# Patient Record
Sex: MALE | Race: WHITE | NOT HISPANIC OR LATINO | Employment: OTHER | URBAN - METROPOLITAN AREA
[De-identification: names, ages, dates, MRNs, and addresses within clinical notes are randomized per-mention and may not be internally consistent; named-entity substitution may affect disease eponyms.]

---

## 2021-06-04 ENCOUNTER — APPOINTMENT (OUTPATIENT)
Dept: RADIOLOGY | Facility: MEDICAL CENTER | Age: 66
End: 2021-06-04
Attending: EMERGENCY MEDICINE
Payer: MEDICARE

## 2021-06-04 ENCOUNTER — HOSPITAL ENCOUNTER (INPATIENT)
Facility: MEDICAL CENTER | Age: 66
LOS: 132 days | DRG: 884 | End: 2021-10-18
Attending: EMERGENCY MEDICINE | Admitting: STUDENT IN AN ORGANIZED HEALTH CARE EDUCATION/TRAINING PROGRAM
Payer: MEDICARE

## 2021-06-04 ENCOUNTER — HOSPITAL ENCOUNTER (OUTPATIENT)
Facility: MEDICAL CENTER | Age: 66
End: 2021-06-04
Attending: EMERGENCY MEDICINE | Admitting: INTERNAL MEDICINE
Payer: MEDICARE

## 2021-06-04 ENCOUNTER — APPOINTMENT (OUTPATIENT)
Dept: RADIOLOGY | Facility: MEDICAL CENTER | Age: 66
DRG: 884 | End: 2021-06-04
Attending: EMERGENCY MEDICINE
Payer: MEDICARE

## 2021-06-04 ENCOUNTER — APPOINTMENT (OUTPATIENT)
Dept: RADIOLOGY | Facility: MEDICAL CENTER | Age: 66
End: 2021-06-04
Attending: INTERNAL MEDICINE
Payer: MEDICARE

## 2021-06-04 VITALS
RESPIRATION RATE: 20 BRPM | HEIGHT: 72 IN | OXYGEN SATURATION: 93 % | HEART RATE: 98 BPM | SYSTOLIC BLOOD PRESSURE: 121 MMHG | WEIGHT: 209.66 LBS | TEMPERATURE: 97.7 F | DIASTOLIC BLOOD PRESSURE: 69 MMHG | BODY MASS INDEX: 28.4 KG/M2

## 2021-06-04 DIAGNOSIS — B35.1 ONYCHOMYCOSIS: ICD-10-CM

## 2021-06-04 DIAGNOSIS — R41.89 COGNITIVE IMPAIRMENT: ICD-10-CM

## 2021-06-04 DIAGNOSIS — E87.20 LACTIC ACIDOSIS: ICD-10-CM

## 2021-06-04 DIAGNOSIS — E86.0 DEHYDRATION: ICD-10-CM

## 2021-06-04 DIAGNOSIS — R41.82 ALTERED MENTAL STATUS, UNSPECIFIED ALTERED MENTAL STATUS TYPE: ICD-10-CM

## 2021-06-04 PROBLEM — R55 SYNCOPE: Status: ACTIVE | Noted: 2021-06-04

## 2021-06-04 PROBLEM — R79.89 ELEVATED LACTIC ACID LEVEL: Status: ACTIVE | Noted: 2021-06-04

## 2021-06-04 PROBLEM — N17.9 AKI (ACUTE KIDNEY INJURY) (HCC): Status: ACTIVE | Noted: 2021-06-04

## 2021-06-04 PROBLEM — E87.29 METABOLIC ACIDOSIS, INCREASED ANION GAP (IAG): Status: ACTIVE | Noted: 2021-06-04

## 2021-06-04 PROBLEM — E83.39 HYPOPHOSPHATEMIA: Status: ACTIVE | Noted: 2021-06-04

## 2021-06-04 PROBLEM — R00.0 TACHYCARDIA: Status: ACTIVE | Noted: 2021-06-04

## 2021-06-04 PROBLEM — E87.6 HYPOKALEMIA: Status: ACTIVE | Noted: 2021-06-04

## 2021-06-04 LAB
ALBUMIN SERPL BCP-MCNC: 4 G/DL (ref 3.2–4.9)
ALBUMIN SERPL BCP-MCNC: 4.3 G/DL (ref 3.2–4.9)
ALBUMIN/GLOB SERPL: 1.7 G/DL
ALBUMIN/GLOB SERPL: 1.7 G/DL
ALP SERPL-CCNC: 62 U/L (ref 30–99)
ALP SERPL-CCNC: 69 U/L (ref 30–99)
ALT SERPL-CCNC: 25 U/L (ref 2–50)
ALT SERPL-CCNC: 30 U/L (ref 2–50)
ANION GAP SERPL CALC-SCNC: 23 MMOL/L (ref 7–16)
ANION GAP SERPL CALC-SCNC: 25 MMOL/L (ref 7–16)
APPEARANCE UR: CLEAR
AST SERPL-CCNC: 29 U/L (ref 12–45)
AST SERPL-CCNC: 30 U/L (ref 12–45)
BACTERIA #/AREA URNS HPF: NEGATIVE /HPF
BASOPHILS # BLD AUTO: 0.4 % (ref 0–1.8)
BASOPHILS # BLD AUTO: 0.5 % (ref 0–1.8)
BASOPHILS # BLD: 0.03 K/UL (ref 0–0.12)
BASOPHILS # BLD: 0.04 K/UL (ref 0–0.12)
BILIRUB SERPL-MCNC: 0.8 MG/DL (ref 0.1–1.5)
BILIRUB SERPL-MCNC: 0.9 MG/DL (ref 0.1–1.5)
BILIRUB UR QL STRIP.AUTO: NEGATIVE
BUN SERPL-MCNC: 12 MG/DL (ref 8–22)
BUN SERPL-MCNC: 15 MG/DL (ref 8–22)
CALCIUM SERPL-MCNC: 9 MG/DL (ref 8.5–10.5)
CALCIUM SERPL-MCNC: 9.9 MG/DL (ref 8.5–10.5)
CHLORIDE SERPL-SCNC: 101 MMOL/L (ref 96–112)
CHLORIDE SERPL-SCNC: 111 MMOL/L (ref 96–112)
CK SERPL-CCNC: 336 U/L (ref 0–154)
CO2 SERPL-SCNC: 11 MMOL/L (ref 20–33)
CO2 SERPL-SCNC: 14 MMOL/L (ref 20–33)
COLOR UR: YELLOW
CREAT SERPL-MCNC: 1.31 MG/DL (ref 0.5–1.4)
CREAT SERPL-MCNC: 1.48 MG/DL (ref 0.5–1.4)
CRP SERPL HS-MCNC: <0.3 MG/DL (ref 0–0.75)
D DIMER PPP IA.FEU-MCNC: 1.34 UG/ML (FEU) (ref 0–0.5)
EKG IMPRESSION: NORMAL
EOSINOPHIL # BLD AUTO: 0.03 K/UL (ref 0–0.51)
EOSINOPHIL # BLD AUTO: 0.1 K/UL (ref 0–0.51)
EOSINOPHIL NFR BLD: 0.4 % (ref 0–6.9)
EOSINOPHIL NFR BLD: 1.5 % (ref 0–6.9)
EPI CELLS #/AREA URNS HPF: NEGATIVE /HPF
ERYTHROCYTE [DISTWIDTH] IN BLOOD BY AUTOMATED COUNT: 50.6 FL (ref 35.9–50)
ERYTHROCYTE [DISTWIDTH] IN BLOOD BY AUTOMATED COUNT: 51.2 FL (ref 35.9–50)
ERYTHROCYTE [SEDIMENTATION RATE] IN BLOOD BY WESTERGREN METHOD: 10 MM/HOUR (ref 0–20)
ERYTHROCYTE [SEDIMENTATION RATE] IN BLOOD BY WESTERGREN METHOD: 8 MM/HOUR (ref 0–20)
EST. AVERAGE GLUCOSE BLD GHB EST-MCNC: 94 MG/DL
ETHANOL BLD-MCNC: <10.1 MG/DL (ref 0–10)
FLUAV RNA SPEC QL NAA+PROBE: NEGATIVE
FLUBV RNA SPEC QL NAA+PROBE: NEGATIVE
GLOBULIN SER CALC-MCNC: 2.3 G/DL (ref 1.9–3.5)
GLOBULIN SER CALC-MCNC: 2.5 G/DL (ref 1.9–3.5)
GLUCOSE BLD-MCNC: 87 MG/DL (ref 65–99)
GLUCOSE SERPL-MCNC: 67 MG/DL (ref 65–99)
GLUCOSE SERPL-MCNC: 97 MG/DL (ref 65–99)
GLUCOSE UR STRIP.AUTO-MCNC: NEGATIVE MG/DL
HBA1C MFR BLD: 4.9 % (ref 4–5.6)
HCT VFR BLD AUTO: 38.5 % (ref 42–52)
HCT VFR BLD AUTO: 40.6 % (ref 42–52)
HGB BLD-MCNC: 12.9 G/DL (ref 14–18)
HGB BLD-MCNC: 13.5 G/DL (ref 14–18)
HYALINE CASTS #/AREA URNS LPF: ABNORMAL /LPF
IMM GRANULOCYTES # BLD AUTO: 0.02 K/UL (ref 0–0.11)
IMM GRANULOCYTES # BLD AUTO: 0.03 K/UL (ref 0–0.11)
IMM GRANULOCYTES NFR BLD AUTO: 0.3 % (ref 0–0.9)
IMM GRANULOCYTES NFR BLD AUTO: 0.4 % (ref 0–0.9)
INR PPP: 1.29 (ref 0.87–1.13)
KETONES UR STRIP.AUTO-MCNC: >=160 MG/DL
LACTATE BLD-SCNC: 1.7 MMOL/L (ref 0.5–2)
LACTATE BLD-SCNC: 2.6 MMOL/L (ref 0.5–2)
LACTATE BLD-SCNC: 2.7 MMOL/L (ref 0.5–2)
LACTATE BLD-SCNC: 3 MMOL/L (ref 0.5–2)
LEUKOCYTE ESTERASE UR QL STRIP.AUTO: NEGATIVE
LYMPHOCYTES # BLD AUTO: 0.81 K/UL (ref 1–4.8)
LYMPHOCYTES # BLD AUTO: 1 K/UL (ref 1–4.8)
LYMPHOCYTES NFR BLD: 14.6 % (ref 22–41)
LYMPHOCYTES NFR BLD: 9.7 % (ref 22–41)
MAGNESIUM SERPL-MCNC: 1.9 MG/DL (ref 1.5–2.5)
MCH RBC QN AUTO: 32.5 PG (ref 27–33)
MCH RBC QN AUTO: 32.7 PG (ref 27–33)
MCHC RBC AUTO-ENTMCNC: 33.3 G/DL (ref 33.7–35.3)
MCHC RBC AUTO-ENTMCNC: 33.5 G/DL (ref 33.7–35.3)
MCV RBC AUTO: 97.5 FL (ref 81.4–97.8)
MCV RBC AUTO: 97.8 FL (ref 81.4–97.8)
MICRO URNS: ABNORMAL
MONOCYTES # BLD AUTO: 0.62 K/UL (ref 0–0.85)
MONOCYTES # BLD AUTO: 0.69 K/UL (ref 0–0.85)
MONOCYTES NFR BLD AUTO: 8.3 % (ref 0–13.4)
MONOCYTES NFR BLD AUTO: 9 % (ref 0–13.4)
NEUTROPHILS # BLD AUTO: 5.1 K/UL (ref 1.82–7.42)
NEUTROPHILS # BLD AUTO: 6.71 K/UL (ref 1.82–7.42)
NEUTROPHILS NFR BLD: 74.2 % (ref 44–72)
NEUTROPHILS NFR BLD: 80.7 % (ref 44–72)
NITRITE UR QL STRIP.AUTO: NEGATIVE
NRBC # BLD AUTO: 0 K/UL
NRBC # BLD AUTO: 0 K/UL
NRBC BLD-RTO: 0 /100 WBC
NRBC BLD-RTO: 0 /100 WBC
NT-PROBNP SERPL IA-MCNC: 98 PG/ML (ref 0–125)
PH UR STRIP.AUTO: 5 [PH] (ref 5–8)
PHOSPHATE SERPL-MCNC: 2.4 MG/DL (ref 2.5–4.5)
PLATELET # BLD AUTO: 211 K/UL (ref 164–446)
PLATELET # BLD AUTO: 215 K/UL (ref 164–446)
PMV BLD AUTO: 9.7 FL (ref 9–12.9)
PMV BLD AUTO: 9.8 FL (ref 9–12.9)
POTASSIUM SERPL-SCNC: 3.4 MMOL/L (ref 3.6–5.5)
POTASSIUM SERPL-SCNC: 3.6 MMOL/L (ref 3.6–5.5)
PROCALCITONIN SERPL-MCNC: <0.05 NG/ML
PROT SERPL-MCNC: 6.3 G/DL (ref 6–8.2)
PROT SERPL-MCNC: 6.8 G/DL (ref 6–8.2)
PROT UR QL STRIP: NEGATIVE MG/DL
PROTHROMBIN TIME: 16.5 SEC (ref 12–14.6)
RBC # BLD AUTO: 3.95 M/UL (ref 4.7–6.1)
RBC # BLD AUTO: 4.15 M/UL (ref 4.7–6.1)
RBC # URNS HPF: ABNORMAL /HPF
RBC UR QL AUTO: ABNORMAL
RSV RNA SPEC QL NAA+PROBE: NEGATIVE
SARS-COV-2 RNA RESP QL NAA+PROBE: NOTDETECTED
SODIUM SERPL-SCNC: 140 MMOL/L (ref 135–145)
SODIUM SERPL-SCNC: 145 MMOL/L (ref 135–145)
SP GR UR STRIP.AUTO: 1.01
SPECIMEN SOURCE: NORMAL
TROPONIN T SERPL-MCNC: 15 NG/L (ref 6–19)
TROPONIN T SERPL-MCNC: 20 NG/L (ref 6–19)
TSH SERPL DL<=0.005 MIU/L-ACNC: 2.42 UIU/ML (ref 0.38–5.33)
UROBILINOGEN UR STRIP.AUTO-MCNC: 0.2 MG/DL
WBC # BLD AUTO: 6.9 K/UL (ref 4.8–10.8)
WBC # BLD AUTO: 8.3 K/UL (ref 4.8–10.8)
WBC #/AREA URNS HPF: ABNORMAL /HPF

## 2021-06-04 PROCEDURE — 85610 PROTHROMBIN TIME: CPT

## 2021-06-04 PROCEDURE — 83735 ASSAY OF MAGNESIUM: CPT

## 2021-06-04 PROCEDURE — 93005 ELECTROCARDIOGRAM TRACING: CPT | Performed by: EMERGENCY MEDICINE

## 2021-06-04 PROCEDURE — 86140 C-REACTIVE PROTEIN: CPT

## 2021-06-04 PROCEDURE — 80053 COMPREHEN METABOLIC PANEL: CPT

## 2021-06-04 PROCEDURE — 82077 ASSAY SPEC XCP UR&BREATH IA: CPT

## 2021-06-04 PROCEDURE — A9270 NON-COVERED ITEM OR SERVICE: HCPCS | Performed by: STUDENT IN AN ORGANIZED HEALTH CARE EDUCATION/TRAINING PROGRAM

## 2021-06-04 PROCEDURE — 83605 ASSAY OF LACTIC ACID: CPT | Mod: 91

## 2021-06-04 PROCEDURE — 85379 FIBRIN DEGRADATION QUANT: CPT

## 2021-06-04 PROCEDURE — 99219 PR INITIAL OBSERVATION CARE,LEVL II: CPT | Performed by: STUDENT IN AN ORGANIZED HEALTH CARE EDUCATION/TRAINING PROGRAM

## 2021-06-04 PROCEDURE — 81001 URINALYSIS AUTO W/SCOPE: CPT

## 2021-06-04 PROCEDURE — 85652 RBC SED RATE AUTOMATED: CPT | Mod: 91

## 2021-06-04 PROCEDURE — 83520 IMMUNOASSAY QUANT NOS NONAB: CPT

## 2021-06-04 PROCEDURE — 700102 HCHG RX REV CODE 250 W/ 637 OVERRIDE(OP): Performed by: STUDENT IN AN ORGANIZED HEALTH CARE EDUCATION/TRAINING PROGRAM

## 2021-06-04 PROCEDURE — A9270 NON-COVERED ITEM OR SERVICE: HCPCS | Performed by: INTERNAL MEDICINE

## 2021-06-04 PROCEDURE — G0378 HOSPITAL OBSERVATION PER HR: HCPCS

## 2021-06-04 PROCEDURE — 70450 CT HEAD/BRAIN W/O DYE: CPT

## 2021-06-04 PROCEDURE — 84443 ASSAY THYROID STIM HORMONE: CPT

## 2021-06-04 PROCEDURE — 84484 ASSAY OF TROPONIN QUANT: CPT | Mod: 91

## 2021-06-04 PROCEDURE — 84145 PROCALCITONIN (PCT): CPT

## 2021-06-04 PROCEDURE — 82550 ASSAY OF CK (CPK): CPT

## 2021-06-04 PROCEDURE — 93005 ELECTROCARDIOGRAM TRACING: CPT

## 2021-06-04 PROCEDURE — 0241U HCHG SARS-COV-2 COVID-19 NFCT DS RESP RNA 4 TRGT MIC: CPT

## 2021-06-04 PROCEDURE — 84484 ASSAY OF TROPONIN QUANT: CPT

## 2021-06-04 PROCEDURE — 700105 HCHG RX REV CODE 258: Performed by: EMERGENCY MEDICINE

## 2021-06-04 PROCEDURE — 81003 URINALYSIS AUTO W/O SCOPE: CPT | Mod: XU

## 2021-06-04 PROCEDURE — 83036 HEMOGLOBIN GLYCOSYLATED A1C: CPT

## 2021-06-04 PROCEDURE — 85025 COMPLETE CBC W/AUTO DIFF WBC: CPT

## 2021-06-04 PROCEDURE — 83880 ASSAY OF NATRIURETIC PEPTIDE: CPT

## 2021-06-04 PROCEDURE — 700102 HCHG RX REV CODE 250 W/ 637 OVERRIDE(OP): Performed by: INTERNAL MEDICINE

## 2021-06-04 PROCEDURE — 71045 X-RAY EXAM CHEST 1 VIEW: CPT

## 2021-06-04 PROCEDURE — 85025 COMPLETE CBC W/AUTO DIFF WBC: CPT | Mod: 91

## 2021-06-04 PROCEDURE — 87086 URINE CULTURE/COLONY COUNT: CPT

## 2021-06-04 PROCEDURE — 83605 ASSAY OF LACTIC ACID: CPT

## 2021-06-04 PROCEDURE — 700105 HCHG RX REV CODE 258: Performed by: STUDENT IN AN ORGANIZED HEALTH CARE EDUCATION/TRAINING PROGRAM

## 2021-06-04 PROCEDURE — 80053 COMPREHEN METABOLIC PANEL: CPT | Mod: 91

## 2021-06-04 PROCEDURE — 36415 COLL VENOUS BLD VENIPUNCTURE: CPT

## 2021-06-04 PROCEDURE — 87040 BLOOD CULTURE FOR BACTERIA: CPT

## 2021-06-04 PROCEDURE — 99220 PR INITIAL OBSERVATION CARE,LEVL III: CPT | Performed by: INTERNAL MEDICINE

## 2021-06-04 PROCEDURE — 700111 HCHG RX REV CODE 636 W/ 250 OVERRIDE (IP): Performed by: STUDENT IN AN ORGANIZED HEALTH CARE EDUCATION/TRAINING PROGRAM

## 2021-06-04 PROCEDURE — 306565 RIGID MIT RESTRAINT(PAIR): Performed by: STUDENT IN AN ORGANIZED HEALTH CARE EDUCATION/TRAINING PROGRAM

## 2021-06-04 PROCEDURE — 99285 EMERGENCY DEPT VISIT HI MDM: CPT

## 2021-06-04 PROCEDURE — 82962 GLUCOSE BLOOD TEST: CPT

## 2021-06-04 PROCEDURE — 71046 X-RAY EXAM CHEST 2 VIEWS: CPT

## 2021-06-04 PROCEDURE — 84100 ASSAY OF PHOSPHORUS: CPT

## 2021-06-04 PROCEDURE — C9803 HOPD COVID-19 SPEC COLLECT: HCPCS | Performed by: INTERNAL MEDICINE

## 2021-06-04 RX ORDER — ONDANSETRON 4 MG/1
4 TABLET, ORALLY DISINTEGRATING ORAL EVERY 4 HOURS PRN
Status: DISCONTINUED | OUTPATIENT
Start: 2021-06-04 | End: 2021-10-07

## 2021-06-04 RX ORDER — ASPIRIN 81 MG/1
324 TABLET, CHEWABLE ORAL DAILY
Status: DISCONTINUED | OUTPATIENT
Start: 2021-06-05 | End: 2021-07-24

## 2021-06-04 RX ORDER — BISACODYL 10 MG
10 SUPPOSITORY, RECTAL RECTAL
Status: DISCONTINUED | OUTPATIENT
Start: 2021-06-04 | End: 2021-06-04

## 2021-06-04 RX ORDER — BISACODYL 10 MG
10 SUPPOSITORY, RECTAL RECTAL
Status: DISCONTINUED | OUTPATIENT
Start: 2021-06-04 | End: 2021-06-04 | Stop reason: HOSPADM

## 2021-06-04 RX ORDER — LABETALOL HYDROCHLORIDE 5 MG/ML
10 INJECTION, SOLUTION INTRAVENOUS EVERY 4 HOURS PRN
Status: DISCONTINUED | OUTPATIENT
Start: 2021-06-04 | End: 2021-06-04 | Stop reason: HOSPADM

## 2021-06-04 RX ORDER — POLYETHYLENE GLYCOL 3350 17 G/17G
1 POWDER, FOR SOLUTION ORAL
Status: DISCONTINUED | OUTPATIENT
Start: 2021-06-04 | End: 2021-06-04

## 2021-06-04 RX ORDER — AMOXICILLIN 250 MG
2 CAPSULE ORAL 2 TIMES DAILY
Status: DISCONTINUED | OUTPATIENT
Start: 2021-06-04 | End: 2021-06-04 | Stop reason: HOSPADM

## 2021-06-04 RX ORDER — ASPIRIN 300 MG/1
300 SUPPOSITORY RECTAL DAILY
Status: DISCONTINUED | OUTPATIENT
Start: 2021-06-05 | End: 2021-07-24

## 2021-06-04 RX ORDER — SODIUM CHLORIDE 9 MG/ML
1000 INJECTION, SOLUTION INTRAVENOUS ONCE
Status: COMPLETED | OUTPATIENT
Start: 2021-06-04 | End: 2021-06-04

## 2021-06-04 RX ORDER — ONDANSETRON 2 MG/ML
4 INJECTION INTRAMUSCULAR; INTRAVENOUS EVERY 4 HOURS PRN
Status: DISCONTINUED | OUTPATIENT
Start: 2021-06-04 | End: 2021-06-04 | Stop reason: HOSPADM

## 2021-06-04 RX ORDER — ACETAMINOPHEN 325 MG/1
650 TABLET ORAL EVERY 6 HOURS PRN
Status: DISCONTINUED | OUTPATIENT
Start: 2021-06-04 | End: 2021-06-04 | Stop reason: HOSPADM

## 2021-06-04 RX ORDER — REGADENOSON 0.08 MG/ML
0.4 INJECTION, SOLUTION INTRAVENOUS
Status: DISCONTINUED | OUTPATIENT
Start: 2021-06-04 | End: 2021-07-08

## 2021-06-04 RX ORDER — ONDANSETRON 2 MG/ML
4 INJECTION INTRAMUSCULAR; INTRAVENOUS EVERY 4 HOURS PRN
Status: DISCONTINUED | OUTPATIENT
Start: 2021-06-04 | End: 2021-07-31

## 2021-06-04 RX ORDER — AMOXICILLIN 250 MG
2 CAPSULE ORAL 2 TIMES DAILY
Status: DISCONTINUED | OUTPATIENT
Start: 2021-06-04 | End: 2021-06-04

## 2021-06-04 RX ORDER — POLYETHYLENE GLYCOL 3350 17 G/17G
1 POWDER, FOR SOLUTION ORAL
Status: DISCONTINUED | OUTPATIENT
Start: 2021-06-04 | End: 2021-06-04 | Stop reason: HOSPADM

## 2021-06-04 RX ORDER — LABETALOL HYDROCHLORIDE 5 MG/ML
10 INJECTION, SOLUTION INTRAVENOUS EVERY 4 HOURS PRN
Status: DISCONTINUED | OUTPATIENT
Start: 2021-06-04 | End: 2021-07-08

## 2021-06-04 RX ORDER — POLYETHYLENE GLYCOL 3350 17 G/17G
1 POWDER, FOR SOLUTION ORAL
Status: DISCONTINUED | OUTPATIENT
Start: 2021-06-04 | End: 2021-08-30

## 2021-06-04 RX ORDER — AMOXICILLIN 250 MG
2 CAPSULE ORAL 2 TIMES DAILY
Status: DISCONTINUED | OUTPATIENT
Start: 2021-06-04 | End: 2021-08-30

## 2021-06-04 RX ORDER — HALOPERIDOL 5 MG/ML
2-5 INJECTION INTRAMUSCULAR EVERY 4 HOURS PRN
Status: DISCONTINUED | OUTPATIENT
Start: 2021-06-04 | End: 2021-07-08

## 2021-06-04 RX ORDER — ACETAMINOPHEN 325 MG/1
650 TABLET ORAL EVERY 6 HOURS PRN
Status: DISCONTINUED | OUTPATIENT
Start: 2021-06-04 | End: 2021-06-04

## 2021-06-04 RX ORDER — ONDANSETRON 4 MG/1
4 TABLET, ORALLY DISINTEGRATING ORAL EVERY 4 HOURS PRN
Status: DISCONTINUED | OUTPATIENT
Start: 2021-06-04 | End: 2021-06-04 | Stop reason: HOSPADM

## 2021-06-04 RX ORDER — BISACODYL 10 MG
10 SUPPOSITORY, RECTAL RECTAL
Status: DISCONTINUED | OUTPATIENT
Start: 2021-06-04 | End: 2021-08-30

## 2021-06-04 RX ORDER — SODIUM CHLORIDE, SODIUM LACTATE, POTASSIUM CHLORIDE, CALCIUM CHLORIDE 600; 310; 30; 20 MG/100ML; MG/100ML; MG/100ML; MG/100ML
INJECTION, SOLUTION INTRAVENOUS CONTINUOUS
Status: DISCONTINUED | OUTPATIENT
Start: 2021-06-04 | End: 2021-06-04

## 2021-06-04 RX ORDER — AMINOPHYLLINE 25 MG/ML
100 INJECTION, SOLUTION INTRAVENOUS
Status: DISCONTINUED | OUTPATIENT
Start: 2021-06-04 | End: 2021-07-08

## 2021-06-04 RX ORDER — ENALAPRILAT 1.25 MG/ML
1.25 INJECTION INTRAVENOUS EVERY 6 HOURS PRN
Status: DISCONTINUED | OUTPATIENT
Start: 2021-06-04 | End: 2021-06-04 | Stop reason: HOSPADM

## 2021-06-04 RX ORDER — ENALAPRILAT 1.25 MG/ML
1.25 INJECTION INTRAVENOUS EVERY 6 HOURS PRN
Status: DISCONTINUED | OUTPATIENT
Start: 2021-06-04 | End: 2021-07-08

## 2021-06-04 RX ORDER — SODIUM CHLORIDE, SODIUM LACTATE, POTASSIUM CHLORIDE, CALCIUM CHLORIDE 600; 310; 30; 20 MG/100ML; MG/100ML; MG/100ML; MG/100ML
INJECTION, SOLUTION INTRAVENOUS CONTINUOUS
Status: DISCONTINUED | OUTPATIENT
Start: 2021-06-04 | End: 2021-06-05

## 2021-06-04 RX ORDER — ASPIRIN 325 MG
325 TABLET ORAL DAILY
Status: DISCONTINUED | OUTPATIENT
Start: 2021-06-05 | End: 2021-07-24

## 2021-06-04 RX ORDER — ACETAMINOPHEN 325 MG/1
650 TABLET ORAL EVERY 6 HOURS PRN
Status: DISCONTINUED | OUTPATIENT
Start: 2021-06-04 | End: 2021-10-18 | Stop reason: HOSPADM

## 2021-06-04 RX ADMIN — SODIUM CHLORIDE 1000 ML: 9 INJECTION, SOLUTION INTRAVENOUS at 18:27

## 2021-06-04 RX ADMIN — HALOPERIDOL LACTATE 5 MG: 5 INJECTION, SOLUTION INTRAMUSCULAR at 22:50

## 2021-06-04 RX ADMIN — DIBASIC SODIUM PHOSPHATE, MONOBASIC POTASSIUM PHOSPHATE AND MONOBASIC SODIUM PHOSPHATE 250 MG: 852; 155; 130 TABLET ORAL at 12:40

## 2021-06-04 RX ADMIN — DOCUSATE SODIUM 50 MG AND SENNOSIDES 8.6 MG 2 TABLET: 8.6; 5 TABLET, FILM COATED ORAL at 21:24

## 2021-06-04 RX ADMIN — SODIUM CHLORIDE, POTASSIUM CHLORIDE, SODIUM LACTATE AND CALCIUM CHLORIDE: 600; 310; 30; 20 INJECTION, SOLUTION INTRAVENOUS at 21:26

## 2021-06-04 RX ADMIN — SODIUM CHLORIDE 1000 ML: 9 INJECTION, SOLUTION INTRAVENOUS at 09:45

## 2021-06-04 RX ADMIN — SODIUM CHLORIDE 1000 ML: 9 INJECTION, SOLUTION INTRAVENOUS at 18:26

## 2021-06-04 RX ADMIN — SODIUM CHLORIDE 1000 ML: 9 INJECTION, SOLUTION INTRAVENOUS at 11:11

## 2021-06-04 RX ADMIN — DIBASIC SODIUM PHOSPHATE, MONOBASIC POTASSIUM PHOSPHATE AND MONOBASIC SODIUM PHOSPHATE 250 MG: 852; 155; 130 TABLET ORAL at 21:25

## 2021-06-04 ASSESSMENT — COGNITIVE AND FUNCTIONAL STATUS - GENERAL
PERSONAL GROOMING: A LITTLE
STANDING UP FROM CHAIR USING ARMS: A LITTLE
EATING MEALS: A LITTLE
SUGGESTED CMS G CODE MODIFIER MOBILITY: CK
MOVING TO AND FROM BED TO CHAIR: A LITTLE
DRESSING REGULAR LOWER BODY CLOTHING: A LITTLE
MOBILITY SCORE: 17
HELP NEEDED FOR BATHING: A LITTLE
TURNING FROM BACK TO SIDE WHILE IN FLAT BAD: A LITTLE
MOVING FROM LYING ON BACK TO SITTING ON SIDE OF FLAT BED: A LITTLE
WALKING IN HOSPITAL ROOM: A LITTLE
CLIMB 3 TO 5 STEPS WITH RAILING: A LOT
SUGGESTED CMS G CODE MODIFIER DAILY ACTIVITY: CK
DAILY ACTIVITIY SCORE: 18
TOILETING: A LITTLE
DRESSING REGULAR UPPER BODY CLOTHING: A LITTLE

## 2021-06-04 ASSESSMENT — LIFESTYLE VARIABLES
TOTAL SCORE: 0
HAVE YOU EVER FELT YOU SHOULD CUT DOWN ON YOUR DRINKING: NO
TOTAL SCORE: 0
TOTAL SCORE: 0
ALCOHOL_USE: NO
DO YOU DRINK ALCOHOL: NO
HOW MANY TIMES IN THE PAST YEAR HAVE YOU HAD 5 OR MORE DRINKS IN A DAY: 0
HAVE PEOPLE ANNOYED YOU BY CRITICIZING YOUR DRINKING: NO
AVERAGE NUMBER OF DAYS PER WEEK YOU HAVE A DRINK CONTAINING ALCOHOL: 0
ON A TYPICAL DAY WHEN YOU DRINK ALCOHOL HOW MANY DRINKS DO YOU HAVE: 0
EVER HAD A DRINK FIRST THING IN THE MORNING TO STEADY YOUR NERVES TO GET RID OF A HANGOVER: NO
EVER FELT BAD OR GUILTY ABOUT YOUR DRINKING: NO
CONSUMPTION TOTAL: NEGATIVE

## 2021-06-04 ASSESSMENT — PAIN DESCRIPTION - PAIN TYPE
TYPE: ACUTE PAIN
TYPE: ACUTE PAIN

## 2021-06-04 ASSESSMENT — ENCOUNTER SYMPTOMS
PALPITATIONS: 1
FALLS: 1
WEAKNESS: 1
DIZZINESS: 1
LOSS OF CONSCIOUSNESS: 1

## 2021-06-04 ASSESSMENT — PATIENT HEALTH QUESTIONNAIRE - PHQ9
2. FEELING DOWN, DEPRESSED, IRRITABLE, OR HOPELESS: NOT AT ALL
1. LITTLE INTEREST OR PLEASURE IN DOING THINGS: NOT AT ALL
SUM OF ALL RESPONSES TO PHQ9 QUESTIONS 1 AND 2: 0

## 2021-06-04 ASSESSMENT — FIBROSIS 4 INDEX
FIB4 SCORE: 1.67
FIB4 SCORE: 1.93
FIB4 SCORE: 1.67

## 2021-06-04 NOTE — ED TRIAGE NOTES
BIBA with report of ALOC.  Pt discharged AMA from CDU earlier today.  Pt found lying on sidewalk with multiple witnessed falls and confusion.  Pt oriented to name only at this time.  Pt moving all extremities, no unilateral deficits noted.  Pt talking nonsensical conversation.  EMS reports initially tachycardic in 140's and dressed in winter clothing.  Given 500ml IVF, cardiac monitor now showing ST at 109.

## 2021-06-04 NOTE — DISCHARGE SUMMARY
"Discharge Summary    CHIEF COMPLAINT ON ADMISSION  Chief Complaint   Patient presents with   • Dizziness   • Syncope     Near       Reason for Admission  ems     Admission Date  6/4/2021    CODE STATUS  Full Code    HPI & HOSPITAL COURSE  This is a 66 y.o. male with no known past medical history, who presented 6/4/2021 with multiple random episodes of lightheadedness and dizziness over the last few weeks, which he says most often occurs when he bends down to  something, with sensation of passing out, and few episodes of loss of consciousness finding himself on the floor. He had no other complaints such as chest pain, shortness of breath, palpitations, nausea, vomiting, fevers or chills, abdominal pain, or bowel movement changes.  He had no associated neurologic symptoms such as focal weakness or numbness, speech changes, or visual field defects.  He does say that he thinks he eats and drinks adequately, and denies using alcohol, tobacco, or recreational drug use.  He does report severe anxiety when he tries to \"step on a curb\", which is better when he goes around the curb instead.  Notably, he is a poor historian, with very odd affect, and is very tangential and circumstantial.  He states he lives in a hotel. Today, he was walking towards a construction site, when a vacuum canister that he was holding fell to the ground, and as he bent down to pick it up, he suddenly felt lightheaded, weak, and subsequently lost consciousness.  EMS found him with a blood sugar of 69, and given D10, thiamine, which improved his blood sugar to 188.  He was then brought to the ED.     The patient was initially evaluated.  Vital signs were stable, albeit blood pressure elevated.  Tachycardic.  Orthostatic vital signs were positive. Initial blood work-up showed no leukocytosis, with stable hemoglobin, potassium of 3.4, CO2 14, anion gap 25, creatinine of 1.48, and blood glucose of 97.  Magnesium was 1.9.  Lactate was 3.0.  " Phosphate level was 2.4.  Troponin was negative.  BNP was low.  Chest x-ray did not show any infiltrates or consolidation.  Head CT showed nothing acute.  EKG showed sinus tachycardia, with T wave inversions in the inferior lateral leads.  He was not complaining of any chest pains.   He was felt to be dehydrated, and patient was given 2 liters of IV normal saline, and admitted for further evaluation.  However, patient then decided to leave AGAINST MEDICAL ADVICE.  Patient was counseled regarding risks that he is taking by leaving AGAINST MEDICAL ADVICE, including increased morbidity from undiagnosed and untreated condition, and even death.  Although patient has odd behavior and thought process, patient understood, and accepted these risks.  He has full capacity to make informed decisions, and could not be held against his will. Despite our best effort to convince him to stay, he decided to leave.  He was counseled to follow-up with his primary physician, or to return to ED for worsening condition.    Therefore, he is discharged in fair and stable condition against medcial advice.      Discharge Date  6/4/2021      FOLLOW UP ITEMS POST DISCHARGE  --- counseled to seek immediate medical attention, or return to the ED for recurrent or worsening symptoms.  He is counseled to keep himself well-hydrated.      DISCHARGE DIAGNOSES  Principal Problem:    Syncope POA: Yes  Active Problems:    Hypokalemia POA: Yes    Hypophosphatemia POA: Yes    Metabolic acidosis, increased anion gap (IAG) POA: Yes    LANDY (acute kidney injury) (HCC) POA: Yes  Resolved Problems:    * No resolved hospital problems. *      FOLLOW UP  No future appointments.  No follow-up provider specified.    MEDICATIONS ON DISCHARGE     Medication List      You have not been prescribed any medications.         Allergies  No Known Allergies    DIET  Orders Placed This Encounter   Procedures   • Diet Order Diet: Regular; Miscellaneous modifications: (optional):  No Decaf, No Caffeine(for test)     Standing Status:   Standing     Number of Occurrences:   1     Order Specific Question:   Diet:     Answer:   Regular [1]     Order Specific Question:   Miscellaneous modifications: (optional)     Answer:   No Decaf, No Caffeine(for test) [11]       ACTIVITY  As tolerated.  Weight bearing as tolerated    CONSULTATIONS  none    PROCEDURES  none    LABORATORY  Lab Results   Component Value Date    SODIUM 140 06/04/2021    POTASSIUM 3.4 (L) 06/04/2021    CHLORIDE 101 06/04/2021    CO2 14 (L) 06/04/2021    GLUCOSE 97 06/04/2021    BUN 15 06/04/2021    CREATININE 1.48 (H) 06/04/2021        Lab Results   Component Value Date    WBC 6.9 06/04/2021    HEMOGLOBIN 13.5 (L) 06/04/2021    HEMATOCRIT 40.6 (L) 06/04/2021    PLATELETCT 215 06/04/2021

## 2021-06-04 NOTE — PROGRESS NOTES
Assessment completed. Pt A&Ox 3 not to place. Respirations are even and unlabored on room air. Pt denies/reports pain at this time. Monitors applied, VS stable, call light and belongings within reach. POC updated (Stress test). Pt educated on room and call light, pt verbalized understanding. Communication board updated. Needs met.

## 2021-06-04 NOTE — ED NOTES
Med Rec completed: per pt at bedside.     No ORAL antibiotics in last 14 days    Preferred Pharmacy: [facility]    Pt confirmed following allergies:  No Known Allergies     Pt's home medications:   No current facility-administered medications on file prior to encounter.     No current outpatient medications on file prior to encounter.

## 2021-06-04 NOTE — ASSESSMENT & PLAN NOTE
-Likely this is due to volume depletion given his metabolic acidosis, LANDY, tachycardia, along with hypokalemia and hypophosphatemia, and possible orthostasis.  He is homeless, and I presume that he is not drinking or eating enough given relative low blood sugar level and tachycardia when he was found by the EMS.  -I will hydrate him with IV LR at 125 cc/h, and replace his potassium and phosphorus deficiencies.  Check orthostatic vital signs.  Check HbA1c, and monitor BG's.  -For further work-up, I will send for echocardiogram, and carotid ultrasound. Will trend troponins.  As he had abnormal EKGs, I will obtain a nuclear cardiac stress test.  Check d-dimer, and if positive proceed to rule out PE.  Monitor on telemetry to rule out any arrhythmias.  Given recurrent syncopal episodes, I will check a tryptase level.  Check urine drug screen.  -If all work-up negative, may need an event monitor or Zio patch for extended cardiac monitoring.

## 2021-06-04 NOTE — ASSESSMENT & PLAN NOTE
-Likely due to lactic acidosis, suspect this is due to dehydration.  -Hydrate with IV LR at 125 cc/h.  Trend lactic acid levels.  Repeat BMP in the morning.

## 2021-06-04 NOTE — H&P
"Hospital Medicine History & Physical Note    Date of Service  6/4/2021    Primary Care Physician  No primary care provider on file.    Consultants  none    Code Status  Full Code    Chief Complaint  Chief Complaint   Patient presents with   • Dizziness   • Syncope     Near       History of Presenting Illness  66 y.o. male with no known past medical history, who presented 6/4/2021 with multiple random episodes of lightheadedness and dizziness over the last few weeks, which he says most often occurs when he bends down to  something, with sensation of passing out, and few episodes of loss of consciousness finding himself on the floor.  He states that prior to losing consciousness, he feels a wave of unsteadiness and weakness on both legs and arms.  He had no other complaints such as chest pain, shortness of breath, palpitations, nausea, vomiting, fevers or chills, abdominal pain, or bowel movement changes.  He had no associated neurologic symptoms such as focal weakness or numbness, speech changes, or visual field defects.  He does say that he thinks he eats and drinks adequately, and denies using alcohol, tobacco, or recreational drug use.  He does report severe anxiety when he tries to \"step on a curb\", which is better when he goes around the curb instead.  Notably, he is a poor historian, with very odd affect, and is very tangential and circumstantial.  He states he lives in a hotel. Today, he was walking towards a construction site, when a vacuum canister that he was holding fell to the ground, and as he bent down to pick it up, he suddenly felt lightheaded, weak, and subsequently lost consciousness.  EMS found him with a blood sugar of 69, and given D10, thiamine, which improved his blood sugar to 188.  He was then brought to the ED.    ED course:  The patient was initially evaluated.  Vital signs were stable, albeit blood pressure elevated.  Tachycardic.  Initial blood work-up showed no leukocytosis, with " stable hemoglobin, potassium of 3.4, CO2 14, anion gap 25, creatinine of 1.48, and blood glucose of 97.  Magnesium was 1.9.  Lactate was 3.0.  Phosphate level was 2.4.  Troponin was negative.  BNP was low.  Chest x-ray (personally reviewed) did not show any infiltrates or consolidation.  Head CT showed nothing acute.  EKG (my read) showed sinus tachycardia, with T wave inversions in the inferior lateral leads.  Patient was given IV fluids, and was subsequent admitted to the hospitalist service.      Review of Systems  ROS     Pertinent positives/negatives as mentioned above.     A complete review of systems was personally done by me. All other systems were negative.       Past Medical History  Reviewed.  No past medical history.    Surgical History  Reviewed.  No past surgical history.    Family History  Reviewed and not pertinent.       Social History   reports that he has never smoked. He has never used smokeless tobacco. He reports previous alcohol use. He reports previous drug use.    Allergies  No Known Allergies    Medications  None       Physical Exam  Temp:  [37 °C (98.6 °F)] 37 °C (98.6 °F)  Pulse:  [110] 110  Resp:  [20] 20  BP: (161)/(82) 161/82  SpO2:  [98 %] 98 %    Physical Exam  Vitals reviewed.   Constitutional:       General: He is not in acute distress.     Appearance: Normal appearance. He is not toxic-appearing or diaphoretic.   HENT:      Head: Normocephalic and atraumatic.      Right Ear: External ear normal.      Left Ear: External ear normal.      Nose: Nose normal.      Mouth/Throat:      Mouth: Mucous membranes are dry.      Pharynx: No oropharyngeal exudate.   Eyes:      General: No scleral icterus.     Extraocular Movements: Extraocular movements intact.      Conjunctiva/sclera: Conjunctivae normal.      Pupils: Pupils are equal, round, and reactive to light.   Cardiovascular:      Rate and Rhythm: Regular rhythm. Tachycardia present.      Heart sounds: Normal heart sounds. No murmur  heard.   No gallop.    Pulmonary:      Effort: Pulmonary effort is normal. No respiratory distress.      Breath sounds: Normal breath sounds. No stridor. No wheezing, rhonchi or rales.   Chest:      Chest wall: No tenderness.   Abdominal:      General: Bowel sounds are normal. There is no distension.      Palpations: Abdomen is soft. There is no mass.      Tenderness: There is no abdominal tenderness. There is no guarding or rebound.   Musculoskeletal:         General: No swelling. Normal range of motion.      Cervical back: Normal range of motion and neck supple.      Right lower leg: No edema.      Left lower leg: No edema.   Lymphadenopathy:      Cervical: No cervical adenopathy.   Skin:     General: Skin is warm and dry.      Coloration: Skin is not jaundiced.      Findings: No rash.   Neurological:      General: No focal deficit present.      Mental Status: He is alert and oriented to person, place, and time.      Cranial Nerves: No cranial nerve deficit.   Psychiatric:         Mood and Affect: Mood normal.         Behavior: Behavior normal.      Comments: Very odd affect  Tangential and circumstantial thought         Laboratory:  Recent Labs     06/04/21  0905   WBC 6.9   RBC 4.15*   HEMOGLOBIN 13.5*   HEMATOCRIT 40.6*   MCV 97.8   MCH 32.5   MCHC 33.3*   RDW 50.6*   PLATELETCT 215   MPV 9.7     Recent Labs     06/04/21  0905   SODIUM 140   POTASSIUM 3.4*   CHLORIDE 101   CO2 14*   GLUCOSE 97   BUN 15   CREATININE 1.48*   CALCIUM 9.9     Recent Labs     06/04/21  0905   ALTSGPT 30   ASTSGOT 29   ALKPHOSPHAT 69   TBILIRUBIN 0.9   GLUCOSE 97         Recent Labs     06/04/21  0905   NTPROBNP 98         Recent Labs     06/04/21  0905   TROPONINT 15       Imaging:  [id number]   Final Result      1.  No acute cardiopulmonary process is identified.   2.  Atherosclerotic plaque.      CT-HEAD W/O   Final Result      1.  No acute intracranial abnormality is identified.   2.  Atrophy   3.  There are periventricular  and subcortical white matter changes present.  This finding is nonspecific and could be from previous small vessel ischemia, demyelination, or gliosis.      EC-ECHOCARDIOGRAM COMPLETE W/O CONT    (Results Pending)   US-CAROTID DOPPLER BILAT    (Results Pending)   NM-CARDIAC STRESS TEST    (Results Pending)         Assessment/Plan:  I anticipate this patient is appropriate for observation status at this time.    * Syncope- (present on admission)  Assessment & Plan  -Likely this is due to volume depletion given his metabolic acidosis, LANDY, tachycardia, along with hypokalemia and hypophosphatemia, and possible orthostasis.  He is homeless, and I presume that he is not drinking or eating enough given relative low blood sugar level and tachycardia when he was found by the EMS.  -I will hydrate him with IV LR at 125 cc/h, and replace his potassium and phosphorus deficiencies.  Check orthostatic vital signs.  Check HbA1c, and monitor BG's.  -For further work-up, I will send for echocardiogram, and carotid ultrasound. Will trend troponins.  As he had abnormal EKGs, I will obtain a nuclear cardiac stress test.  Check d-dimer, and if positive proceed to rule out PE.  Monitor on telemetry to rule out any arrhythmias.  Given recurrent syncopal episodes, I will check a tryptase level.  Check urine drug screen.  -If all work-up negative, may need an event monitor or Zio patch for extended cardiac monitoring.      LANDY (acute kidney injury) (HCC)- (present on admission)  Assessment & Plan  -Suspect prerenal, although we do not have a baseline/prior renal function in the system.  -Hydrate with IV LR at 125 cc/h.  Monitor for improvement in renal function.  - avoid nephrotoxins, and continue to renally dose all medications.      Metabolic acidosis, increased anion gap (IAG)- (present on admission)  Assessment & Plan  -Likely due to lactic acidosis, suspect this is due to dehydration.  -Hydrate with IV LR at 125 cc/h.  Trend lactic  acid levels.  Repeat BMP in the morning.    Hypophosphatemia- (present on admission)  Assessment & Plan  -We will give him 4 doses of oral K-Phos.  Repeat phosphorus level in the morning.    Hypokalemia- (present on admission)  Assessment & Plan  -Replace with 20 mEq of KCl with his IV fluids.  Magnesium level is normal.  Repeat BMP in the morning.      DVT prophylaxis: SCD

## 2021-06-04 NOTE — PROGRESS NOTES
Tele sitter at bedside and lap belt applied. Patient is able to remove lap belt without direction from nursing staff. All safety measures in place.

## 2021-06-04 NOTE — ED TRIAGE NOTES
".  Chief Complaint   Patient presents with   • Dizziness   • Syncope     Near   Pt BIB EMS from local construction site.  Pt walking through construction area, reported feeling dizzy and was assisted to the ground.  Chest pain?  No headache.  Bilateral knee and right elbow abrasions noted, no active bleeding.  Per EMS upon arrival Pt was pale and diaphoretic with initial GCS of 14.  FSBS was 69.  Pt received D10, 175 ml and Thiamine 100mg.  Pt's blood sugar increased to 188.  Pt alert and oriented X 3(date?).  Pt reports no history of diabetes,  \" never been treated for diabetes. \"   "

## 2021-06-04 NOTE — ED NOTES
Pt transported from [facility] on [facility] back to [facility].

## 2021-06-04 NOTE — ED NOTES
"Pt questioned about vacuum cannister that was brought in by EMS.  Pt states \" I was moving the vacuum across the street, saw something and that's when I fell.\"   "

## 2021-06-04 NOTE — ED PROVIDER NOTES
"ED Provider Note    CHIEF COMPLAINT  Chief Complaint   Patient presents with   • Dizziness   • Syncope     Near       [facility]  Lane Beard is a 66 y.o. male who presents with complaint of dizziness and multiple falls.  Patient reports that over the last few weeks he has had multiple episodes of feeling dizzy, reports his dizziness occurs seemingly out of nowhere, he reports it feels like he is going to pass out and therefore he generally goes down and lies on the floor.  She does report having a few episodes of loss of consciousness where he wakes up on the ground.  Today he was walking across the street, started to feel dizzy went down slowly to the ground and then reports he passed out.  Patient denies any associated chest pain shortness of breath or palpitations prior to his episodes.  He denies any history of heart disease or other major medical problems however he rarely ever seeks medical care.  Patient denies any use of anticoagulants.  Denies any neck or back pain.  He denies any pain elsewhere.  Patient denies any bleeding, he denies any black or bloody stool, nosebleeds or other issues.  He denies any nausea or vomiting.    REVIEW OF SYSTEMS  ROS    See [facility] for further details. All other systems are negative.     PAST MEDICAL HISTORY       SOCIAL HISTORY  Social History     Tobacco Use   • Smoking status: Never Smoker   • Smokeless tobacco: Never Used   Substance and Sexual Activity   • Alcohol use: Not Currently     Comment: Pt reports last drink \" in the eighties sometime \"    • Drug use: Not Currently   • Sexual activity: Not on file       SURGICAL HISTORY  patient denies any surgical history    CURRENT MEDICATIONS  Home Medications    **Home medications have not yet been reviewed for this encounter**         ALLERGIES  Not on File    PHYSICAL EXAM  Vitals:    06/04/21 0900   BP: (!) 161/82   Pulse: (!) 110   Resp: 20   Temp: 37 °C (98.6 °F)   SpO2: 98%       Physical Exam  Constitutional:       " Appearance: He is well-developed.   HENT:      Head: Normocephalic and atraumatic.   Eyes:      Conjunctiva/sclera: Conjunctivae normal.      Pupils: Pupils are equal, round, and reactive to light.   Cardiovascular:      Rate and Rhythm: Regular rhythm.      Heart sounds: No murmur heard.   No friction rub. No gallop.       Comments: tachycardic  Pulmonary:      Effort: Pulmonary effort is normal. No respiratory distress.      Breath sounds: Normal breath sounds. No wheezing.   Abdominal:      General: Bowel sounds are normal. There is no distension.      Palpations: Abdomen is soft.      Tenderness: There is no abdominal tenderness. There is no rebound.   Musculoskeletal:      Cervical back: Normal range of motion and neck supple.   Skin:     General: Skin is warm and dry.   Neurological:      General: No focal deficit present.      Mental Status: He is alert and oriented to person, place, and time.      Comments: Distal and proximal strength 5/5 in upper and lower extremities. Normal gait. No dysmetria. No sensation deficits. No visual field deficits. Cranial nerves intact.      Psychiatric:         Behavior: Behavior normal.           DIAGNOSTIC STUDIES / PROCEDURES    EKG  See below    LABS  Results for orders placed or performed during the hospital encounter of 06/04/21   CBC WITH DIFFERENTIAL   Result Value Ref Range    WBC 6.9 4.8 - 10.8 K/uL    RBC 4.15 (L) 4.70 - 6.10 M/uL    Hemoglobin 13.5 (L) 14.0 - 18.0 g/dL    Hematocrit 40.6 (L) 42.0 - 52.0 %    MCV 97.8 81.4 - 97.8 fL    MCH 32.5 27.0 - 33.0 pg    MCHC 33.3 (L) 33.7 - 35.3 g/dL    RDW 50.6 (H) 35.9 - 50.0 fL    Platelet Count 215 164 - 446 K/uL    MPV 9.7 9.0 - 12.9 fL    Neutrophils-Polys 74.20 (H) 44.00 - 72.00 %    Lymphocytes 14.60 (L) 22.00 - 41.00 %    Monocytes 9.00 0.00 - 13.40 %    Eosinophils 1.50 0.00 - 6.90 %    Basophils 0.40 0.00 - 1.80 %    Immature Granulocytes 0.30 0.00 - 0.90 %    Nucleated RBC 0.00 /100 WBC    Neutrophils  (Absolute) 5.10 1.82 - 7.42 K/uL    Lymphs (Absolute) 1.00 1.00 - 4.80 K/uL    Monos (Absolute) 0.62 0.00 - 0.85 K/uL    Eos (Absolute) 0.10 0.00 - 0.51 K/uL    Baso (Absolute) 0.03 0.00 - 0.12 K/uL    Immature Granulocytes (abs) 0.02 0.00 - 0.11 K/uL    NRBC (Absolute) 0.00 K/uL   CMP   Result Value Ref Range    Sodium 140 135 - 145 mmol/L    Potassium 3.4 (L) 3.6 - 5.5 mmol/L    Chloride 101 96 - 112 mmol/L    Co2 14 (L) 20 - 33 mmol/L    Anion Gap 25.0 (H) 7.0 - 16.0    Glucose 97 65 - 99 mg/dL    Bun 15 8 - 22 mg/dL    Creatinine 1.48 (H) 0.50 - 1.40 mg/dL    Calcium 9.9 8.5 - 10.5 mg/dL    AST(SGOT) 29 12 - 45 U/L    ALT(SGPT) 30 2 - 50 U/L    Alkaline Phosphatase 69 30 - 99 U/L    Total Bilirubin 0.9 0.1 - 1.5 mg/dL    Albumin 4.3 3.2 - 4.9 g/dL    Total Protein 6.8 6.0 - 8.2 g/dL    Globulin 2.5 1.9 - 3.5 g/dL    A-G Ratio 1.7 g/dL   TROPONIN   Result Value Ref Range    Troponin T 15 6 - 19 ng/L   LACTIC ACID   Result Value Ref Range    Lactic Acid 3.0 (H) 0.5 - 2.0 mmol/L   PHOSPHORUS   Result Value Ref Range    Phosphorus 2.4 (L) 2.5 - 4.5 mg/dL   MAGNESIUM   Result Value Ref Range    Magnesium 1.9 1.5 - 2.5 mg/dL   proBrain Natriuretic Peptide, NT   Result Value Ref Range    NT-proBNP 98 0 - 125 pg/mL   ESTIMATED GFR   Result Value Ref Range    GFR If  57 (A) >60 mL/min/1.73 m 2    GFR If Non  47 (A) >60 mL/min/1.73 m 2   EKG (NOW)   Result Value Ref Range    Report       [facility] Emergency Dept.    Test Date:  2021  Pt Name:    YENNY BARRERA             Department: ER  MRN:        [id number]                      Room:       [facility]  Gender:     Male                         Technician: [id number]  :        [id number]                   Requested By:ER TRIAGE PROTOCOL  Order #:    [id number]                    [place] MD: Arden Garces MD    Measurements  Intervals                                Axis  Rate:       107                          P:           65  GA:         128                          QRS:        76  QRSD:       88                           T:          -83  QT:         348  QTc:        465    Interpretive Statements  EKG is sinus tachycardia, T wave inversions present in the inferior leads and  some ST depression in the lateral leads no ST elevation.  No ectopy.  Normal  axis.  Normal intervals.  Electronically Signed On 6-4-2021 9:13:56 PDT by Arden Garces MD     POCT glucose device results   Result Value Ref Range    Glucose - Accu-Ck 87 65 - 99 mg/dL         RADIOLOGY  DX-CHEST-2 VIEWS   Final Result      1.  No acute cardiopulmonary process is identified.   2.  Atherosclerotic plaque.      CT-HEAD W/O   Final Result      1.  No acute intracranial abnormality is identified.   2.  Atrophy   3.  There are periventricular and subcortical white matter changes present.  This finding is nonspecific and could be from previous small vessel ischemia, demyelination, or gliosis.      EC-ECHOCARDIOGRAM COMPLETE W/O CONT    (Results Pending)   US-CAROTID DOPPLER BILAT    (Results Pending)   NM-CARDIAC STRESS TEST    (Results Pending)           COURSE & MEDICAL DECISION MAKING  Pertinent Labs & Imaging studies reviewed. (See chart for details)    Patient is very poor historian but appears to have multiple episodes of syncope.  Syncope occurs randomly and does not appear to be vasovagal though this is possible.  Patient does not appear to have any seizures, he has had this happen in front of his roommate at his group home and no convulsions were described.  Patient has struck his head in the setting of falling and given his multiple falls and age will check CT for possible subdural.  Patient CT head is unremarkable for evidence of acute trauma.  Patient's EKG reveals some T wave inversions but no evidence of ectopy.  Patient basic labs reveal a mild elevated lactic acidosis, possible dehydration.  Giving IV fluids.  Patient without any associated complaints of  abdominal pain and he has no tenderness on exam.  Patien does not have any fever or evidence of infection on exam.  Given patient's abnormal EKG, elevated lactic acidosis, and multiple episodes of unprovoked syncope will admit patient for further work-up.  I have discussed the case with hospitalist and patient will be admitted.      FINAL IMPRESSION  1.  Unprovoked syncope, abnormal EKG, lactic acidosis, dehydration         Electronically signed by: Dez Her M.D., 6/4/2021 9:12 AM

## 2021-06-04 NOTE — ED NOTES
Pt standing next to gurHerminie after using urinal.  Pt removing EKG leads - assisted back on gurney by RN.

## 2021-06-04 NOTE — ASSESSMENT & PLAN NOTE
-Replace with 20 mEq of KCl with his IV fluids.  Magnesium level is normal.  Repeat BMP in the morning.

## 2021-06-04 NOTE — ED NOTES
Pt transported to [facility] on [facility] by [place].

## 2021-06-04 NOTE — ED NOTES
Pt transported from [facility] on [facility] back to [facility].

## 2021-06-04 NOTE — PROGRESS NOTES
Patient decided to leave AGAINST MEDICAL ADVICE.  Patient was counseled regarding risks that he is taking by leaving AGAINST MEDICAL ADVICE, including increased morbidity from undiagnosed and untreated condition, and even death.  Although patient has odd behavior and thought process, patient understood, and accepted these risks.  He has full capacity to make informed decisions, and could not be held against his will. Despite our best effort to convince him to stay, he decided to leave.  He was counseled to follow-up with his primary physician, or to return to ED for worsening condition.

## 2021-06-04 NOTE — ASSESSMENT & PLAN NOTE
-Suspect prerenal, although we do not have a baseline/prior renal function in the system.  -Hydrate with IV LR at 125 cc/h.  Monitor for improvement in renal function.  - avoid nephrotoxins, and continue to renally dose all medications.

## 2021-06-05 ENCOUNTER — APPOINTMENT (OUTPATIENT)
Dept: RADIOLOGY | Facility: MEDICAL CENTER | Age: 66
DRG: 884 | End: 2021-06-05
Attending: STUDENT IN AN ORGANIZED HEALTH CARE EDUCATION/TRAINING PROGRAM
Payer: MEDICARE

## 2021-06-05 LAB
AMPHET UR QL SCN: NEGATIVE
ANION GAP SERPL CALC-SCNC: 17 MMOL/L (ref 7–16)
APPEARANCE UR: CLEAR
BARBITURATES UR QL SCN: NEGATIVE
BASOPHILS # BLD AUTO: 0.6 % (ref 0–1.8)
BASOPHILS # BLD: 0.04 K/UL (ref 0–0.12)
BENZODIAZ UR QL SCN: NEGATIVE
BILIRUB UR QL STRIP.AUTO: NEGATIVE
BUN SERPL-MCNC: 9 MG/DL (ref 8–22)
BZE UR QL SCN: NEGATIVE
CALCIUM SERPL-MCNC: 8.5 MG/DL (ref 8.5–10.5)
CANNABINOIDS UR QL SCN: POSITIVE
CHLORIDE SERPL-SCNC: 113 MMOL/L (ref 96–112)
CHOLEST SERPL-MCNC: 128 MG/DL (ref 100–199)
CO2 SERPL-SCNC: 15 MMOL/L (ref 20–33)
COLOR UR: YELLOW
CREAT SERPL-MCNC: 1.08 MG/DL (ref 0.5–1.4)
EOSINOPHIL # BLD AUTO: 0.03 K/UL (ref 0–0.51)
EOSINOPHIL NFR BLD: 0.4 % (ref 0–6.9)
ERYTHROCYTE [DISTWIDTH] IN BLOOD BY AUTOMATED COUNT: 52.3 FL (ref 35.9–50)
GLUCOSE BLD-MCNC: 69 MG/DL (ref 65–99)
GLUCOSE BLD-MCNC: 81 MG/DL (ref 65–99)
GLUCOSE BLD-MCNC: 82 MG/DL (ref 65–99)
GLUCOSE BLD-MCNC: 90 MG/DL (ref 65–99)
GLUCOSE SERPL-MCNC: 65 MG/DL (ref 65–99)
GLUCOSE UR STRIP.AUTO-MCNC: NEGATIVE MG/DL
HCT VFR BLD AUTO: 34.8 % (ref 42–52)
HDLC SERPL-MCNC: 41 MG/DL
HGB BLD-MCNC: 11.7 G/DL (ref 14–18)
IMM GRANULOCYTES # BLD AUTO: 0.04 K/UL (ref 0–0.11)
IMM GRANULOCYTES NFR BLD AUTO: 0.6 % (ref 0–0.9)
KETONES UR STRIP.AUTO-MCNC: >=160 MG/DL
LACTATE BLD-SCNC: 1.2 MMOL/L (ref 0.5–2)
LACTATE BLD-SCNC: 1.2 MMOL/L (ref 0.5–2)
LACTATE BLD-SCNC: 1.4 MMOL/L (ref 0.5–2)
LDLC SERPL CALC-MCNC: 72 MG/DL
LEUKOCYTE ESTERASE UR QL STRIP.AUTO: NEGATIVE
LYMPHOCYTES # BLD AUTO: 1.15 K/UL (ref 1–4.8)
LYMPHOCYTES NFR BLD: 15.8 % (ref 22–41)
MCH RBC QN AUTO: 33.1 PG (ref 27–33)
MCHC RBC AUTO-ENTMCNC: 33.6 G/DL (ref 33.7–35.3)
MCV RBC AUTO: 98.3 FL (ref 81.4–97.8)
METHADONE UR QL SCN: NEGATIVE
MICRO URNS: NORMAL
MONOCYTES # BLD AUTO: 0.77 K/UL (ref 0–0.85)
MONOCYTES NFR BLD AUTO: 10.6 % (ref 0–13.4)
NEUTROPHILS # BLD AUTO: 5.24 K/UL (ref 1.82–7.42)
NEUTROPHILS NFR BLD: 72 % (ref 44–72)
NITRITE UR QL STRIP.AUTO: NEGATIVE
NRBC # BLD AUTO: 0 K/UL
NRBC BLD-RTO: 0 /100 WBC
OPIATES UR QL SCN: NEGATIVE
OXYCODONE UR QL SCN: NEGATIVE
PCP UR QL SCN: NEGATIVE
PH UR STRIP.AUTO: 5 [PH] (ref 5–8)
PHOSPHATE SERPL-MCNC: 2.8 MG/DL (ref 2.5–4.5)
PLATELET # BLD AUTO: 170 K/UL (ref 164–446)
PMV BLD AUTO: 9.1 FL (ref 9–12.9)
POTASSIUM SERPL-SCNC: 3.8 MMOL/L (ref 3.6–5.5)
PROPOXYPH UR QL SCN: NEGATIVE
PROT UR QL STRIP: NEGATIVE MG/DL
RBC # BLD AUTO: 3.54 M/UL (ref 4.7–6.1)
RBC UR QL AUTO: NEGATIVE
SODIUM SERPL-SCNC: 145 MMOL/L (ref 135–145)
SP GR UR STRIP.AUTO: 1.02
TRIGL SERPL-MCNC: 77 MG/DL (ref 0–149)
TROPONIN T SERPL-MCNC: 18 NG/L (ref 6–19)
UROBILINOGEN UR STRIP.AUTO-MCNC: 0.2 MG/DL
WBC # BLD AUTO: 7.3 K/UL (ref 4.8–10.8)

## 2021-06-05 PROCEDURE — 80048 BASIC METABOLIC PNL TOTAL CA: CPT

## 2021-06-05 PROCEDURE — 84100 ASSAY OF PHOSPHORUS: CPT

## 2021-06-05 PROCEDURE — 302092 REMEDY SKIN REPAIR CREAM: Performed by: HOSPITALIST

## 2021-06-05 PROCEDURE — 306565 RIGID MIT RESTRAINT(PAIR): Performed by: STUDENT IN AN ORGANIZED HEALTH CARE EDUCATION/TRAINING PROGRAM

## 2021-06-05 PROCEDURE — 700101 HCHG RX REV CODE 250: Performed by: HOSPITALIST

## 2021-06-05 PROCEDURE — 36415 COLL VENOUS BLD VENIPUNCTURE: CPT

## 2021-06-05 PROCEDURE — 700102 HCHG RX REV CODE 250 W/ 637 OVERRIDE(OP): Performed by: STUDENT IN AN ORGANIZED HEALTH CARE EDUCATION/TRAINING PROGRAM

## 2021-06-05 PROCEDURE — 85025 COMPLETE CBC W/AUTO DIFF WBC: CPT

## 2021-06-05 PROCEDURE — A9270 NON-COVERED ITEM OR SERVICE: HCPCS | Performed by: STUDENT IN AN ORGANIZED HEALTH CARE EDUCATION/TRAINING PROGRAM

## 2021-06-05 PROCEDURE — 80307 DRUG TEST PRSMV CHEM ANLYZR: CPT

## 2021-06-05 PROCEDURE — 99226 PR SUBSEQUENT OBSERVATION CARE,LEVEL III: CPT | Performed by: HOSPITALIST

## 2021-06-05 PROCEDURE — A9270 NON-COVERED ITEM OR SERVICE: HCPCS | Performed by: INTERNAL MEDICINE

## 2021-06-05 PROCEDURE — 80061 LIPID PANEL: CPT

## 2021-06-05 PROCEDURE — 700111 HCHG RX REV CODE 636 W/ 250 OVERRIDE (IP): Performed by: STUDENT IN AN ORGANIZED HEALTH CARE EDUCATION/TRAINING PROGRAM

## 2021-06-05 PROCEDURE — 82962 GLUCOSE BLOOD TEST: CPT | Mod: 91

## 2021-06-05 PROCEDURE — G0378 HOSPITAL OBSERVATION PER HR: HCPCS

## 2021-06-05 PROCEDURE — 700102 HCHG RX REV CODE 250 W/ 637 OVERRIDE(OP): Performed by: INTERNAL MEDICINE

## 2021-06-05 PROCEDURE — 83605 ASSAY OF LACTIC ACID: CPT

## 2021-06-05 RX ORDER — DEXTROSE MONOHYDRATE, SODIUM CHLORIDE, AND POTASSIUM CHLORIDE 50; 1.49; 4.5 G/1000ML; G/1000ML; G/1000ML
INJECTION, SOLUTION INTRAVENOUS CONTINUOUS
Status: DISCONTINUED | OUTPATIENT
Start: 2021-06-05 | End: 2021-06-14

## 2021-06-05 RX ADMIN — ASPIRIN 325 MG ORAL TABLET 325 MG: 325 PILL ORAL at 05:12

## 2021-06-05 RX ADMIN — DOCUSATE SODIUM 50 MG AND SENNOSIDES 8.6 MG 2 TABLET: 8.6; 5 TABLET, FILM COATED ORAL at 05:12

## 2021-06-05 RX ADMIN — POTASSIUM CHLORIDE, DEXTROSE MONOHYDRATE AND SODIUM CHLORIDE: 150; 5; 450 INJECTION, SOLUTION INTRAVENOUS at 19:43

## 2021-06-05 RX ADMIN — DIBASIC SODIUM PHOSPHATE, MONOBASIC POTASSIUM PHOSPHATE AND MONOBASIC SODIUM PHOSPHATE 250 MG: 852; 155; 130 TABLET ORAL at 00:19

## 2021-06-05 RX ADMIN — HALOPERIDOL LACTATE 5 MG: 5 INJECTION, SOLUTION INTRAMUSCULAR at 15:54

## 2021-06-05 RX ADMIN — ENOXAPARIN SODIUM 40 MG: 40 INJECTION SUBCUTANEOUS at 05:11

## 2021-06-05 RX ADMIN — POTASSIUM CHLORIDE, DEXTROSE MONOHYDRATE AND SODIUM CHLORIDE: 150; 5; 450 INJECTION, SOLUTION INTRAVENOUS at 11:10

## 2021-06-05 RX ADMIN — DIBASIC SODIUM PHOSPHATE, MONOBASIC POTASSIUM PHOSPHATE AND MONOBASIC SODIUM PHOSPHATE 250 MG: 852; 155; 130 TABLET ORAL at 05:12

## 2021-06-05 ASSESSMENT — PAIN DESCRIPTION - PAIN TYPE
TYPE: ACUTE PAIN
TYPE: ACUTE PAIN

## 2021-06-05 NOTE — H&P
Hospital Medicine History & Physical Note    Date of Service  6/4/2021    Primary Care Physician  No primary care provider on file.    Consultants      Code Status  Full Code    Chief Complaint  Chief Complaint   Patient presents with   • ALOC       History of Presenting Illness  66 y.o. male no known past medical history, who presented 6/4/2021 AM with multiple random episodes of lightheadedness and dizziness over the last few weeks, which he says most often occurs when he bends down to  something, with sensation of passing out, and few episodes of loss of consciousness finding himself on the floor. Patient is a poor historian, with very odd affect, and is very tangential and circumstantial.  He stated on earlier admission this morning that he lives in a hotel, and when seen in ED currently states that he lives here in the hospital in [place]. This morning, at previous admission patient states that he was walking towards a construction site, when a vacuum canister that he was holding fell to the ground, and as he bent down to pick it up, he suddenly felt lightheaded, weak, and subsequently lost consciousness.  EMS found him with a blood sugar of 69, and given D10, thiamine, which improved his blood sugar to 188.  He was then brought to the ED.       Chest x-ray did not show any infiltrates or consolidation.  Head CT showed nothing acute.  EKG showed sinus tachycardia, with T wave inversions in the inferior lateral leads. He was felt to be dehydrated, and patient was given 2 liters of IV normal saline, and admitted for further evaluation.  However, patient then decided to leave AGAINST MEDICAL ADVICE.      Following this morning's events patient was found by several bystanders laying down on the sidewalk following multiple witnessed falls. He was confused at the time with altered level of consciousness. EMS was called and he was brought into the ED for evaluation. According to EMS and nursing staff, he was  seen in the ED this morning for syncope while crossing the street, dizziness, and confusion by Dr. Her (Emergency Medicine) and plan for care was admission to CDU, however left AMA as discussed above.        Notable findings include: Heart rate 114, GFR 45, AG 23, LA 2.6    CT head showing no acute intracranial abnormalities  CT chest x-ray showing no acute cardiopulmonary abnormality      Patient is readmitted with the hospitalist service for medical management with AMS.        Review of Systems  Review of Systems   Unable to perform ROS: Mental status change   Cardiovascular: Positive for palpitations.   Musculoskeletal: Positive for falls.   Neurological: Positive for dizziness, loss of consciousness and weakness.        Confusion and AMS   All other systems reviewed and are negative.      Past Medical History  None known, reassess when mental status improves    Surgical History  None known, reassess     Family History  None reassess when mental status improves    Social History   reports that he has never smoked. He has never used smokeless tobacco. He reports previous alcohol use. He reports previous drug use. reassess when mental status improves    Allergies  No Known Allergies    Medications  None       Physical Exam  Temp:  [37.1 °C (98.7 °F)-37.4 °C (99.3 °F)] 37.4 °C (99.3 °F)  Pulse:  [] 97  Resp:  [14-20] 20  BP: (111-154)/(58-80) 134/80  SpO2:  [95 %-99 %] 99 %    Physical Exam       Constitutional: Resting comfortably in NAD   HENT: Normocephalic, no obvious evidence of acute trauma.  Eyes: No scleral icterus. Normal conjunctiva   Neck: Comfortable movement without any obvious restriction in the range of motion.  Cardiovascular: tachycardia  Thorax & Lungs: No respiratory distress. No wheezing, rales or rhonchi heard on ausculation.  there is no obvious chest wall tenderness. I appreciate normal air movement throughout.   Abdomen: The abdomen is not visibly distended. Upon palpation, I find  it to be without tenderness.  No mass appreciated.  Skin: The exposed portions of skin reveal no obvious rash or other abnormalities.  Extremities/Musculoskeletal: no lower extremity edema with no asymmetry.  Neurologic: patient AAAX1 to self only, states that he is currently in his home in [place], does not known date, month or year  Psychiatric: AMS, tangential, confused    Laboratory:  Recent Labs     06/04/21  0905 06/04/21  1700   WBC 6.9 8.3   RBC 4.15* 3.95*   HEMOGLOBIN 13.5* 12.9*   HEMATOCRIT 40.6* 38.5*   MCV 97.8 97.5   MCH 32.5 32.7   MCHC 33.3* 33.5*   RDW 50.6* 51.2*   PLATELETCT 215 211   MPV 9.7 9.8     Recent Labs     06/04/21  0905 06/04/21  1700   SODIUM 140 145   POTASSIUM 3.4* 3.6   CHLORIDE 101 111   CO2 14* 11*   GLUCOSE 97 67   BUN 15 12   CREATININE 1.48* 1.31   CALCIUM 9.9 9.0     Recent Labs     06/04/21  0905 06/04/21  1700   ALTSGPT 30 25   ASTSGOT 29 30   ALKPHOSPHAT 69 62   TBILIRUBIN 0.9 0.8   GLUCOSE 97 67     Recent Labs     06/04/21  2040   INR 1.29*     Recent Labs     06/04/21  0905   NTPROBNP 98         Recent Labs     06/04/21  0905 06/04/21  2040   TROPONINT 15 20*       Imaging:  CT-HEAD W/O   Final Result      1.  Cerebral atrophy and chronic microvascular ischemic type changes.   2.  No acute intracranial abnormality.      DX-CHEST-PORTABLE (1 VIEW)   Final Result      No acute cardiopulmonary abnormality.      NM-CARDIAC STRESS TEST    (Results Pending)   EC-ECHOCARDIOGRAM COMPLETE W/O CONT    (Results Pending)   EC-ECHOCARDIOGRAM COMPLETE W/O CONT    (Results Pending)   NM-CARDIAC STRESS TEST    (Results Pending)         Assessment/Plan:  I anticipate this patient is appropriate for observation status at this time.    * Syncope- (present on admission)  Assessment & Plan  CT head showing no acute intracranial abnormalities  PT OT  Fall precautions in place  Echo ordered to assess  Ordered CoVID testing     AMS (altered mental status)- (present on admission)  Assessment &  Plan  AAAX1 to self only  Pulling at lines and soft restraints are in place due to possible self injury/aggression, interference with medical treatment  Continue to monitor    Dehydration- (present on admission)  Assessment & Plan  IV fluids in place  Confusion and altered mental status on readmission today  Multiple falls and syncope  Fall Precautions in place continue to monitor    Tachycardia- (present on admission)  Assessment & Plan  Rate 114 on admission  Admitted with telemetry  IV fluids in place  Echo ordered due to syncopal episodes  Continue to monitor    Elevated lactic acid level- (present on admission)  Assessment & Plan  Lactic acid 2.6  IV fluids in place  WBC WNL  ESR/CRP ordered  Awaiting blood and urine cultures

## 2021-06-05 NOTE — CARE PLAN
The patient is Stable - Low risk of patient condition declining or worsening       Problem: Safety - Medical Restraint  Goal: Remains free of injury from restraints (Restraint for Interference with Medical Device)  Outcome: Progressing  Flowsheets (Taken 6/4/2021 2144)  Addressed this shift: Remains free of injury from restraints (restraint for interference with medical device):   Determine that other, less restrictive measures have been tried or would not be effective before applying the restraint   Evaluate the patient's condition at the time of restraint application   Inform patient/family regarding the reason for restraint   Every 2 hours: Monitor safety, psychosocial status, comfort, nutrition and hydration  Note: Pt in bilateral soft wrist restraints for impulsivity, noncompliance and interfering with medical treatment. A&O to self, confused, impulsive and hallucinating/delusional. No evidence of injury from restraints. Hourly rounding in place to assess needs. ROM performed. Fluids/food/elimination offered. WCTM.     Problem: Skin Integrity  Goal: Skin integrity is maintained or improved  Outcome: Progressing     Problem: Fall Risk  Goal: Patient will remain free from falls  Outcome: Progressing

## 2021-06-05 NOTE — PROGRESS NOTES
Dr Denise notified pt's BG 69. Pt had one OJ and recheck was 90. New order for D5 1/2 NS with 20K.

## 2021-06-05 NOTE — ED NOTES
"1915 back from CT via gurney.  Per CT tech PIV \"fell out\".  Dressing applied.    PIV to R hand also dislodged , dressing applied.  EOS, verbal report to Jose Martin COLLADO  "

## 2021-06-05 NOTE — PROGRESS NOTES
4 Eyes Skin Assessment Completed by HEAVEN Mercer and HEAVEN Burch.    Head WDL  Ears WDL  Nose WDL  Mouth WDL  Neck redness back of neck  Breast/Chest WDL  Shoulder Blades Redness  Spine Redness  (R) Arm/Elbow/Hand Abrasion and Scab  (L) Arm/Elbow/Hand Redness and Bruising  Abdomen WDL  Groin WDL  Scrotum/Coccyx/Buttocks WDL  (R) Leg Redness and Abrasion on knee  (L) Leg Redness and Abrasion on knee  (R) Heel/Foot/Toe WDL 3rd toe bleeding from long nails, dryness flaky. Cleaned off, no further bleeding  (L) Heel/Foot/Toe WDL dryness, flaky          Devices In Places Tele Box      Interventions In Place Pillows, Q2 Turns and Barrier Cream. Hourly rounding, moisturizer at bedside, daily skin checks    Possible Skin Injury No    Pictures Uploaded Into Epic N/A  Wound Consult Placed N/A  RN Wound Prevention Protocol Ordered Yes

## 2021-06-05 NOTE — PROGRESS NOTES
[facility] Medicine Daily Progress Note    Date of Service  6/5/2021    Chief Complaint  66 y.o. male admitted 6/4/2021 with altered mental status    Hospital Course  No notes on file    Interval Problem Update  No acute overnight events, patient continues to be disoriented, we are currently trialing him off restraints at this juncture.    Consultants/Specialty  None indicated at this juncture.    Code Status  Full Code    Disposition  Pending at this time    Review of Systems  Unable to be assessed    Physical Exam  Temp:  [36.7 °C (98.1 °F)-37.4 °C (99.3 °F)] 36.7 °C (98.1 °F)  Pulse:  [] 90  Resp:  [14-20] 18  BP: (111-154)/(58-80) 123/63  SpO2:  [93 %-99 %] 93 %    General: No acute distress ANO x1  HEENT atraumatic, normocephalic, pupils equal round reactive to light  Neck: No JVD  Chest: Respirations are unlabored  Cardiac: Physiologic S1 and S2  Abdomen: Soft, nontender, nondistended  Extremities: Without clubbing, cyanosis or edema  Neuro: Cranial nerves II through XII are grossly intact.  Psych: Disoriented, judgment does not appear intact at this time.      Current Facility-Administered Medications:   •  dextrose 5 % and 0.45 % NaCl with KCl 20 mEq, , Intravenous, Continuous, Kelvin Denise M.D., Last Rate: 125 mL/hr at 06/05/21 1110, New Bag at 06/05/21 1110  •  aspirin (ASA) tablet 325 mg, 325 mg, Oral, DAILY, 325 mg at 06/05/21 0512 **OR** aspirin (ASA) chewable tab 324 mg, 324 mg, Oral, DAILY **OR** aspirin (ASA) suppository 300 mg, 300 mg, Rectal, DAILY, Nabeel Pitts M.D.  •  regadenoson (LEXISCAN) injection SOLN 0.4 mg, 0.4 mg, Intravenous, Once PRN, Nabeel Pitts M.D.  •  aminophylline injection 100 mg, 100 mg, Intravenous, Q5 MIN PRN, Nabeel Pitts M.D.  •  acetaminophen (Tylenol) tablet 650 mg, 650 mg, Oral, Q6HRS PRN, Jojo Castillo M.D.  •  enalaprilat (VASOTEC) injection 1.25 mg, 1.25 mg, Intravenous, Q6HRS PRN, Jojo Castillo M.D.  •  labetalol (NORMODYNE/TRANDATE)  injection 10 mg, 10 mg, Intravenous, Q4HRS PRN, Jojo Castillo M.D.  •  ondansetron (ZOFRAN ODT) dispertab 4 mg, 4 mg, Oral, Q4HRS PRN, Jojo Castillo M.D.  •  ondansetron (ZOFRAN) syringe/vial injection 4 mg, 4 mg, Intravenous, Q4HRS PRN, Jojo Castillo M.D.  •  Respiratory Therapy Consult, , Nebulization, Continuous RT, Jojo Castillo M.D.  •  senna-docusate (PERICOLACE or SENOKOT S) 8.6-50 MG per tablet 2 tablet, 2 tablet, Oral, BID, 2 tablet at 06/05/21 0512 **AND** polyethylene glycol/lytes (MIRALAX) PACKET 1 Packet, 1 Packet, Oral, QDAY PRN **AND** magnesium hydroxide (MILK OF MAGNESIA) suspension 30 mL, 30 mL, Oral, QDAY PRN **AND** bisacodyl (DULCOLAX) suppository 10 mg, 10 mg, Rectal, QDAY PRN, Jojo Castillo M.D.  •  enoxaparin (LOVENOX) inj 40 mg, 40 mg, Subcutaneous, DAILY, Jojo Castillo M.D., 40 mg at 06/05/21 0511  •  haloperidol lactate (HALDOL) injection 2-5 mg, 2-5 mg, Intravenous, Q4HRS PRN, Jojo Castillo M.D., 5 mg at 06/04/21 2250      Fluids  No intake or output data in the 24 hours ending 06/05/21 1218    Laboratory  Recent Labs     06/04/21  0905 06/04/21  1700 06/05/21  0102   WBC 6.9 8.3 7.3   RBC 4.15* 3.95* 3.54*   HEMOGLOBIN 13.5* 12.9* 11.7*   HEMATOCRIT 40.6* 38.5* 34.8*   MCV 97.8 97.5 98.3*   MCH 32.5 32.7 33.1*   MCHC 33.3* 33.5* 33.6*   RDW 50.6* 51.2* 52.3*   PLATELETCT 215 211 170   MPV 9.7 9.8 9.1     Recent Labs     06/04/21  0905 06/04/21  1700 06/05/21  0102   SODIUM 140 145 145   POTASSIUM 3.4* 3.6 3.8   CHLORIDE 101 111 113*   CO2 14* 11* 15*   GLUCOSE 97 67 65   BUN 15 12 9   CREATININE 1.48* 1.31 1.08   CALCIUM 9.9 9.0 8.5     Recent Labs     06/04/21  2040   INR 1.29*         Recent Labs     06/05/21  0102   TRIGLYCERIDE 77   HDL 41   LDL 72       Imaging  CT-HEAD W/O   Final Result      1.  Cerebral atrophy and chronic microvascular ischemic type changes.   2.  No acute intracranial abnormality.      DX-CHEST-PORTABLE (1 VIEW)   Final Result       No acute cardiopulmonary abnormality.      NM-CARDIAC STRESS TEST    (Results Pending)   EC-ECHOCARDIOGRAM COMPLETE W/O CONT    (Results Pending)   EC-ECHOCARDIOGRAM COMPLETE W/O CONT    (Results Pending)   NM-CARDIAC STRESS TEST    (Results Pending)   MR-BRAIN-W/O    (Results Pending)        Assessment/Plan  * Syncope- (present on admission)  Assessment & Plan  CT head showing no acute intracranial abnormalities  PT OT  Fall precautions in place  Echo ordered to assess  Ordered CoVID testing     AMS (altered mental status)- (present on admission)  Assessment & Plan  AAAX1 to self only  Pulling at lines and soft restraints are in place due to possible self injury/aggression, interference with medical treatment  Continue to monitor  MRI Brain ordered HD#1.      Dehydration- (present on admission)  Assessment & Plan  IV fluids in place  Confusion and altered mental status on readmission today  Multiple falls and syncope  Fall Precautions in place continue to monitor    Tachycardia- (present on admission)  Assessment & Plan  Rate 114 on admission  Admitted with telemetry  IV fluids in place  Echo ordered due to syncopal episodes  Continue to monitor    Elevated lactic acid level- (present on admission)  Assessment & Plan  Lactic acid 2.6  IV fluids in place  WBC WNL  ESR/CRP ordered  Awaiting blood and urine cultures         VTE prophylaxis: Low molecular weight heparin's as ordered.

## 2021-06-05 NOTE — ED PROVIDER NOTES
ED Provider Note    Scribed for Dr. Lane May M.D. by Willie Pond. 6/4/2021  5:38 PM    Primary care provider: None Noted  Means of arrival: Ambulance   History obtained from: Patient and EMS  History limited by: Altered Level of Consciousness    CHIEF COMPLAINT  Chief Complaint   Patient presents with    ALOC       HPI  Lane Beard is a 66 y.o. male who presents to the Emergency Department for evaluation of acute altered level of consciousness onset today. The patient was found by several bystanders laying down on the sidewalk following multiple witnessed falls. He was confused at the time with altered level of consciousness. EMS was called and he was brought into the ED for evaluation. According to EMS and nursing staff, he was seen in the ED this morning for syncope while crossing the street, dizziness, and confusion by Dr. Her (Emergency Medicine) and plan for care was admission to CDU. Before he could be taken up to the CDU, the patient left AMA. He states that he is not very confused at the moment, but notes that he occasionally gets confused and has had more episodes like this recently. In the last few weeks, he has had several episodes of dizziness where he has to lay down on the ground where ever he is and often time will lose consciousness. The patient reports associated dizziness, syncope, and intermittent confusion. Negative for fever, chills, nausea, vomiting, vision changes, numbness, tingling, or head injury. His dizziness is exacerbated when standing up or ambulating. The patient denies any chronic medical history.    Further history of present illness cannot be obtained due to the patient's altered level of consciousness     REVIEW OF SYSTEMS  Pertinent positives include altered level of consciousness, dizziness, syncope, and intermittent confusion. Pertinent negatives include no fever, chills, nausea, vomiting, vision changes, numbness, tingling, or head injury. As above, all  "other systems reviewed and are negative.   See HPI for further details.   Further review of systems cannot be obtained due to the patient's altered level of consciousness    PAST MEDICAL HISTORY   None chronic noted    SURGICAL HISTORY  patient denies any surgical history    SOCIAL HISTORY  Social History     Tobacco Use    Smoking status: Never Smoker    Smokeless tobacco: Never Used   Vaping Use    Vaping Use: Never used   Substance Use Topics    Alcohol use: Not Currently     Comment: Pt reports last drink \" in the eighties sometime \"     Drug use: Not Currently      Social History     Substance and Sexual Activity   Drug Use Not Currently       FAMILY HISTORY  None pertinent noted.     CURRENT MEDICATIONS  Home Medications       Reviewed by Malik Craig (Pharmacy Tech) on 06/04/21 at 1947  Med List Status: Complete     Medication Last Dose Status        Patient Clement Taking any Medications                           ALLERGIES  No Known Allergies    PHYSICAL EXAM  VITAL SIGNS: /74   Pulse (!) 112   Temp 37.1 °C (98.7 °F) (Temporal)   Resp 14   Ht 1.829 m (6')   Wt 95.1 kg (209 lb 10.5 oz)   SpO2 95%   BMI 28.43 kg/m²     Constitutional: Well developed, Well nourished, No distress, cooperative    HENT: Normocephalic, Atraumatic, Bilateral external ears normal, Oropharynx extremely dry, No oral exudates.   Eyes: PERRLA, EOMI, Conjunctiva normal, No discharge.   Neck: No tenderness, Supple, No stridor.   Lymphatic: No lymphadenopathy noted.   Cardiovascular: Tachycardic heart rate, Normal rhythm.   Thorax & Lungs: Clear to auscultation bilaterally, No respiratory distress, No wheezing, No crackles.   Abdomen: Soft, No tenderness, No masses, No pulsatile masses.   Skin: Warm, Dry, No erythema, No rash.   Extremities:, No edema No cyanosis.   Musculoskeletal: No tenderness to palpation or major deformities noted.  Intact distal pulses  Neurologic: Awake, alert. He is oriented to place, but not time. " He answers questions appropriately otherwise.   Psychiatric: Is confused rambling historian   LABS  Results for orders placed or performed during the hospital encounter of 06/04/21   Lactic acid (lactate)   Result Value Ref Range    Lactic Acid 2.6 (H) 0.5 - 2.0 mmol/L   CBC WITH DIFFERENTIAL   Result Value Ref Range    WBC 8.3 4.8 - 10.8 K/uL    RBC 3.95 (L) 4.70 - 6.10 M/uL    Hemoglobin 12.9 (L) 14.0 - 18.0 g/dL    Hematocrit 38.5 (L) 42.0 - 52.0 %    MCV 97.5 81.4 - 97.8 fL    MCH 32.7 27.0 - 33.0 pg    MCHC 33.5 (L) 33.7 - 35.3 g/dL    RDW 51.2 (H) 35.9 - 50.0 fL    Platelet Count 211 164 - 446 K/uL    MPV 9.8 9.0 - 12.9 fL    Neutrophils-Polys 80.70 (H) 44.00 - 72.00 %    Lymphocytes 9.70 (L) 22.00 - 41.00 %    Monocytes 8.30 0.00 - 13.40 %    Eosinophils 0.40 0.00 - 6.90 %    Basophils 0.50 0.00 - 1.80 %    Immature Granulocytes 0.40 0.00 - 0.90 %    Nucleated RBC 0.00 /100 WBC    Neutrophils (Absolute) 6.71 1.82 - 7.42 K/uL    Lymphs (Absolute) 0.81 (L) 1.00 - 4.80 K/uL    Monos (Absolute) 0.69 0.00 - 0.85 K/uL    Eos (Absolute) 0.03 0.00 - 0.51 K/uL    Baso (Absolute) 0.04 0.00 - 0.12 K/uL    Immature Granulocytes (abs) 0.03 0.00 - 0.11 K/uL    NRBC (Absolute) 0.00 K/uL   COMP METABOLIC PANEL   Result Value Ref Range    Sodium 145 135 - 145 mmol/L    Potassium 3.6 3.6 - 5.5 mmol/L    Chloride 111 96 - 112 mmol/L    Co2 11 (L) 20 - 33 mmol/L    Anion Gap 23.0 (H) 7.0 - 16.0    Glucose 67 65 - 99 mg/dL    Bun 12 8 - 22 mg/dL    Creatinine 1.31 0.50 - 1.40 mg/dL    Calcium 9.0 8.5 - 10.5 mg/dL    AST(SGOT) 30 12 - 45 U/L    ALT(SGPT) 25 2 - 50 U/L    Alkaline Phosphatase 62 30 - 99 U/L    Total Bilirubin 0.8 0.1 - 1.5 mg/dL    Albumin 4.0 3.2 - 4.9 g/dL    Total Protein 6.3 6.0 - 8.2 g/dL    Globulin 2.3 1.9 - 3.5 g/dL    A-G Ratio 1.7 g/dL   URINALYSIS    Specimen: Urine   Result Value Ref Range    Color Yellow     Character Clear     Specific Gravity 1.011 <1.035    Ph 5.0 5.0 - 8.0    Glucose Negative  Negative mg/dL    Ketones >=160 Negative mg/dL    Protein Negative Negative mg/dL    Bilirubin Negative Negative    Urobilinogen, Urine 0.2 Negative    Nitrite Negative Negative    Leukocyte Esterase Negative Negative    Occult Blood Trace (A) Negative    Micro Urine Req Microscopic    URINE MICROSCOPIC (W/UA)   Result Value Ref Range    WBC 0-2 (A) /hpf    RBC 0-2 (A) /hpf    Bacteria Negative None /hpf    Epithelial Cells Negative /hpf    Hyaline Cast 0-2 /lpf   ESTIMATED GFR   Result Value Ref Range    GFR If African American >60 >60 mL/min/1.73 m 2    GFR If Non African American 54 (A) >60 mL/min/1.73 m 2     All labs reviewed by me.      RADIOLOGY  CT-HEAD W/O   Final Result      1.  Cerebral atrophy and chronic microvascular ischemic type changes.   2.  No acute intracranial abnormality.      DX-CHEST-PORTABLE (1 VIEW)   Final Result      No acute cardiopulmonary abnormality.      NM-CARDIAC STRESS TEST    (Results Pending)   EC-ECHOCARDIOGRAM COMPLETE W/O CONT    (Results Pending)   EC-ECHOCARDIOGRAM COMPLETE W/O CONT    (Results Pending)   NM-CARDIAC STRESS TEST    (Results Pending)     The radiologist's interpretation of all radiological studies have been reviewed by me.    COURSE & MEDICAL DECISION MAKING  Pertinent Labs & Imaging studies reviewed. (See chart for details)    5:15 PM - Patient seen and examined at bedside. Ordered DX Chest, Blood Culture, Urine Culture, Urinalysis, CMP, CBC with Differential, and Lactic Acid to evaluate his symptoms. The differential diagnoses include but are not limited to: dehydration, unspecified altered mental status, or lactic acidosis. I will await lab and radiology results before determining whether interventions are necessary. The patient is agreeable and understanding of my plan of care.     6:04 PM - Patient will be treated with NS Infusion 1,000 mL. Ordered for CT Head for further evaluation.    7:30 PM - Paged hospitalist.     7:38 PM - I spoke with Dr. Castillo  (Hospitalist) and update her on the condition of the patient. She agrees to admit the patient for further evaluation and treatment.    HYDRATION: Based on the patient's presentation of Tachycardia the patient was given IV fluids. IV Hydration was used because oral hydration was not adequate alone. Upon recheck following hydration, the patient was improved.       Decision Making:  This is a patient presenting with altered mental status he had been seen here earlier in the day with similar presentation apparently signed out AGAINST MEDICAL ADVICE at that time. He appears to be significantly dehydrated is a significant lactic acidosis and metabolic acidosis, which is likely I think secondary to dehydration. No source of infection was found to suggest sepsis. The patient is agreeable to admission and I think is too confused to determine at this point. He did have a fall although apparently trauma we did repeat his CT scan which is negative. Fluid replacement has been commenced here in the emergency room patient will be admitted the hospitalist as noted    DISPOSITION:  Patient will be hospitalized by Dr. Castillo (Hospitalist) in guarded condition.    FINAL IMPRESSION  1. Altered mental status, unspecified altered mental status type    2. Dehydration    3. Lactic acidosis          Willie DAILEY (Abhijeet), am scribing for, and in the presence of, Lane May M.D..    Electronically signed by: Willie Pond (Abhijeet), 6/4/2021    Lane DAILEY M.D. personally performed the services described in this documentation, as scribed by Willie Pond in my presence, and it is both accurate and complete.    C.     The note accurately reflects work and decisions made by me.  Lane May M.D.  6/4/2021  9:15 PM

## 2021-06-05 NOTE — PROGRESS NOTES
Pt getting increasingly agitated and he has pulled off his tele monitor x3 and condom catheter x2. Pt is twisting in bed and trying to swing legs over to get out. Safety and fall education provided again, pt confused and shows no evidence of learning. Medicated with Haldol as per MAR and awaiting updated restraint orders. WCTM.

## 2021-06-05 NOTE — PROGRESS NOTES
Pt up from ED on zoll with HEAVEN Burch. telemonitor on, monitor room notified. Pt in bilateral soft wrist restraints for interference with medical treatment. A&O to self, on 2L nasal canula, confused. VSS, denies pain. Pt oriented to room. Needs assessed and met. WCTM. Hourly rounding in place.

## 2021-06-05 NOTE — CARE PLAN
The patient is Watcher - Medium risk of patient condition declining or worsening         Progress made toward(s) clinical / shift goals:  Pt remains confused and in restraints. Not redirectable. Multiple times pt has pulled off condom cath and tried to get out of bed.     Patient is not progressing towards the following goals:      Problem: Safety - Medical Restraint  Goal: Remains free of injury from restraints (Restraint for Interference with Medical Device)  Outcome: Not Progressing  Goal: Free from restraint(s) (Restraint for Interference with Medical Device)  Outcome: Not Progressing

## 2021-06-05 NOTE — ASSESSMENT & PLAN NOTE
RESOLVED. Lactic acid 2.6 on admission, possibly due to poor oral intake. Infectious workup negative

## 2021-06-05 NOTE — ED NOTES
Med rec complete per historical med rec, today 6/4/2021 prior to Pt AMA, at which time med rec had been completed per Pt at bedside. Pt had denied taking any prescription medications. No known drug allergies. No oral antibiotics in last 14 days. Preferred pharmacy: [facility].

## 2021-06-05 NOTE — PROGRESS NOTES
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   "

## 2021-06-05 NOTE — ASSESSMENT & PLAN NOTE
Dementia likely contributing. SLP cognitive evaluation results on 6/11 shows severe-profound deficits across almost all cognitive domains tested, consistent with dementia. He was evaluated by psychiatry on 6/15 who also agree with likely dementia dx, and since he is without acute psychiatric symptoms or behaviors, psychiatry consult cancelled. Evaluated by neurology. CT head and MRI brain negative. TSH, HIV, RPR, B12 normal.  - Started on thiamine  - Friends are attempting to pursue guardianship

## 2021-06-06 ENCOUNTER — APPOINTMENT (OUTPATIENT)
Dept: CARDIOLOGY | Facility: MEDICAL CENTER | Age: 66
DRG: 884 | End: 2021-06-06
Attending: INTERNAL MEDICINE
Payer: MEDICARE

## 2021-06-06 LAB
BACTERIA UR CULT: NORMAL
GLUCOSE BLD-MCNC: 101 MG/DL (ref 65–99)
GLUCOSE BLD-MCNC: 116 MG/DL (ref 65–99)
GLUCOSE BLD-MCNC: 123 MG/DL (ref 65–99)
GLUCOSE BLD-MCNC: 136 MG/DL (ref 65–99)
LV EJECT FRACT  99904: 60
SIGNIFICANT IND 70042: NORMAL
SITE SITE: NORMAL
SOURCE SOURCE: NORMAL

## 2021-06-06 PROCEDURE — 99226 PR SUBSEQUENT OBSERVATION CARE,LEVEL III: CPT | Performed by: HOSPITALIST

## 2021-06-06 PROCEDURE — 700111 HCHG RX REV CODE 636 W/ 250 OVERRIDE (IP): Performed by: HOSPITALIST

## 2021-06-06 PROCEDURE — 700111 HCHG RX REV CODE 636 W/ 250 OVERRIDE (IP): Performed by: STUDENT IN AN ORGANIZED HEALTH CARE EDUCATION/TRAINING PROGRAM

## 2021-06-06 PROCEDURE — 93308 TTE F-UP OR LMTD: CPT | Mod: 26 | Performed by: INTERNAL MEDICINE

## 2021-06-06 PROCEDURE — 93325 DOPPLER ECHO COLOR FLOW MAPG: CPT

## 2021-06-06 PROCEDURE — G0378 HOSPITAL OBSERVATION PER HR: HCPCS

## 2021-06-06 PROCEDURE — 306565 RIGID MIT RESTRAINT(PAIR): Performed by: HOSPITALIST

## 2021-06-06 PROCEDURE — 93325 DOPPLER ECHO COLOR FLOW MAPG: CPT | Mod: 26 | Performed by: INTERNAL MEDICINE

## 2021-06-06 PROCEDURE — A9270 NON-COVERED ITEM OR SERVICE: HCPCS | Performed by: INTERNAL MEDICINE

## 2021-06-06 PROCEDURE — 700117 HCHG RX CONTRAST REV CODE 255: Performed by: INTERNAL MEDICINE

## 2021-06-06 PROCEDURE — 82962 GLUCOSE BLOOD TEST: CPT | Mod: 91

## 2021-06-06 PROCEDURE — 700102 HCHG RX REV CODE 250 W/ 637 OVERRIDE(OP): Performed by: INTERNAL MEDICINE

## 2021-06-06 PROCEDURE — 700101 HCHG RX REV CODE 250: Performed by: HOSPITALIST

## 2021-06-06 PROCEDURE — 93321 DOPPLER ECHO F-UP/LMTD STD: CPT | Mod: 26 | Performed by: INTERNAL MEDICINE

## 2021-06-06 RX ORDER — LORAZEPAM 2 MG/ML
1 INJECTION INTRAMUSCULAR EVERY 4 HOURS PRN
Status: DISCONTINUED | OUTPATIENT
Start: 2021-06-06 | End: 2021-06-08

## 2021-06-06 RX ORDER — MORPHINE SULFATE 4 MG/ML
2 INJECTION, SOLUTION INTRAMUSCULAR; INTRAVENOUS ONCE
Status: COMPLETED | OUTPATIENT
Start: 2021-06-06 | End: 2021-06-06

## 2021-06-06 RX ADMIN — MORPHINE SULFATE 2 MG: 4 INJECTION INTRAVENOUS at 17:07

## 2021-06-06 RX ADMIN — LORAZEPAM 1 MG: 2 INJECTION INTRAMUSCULAR; INTRAVENOUS at 17:07

## 2021-06-06 RX ADMIN — HALOPERIDOL LACTATE 5 MG: 5 INJECTION, SOLUTION INTRAMUSCULAR at 09:18

## 2021-06-06 RX ADMIN — ASPIRIN 325 MG ORAL TABLET 325 MG: 325 PILL ORAL at 06:03

## 2021-06-06 RX ADMIN — HUMAN ALBUMIN MICROSPHERES AND PERFLUTREN 3 ML: 10; .22 INJECTION, SOLUTION INTRAVENOUS at 11:20

## 2021-06-06 RX ADMIN — POTASSIUM CHLORIDE, DEXTROSE MONOHYDRATE AND SODIUM CHLORIDE: 150; 5; 450 INJECTION, SOLUTION INTRAVENOUS at 22:12

## 2021-06-06 RX ADMIN — POTASSIUM CHLORIDE, DEXTROSE MONOHYDRATE AND SODIUM CHLORIDE: 150; 5; 450 INJECTION, SOLUTION INTRAVENOUS at 12:47

## 2021-06-06 RX ADMIN — POTASSIUM CHLORIDE, DEXTROSE MONOHYDRATE AND SODIUM CHLORIDE: 150; 5; 450 INJECTION, SOLUTION INTRAVENOUS at 04:38

## 2021-06-06 RX ADMIN — HALOPERIDOL LACTATE 5 MG: 5 INJECTION, SOLUTION INTRAMUSCULAR at 13:46

## 2021-06-06 RX ADMIN — ENOXAPARIN SODIUM 40 MG: 40 INJECTION SUBCUTANEOUS at 06:03

## 2021-06-06 ASSESSMENT — PAIN DESCRIPTION - PAIN TYPE: TYPE: ACUTE PAIN

## 2021-06-06 NOTE — PROGRESS NOTES
[facility] Medicine Daily Progress Note    Date of Service  6/6/2021    Chief Complaint  66 y.o. male admitted 6/4/2021 with altered mental status    Hospital Course  No notes on file    Interval Problem Update  No acute overnight events, patient continues to be disoriented, we are currently trialing him off restraints at this juncture.    Consultants/Specialty  None indicated at this juncture.    Code Status  Full Code    Disposition  Pending at this time    Review of Systems  Unable to be assessed    Physical Exam  Temp:  [36.4 °C (97.5 °F)-36.9 °C (98.5 °F)] 36.7 °C (98 °F)  Pulse:  [86-96] 88  Resp:  [16-18] 16  BP: (140-155)/(65-86) 140/65  SpO2:  [92 %-97 %] 92 %    General: No acute distress ANO x1  HEENT atraumatic, normocephalic, pupils equal round reactive to light  Neck: No JVD  Chest: Respirations are unlabored  Cardiac: Physiologic S1 and S2  Abdomen: Soft, nontender, nondistended  Extremities: Without clubbing, cyanosis or edema  Neuro: Cranial nerves II through XII are grossly intact.  Psych: Disoriented, judgment does not appear intact at this time.      Current Facility-Administered Medications:   •  dextrose 5 % and 0.45 % NaCl with KCl 20 mEq, , Intravenous, Continuous, Kelvin Denise M.D., Last Rate: 125 mL/hr at 06/06/21 1247, New Bag at 06/06/21 1247  •  aspirin (ASA) tablet 325 mg, 325 mg, Oral, DAILY, 325 mg at 06/06/21 0603 **OR** aspirin (ASA) chewable tab 324 mg, 324 mg, Oral, DAILY **OR** aspirin (ASA) suppository 300 mg, 300 mg, Rectal, DAILY, Nabeel Pitts M.D.  •  regadenoson (LEXISCAN) injection SOLN 0.4 mg, 0.4 mg, Intravenous, Once PRN, Nabeel Pitts M.D.  •  aminophylline injection 100 mg, 100 mg, Intravenous, Q5 MIN PRN, Nabeel Pitts M.D.  •  acetaminophen (Tylenol) tablet 650 mg, 650 mg, Oral, Q6HRS PRN, Jojo Castillo M.D.  •  enalaprilat (VASOTEC) injection 1.25 mg, 1.25 mg, Intravenous, Q6HRS PRN, Jojo Castillo M.D.  •  labetalol (NORMODYNE/TRANDATE)  injection 10 mg, 10 mg, Intravenous, Q4HRS PRN, Jojo Castillo M.D.  •  ondansetron (ZOFRAN ODT) dispertab 4 mg, 4 mg, Oral, Q4HRS PRN, Jojo Castillo M.D.  •  ondansetron (ZOFRAN) syringe/vial injection 4 mg, 4 mg, Intravenous, Q4HRS PRN, Jojo Castillo M.D.  •  Respiratory Therapy Consult, , Nebulization, Continuous RT, Jojo Castillo M.D.  •  senna-docusate (PERICOLACE or SENOKOT S) 8.6-50 MG per tablet 2 tablet, 2 tablet, Oral, BID, 2 tablet at 06/05/21 0512 **AND** polyethylene glycol/lytes (MIRALAX) PACKET 1 Packet, 1 Packet, Oral, QDAY PRN **AND** magnesium hydroxide (MILK OF MAGNESIA) suspension 30 mL, 30 mL, Oral, QDAY PRN **AND** bisacodyl (DULCOLAX) suppository 10 mg, 10 mg, Rectal, QDAY PRN, Jojo Castillo M.D.  •  enoxaparin (LOVENOX) inj 40 mg, 40 mg, Subcutaneous, DAILY, Jojo Castillo M.D., 40 mg at 06/06/21 0603  •  haloperidol lactate (HALDOL) injection 2-5 mg, 2-5 mg, Intravenous, Q4HRS PRN, Jojo Castillo M.D., 5 mg at 06/06/21 1346      Fluids    Intake/Output Summary (Last 24 hours) at 6/6/2021 1519  Last data filed at 6/6/2021 0600  Gross per 24 hour   Intake --   Output 1900 ml   Net -1900 ml       Laboratory  Recent Labs     06/04/21  0905 06/04/21  1700 06/05/21  0102   WBC 6.9 8.3 7.3   RBC 4.15* 3.95* 3.54*   HEMOGLOBIN 13.5* 12.9* 11.7*   HEMATOCRIT 40.6* 38.5* 34.8*   MCV 97.8 97.5 98.3*   MCH 32.5 32.7 33.1*   MCHC 33.3* 33.5* 33.6*   RDW 50.6* 51.2* 52.3*   PLATELETCT 215 211 170   MPV 9.7 9.8 9.1     Recent Labs     06/04/21  0905 06/04/21  1700 06/05/21  0102   SODIUM 140 145 145   POTASSIUM 3.4* 3.6 3.8   CHLORIDE 101 111 113*   CO2 14* 11* 15*   GLUCOSE 97 67 65   BUN 15 12 9   CREATININE 1.48* 1.31 1.08   CALCIUM 9.9 9.0 8.5     Recent Labs     06/04/21  2040   INR 1.29*         Recent Labs     06/05/21  0102   TRIGLYCERIDE 77   HDL 41   LDL 72       Imaging  EC-ECHOCARDIOGRAM LTD W/ CONT   Final Result      CT-HEAD W/O   Final Result      1.  Cerebral  atrophy and chronic microvascular ischemic type changes.   2.  No acute intracranial abnormality.      DX-CHEST-PORTABLE (1 VIEW)   Final Result      No acute cardiopulmonary abnormality.      NM-CARDIAC STRESS TEST    (Results Pending)   MR-BRAIN-W/O    (Results Pending)        Assessment/Plan  * Syncope- (present on admission)  Assessment & Plan  CT head showing no acute intracranial abnormalities  PT OT  Fall precautions in place  Echo ordered to assess  Ordered CoVID testing     AMS (altered mental status)- (present on admission)  Assessment & Plan  AAAX1 to self only  Pulling at lines and soft restraints are in place due to possible self injury/aggression, interference with medical treatment  Continue to monitor  MRI Brain ordered HD#1.      Dehydration- (present on admission)  Assessment & Plan  IV fluids in place  Confusion and altered mental status on readmission today  Multiple falls and syncope  Fall Precautions in place continue to monitor    Tachycardia- (present on admission)  Assessment & Plan  Rate 114 on admission  Admitted with telemetry  IV fluids in place  Echo ordered due to syncopal episodes  Continue to monitor    Elevated lactic acid level- (present on admission)  Assessment & Plan  Lactic acid 2.6  IV fluids in place  WBC WNL  ESR/CRP ordered  Awaiting blood and urine cultures       VTE prophylaxis: Low molecular weight heparin's as ordered.

## 2021-06-06 NOTE — CARE PLAN
The patient is Watcher - Medium risk of patient condition declining or worsening         Progress made toward(s) clinical / shift goals:  Pt cont to be very confused and remains in restraints. Echo completed.     Patient is not progressing towards the following goals:      Problem: Safety - Medical Restraint  Goal: Remains free of injury from restraints (Restraint for Interference with Medical Device)  Outcome: Not Progressing  Goal: Free from restraint(s) (Restraint for Interference with Medical Device)  Outcome: Not Progressing     Problem: Hemodynamics  Goal: Patient's hemodynamics, fluid balance and neurologic status will be stable or improve  Outcome: Not Progressing     Problem: Fluid Volume  Goal: Fluid volume balance will be maintained  Outcome: Not Progressing     Problem: Urinary - Renal Perfusion  Goal: Ability to achieve and maintain adequate renal perfusion and functioning will improve  Outcome: Not Progressing     Problem: Respiratory  Goal: Patient will achieve/maintain optimum respiratory ventilation and gas exchange  Outcome: Not Progressing     Problem: Mechanical Ventilation  Goal: Safe management of artificial airway and ventilation  Outcome: Not Progressing  Goal: Successful weaning off mechanical ventilator, spontaneously maintains adequate gas exchange  Outcome: Not Progressing  Goal: Patient will be able to express needs and understand communication  Outcome: Not Progressing     Problem: Physical Regulation  Goal: Diagnostic test results will improve  Outcome: Not Progressing  Goal: Signs and symptoms of infection will decrease  Outcome: Not Progressing     Problem: Skin Integrity  Goal: Skin integrity is maintained or improved  Outcome: Not Progressing     Problem: Fall Risk  Goal: Patient will remain free from falls  Outcome: Not Progressing     Problem: Knowledge Deficit - Standard  Goal: Patient and family/care givers will demonstrate understanding of plan of care, disease  process/condition, diagnostic tests and medications  Outcome: Not Progressing

## 2021-06-06 NOTE — THERAPY
Physical Therapy Contact Note    PT consult received and acknowledged. Discussed with RN, patient is currently restrained and not appropriate to participate with PT. Will re attempt as able and appropriate.    Ana Laura Heaton, PT, DPT  [phone]

## 2021-06-06 NOTE — CARE PLAN
The patient is Stable - Low risk of patient condition declining or worsening         Progress made toward(s) clinical / shift goals:  Fall precautions in place. Pt turned q2h. Skin red but blanching under restraints.       Problem: Safety - Medical Restraint  Goal: Remains free of injury from restraints (Restraint for Interference with Medical Device)  Outcome: Progressing     Problem: Skin Integrity  Goal: Skin integrity is maintained or improved  Outcome: Progressing     Problem: Fall Risk  Goal: Patient will remain free from falls  Outcome: Progressing

## 2021-06-06 NOTE — PROGRESS NOTES
Pt continues to try and get out of bed non stop. He pulls condom cath off, telemetry and mitts despite being in restraints. Gave 5mg of PRN Haldol with no effect noted. Pt refusing food and continues to ramble nonsensically.

## 2021-06-06 NOTE — PROGRESS NOTES
Pt continues to be restless and agitated. Continually trying to crawl out of bed and pulling at tubes and lines. Briefly tried removing restraints while performing care but pt immediately tried to get out of bed and was not redirectable. Placed at least 10 condom caths on this shift. A and o x 1. Constant nonsensical rambling from patient.  Refusing food. Redness under restraints. Some of it looks like healing bruises likely related to previous falls outside of hospital. Some may be from him constantly pulling against restraints. Gave two doses of PRN Haldol with little to no effect noted.

## 2021-06-06 NOTE — PROGRESS NOTES
MRI asking for abd X ray to clear patient for MRI.  Will relay to MD. Also informed them pt is currently in restraints and will not follow commands. Will notify them when patient able to follow directions.

## 2021-06-07 ENCOUNTER — APPOINTMENT (OUTPATIENT)
Dept: RADIOLOGY | Facility: MEDICAL CENTER | Age: 66
DRG: 884 | End: 2021-06-07
Attending: HOSPITALIST
Payer: MEDICARE

## 2021-06-07 LAB
AMMONIA PLAS-SCNC: 16 UMOL/L (ref 11–45)
HIV 1+2 AB+HIV1 P24 AG SERPL QL IA: NORMAL
MAGNESIUM SERPL-MCNC: 1.6 MG/DL (ref 1.5–2.5)
T4 FREE SERPL-MCNC: 1.37 NG/DL (ref 0.93–1.7)
TREPONEMA PALLIDUM IGG+IGM AB [PRESENCE] IN SERUM OR PLASMA BY IMMUNOASSAY: NORMAL
TSH SERPL DL<=0.005 MIU/L-ACNC: 2.77 UIU/ML (ref 0.38–5.33)
VIT B12 SERPL-MCNC: 955 PG/ML (ref 211–911)

## 2021-06-07 PROCEDURE — 97166 OT EVAL MOD COMPLEX 45 MIN: CPT

## 2021-06-07 PROCEDURE — 82607 VITAMIN B-12: CPT

## 2021-06-07 PROCEDURE — 83735 ASSAY OF MAGNESIUM: CPT

## 2021-06-07 PROCEDURE — A9270 NON-COVERED ITEM OR SERVICE: HCPCS | Performed by: INTERNAL MEDICINE

## 2021-06-07 PROCEDURE — 700101 HCHG RX REV CODE 250: Performed by: HOSPITALIST

## 2021-06-07 PROCEDURE — 84443 ASSAY THYROID STIM HORMONE: CPT

## 2021-06-07 PROCEDURE — 700102 HCHG RX REV CODE 250 W/ 637 OVERRIDE(OP): Performed by: INTERNAL MEDICINE

## 2021-06-07 PROCEDURE — 700111 HCHG RX REV CODE 636 W/ 250 OVERRIDE (IP): Performed by: STUDENT IN AN ORGANIZED HEALTH CARE EDUCATION/TRAINING PROGRAM

## 2021-06-07 PROCEDURE — 84425 ASSAY OF VITAMIN B-1: CPT

## 2021-06-07 PROCEDURE — G0378 HOSPITAL OBSERVATION PER HR: HCPCS

## 2021-06-07 PROCEDURE — 82140 ASSAY OF AMMONIA: CPT

## 2021-06-07 PROCEDURE — 74018 RADEX ABDOMEN 1 VIEW: CPT

## 2021-06-07 PROCEDURE — 86780 TREPONEMA PALLIDUM: CPT

## 2021-06-07 PROCEDURE — 99205 OFFICE O/P NEW HI 60 MIN: CPT | Performed by: PSYCHIATRY & NEUROLOGY

## 2021-06-07 PROCEDURE — 99225 PR SUBSEQUENT OBSERVATION CARE,LEVEL II: CPT | Performed by: HOSPITALIST

## 2021-06-07 PROCEDURE — 84439 ASSAY OF FREE THYROXINE: CPT

## 2021-06-07 PROCEDURE — 87389 HIV-1 AG W/HIV-1&-2 AB AG IA: CPT

## 2021-06-07 PROCEDURE — 700102 HCHG RX REV CODE 250 W/ 637 OVERRIDE(OP): Performed by: STUDENT IN AN ORGANIZED HEALTH CARE EDUCATION/TRAINING PROGRAM

## 2021-06-07 PROCEDURE — A9270 NON-COVERED ITEM OR SERVICE: HCPCS | Performed by: STUDENT IN AN ORGANIZED HEALTH CARE EDUCATION/TRAINING PROGRAM

## 2021-06-07 PROCEDURE — 36415 COLL VENOUS BLD VENIPUNCTURE: CPT

## 2021-06-07 RX ADMIN — DOCUSATE SODIUM 50 MG AND SENNOSIDES 8.6 MG 2 TABLET: 8.6; 5 TABLET, FILM COATED ORAL at 05:21

## 2021-06-07 RX ADMIN — POTASSIUM CHLORIDE, DEXTROSE MONOHYDRATE AND SODIUM CHLORIDE: 150; 5; 450 INJECTION, SOLUTION INTRAVENOUS at 15:30

## 2021-06-07 RX ADMIN — ASPIRIN 324 MG: 81 TABLET, CHEWABLE ORAL at 05:21

## 2021-06-07 RX ADMIN — POTASSIUM CHLORIDE, DEXTROSE MONOHYDRATE AND SODIUM CHLORIDE: 150; 5; 450 INJECTION, SOLUTION INTRAVENOUS at 05:22

## 2021-06-07 RX ADMIN — ENOXAPARIN SODIUM 40 MG: 40 INJECTION SUBCUTANEOUS at 05:21

## 2021-06-07 ASSESSMENT — COGNITIVE AND FUNCTIONAL STATUS - GENERAL
DAILY ACTIVITIY SCORE: 14
EATING MEALS: A LITTLE
PERSONAL GROOMING: A LITTLE
HELP NEEDED FOR BATHING: A LOT
DRESSING REGULAR UPPER BODY CLOTHING: A LITTLE
TOILETING: TOTAL
DRESSING REGULAR LOWER BODY CLOTHING: A LOT
SUGGESTED CMS G CODE MODIFIER DAILY ACTIVITY: CK

## 2021-06-07 ASSESSMENT — ACTIVITIES OF DAILY LIVING (ADL): TOILETING: INDEPENDENT

## 2021-06-07 ASSESSMENT — PAIN DESCRIPTION - PAIN TYPE
TYPE: ACUTE PAIN

## 2021-06-07 ASSESSMENT — FIBROSIS 4 INDEX: FIB4 SCORE: 2.4

## 2021-06-07 NOTE — PROGRESS NOTES
Report received at bedside from NOC RN. PT care assumed. Tele box on and verified with monitor room. PT A&O to self only. Restraints in place with MD order. No signs of distress noted at this time. Patient resting comfortably in bed. POC discussed with PT and verbalizes no questions. PT c/o of no pain. PT denies any additional needs at this time. Bed in lowest and locked position. PT educated on fall risk and verbalized understanding. Bed alarm plugged in and on. All questions answered appropriately. Will continue plan of care.

## 2021-06-07 NOTE — CARE PLAN
Problem: Safety - Medical Restraint  Goal: Remains free of injury from restraints (Restraint for Interference with Medical Device)  Outcome: Not Met  Flowsheets (Taken 6/7/2021 0004)  Addressed this shift: Remains free of injury from restraints (restraint for interference with medical device):   Determine that other, less restrictive measures have been tried or would not be effective before applying the restraint   Evaluate the patient's condition at the time of restraint application   Inform patient/family regarding the reason for restraint   Every 2 hours: Monitor safety, psychosocial status, comfort, nutrition and hydration  Note: Patient has bruising to L wrist.  MD aware.  Will continue to monitor.  Goal: Free from restraint(s) (Restraint for Interference with Medical Device)  Outcome: Not Met  Flowsheets (Taken 6/7/2021 0004)  Addressed this shift: Free from restraint(s) (restraint for interference with medical device):   ONCE/SHIFT or MINIMUM Every 12 hours: Assess and document the continuing need for restraints   Identify and implement measures to help patient regain control   The patient is Watcher - Medium risk of patient condition declining or worsening    Shift Goals  Clinical Goals: Reduce agitation. Sleep comfortable  Patient Goals: Unable to Assess  Family Goals: N/A    Progress made toward(s) clinical / shift goals:   Patient has been sleeping comfortably     Patient is not progressing towards the following goals: Patient remains in restraints due to continuing agitation, confusion, trying to get out of bed, and taking off clothing, tele leads, and pulling at peripheral IV.    Problem: Fall Risk  Goal: Patient will remain free from falls  Outcome: Progressing  Note: Patient has remained free from falls over the course of his hospital stay.

## 2021-06-07 NOTE — PROGRESS NOTES
Received Bedside report. Assumed care at 1900. This pt is AOx1, voiding via condom catheter, 0/10 pain. Patient and RN discussed plan of care: questions answered. Labs noted, assessment complete, patient tolerating regular diet. Tele box in place. Pt is on room air. Call light in place, fall precautions in place, patient educated on importance of calling for assistance. No additional needs at this time. VSS

## 2021-06-07 NOTE — CONSULTS
Neurology Initial Consult H&P  Neurohospitalist Service, [facility]    Referring Physician: Kelvin Denise M.D.    Chief Complaint   Patient presents with   • ALOC       HPI: Lane Beard is a 66 y.o. man presenting for whom neurology has been consulted for altered mental status. Per schultz review, pt initially presented on 6/4/21 with dizziness and lightheadedness with reported sensations of passing out and loss of consciousness. Pt then AMA and was brought back by EMS after he was witnessed falling on the street and reportedly loosing consciousness again. Initial workup noted for blood sugar of 69 and LA of 2.7. This was treated with 2L iv fluids and D10. His chest XR reviewed and unremarkable. Head CT reviewed and shows moderate cortical atrophy and microvascular changes. He has not gone down for MRI do to AMS and agitation. UA neg. UDS pos for cannabinoids. TSH 2.42. ECHO EF 60% limited study. Blood and urine cultures neg to date, no leukocytosis, and normal procal. A1C 4.9% and LDL 72. ETOH was negative on admission. He does have metabolic acidosis bicarb 15. ESR and CRP negative. Pt currently denies pain. He has required restraints for agitation and Haldol for sedation.     Review of systems: In addition to what is detailed in the HPI above, all other systems reviewed and are negative.    Past Medical History:   Pt denies pmx    has no past medical history on file.    FHx:  Pt not able to recall any pertinent family hx   family history is not on file.    SHx:  He denied drinking alcohol, smoking, and illicit drug use     Allergies:  No Known Allergies    Medications:    Current Facility-Administered Medications:   •  LORazepam (ATIVAN) injection 1 mg, 1 mg, Intravenous, Q4HRS PRN, Kelvin Denise M.D., 1 mg at 06/06/21 1707  •  dextrose 5 % and 0.45 % NaCl with KCl 20 mEq, , Intravenous, Continuous, Kelvin Denise M.D., Last Rate: 125 mL/hr at 06/07/21 0522, New Bag at 06/07/21  0522  •  aspirin (ASA) tablet 325 mg, 325 mg, Oral, DAILY, 325 mg at 06/06/21 0603 **OR** aspirin (ASA) chewable tab 324 mg, 324 mg, Oral, DAILY, 324 mg at 06/07/21 0521 **OR** aspirin (ASA) suppository 300 mg, 300 mg, Rectal, DAILY, Nabeel Pitts M.D.  •  regadenoson (LEXISCAN) injection SOLN 0.4 mg, 0.4 mg, Intravenous, Once PRN, Nabeel Pitts M.D.  •  aminophylline injection 100 mg, 100 mg, Intravenous, Q5 MIN PRN, Nabeel Pitts M.D.  •  acetaminophen (Tylenol) tablet 650 mg, 650 mg, Oral, Q6HRS PRN, Jojo Castillo M.D.  •  enalaprilat (VASOTEC) injection 1.25 mg, 1.25 mg, Intravenous, Q6HRS PRN, Jojo Castillo M.D.  •  labetalol (NORMODYNE/TRANDATE) injection 10 mg, 10 mg, Intravenous, Q4HRS PRN, Jojo Castillo M.D.  •  ondansetron (ZOFRAN ODT) dispertab 4 mg, 4 mg, Oral, Q4HRS PRN, Jojo Castillo M.D.  •  ondansetron (ZOFRAN) syringe/vial injection 4 mg, 4 mg, Intravenous, Q4HRS PRN, Jojo Castillo M.D.  •  Respiratory Therapy Consult, , Nebulization, Continuous RT, Jojo Castillo M.D.  •  senna-docusate (PERICOLACE or SENOKOT S) 8.6-50 MG per tablet 2 tablet, 2 tablet, Oral, BID, 2 tablet at 06/07/21 0521 **AND** polyethylene glycol/lytes (MIRALAX) PACKET 1 Packet, 1 Packet, Oral, QDAY PRN **AND** magnesium hydroxide (MILK OF MAGNESIA) suspension 30 mL, 30 mL, Oral, QDAY PRN **AND** bisacodyl (DULCOLAX) suppository 10 mg, 10 mg, Rectal, QDAY PRN, Jojo Castillo M.D.  •  enoxaparin (LOVENOX) inj 40 mg, 40 mg, Subcutaneous, DAILY, Jojo Castillo M.D., 40 mg at 06/07/21 0521  •  haloperidol lactate (HALDOL) injection 2-5 mg, 2-5 mg, Intravenous, Q4HRS PRN, Jojo Castillo M.D., 5 mg at 06/06/21 1346    Physical Examination:     Vitals:    06/06/21 2100 06/07/21 0030 06/07/21 0400 06/07/21 0808   BP: 154/90 138/73 156/93 132/83   Pulse: 96 81 (!) 106 85   Resp: 18 18 18 19   Temp: 36.2 °C (97.2 °F) 35.9 °C (96.6 °F) 36.4 °C (97.5 °F) 36.1 °C (97 °F)   TempSrc: Temporal  Temporal Temporal Temporal   SpO2: 97% 94% 97% 96%   Weight:       Height:           General: Patient is awake and in no acute distress  Eyes: examination of optic disks not indicated at this time given acuity of consult  CV: RRR    NEUROLOGICAL EXAM:     Mental status: Awake, alert to name and year, able to point to the ceiling and then to the ground  Unable to follow two step command of touching left ear with right hand (used left hand), able to tell apple and orange are fruit, states watch and ruler measure time, knows he's in [place] but unaware that he is in the hospital. Not able to reason what killing two birds with one stone means. When asked what he would do if there was a fire in his room, pt stated if it was small, he would take care of it   Speech and language: speech is not dysarthric. The patient is able to name and repeat.  Cranial nerve exam: Pupils are equal, round and reactive to light bilaterally. Visual fields are full. Extraocular muscles are intact. Sensation in the face is intact to light touch. Face is symmetric. Shoulder shrug is full. Tongue is midline.   Motor exam: Strength is 5/5 in all extremities both distally and proximally. Tone is normal. No abnormal movements were seen on exam  Sensory exam: No sensory deficits identified   Deep tendon reflexes: Toes down-going bilaterally.  Gait: deferred given pt status     Objective Data:    Labs:  Lab Results   Component Value Date/Time    PROTHROMBTM 16.5 (H) 06/04/2021 08:40 PM    INR 1.29 (H) 06/04/2021 08:40 PM      Lab Results   Component Value Date/Time    WBC 7.3 06/05/2021 01:02 AM    RBC 3.54 (L) 06/05/2021 01:02 AM    HEMOGLOBIN 11.7 (L) 06/05/2021 01:02 AM    HEMATOCRIT 34.8 (L) 06/05/2021 01:02 AM    MCV 98.3 (H) 06/05/2021 01:02 AM    MCH 33.1 (H) 06/05/2021 01:02 AM    MCHC 33.6 (L) 06/05/2021 01:02 AM    MPV 9.1 06/05/2021 01:02 AM    NEUTSPOLYS 72.00 06/05/2021 01:02 AM    LYMPHOCYTES 15.80 (L) 06/05/2021 01:02 AM    MONOCYTES  10.60 06/05/2021 01:02 AM    EOSINOPHILS 0.40 06/05/2021 01:02 AM    BASOPHILS 0.60 06/05/2021 01:02 AM      Lab Results   Component Value Date/Time    SODIUM 145 06/05/2021 01:02 AM    POTASSIUM 3.8 06/05/2021 01:02 AM    CHLORIDE 113 (H) 06/05/2021 01:02 AM    CO2 15 (L) 06/05/2021 01:02 AM    GLUCOSE 65 06/05/2021 01:02 AM    BUN 9 06/05/2021 01:02 AM    CREATININE 1.08 06/05/2021 01:02 AM      Lab Results   Component Value Date/Time    CHOLSTRLTOT 128 06/05/2021 01:02 AM    LDL 72 06/05/2021 01:02 AM    HDL 41 06/05/2021 01:02 AM    TRIGLYCERIDE 77 06/05/2021 01:02 AM       Lab Results   Component Value Date/Time    ALKPHOSPHAT 62 06/04/2021 05:00 PM    ASTSGOT 30 06/04/2021 05:00 PM    ALTSGPT 25 06/04/2021 05:00 PM    TBILIRUBIN 0.8 06/04/2021 05:00 PM        Imaging/Testing:    I interpreted and/or reviewed the patient's neuroimaging    EC-ECHOCARDIOGRAM LTD W/ CONT   Final Result      CT-HEAD W/O   Final Result      1.  Cerebral atrophy and chronic microvascular ischemic type changes.   2.  No acute intracranial abnormality.      DX-CHEST-PORTABLE (1 VIEW)   Final Result      No acute cardiopulmonary abnormality.      NM-CARDIAC STRESS TEST    (Results Pending)   MR-BRAIN-WITH & W/O    (Results Pending)       Assessment and Plan:    Lane Beard is a 66 y.o. man presenting for whom neurology has been consulted for altered mental status  #Metabolic encephalopathy vs acute delirium, less likely acute stroke given normal neuro exam   Suspect that this is likely metabolic. CT head reviewed and does show some chronic changes of cortical atrophy and microvascular changes. Has not been able to go for an MRI. Neuro exam motor and sensory unremarkable. He denied hx of seizure or stroke. A1C and lipid panel in a good range.     Plan:  Will obtain further lab workup (HIV, syphilis, free T4, ammonia, Magnesium, B12)  Will send patient down for an MRI brain  w and w/o contrast when he is appropriate   Try to orient  patient frequently, keep the windows open during the day, limit restraint and sedation use   Avoid ativan ideally as it can worsen delirium   Please notify neurology if there are new findings on MRI  Correct metabolic acidosis per primary team   Will continue to follow, please contact us with questions    The evaluation of the patient, and recommended management, was discussed with Dr. Sabrina Phan MD  Internal Medicine Resident

## 2021-06-07 NOTE — THERAPY
"Occupational Therapy   Initial Evaluation     Patient Name: Lane Beard  Age:  66 y.o., Sex:  male  Medical Record #: [id number]  Today's Date: 6/7/2021       Precautions: Fall Risk  Comments: Pt with multiple abraisions on his LE from repeated falls    Assessment  Patient is 66 y.o. male with a diagnosis of AMS.  Pt was in the ER previously but left AMA & was later found down on the street.  Pt currently being worked up for cause of AMS.  Pt was pleasant & co-operative but only oriented to self.  Pt was incontinent of BM in bed.  Pt unable to state his need for additional BM until he was incontinent again standing at the EOB.  Nsg notified that pt's HR increased into the 150's while standing EOB.  Pt able to amb to bathroom with FWW & Min A.  Pt was Min A for toilet transfer using grab bar.  Pt required Max A for personal hygiene after toileting.  Pt did stand at sink to groom briefly with Min A.  Pt returned back to supine in bed after tx.  Pt appeared fatigued after tx.  Pt unable to demonstrate correct use of call light despite multiple attempts to teach.  Pt is not safe to D/C home at this time.  Will need Post Acute OT services or the very least 24/7 supervision.    Plan    Recommend Occupational Therapy 3 times per week until therapy goals are met for the following treatments:  Adaptive Equipment, Cognitive Skill Development, Self Care/Activities of Daily Living, Therapeutic Activities and Therapeutic Exercises.    DC Equipment Recommendations: Unable to determine at this time  Discharge Recommendations: Recommend post-acute placement for additional occupational therapy services prior to discharge home     Subjective    \"We better decide quick\"     Objective       06/07/21 1053   Prior Living Situation   Prior Services None   Housing / Facility Unable To Determine At This Time   Comments Pt is A&O 1.  Pt unable to provide any meaningful hx or PLOF   Prior Level of IADL Function   Medication Management " Unable To Determine At This Time   Cognition    Cognition / Consciousness X   Speech/ Communication Delayed Responses   Orientation Level Not Oriented to Age;Not Oriented to Address;Not Oriented to Year;Not Oriented to Month;Not Oriented to Day;Not Oriented to Time;Not Oriented to Place;Not Oriented to Reason   Level of Consciousness Alert   Ability To Follow Commands 1 Step   Safety Awareness Impaired   New Learning Impaired   Attention Impaired   Sequencing Impaired   Initiation Impaired   Comments Pt with very poor STM, is only oriented to self.   Balance Assessment   Sitting Balance (Static) Fair +   Sitting Balance (Dynamic) Fair +   Standing Balance (Static) Fair   Standing Balance (Dynamic) Fair -   Weight Shift Sitting Fair   Weight Shift Standing Fair   Bed Mobility    Supine to Sit Minimal Assist   Sit to Supine Minimal Assist   Scooting Minimal Assist   Rolling Supervised   ADL Assessment   Eating Minimal Assist   Grooming Minimal Assist;Standing   Bathing Moderate Assist   Upper Body Dressing Minimal Assist   Lower Body Dressing Moderate Assist   Toileting Maximal Assist   Comments Pt incontinent of loose stool, unable to express his need for BM on toilet.   Functional Mobility   Sit to Stand Minimal Assist   Bed, Chair, Wheelchair Transfer Minimal Assist   Toilet Transfers Minimal Assist   Mobility pt amb with FWW to bathroom with Min A   Patient / Family Goals   Patient / Family Goal #1 pt unable to state   Short Term Goals   Short Term Goal # 1 Pt will be supervised with ADL transfers   Short Term Goal # 2 Pt will dress LB with Min A   Short Term Goal # 3 Pt will sequence grooming tasks standing with less than 2 V/Q's

## 2021-06-07 NOTE — PROGRESS NOTES
Hospital Medicine Daily Progress Note    Date of Service  6/7/2021    Chief Complaint  66 y.o. male admitted 6/4/2021 with altered mental status    Hospital Course  No notes on file    Interval Problem Update  No acute overnight events, patient continues to be disoriented, I have requested that we trial mittens only as restraints this morning adds he is beginning to develop wrist abrasions.  Neurology consulted 6/7/2021 given his persistent altered status.    Consultants/Specialty  Neuro 6/7    Code Status  Full Code    Disposition  Pending at this time    Review of Systems  Unable to be assessed    Physical Exam  Temp:  [35.9 °C (96.6 °F)-36.5 °C (97.7 °F)] 36.2 °C (97.1 °F)  Pulse:  [] 89  Resp:  [17-19] 17  BP: (132-156)/(55-93) 140/82  SpO2:  [94 %-97 %] 95 %    General: No acute distress ANO x1  HEENT atraumatic, normocephalic, pupils equal round reactive to light  Neck: No JVD  Chest: Respirations are unlabored  Cardiac: Physiologic S1 and S2  Abdomen: Soft, nontender, nondistended  Extremities: Without clubbing, cyanosis or edema, abrasions developing around wrist restraints.    Neuro: Cranial nerves II through XII are grossly intact.  Psych: Disoriented, judgment does not appear intact at this time.      Current Facility-Administered Medications:   •  LORazepam (ATIVAN) injection 1 mg, 1 mg, Intravenous, Q4HRS PRN, Kelvin Denise M.D., 1 mg at 06/06/21 1707  •  dextrose 5 % and 0.45 % NaCl with KCl 20 mEq, , Intravenous, Continuous, Kelvin Denise M.D., Last Rate: 125 mL/hr at 06/07/21 0522, New Bag at 06/07/21 0522  •  aspirin (ASA) tablet 325 mg, 325 mg, Oral, DAILY, 325 mg at 06/06/21 0603 **OR** aspirin (ASA) chewable tab 324 mg, 324 mg, Oral, DAILY, 324 mg at 06/07/21 0521 **OR** aspirin (ASA) suppository 300 mg, 300 mg, Rectal, DAILY, Nabeel Pitts M.D.  •  regadenoson (LEXISCAN) injection SOLN 0.4 mg, 0.4 mg, Intravenous, Once PRN, Nabeel Pitts M.D.  •  aminophylline injection 100 mg,  100 mg, Intravenous, Q5 MIN PRN, Nabeel Pitts M.D.  •  acetaminophen (Tylenol) tablet 650 mg, 650 mg, Oral, Q6HRS PRN, Jojo Castillo M.D.  •  enalaprilat (VASOTEC) injection 1.25 mg, 1.25 mg, Intravenous, Q6HRS PRN, Jojo Castillo M.D.  •  labetalol (NORMODYNE/TRANDATE) injection 10 mg, 10 mg, Intravenous, Q4HRS PRN, Jojo Castillo M.D.  •  ondansetron (ZOFRAN ODT) dispertab 4 mg, 4 mg, Oral, Q4HRS PRN, Jojo Castillo M.D.  •  ondansetron (ZOFRAN) syringe/vial injection 4 mg, 4 mg, Intravenous, Q4HRS PRN, Jojo Castillo M.D.  •  Respiratory Therapy Consult, , Nebulization, Continuous RT, Jojo Castillo M.D.  •  senna-docusate (PERICOLACE or SENOKOT S) 8.6-50 MG per tablet 2 tablet, 2 tablet, Oral, BID, 2 tablet at 06/07/21 0521 **AND** polyethylene glycol/lytes (MIRALAX) PACKET 1 Packet, 1 Packet, Oral, QDAY PRN **AND** magnesium hydroxide (MILK OF MAGNESIA) suspension 30 mL, 30 mL, Oral, QDAY PRN **AND** bisacodyl (DULCOLAX) suppository 10 mg, 10 mg, Rectal, QDAY PRN, Jojo Castillo M.D.  •  enoxaparin (LOVENOX) inj 40 mg, 40 mg, Subcutaneous, DAILY, Jojo Castillo M.D., 40 mg at 06/07/21 0521  •  haloperidol lactate (HALDOL) injection 2-5 mg, 2-5 mg, Intravenous, Q4HRS PRN, Jojo Castillo M.D., 5 mg at 06/06/21 1346      Fluids    Intake/Output Summary (Last 24 hours) at 6/7/2021 1220  Last data filed at 6/7/2021 0900  Gross per 24 hour   Intake 100 ml   Output 750 ml   Net -650 ml       Laboratory  Recent Labs     06/04/21  1700 06/05/21  0102   WBC 8.3 7.3   RBC 3.95* 3.54*   HEMOGLOBIN 12.9* 11.7*   HEMATOCRIT 38.5* 34.8*   MCV 97.5 98.3*   MCH 32.7 33.1*   MCHC 33.5* 33.6*   RDW 51.2* 52.3*   PLATELETCT 211 170   MPV 9.8 9.1     Recent Labs     06/04/21  1700 06/05/21  0102   SODIUM 145 145   POTASSIUM 3.6 3.8   CHLORIDE 111 113*   CO2 11* 15*   GLUCOSE 67 65   BUN 12 9   CREATININE 1.31 1.08   CALCIUM 9.0 8.5     Recent Labs     06/04/21  2040   INR 1.29*         Recent Labs      06/05/21  0102   TRIGLYCERIDE 77   HDL 41   LDL 72       Imaging  EC-ECHOCARDIOGRAM LTD W/ CONT   Final Result      CT-HEAD W/O   Final Result      1.  Cerebral atrophy and chronic microvascular ischemic type changes.   2.  No acute intracranial abnormality.      DX-CHEST-PORTABLE (1 VIEW)   Final Result      No acute cardiopulmonary abnormality.      NM-CARDIAC STRESS TEST    (Results Pending)   MR-BRAIN-WITH & W/O    (Results Pending)        Assessment/Plan  * Syncope- (present on admission)  Assessment & Plan  CT head showing no acute intracranial abnormalities  PT OT  Fall precautions in place  Echo ordered to assess, grossly unremarkable      AMS (altered mental status)- (present on admission)  Assessment & Plan  AAAX1 to self only  Pulling at lines and soft restraints are in place due to possible self injury/aggression, interference with medical treatment  Continue to monitor  MRI Brain ordered HD#1.    Neuro consults on HD#3 given lack of interval improvement with supportive measures.  Assistance appreciated.      Dehydration- (present on admission)  Assessment & Plan  IV fluids in place, resolved.     Tachycardia- (present on admission)  Assessment & Plan  Rate 114 on admission  Admitted with telemetry  IV fluids in place  Echo ordered due to syncopal episodes  Continue to monitor    Elevated lactic acid level- (present on admission)  Assessment & Plan  Lactic acid 2.6  IV fluids in place  WBC WNL  Cx Neg       VTE prophylaxis: Low molecular weight heparin's as ordered.

## 2021-06-07 NOTE — CARE PLAN
The patient is Watcher - Medium risk of patient condition declining or worsening    Progress made toward(s) clinical / shift goals: Q2 turns in place. Managing and monitoring wrist bruising. Wrist restraints discontinued and patient cooperating while being out of wrist restraints. All fall precautions in place. Non-skid socks in place. Bed in lowest and locked position. Bed alarm in place and on.    Patient is not progressing towards the following goals: PT is still confused and A&O to self only. Reorienting PT hourly and allowing him to voice his feelings and opinions.       Problem: Skin Integrity  Goal: Skin integrity is maintained or improved  Outcome: Progressing     Problem: Fall Risk  Goal: Patient will remain free from falls  Outcome: Progressing

## 2021-06-08 ENCOUNTER — APPOINTMENT (OUTPATIENT)
Dept: RADIOLOGY | Facility: MEDICAL CENTER | Age: 66
DRG: 884 | End: 2021-06-08
Attending: INTERNAL MEDICINE
Payer: MEDICARE

## 2021-06-08 LAB
ANION GAP SERPL CALC-SCNC: 12 MMOL/L (ref 7–16)
BUN SERPL-MCNC: 6 MG/DL (ref 8–22)
CALCIUM SERPL-MCNC: 9.1 MG/DL (ref 8.5–10.5)
CHLORIDE SERPL-SCNC: 110 MMOL/L (ref 96–112)
CO2 SERPL-SCNC: 21 MMOL/L (ref 20–33)
CREAT SERPL-MCNC: 0.95 MG/DL (ref 0.5–1.4)
GLUCOSE SERPL-MCNC: 119 MG/DL (ref 65–99)
POTASSIUM SERPL-SCNC: 3.4 MMOL/L (ref 3.6–5.5)
SODIUM SERPL-SCNC: 143 MMOL/L (ref 135–145)
TRYPTASE SERPL-MCNC: 4.1 UG/L

## 2021-06-08 PROCEDURE — 770020 HCHG ROOM/CARE - TELE (206)

## 2021-06-08 PROCEDURE — 36415 COLL VENOUS BLD VENIPUNCTURE: CPT

## 2021-06-08 PROCEDURE — 99233 SBSQ HOSP IP/OBS HIGH 50: CPT | Performed by: STUDENT IN AN ORGANIZED HEALTH CARE EDUCATION/TRAINING PROGRAM

## 2021-06-08 PROCEDURE — A9270 NON-COVERED ITEM OR SERVICE: HCPCS | Performed by: STUDENT IN AN ORGANIZED HEALTH CARE EDUCATION/TRAINING PROGRAM

## 2021-06-08 PROCEDURE — 700102 HCHG RX REV CODE 250 W/ 637 OVERRIDE(OP): Performed by: STUDENT IN AN ORGANIZED HEALTH CARE EDUCATION/TRAINING PROGRAM

## 2021-06-08 PROCEDURE — A9270 NON-COVERED ITEM OR SERVICE: HCPCS | Performed by: INTERNAL MEDICINE

## 2021-06-08 PROCEDURE — 700102 HCHG RX REV CODE 250 W/ 637 OVERRIDE(OP): Performed by: INTERNAL MEDICINE

## 2021-06-08 PROCEDURE — 99232 SBSQ HOSP IP/OBS MODERATE 35: CPT | Performed by: PSYCHIATRY & NEUROLOGY

## 2021-06-08 PROCEDURE — 700111 HCHG RX REV CODE 636 W/ 250 OVERRIDE (IP): Performed by: STUDENT IN AN ORGANIZED HEALTH CARE EDUCATION/TRAINING PROGRAM

## 2021-06-08 PROCEDURE — 97162 PT EVAL MOD COMPLEX 30 MIN: CPT

## 2021-06-08 PROCEDURE — 80048 BASIC METABOLIC PNL TOTAL CA: CPT

## 2021-06-08 RX ORDER — POTASSIUM CHLORIDE 20 MEQ/1
40 TABLET, EXTENDED RELEASE ORAL ONCE
Status: COMPLETED | OUTPATIENT
Start: 2021-06-08 | End: 2021-06-08

## 2021-06-08 RX ORDER — LORAZEPAM 2 MG/ML
1 INJECTION INTRAMUSCULAR
Status: DISCONTINUED | OUTPATIENT
Start: 2021-06-08 | End: 2021-07-08

## 2021-06-08 RX ADMIN — POTASSIUM CHLORIDE 40 MEQ: 1500 TABLET, EXTENDED RELEASE ORAL at 17:50

## 2021-06-08 RX ADMIN — ENOXAPARIN SODIUM 40 MG: 40 INJECTION SUBCUTANEOUS at 05:54

## 2021-06-08 RX ADMIN — ASPIRIN 325 MG ORAL TABLET 325 MG: 325 PILL ORAL at 05:53

## 2021-06-08 ASSESSMENT — COGNITIVE AND FUNCTIONAL STATUS - GENERAL
MOVING FROM LYING ON BACK TO SITTING ON SIDE OF FLAT BED: A LOT
MOBILITY SCORE: 12
STANDING UP FROM CHAIR USING ARMS: A LITTLE
TURNING FROM BACK TO SIDE WHILE IN FLAT BAD: A LITTLE
WALKING IN HOSPITAL ROOM: A LOT
SUGGESTED CMS G CODE MODIFIER MOBILITY: CL
CLIMB 3 TO 5 STEPS WITH RAILING: TOTAL
MOVING TO AND FROM BED TO CHAIR: UNABLE

## 2021-06-08 ASSESSMENT — GAIT ASSESSMENTS
GAIT LEVEL OF ASSIST: MODERATE ASSIST
DEVIATION: DECREASED BASE OF SUPPORT;OTHER (COMMENT)
DISTANCE (FEET): 20
ASSISTIVE DEVICE: HAND HELD ASSIST

## 2021-06-08 ASSESSMENT — ENCOUNTER SYMPTOMS
TREMORS: 0
NERVOUS/ANXIOUS: 0
FOCAL WEAKNESS: 0
HEADACHES: 0
DEPRESSION: 0
DIZZINESS: 1
LOSS OF CONSCIOUSNESS: 1
MYALGIAS: 0
FALLS: 1
NAUSEA: 0
ABDOMINAL PAIN: 0
CHILLS: 0
SHORTNESS OF BREATH: 0
BLURRED VISION: 0
VOMITING: 0
FEVER: 0
SEIZURES: 0
SPEECH CHANGE: 0
ROS GI COMMENTS: DECREASED APPETITE

## 2021-06-08 ASSESSMENT — LIFESTYLE VARIABLES: SUBSTANCE_ABUSE: 0

## 2021-06-08 ASSESSMENT — FIBROSIS 4 INDEX: FIB4 SCORE: 2.4

## 2021-06-08 NOTE — PROGRESS NOTES
Neurology Progress Note  Neurohospitalist Service, [facility]    Referring Physician: Adia Lara M.D.    Chief Complaint   Patient presents with   • ALOC       HPI: Refer to initial documented Neurology H&P, as detailed in the patient's chart.    Interval History: No acute events overnight.  Denies headache.  No novel complaints.  Confused and disoriented.    Review of systems: In addition to what is detailed in the HPI and/or updated in the interval history, all other systems reviewed and are negative.    Past Medical History:   Unchanged from prior    FHx:  Unchanged from prior    SHx:   reports that he has never smoked. He has never used smokeless tobacco. He reports previous alcohol use. He reports previous drug use.    Medications:    Current Facility-Administered Medications:   •  LORazepam (ATIVAN) injection 1 mg, 1 mg, Intravenous, Q4HRS PRN, Kelvin Denise M.D., 1 mg at 06/06/21 1707  •  dextrose 5 % and 0.45 % NaCl with KCl 20 mEq, , Intravenous, Continuous, Kelvin Denise M.D., Last Rate: 125 mL/hr at 06/07/21 1530, New Bag at 06/07/21 1530  •  aspirin (ASA) tablet 325 mg, 325 mg, Oral, DAILY, 325 mg at 06/08/21 0553 **OR** aspirin (ASA) chewable tab 324 mg, 324 mg, Oral, DAILY, 324 mg at 06/07/21 0521 **OR** aspirin (ASA) suppository 300 mg, 300 mg, Rectal, DAILY, Nabeel Pitts M.D.  •  regadenoson (LEXISCAN) injection SOLN 0.4 mg, 0.4 mg, Intravenous, Once PRN, Nabeel Pitts M.D.  •  aminophylline injection 100 mg, 100 mg, Intravenous, Q5 MIN PRN, Nabeel Pitts M.D.  •  acetaminophen (Tylenol) tablet 650 mg, 650 mg, Oral, Q6HRS PRN, Jojo Castillo M.D.  •  enalaprilat (VASOTEC) injection 1.25 mg, 1.25 mg, Intravenous, Q6HRS PRN, Jojo Castillo M.D.  •  labetalol (NORMODYNE/TRANDATE) injection 10 mg, 10 mg, Intravenous, Q4HRS PRN, Jojo Castillo M.D.  •  ondansetron (ZOFRAN ODT) dispertab 4 mg, 4 mg, Oral, Q4HRS PRN, Jojo Castillo M.D.  •   ondansetron (ZOFRAN) syringe/vial injection 4 mg, 4 mg, Intravenous, Q4HRS PRN, Jojo Castillo M.D.  •  Respiratory Therapy Consult, , Nebulization, Continuous RT, Jojo Castillo M.D.  •  senna-docusate (PERICOLACE or SENOKOT S) 8.6-50 MG per tablet 2 tablet, 2 tablet, Oral, BID, 2 tablet at 06/07/21 0521 **AND** polyethylene glycol/lytes (MIRALAX) PACKET 1 Packet, 1 Packet, Oral, QDAY PRN **AND** magnesium hydroxide (MILK OF MAGNESIA) suspension 30 mL, 30 mL, Oral, QDAY PRN **AND** bisacodyl (DULCOLAX) suppository 10 mg, 10 mg, Rectal, QDAY PRN, Jojo Castillo M.D.  •  enoxaparin (LOVENOX) inj 40 mg, 40 mg, Subcutaneous, DAILY, Jojo Castillo M.D., 40 mg at 06/08/21 0554  •  haloperidol lactate (HALDOL) injection 2-5 mg, 2-5 mg, Intravenous, Q4HRS PRN, Jojo Castillo M.D., 5 mg at 06/06/21 1346    Physical Examination:     Vitals:    06/07/21 2308 06/08/21 0347 06/08/21 0701 06/08/21 1108   BP: 153/86 149/88 140/85 144/93   Pulse: 89 92 89 (!) 113   Resp: 18 18 18 18   Temp: 37.3 °C (99.2 °F) 36 °C (96.8 °F) 36.3 °C (97.3 °F) 36.5 °C (97.7 °F)   TempSrc: Temporal Temporal Temporal Temporal   SpO2: 99% 98% 97% 98%   Weight:       Height:           General: Patient is awake and in no acute distress  Eyes: examination of optic disks not indicated at this time  CV: RRR    NEUROLOGICAL EXAM:     Mental status: Awake, alert and disoriented, thinks he is in a hotel, follows simple but not complex commands, abnormal abstraction, unable to spell WORLD backwards, abnormal insight  Speech and language: speech is not dysarthric. The patient is able to name and repeat.  Cranial nerve exam: Pupils are equal, round and reactive to light bilaterally. Visual fields are full on blink to threat b/l. Extraocular muscles are intact. Sensation in the face is intact to light touch. Face is symmetric. Hearing to finger rub equal. Palate elevates symmetrically. Shoulder shrug is full. Tongue is midline.  Motor exam: at least  antigravity in all four extremities and moving symmetrically spontaneously. Tone is normal. No abnormal movements were seen on exam.  Sensory exam: No sensory deficits identified   Deep tendon reflexes: 1+ and symmetric. Toes down-going bilaterally.  Coordination: no ataxia   Gait: deferred given patient preference    Objective Data:    Labs:  Lab Results   Component Value Date/Time    PROTHROMBTM 16.5 (H) 06/04/2021 08:40 PM    INR 1.29 (H) 06/04/2021 08:40 PM      Lab Results   Component Value Date/Time    WBC 7.3 06/05/2021 01:02 AM    RBC 3.54 (L) 06/05/2021 01:02 AM    HEMOGLOBIN 11.7 (L) 06/05/2021 01:02 AM    HEMATOCRIT 34.8 (L) 06/05/2021 01:02 AM    MCV 98.3 (H) 06/05/2021 01:02 AM    MCH 33.1 (H) 06/05/2021 01:02 AM    MCHC 33.6 (L) 06/05/2021 01:02 AM    MPV 9.1 06/05/2021 01:02 AM    NEUTSPOLYS 72.00 06/05/2021 01:02 AM    LYMPHOCYTES 15.80 (L) 06/05/2021 01:02 AM    MONOCYTES 10.60 06/05/2021 01:02 AM    EOSINOPHILS 0.40 06/05/2021 01:02 AM    BASOPHILS 0.60 06/05/2021 01:02 AM      Lab Results   Component Value Date/Time    SODIUM 145 06/05/2021 01:02 AM    POTASSIUM 3.8 06/05/2021 01:02 AM    CHLORIDE 113 (H) 06/05/2021 01:02 AM    CO2 15 (L) 06/05/2021 01:02 AM    GLUCOSE 65 06/05/2021 01:02 AM    BUN 9 06/05/2021 01:02 AM    CREATININE 1.08 06/05/2021 01:02 AM      Lab Results   Component Value Date/Time    CHOLSTRLTOT 128 06/05/2021 01:02 AM    LDL 72 06/05/2021 01:02 AM    HDL 41 06/05/2021 01:02 AM    TRIGLYCERIDE 77 06/05/2021 01:02 AM       Lab Results   Component Value Date/Time    ALKPHOSPHAT 62 06/04/2021 05:00 PM    ASTSGOT 30 06/04/2021 05:00 PM    ALTSGPT 25 06/04/2021 05:00 PM    TBILIRUBIN 0.8 06/04/2021 05:00 PM        Imaging/Testing:    I interpreted and/or reviewed the patient's neuroimaging    [id number] VIEW   Final Result      1.  No metallic foreign bodies detected.   2.  Right nephrolithiasis.      EC-ECHOCARDIOGRAM LTD W/ CONT   Final Result      CT-HEAD W/O   Final Result       1.  Cerebral atrophy and chronic microvascular ischemic type changes.   2.  No acute intracranial abnormality.      DX-CHEST-PORTABLE (1 VIEW)   Final Result      No acute cardiopulmonary abnormality.      MR-BRAIN-WITH & W/O    (Results Pending)       Assessment and Plan:    Lane Beard is a 66 y.o. man presenting for whom neurology has been consulted for altered mental status.  Differential includes toxic metabolic encephalopathy (notable metabolic acidosis) vs. Delirium.  Recommend completion of workup for altered mental status contributors and/or confounders: TSH, B12, thiamine, RPR, HIV, ammonia, UA, utox.  Attempt to minimize risk of delirium such as avoiding day time napping and promote night time sleep, monitor for constipation, remove lines/tubing that is not needed, avoid early lab draws and vital checks, limit polypharmacy as able, and keep close to the window.  Pending dedicated neuroimaging with MRI brain w/ and w/o CST with further recommendations to follow.    The evaluation of the patient, and recommended management, was discussed with the resident staff. I have performed a physical exam and reviewed and updated ROS and Plan today (6/8/2021). In review of yesterday's note (6/7/2021), there are no changes except as documented above.    Marcin Bennett MD  Neurohospitalist, Acute Care Services   of Neurology

## 2021-06-08 NOTE — CARE PLAN
The patient is Watcher - Medium risk of patient condition declining or worsening    Progress made toward(s) clinical / shift goals: Educated PT on fall risks. All fall risks in place at this time. PT is now able to answer questions appropriately and PT can tell us what brought him into the hospital. He is progressing today.    Patient is not progressing towards the following goals: PT has no appetite, so RN will educate PT the importance of intake.    Problem: Knowledge Deficit - Standard  Goal: Patient and family/care givers will demonstrate understanding of plan of care, disease process/condition, diagnostic tests and medications  Outcome: Progressing     Problem: Fall Risk  Goal: Patient will remain free from falls  Outcome: Progressing

## 2021-06-08 NOTE — THERAPY
Physical Therapy   Initial Evaluation     Patient Name: Lane Beard  Age:  66 y.o., Sex:  male  Medical Record #: [id number]  Today's Date: 6/8/2021     Precautions: Fall Risk    Assessment  Patient is 66 y.o. male admitted with AMS. Pt was previously in ER but left AMA and was later found down in the street. Work up is ongoing for confusion, pending MRI. Pt very pleasant but only oriented to self. He cooperated with initial evaluation but required max cues throughout for safety and sequencing. Pt required mod assist with bed mobility, mostly due to initiation deficits. Pt completed transfers with HHA and min a/ and ambulated around room with HHA x2 and mod assist. 2 large LOB noted requiring max assist to prevent fall. PT will cont while in acute care setting to address deficits.     Pt is not safe to dc without 24/7      Plan    Recommend Physical Therapy 3 times per week until therapy goals are met for the following treatments:  Bed Mobility, Gait Training, Neuro Re-Education / Balance, Self Care/Home Evaluation, Therapeutic Activities and Therapeutic Exercises    DC Equipment Recommendations: Unable to determine at this time  Discharge Recommendations: Recommend post-acute placement for additional physical therapy services prior to discharge home          06/08/21 1145   Prior Living Situation   Prior Services Unable To Determine At This Time   Comments Pt is only oriented to himself and unable to provide any information   Prior Level of Functional Mobility   Bed Mobility Unable To Determine At This Time   History of Falls   History of Falls Yes   Cognition    Cognition / Consciousness X   Speech/ Communication Delayed Responses   Orientation Level Not Oriented to Age;Not Oriented to Address;Not Oriented to Year;Not Oriented to Month;Not Oriented to Day;Not Oriented to Time;Not Oriented to Place;Not Oriented to Reason   Level of Consciousness Alert   Ability To Follow Commands 1 Step   Safety Awareness  Impaired;Impulsive   New Learning Impaired   Attention Impaired   Sequencing Impaired   Initiation Impaired   Comments very pleasantly confused   Coordination Lower Body    Coordination Lower Body  Not Tested   Comments unable to assess   Gait Analysis   Gait Level Of Assist Moderate Assist   Assistive Device Hand Held Assist   Distance (Feet) 20   # of Times Distance was Traveled 1   Deviation Decreased Base Of Support;Other (Comment)  (scissoring)   # of Stairs Climbed 0   Comments 2 large LOB requiring max assist to prevent falls   Bed Mobility    Supine to Sit Moderate Assist   Sit to Supine   (in recliner)   Scooting Supervised   Comments behavioral?   Functional Mobility   Sit to Stand Minimal Assist   Bed, Chair, Wheelchair Transfer Minimal Assist   Transfer Method Stand Step   Mobility in room with HHA x2   Short Term Goals    Short Term Goal # 1 pt will be able to complete bed mobility from a flat bed with SPV in 6tx in order to return home   Short Term Goal # 2 pt will be able to complete functional transfers with SPV and no AD in 6tx in order to progress with therapy   Short Term Goal # 3 pt will be able to ambulate 10ft with no AD and SPV in 6tx in order to reduce fall risk   Anticipated Discharge Equipment and Recommendations   DC Equipment Recommendations Unable to determine at this time   Discharge Recommendations Recommend post-acute placement for additional physical therapy services prior to discharge home

## 2021-06-08 NOTE — HOSPITAL COURSE
66-year-old male with no known PMHx who presented to the hospital on 6/4/2021 after he was found down on the sidewalk after multiple witnessed falls.  Patient was confused at the time of admission.  He had been seen earlier in the day for syncope while crossing the street however at that time left AGAINST MEDICAL ADVICE.  On admission he was tachycardic with a heart rate of 114, anion gap of 23 and a lactic acid of 2.6.  He had a CT scan of the head, which revealed no acute intracranial abnormalities.  Chest x-ray completed, which showed no acute cardiopulmonary process.  Patient was admitted for further evaluation of altered mental status and syncope. Patient throughout this stay has been confused an lack of capacity was established.   He underwent MRI brain with and without contrast on Gavi 10, 2021, no acute abnormalities noted.

## 2021-06-08 NOTE — PROGRESS NOTES
Report received at bedside from NOC RN. Pt care assumed. Tele box on and verified with monitor room. PT A&Ox3, disoriented to situation. No signs of distress noted at this time. Patient resting comfortably in bed. POC discussed with PT and verbalizes no questions. PT c/o of no pain. PT denies any additional needs at this time. Bed in lowest and locked position. PT educated on fall risk and verbalized understanding. Call light within reach. Bed alarm plugged in and on. Will continue plan of care.

## 2021-06-08 NOTE — CARE PLAN
Problem: Safety - Medical Restraint  Goal: Remains free of injury from restraints (Restraint for Interference with Medical Device)  Outcome: Progressing  Goal: Free from restraint(s) (Restraint for Interference with Medical Device)  Outcome: Progressing     Problem: Urinary - Renal Perfusion  Goal: Ability to achieve and maintain adequate renal perfusion and functioning will improve  Outcome: Progressing     Problem: Respiratory  Goal: Patient will achieve/maintain optimum respiratory ventilation and gas exchange  Outcome: Progressing

## 2021-06-08 NOTE — PROGRESS NOTES
PT back onto floor from X-ray Abdomen. Monitor room notified. Tele box remains in place. Bilateral mitt restraints in place with MD order as well as 4 side railings up. Will follow plan of care.

## 2021-06-08 NOTE — PROGRESS NOTES
[facility] Medicine Daily Progress Note    Date of Service  6/8/2021    Chief Complaint  66 y.o. male admitted 6/4/2021 with altered mental status    Hospital Course  Lane is a 66-year-old male with no known past medical history who presented to the hospital on 6/4/2021 after he was found down on the sidewalk after multiple witnessed falls.  Patient was confused at the time of admission.  He had been seen earlier in the day for syncope while crossing the street however at that time left AGAINST MEDICAL ADVICE.  On admission he was tachycardic with a heart rate of 114, anion gap of 23 and a lactic acid of 2.6.  He had a CT which showed no acute intracranial abnormalities.  He also underwent his chest x-ray which showed no acute cardiopulmonary process patient was admitted for further evaluation of altered mental status and syncope.       Interval Problem Update  No acute overnight events, patient is awake and alert and oriented to self and place.  He is calm and pleasant he denies any acute complaints.  He does have decreased appetite.    He states that he was a  and is now retired he states he [place] but is unable to tell me , the name of it.  He is unable to tell me how old he is however he is able to tell me his birthdate.    Out of restraints and pleasant  His vitals are stable, intermittently tachycardic  BMP pending  MRI brain pending    Consultants/Specialty  Neuro 6/7    Code Status  Full Code    Disposition  Anticipate patient may need long-term placement    Review of Systems  Review of Systems   Constitutional: Negative for chills and fever.   HENT: Negative for congestion.    Eyes: Negative for blurred vision.   Respiratory: Negative for shortness of breath.    Cardiovascular: Negative for chest pain.   Gastrointestinal: Negative for abdominal pain, nausea and vomiting.        Decreased appetite   Genitourinary: Negative for dysuria.   Musculoskeletal: Positive for  falls. Negative for joint pain and myalgias.   Skin: Negative for rash.   Neurological: Positive for dizziness and loss of consciousness. Negative for tremors, speech change, focal weakness, seizures and headaches.   Psychiatric/Behavioral: Negative for depression and substance abuse. The patient is not nervous/anxious.        Physical Exam  Temp:  [36 °C (96.8 °F)-37.3 °C (99.2 °F)] 36.5 °C (97.7 °F)  Pulse:  [] 113  Resp:  [17-18] 18  BP: (122-153)/(79-93) 144/93  SpO2:  [95 %-99 %] 98 %    Physical Exam  Constitutional:       General: He is not in acute distress.     Appearance: Normal appearance. He is normal weight. He is not ill-appearing.   HENT:      Head: Normocephalic and atraumatic.      Mouth/Throat:      Mouth: Mucous membranes are dry.      Pharynx: Oropharynx is clear.   Eyes:      Extraocular Movements: Extraocular movements intact.      Conjunctiva/sclera: Conjunctivae normal.      Pupils: Pupils are equal, round, and reactive to light.   Cardiovascular:      Rate and Rhythm: Normal rate and regular rhythm.      Pulses: Normal pulses.   Pulmonary:      Effort: Pulmonary effort is normal. No respiratory distress.      Breath sounds: Normal breath sounds. No wheezing.   Abdominal:      General: Bowel sounds are normal. There is no distension.      Palpations: Abdomen is soft.      Tenderness: There is no abdominal tenderness. There is no guarding.   Musculoskeletal:         General: Signs of injury present. Normal range of motion.      Cervical back: Normal range of motion.      Right lower leg: No edema.      Left lower leg: No edema.   Skin:     General: Skin is warm.      Capillary Refill: Capillary refill takes less than 2 seconds.      Findings: Bruising present.   Neurological:      General: No focal deficit present.      Mental Status: He is alert. He is disoriented.      Cranial Nerves: No cranial nerve deficit.      Sensory: No sensory deficit.      Coordination: Coordination normal.    Psychiatric:         Mood and Affect: Mood normal.         Cognition and Memory: Cognition is impaired. Memory is impaired. He exhibits impaired recent memory and impaired remote memory.         Judgment: Judgment is not inappropriate.      Comments: Slowed mentation, A&Ox 2 (fluctates)            Current Facility-Administered Medications:   •  dextrose 5 % and 0.45 % NaCl with KCl 20 mEq, , Intravenous, Continuous, Kelvin Denise M.D., Last Rate: 125 mL/hr at 06/07/21 1530, New Bag at 06/07/21 1530  •  aspirin (ASA) tablet 325 mg, 325 mg, Oral, DAILY, 325 mg at 06/08/21 0553 **OR** aspirin (ASA) chewable tab 324 mg, 324 mg, Oral, DAILY, 324 mg at 06/07/21 0521 **OR** aspirin (ASA) suppository 300 mg, 300 mg, Rectal, DAILY, Nabeel Pitts M.D.  •  regadenoson (LEXISCAN) injection SOLN 0.4 mg, 0.4 mg, Intravenous, Once PRN, Nabeel Pitts M.D.  •  aminophylline injection 100 mg, 100 mg, Intravenous, Q5 MIN PRN, Nabeel Pitts M.D.  •  acetaminophen (Tylenol) tablet 650 mg, 650 mg, Oral, Q6HRS PRN, Jojo Castillo M.D.  •  enalaprilat (VASOTEC) injection 1.25 mg, 1.25 mg, Intravenous, Q6HRS PRN, Jojo Castillo M.D.  •  labetalol (NORMODYNE/TRANDATE) injection 10 mg, 10 mg, Intravenous, Q4HRS PRN, Jojo Castillo M.D.  •  ondansetron (ZOFRAN ODT) dispertab 4 mg, 4 mg, Oral, Q4HRS PRN, Jojo Castillo M.D.  •  ondansetron (ZOFRAN) syringe/vial injection 4 mg, 4 mg, Intravenous, Q4HRS PRN, Jojo Castillo M.D.  •  Respiratory Therapy Consult, , Nebulization, Continuous RT, oJjo Castillo M.D.  •  senna-docusate (PERICOLACE or SENOKOT S) 8.6-50 MG per tablet 2 tablet, 2 tablet, Oral, BID, 2 tablet at 06/07/21 0521 **AND** polyethylene glycol/lytes (MIRALAX) PACKET 1 Packet, 1 Packet, Oral, QDAY PRN **AND** magnesium hydroxide (MILK OF MAGNESIA) suspension 30 mL, 30 mL, Oral, QDAY PRN **AND** bisacodyl (DULCOLAX) suppository 10 mg, 10 mg, Rectal, QDAY PRN, Jojo Castillo M.D.  •  enoxaparin  (LOVENOX) inj 40 mg, 40 mg, Subcutaneous, DAILY, Jojo Castillo M.D., 40 mg at 06/08/21 0581  •  haloperidol lactate (HALDOL) injection 2-5 mg, 2-5 mg, Intravenous, Q4HRS PRN, Jojo Castillo M.D., 5 mg at 06/06/21 1346      Fluids    Intake/Output Summary (Last 24 hours) at 6/8/2021 1157  Last data filed at 6/7/2021 1216  Gross per 24 hour   Intake 150 ml   Output --   Net 150 ml       Laboratory                        Imaging  [id number] VIEW   Final Result      1.  No metallic foreign bodies detected.   2.  Right nephrolithiasis.      EC-ECHOCARDIOGRAM LTD W/ CONT   Final Result      CT-HEAD W/O   Final Result      1.  Cerebral atrophy and chronic microvascular ischemic type changes.   2.  No acute intracranial abnormality.      DX-CHEST-PORTABLE (1 VIEW)   Final Result      No acute cardiopulmonary abnormality.      MR-BRAIN-WITH & W/O    (Results Pending)        Assessment/Plan  * Syncope- (present on admission)  Assessment & Plan  Apparent episodes of syncope  CT head showing no acute intracranial abnormalities  Echo ordered to assess, grossly unremarkable  Will check orthostatic vitals   Nothing significant on telemetry found   Fall precautions in place  PTOT    AMS (altered mental status)- (present on admission)  Assessment & Plan  Patient continues to be encephalopathic, unclear etiology at this time, suspect he has a component of delirium as well as he mentation does seem to wax and wane, suspect possible underlying dementia/alzheimers given his lack of overall improvement and impairment in memory   CT on admission neg for acute process, no focal symptoms to suggest large CVA as etiology   TSH, HIV, RPR, B12 all normal   MRI brain is pending   Neuro is following, appreciate their recommendations      Dehydration- (present on admission)  Assessment & Plan  Continue IVF  Encourage oral intake, pt has poor appetite     Tachycardia- (present on admission)  Assessment & Plan  Rate 114 on admission, now  improved, intermittently elevates with activity  Cont. IVF until oral intake is adequate   No significant arrhythmias on telemetry  ctm     Elevated lactic acid level- (present on admission)  Assessment & Plan  Lactic acid 2.6 on admission, suspect this is due to poor oral intake   Continue IVF pending repeat BMP  WBC WNL  Cx Neg       VTE prophylaxis: Heparin

## 2021-06-09 LAB
ANION GAP SERPL CALC-SCNC: 9 MMOL/L (ref 7–16)
BACTERIA BLD CULT: NORMAL
BASOPHILS # BLD AUTO: 0.5 % (ref 0–1.8)
BASOPHILS # BLD: 0.03 K/UL (ref 0–0.12)
BUN SERPL-MCNC: 6 MG/DL (ref 8–22)
CALCIUM SERPL-MCNC: 9.1 MG/DL (ref 8.5–10.5)
CHLORIDE SERPL-SCNC: 110 MMOL/L (ref 96–112)
CO2 SERPL-SCNC: 25 MMOL/L (ref 20–33)
CREAT SERPL-MCNC: 0.83 MG/DL (ref 0.5–1.4)
EOSINOPHIL # BLD AUTO: 0.12 K/UL (ref 0–0.51)
EOSINOPHIL NFR BLD: 1.9 % (ref 0–6.9)
ERYTHROCYTE [DISTWIDTH] IN BLOOD BY AUTOMATED COUNT: 50.8 FL (ref 35.9–50)
GLUCOSE SERPL-MCNC: 111 MG/DL (ref 65–99)
HCT VFR BLD AUTO: 35.5 % (ref 42–52)
HGB BLD-MCNC: 11.8 G/DL (ref 14–18)
IMM GRANULOCYTES # BLD AUTO: 0.03 K/UL (ref 0–0.11)
IMM GRANULOCYTES NFR BLD AUTO: 0.5 % (ref 0–0.9)
LYMPHOCYTES # BLD AUTO: 1.43 K/UL (ref 1–4.8)
LYMPHOCYTES NFR BLD: 22.5 % (ref 22–41)
MCH RBC QN AUTO: 32.2 PG (ref 27–33)
MCHC RBC AUTO-ENTMCNC: 33.2 G/DL (ref 33.7–35.3)
MCV RBC AUTO: 96.7 FL (ref 81.4–97.8)
MONOCYTES # BLD AUTO: 0.63 K/UL (ref 0–0.85)
MONOCYTES NFR BLD AUTO: 9.9 % (ref 0–13.4)
NEUTROPHILS # BLD AUTO: 4.12 K/UL (ref 1.82–7.42)
NEUTROPHILS NFR BLD: 64.7 % (ref 44–72)
NRBC # BLD AUTO: 0 K/UL
NRBC BLD-RTO: 0 /100 WBC
PLATELET # BLD AUTO: 202 K/UL (ref 164–446)
PMV BLD AUTO: 9.6 FL (ref 9–12.9)
POTASSIUM SERPL-SCNC: 3.3 MMOL/L (ref 3.6–5.5)
RBC # BLD AUTO: 3.67 M/UL (ref 4.7–6.1)
SIGNIFICANT IND 70042: NORMAL
SITE SITE: NORMAL
SODIUM SERPL-SCNC: 144 MMOL/L (ref 135–145)
SOURCE SOURCE: NORMAL
WBC # BLD AUTO: 6.4 K/UL (ref 4.8–10.8)

## 2021-06-09 PROCEDURE — 700102 HCHG RX REV CODE 250 W/ 637 OVERRIDE(OP): Performed by: STUDENT IN AN ORGANIZED HEALTH CARE EDUCATION/TRAINING PROGRAM

## 2021-06-09 PROCEDURE — A9270 NON-COVERED ITEM OR SERVICE: HCPCS | Performed by: STUDENT IN AN ORGANIZED HEALTH CARE EDUCATION/TRAINING PROGRAM

## 2021-06-09 PROCEDURE — 80048 BASIC METABOLIC PNL TOTAL CA: CPT

## 2021-06-09 PROCEDURE — 99233 SBSQ HOSP IP/OBS HIGH 50: CPT | Performed by: STUDENT IN AN ORGANIZED HEALTH CARE EDUCATION/TRAINING PROGRAM

## 2021-06-09 PROCEDURE — 700102 HCHG RX REV CODE 250 W/ 637 OVERRIDE(OP): Performed by: INTERNAL MEDICINE

## 2021-06-09 PROCEDURE — 99232 SBSQ HOSP IP/OBS MODERATE 35: CPT | Mod: GC | Performed by: PSYCHIATRY & NEUROLOGY

## 2021-06-09 PROCEDURE — A9270 NON-COVERED ITEM OR SERVICE: HCPCS | Performed by: INTERNAL MEDICINE

## 2021-06-09 PROCEDURE — 770001 HCHG ROOM/CARE - MED/SURG/GYN PRIV*

## 2021-06-09 PROCEDURE — 700111 HCHG RX REV CODE 636 W/ 250 OVERRIDE (IP): Performed by: STUDENT IN AN ORGANIZED HEALTH CARE EDUCATION/TRAINING PROGRAM

## 2021-06-09 PROCEDURE — 36415 COLL VENOUS BLD VENIPUNCTURE: CPT

## 2021-06-09 PROCEDURE — 85025 COMPLETE CBC W/AUTO DIFF WBC: CPT

## 2021-06-09 RX ORDER — GAUZE BANDAGE 2" X 2"
100 BANDAGE TOPICAL DAILY
Status: DISCONTINUED | OUTPATIENT
Start: 2021-06-09 | End: 2021-10-18 | Stop reason: HOSPADM

## 2021-06-09 RX ORDER — MAGNESIUM SULFATE HEPTAHYDRATE 40 MG/ML
2 INJECTION, SOLUTION INTRAVENOUS ONCE
Status: COMPLETED | OUTPATIENT
Start: 2021-06-09 | End: 2021-06-09

## 2021-06-09 RX ORDER — POTASSIUM CHLORIDE 20 MEQ/1
40 TABLET, EXTENDED RELEASE ORAL DAILY
Status: DISCONTINUED | OUTPATIENT
Start: 2021-06-09 | End: 2021-06-11

## 2021-06-09 RX ORDER — LOSARTAN POTASSIUM 25 MG/1
25 TABLET ORAL
Status: DISCONTINUED | OUTPATIENT
Start: 2021-06-09 | End: 2021-07-31

## 2021-06-09 RX ADMIN — MAGNESIUM SULFATE 2 G: 2 INJECTION INTRAVENOUS at 11:22

## 2021-06-09 RX ADMIN — Medication 100 MG: at 11:22

## 2021-06-09 RX ADMIN — ASPIRIN 325 MG ORAL TABLET 325 MG: 325 PILL ORAL at 05:22

## 2021-06-09 RX ADMIN — THERA TABS 1 TABLET: TAB at 11:22

## 2021-06-09 RX ADMIN — ENOXAPARIN SODIUM 40 MG: 40 INJECTION SUBCUTANEOUS at 05:23

## 2021-06-09 RX ADMIN — POTASSIUM CHLORIDE 40 MEQ: 1500 TABLET, EXTENDED RELEASE ORAL at 07:52

## 2021-06-09 RX ADMIN — LOSARTAN POTASSIUM 25 MG: 25 TABLET, FILM COATED ORAL at 17:42

## 2021-06-09 ASSESSMENT — LIFESTYLE VARIABLES: SUBSTANCE_ABUSE: 0

## 2021-06-09 ASSESSMENT — ENCOUNTER SYMPTOMS
SHORTNESS OF BREATH: 0
TREMORS: 0
MYALGIAS: 0
FEVER: 0
SPEECH CHANGE: 0
CHILLS: 0
DEPRESSION: 0
ROS GI COMMENTS: DECREASED APPETITE
ABDOMINAL PAIN: 0
LOSS OF CONSCIOUSNESS: 1
FOCAL WEAKNESS: 0
NAUSEA: 0
DIZZINESS: 1
FALLS: 1
HEADACHES: 0
BLURRED VISION: 0
SEIZURES: 0
NERVOUS/ANXIOUS: 0
VOMITING: 0

## 2021-06-09 ASSESSMENT — FIBROSIS 4 INDEX: FIB4 SCORE: 2.02

## 2021-06-09 NOTE — PROGRESS NOTES
"Pt continually pulling off leads, and getting out of bed without calling. Tele sitter in room. Report given to tele sitter. Pt states \"I will pull everything off all night. I will pull this box off all night,\" while pointing to tele box. Called monitor room to inform them pt is refusing tele box for now. Will try again to put box on pt later tonight.   "

## 2021-06-09 NOTE — PROGRESS NOTES
Assumed care of PT A&O 1. Pt resting in bed with no signs of labored breathing. On RA. Tele monitor in place, cardiac rhythm being monitored. Call light within reach, bed in lowest position, upper bed rails up. Pt was updated on plan of care for the night.

## 2021-06-09 NOTE — DISCHARGE PLANNING
Anticipated Discharge Disposition: tbd     Action: attempted to do next of kin search on pt to coordinate discharge planning. No results came up for pt.     Barriers to Discharge: discharge plan, orientation status, medical clearance, no information on pt     Plan: LSW to assist as needed and monitor for barriers to discharge.

## 2021-06-09 NOTE — CARE PLAN
The patient is Watcher - Medium risk of patient condition declining or worsening    Progress made toward(s) clinical / shift goals: Educate PT on fall risks. Administer medication as prescribed. Ensure bed alarm is on at all times.     Patient is not progressing towards the following goals: PT currently still A&O to self only and remains confused.     Problem: Hemodynamics  Goal: Patient's hemodynamics, fluid balance and neurologic status will be stable or improve  Outcome: Not Met     Problem: Safety - Medical Restraint  Goal: Free from restraint(s) (Restraint for Interference with Medical Device)  Outcome: Progressing

## 2021-06-09 NOTE — PROGRESS NOTES
"Report received at bedside from NOC RN. PT care assumed. PT refusing tele box at this time. PT A&O to self only and stated \"there is fire surrounding me all the time.\" PT agitated and restless. PT c/o of no pain. PT denies any additional needs at this time. Bed in lowest and locked position. PT educated on fall risk and no evidence of learning. Call light within reach. 3 bed rails up. Non-skid socks in place. Bed alarm plugged in and on. Will continue plan of care.          "

## 2021-06-09 NOTE — CARE PLAN
Problem: Skin Integrity  Goal: Skin integrity is maintained or improved  Outcome: Progressing     Problem: Fall Risk  Goal: Patient will remain free from falls  Outcome: Not Progressing     Problem: Knowledge Deficit - Standard  Goal: Patient and family/care givers will demonstrate understanding of plan of care, disease process/condition, diagnostic tests and medications  Outcome: Not Progressing

## 2021-06-09 NOTE — PROGRESS NOTES
Neurology Progress Note  Neurohospitalist Service, [facility]    Referring Physician: Adia Lara M.D.    Chief Complaint   Patient presents with   • ALOC       HPI: Refer to initial documented Neurology H&P, as detailed in the patient's chart.    Interval History: Pt had episodes of agitation and restlessness overnight. Was calm sitting in a geriatric chair this morning. Denies headache.  No novel complaints.  Confused and disoriented. Alert to self only.     Review of systems: In addition to what is detailed in the HPI and/or updated in the interval history, all other systems reviewed and are negative.    Past Medical History:   Unchanged from prior    FHx:  Unchanged from prior    SHx:   reports that he has never smoked. He has never used smokeless tobacco. He reports previous alcohol use. He reports previous drug use.    Medications:    Current Facility-Administered Medications:   •  potassium chloride SA (Kdur) tablet 40 mEq, 40 mEq, Oral, DAILY, Adia Lara M.D., 40 mEq at 06/09/21 0752  •  thiamine (Vitamin B-1) tablet 100 mg, 100 mg, Oral, DAILY, Ilya Phan M.D.  •  LORazepam (ATIVAN) injection 1 mg, 1 mg, Intravenous, Once PRN, Adia Lara M.D.  •  dextrose 5 % and 0.45 % NaCl with KCl 20 mEq, , Intravenous, Continuous, Kelvin Denise M.D., Last Rate: 125 mL/hr at 06/07/21 1530, New Bag at 06/07/21 1530  •  aspirin (ASA) tablet 325 mg, 325 mg, Oral, DAILY, 325 mg at 06/09/21 0522 **OR** aspirin (ASA) chewable tab 324 mg, 324 mg, Oral, DAILY, 324 mg at 06/07/21 0521 **OR** aspirin (ASA) suppository 300 mg, 300 mg, Rectal, DAILY, Nabeel Pitts M.D.  •  regadenoson (LEXISCAN) injection SOLN 0.4 mg, 0.4 mg, Intravenous, Once PRN, Nabeel Pitts M.D.  •  aminophylline injection 100 mg, 100 mg, Intravenous, Q5 MIN PRN, Nabeel Pitts M.D.  •  acetaminophen (Tylenol) tablet 650 mg, 650 mg, Oral, Q6HRS PRN, Jojo Castillo M.D.  •  enalaprilat (VASOTEC)  injection 1.25 mg, 1.25 mg, Intravenous, Q6HRS PRN, Jojo Castillo M.D.  •  labetalol (NORMODYNE/TRANDATE) injection 10 mg, 10 mg, Intravenous, Q4HRS PRN, Jojo Castillo M.D.  •  ondansetron (ZOFRAN ODT) dispertab 4 mg, 4 mg, Oral, Q4HRS PRN, Jojo Castillo M.D.  •  ondansetron (ZOFRAN) syringe/vial injection 4 mg, 4 mg, Intravenous, Q4HRS PRN, Jojo Castillo M.D.  •  Respiratory Therapy Consult, , Nebulization, Continuous RT, Jojo Castillo M.D.  •  senna-docusate (PERICOLACE or SENOKOT S) 8.6-50 MG per tablet 2 tablet, 2 tablet, Oral, BID, 2 tablet at 06/07/21 0521 **AND** polyethylene glycol/lytes (MIRALAX) PACKET 1 Packet, 1 Packet, Oral, QDAY PRN **AND** magnesium hydroxide (MILK OF MAGNESIA) suspension 30 mL, 30 mL, Oral, QDAY PRN **AND** bisacodyl (DULCOLAX) suppository 10 mg, 10 mg, Rectal, QDAY PRN, Jojo Castillo M.D.  •  enoxaparin (LOVENOX) inj 40 mg, 40 mg, Subcutaneous, DAILY, Jojo Castillo M.D., 40 mg at 06/09/21 0523  •  haloperidol lactate (HALDOL) injection 2-5 mg, 2-5 mg, Intravenous, Q4HRS PRN, Jojo Castillo M.D., 5 mg at 06/06/21 1346    Physical Examination:     Vitals:    06/08/21 1921 06/08/21 2310 06/09/21 0355 06/09/21 0707   BP: 144/88 158/85 (!) 162/93 157/85   Pulse: 99 86 71 77   Resp: 18 18 18 16   Temp: 36.2 °C (97.2 °F) 36.8 °C (98.2 °F) 36.2 °C (97.1 °F) 36.1 °C (97 °F)   TempSrc: Temporal Temporal Temporal Temporal   SpO2: 98% 95% 97% 100%   Weight: 90 kg (198 lb 6.6 oz)      Height:           General: Patient is awake and in no acute distress  Eyes: examination of optic disks not indicated at this time    NEUROLOGICAL EXAM:     Mental status: Awake, alert and disoriented, doesn't know where he is, follows simple but not complex commands, abnormal abstraction, abnormal insight, was able to take his right hand and touch his left ear, said apple and orange are spherical and can be sour when asked about the relationship   Speech and language: speech is not  dysarthric. The patient is able to name and repeat.  Cranial nerve exam: Extraocular muscles are intact. Face is symmetric.   Motor exam: at least antigravity in all four extremities and moving symmetrically spontaneously. No abnormal movements were seen   Gait: deferred given patient preference    Objective Data:    Labs:  Lab Results   Component Value Date/Time    PROTHROMBTM 16.5 (H) 06/04/2021 08:40 PM    INR 1.29 (H) 06/04/2021 08:40 PM      Lab Results   Component Value Date/Time    WBC 6.4 06/09/2021 02:09 AM    RBC 3.67 (L) 06/09/2021 02:09 AM    HEMOGLOBIN 11.8 (L) 06/09/2021 02:09 AM    HEMATOCRIT 35.5 (L) 06/09/2021 02:09 AM    MCV 96.7 06/09/2021 02:09 AM    MCH 32.2 06/09/2021 02:09 AM    MCHC 33.2 (L) 06/09/2021 02:09 AM    MPV 9.6 06/09/2021 02:09 AM    NEUTSPOLYS 64.70 06/09/2021 02:09 AM    LYMPHOCYTES 22.50 06/09/2021 02:09 AM    MONOCYTES 9.90 06/09/2021 02:09 AM    EOSINOPHILS 1.90 06/09/2021 02:09 AM    BASOPHILS 0.50 06/09/2021 02:09 AM      Lab Results   Component Value Date/Time    SODIUM 144 06/09/2021 02:09 AM    POTASSIUM 3.3 (L) 06/09/2021 02:09 AM    CHLORIDE 110 06/09/2021 02:09 AM    CO2 25 06/09/2021 02:09 AM    GLUCOSE 111 (H) 06/09/2021 02:09 AM    BUN 6 (L) 06/09/2021 02:09 AM    CREATININE 0.83 06/09/2021 02:09 AM      Lab Results   Component Value Date/Time    CHOLSTRLTOT 128 06/05/2021 01:02 AM    LDL 72 06/05/2021 01:02 AM    HDL 41 06/05/2021 01:02 AM    TRIGLYCERIDE 77 06/05/2021 01:02 AM       Lab Results   Component Value Date/Time    ALKPHOSPHAT 62 06/04/2021 05:00 PM    ASTSGOT 30 06/04/2021 05:00 PM    ALTSGPT 25 06/04/2021 05:00 PM    TBILIRUBIN 0.8 06/04/2021 05:00 PM        Imaging/Testing:    I interpreted and/or reviewed the patient's neuroimaging    [id number] VIEW   Final Result      1.  No metallic foreign bodies detected.   2.  Right nephrolithiasis.      EC-ECHOCARDIOGRAM LTD W/ CONT   Final Result      CT-HEAD W/O   Final Result      1.  Cerebral atrophy and  chronic microvascular ischemic type changes.   2.  No acute intracranial abnormality.      DX-CHEST-PORTABLE (1 VIEW)   Final Result      No acute cardiopulmonary abnormality.      MR-BRAIN-WITH & W/O    (Results Pending)       Assessment and Plan:    Lane Beard is a 66 y.o. man presenting for whom neurology has been consulted for altered mental status.  Differential includes toxic metabolic encephalopathy (notable metabolic acidosis) vs. Delirium.  Recommend completion of workup for altered mental status contributors and/or confounders: TSH, B12, thiamine, RPR, HIV, ammonia, UA, utox.  Attempt to minimize risk of delirium such as avoiding day time napping and promote night time sleep, monitor for constipation, remove lines/tubing that is not needed, avoid early lab draws and vital checks, limit polypharmacy as able, and keep close to the window.  Pending dedicated neuroimaging with MRI brain w/ and w/o CST with further recommendations to follow.    So far ammonia, TSH, T4, vitamin B 12, HIV, and syphilis tests have been unremarkable  Will give one time dose of iv Mg and start daily thiamine and multivitamin  Goal K >4 and Mg>2, continue optimizing per primary   Please update neurology when patients MRI is complete     The evaluation of the patient, and recommended management, was discussed with the resident staff. I have performed a physical exam and reviewed and updated ROS and Plan today (6/9/2021). In review of yesterday's note (6/8/2021), there are no changes except as documented above.    Ilya Phan MD  Internal Medicine Resident

## 2021-06-09 NOTE — PROGRESS NOTES
[facility] Medicine Daily Progress Note    Date of Service  6/9/2021    Chief Complaint  66 y.o. male admitted 6/4/2021 with altered mental status    Hospital Course  Lane is a 66-year-old male with no known past medical history who presented to the hospital on 6/4/2021 after he was found down on the sidewalk after multiple witnessed falls.  Patient was confused at the time of admission.  He had been seen earlier in the day for syncope while crossing the street however at that time left AGAINST MEDICAL ADVICE.  On admission he was tachycardic with a heart rate of 114, anion gap of 23 and a lactic acid of 2.6.  He had a CT which showed no acute intracranial abnormalities.  He also underwent his chest x-ray which showed no acute cardiopulmonary process patient was admitted for further evaluation of altered mental status and syncope.       Interval Problem Update  No acute overnight events, patient is awake, alert oriented to self only this morning, more confused this AM  Pleasant but impulsive  Still pending MRI Brain   K low, replaced with oral supplementation   Vitals stable   Pt refusing telemetry box, keep removing IVs as well. Okay to keep IVs out at this time. Transfer orders placed. Pt not longer requiring telemetry monitoring     Patient lacks Capacity     Consultants/Specialty  Neurology    Code Status  Full Code    Disposition  Pt remains altered and lacks capacity, suspect pt will need long term placement     Review of Systems  Review of Systems   Constitutional: Negative for chills and fever.   HENT: Negative for congestion.    Eyes: Negative for blurred vision.   Respiratory: Negative for shortness of breath.    Cardiovascular: Negative for chest pain.   Gastrointestinal: Negative for abdominal pain, nausea and vomiting.        Decreased appetite   Genitourinary: Negative for dysuria.   Musculoskeletal: Positive for falls. Negative for joint pain and myalgias.   Skin: Negative for rash.   Neurological:  Positive for dizziness and loss of consciousness. Negative for tremors, speech change, focal weakness, seizures and headaches.   Psychiatric/Behavioral: Negative for depression and substance abuse. The patient is not nervous/anxious.        Physical Exam  Temp:  [36.1 °C (97 °F)-37.1 °C (98.7 °F)] 36.1 °C (97 °F)  Pulse:  [] 77  Resp:  [16-18] 16  BP: (144-162)/(85-93) 157/85  SpO2:  [95 %-100 %] 100 %    Physical Exam  Constitutional:       General: He is not in acute distress.     Appearance: Normal appearance. He is normal weight. He is not ill-appearing.   HENT:      Head: Normocephalic and atraumatic.      Mouth/Throat:      Mouth: Mucous membranes are dry.      Pharynx: Oropharynx is clear.   Eyes:      Extraocular Movements: Extraocular movements intact.      Conjunctiva/sclera: Conjunctivae normal.   Cardiovascular:      Rate and Rhythm: Normal rate and regular rhythm.      Pulses: Normal pulses.   Pulmonary:      Effort: Pulmonary effort is normal. No respiratory distress.      Breath sounds: Normal breath sounds. No wheezing.   Abdominal:      General: Bowel sounds are normal. There is no distension.      Palpations: Abdomen is soft.      Tenderness: There is no abdominal tenderness. There is no guarding.   Musculoskeletal:         General: Signs of injury (abrasions to b/l knees) present. Normal range of motion.      Cervical back: Normal range of motion.      Right lower leg: No edema.      Left lower leg: No edema.   Skin:     General: Skin is warm.      Capillary Refill: Capillary refill takes less than 2 seconds.      Findings: Bruising (scattered) present.   Neurological:      General: No focal deficit present.      Mental Status: He is alert. He is disoriented.      Cranial Nerves: No cranial nerve deficit.      Sensory: No sensory deficit.      Coordination: Coordination normal.   Psychiatric:         Mood and Affect: Mood normal.         Cognition and Memory: Cognition is impaired. Memory is  impaired. He exhibits impaired recent memory and impaired remote memory.         Judgment: Judgment is not inappropriate.      Comments: Slowed mentation, A&Ox1, impulsive           Current Facility-Administered Medications:   •  potassium chloride SA (Kdur) tablet 40 mEq, 40 mEq, Oral, DAILY, Adia Lara M.D., 40 mEq at 06/09/21 0752  •  thiamine (Vitamin B-1) tablet 100 mg, 100 mg, Oral, DAILY, Ilya Phan M.D.  •  magnesium sulfate IVPB premix 2 g, 2 g, Intravenous, Once, Ilya Phan M.D.  •  multivitamin (THERAGRAN) tablet 1 tablet, 1 tablet, Oral, DAILY, Ilya Phan M.D.  •  LORazepam (ATIVAN) injection 1 mg, 1 mg, Intravenous, Once PRN, Adia Lara M.D.  •  dextrose 5 % and 0.45 % NaCl with KCl 20 mEq, , Intravenous, Continuous, Kelvin Denise M.D., Last Rate: 125 mL/hr at 06/07/21 1530, New Bag at 06/07/21 1530  •  aspirin (ASA) tablet 325 mg, 325 mg, Oral, DAILY, 325 mg at 06/09/21 0522 **OR** aspirin (ASA) chewable tab 324 mg, 324 mg, Oral, DAILY, 324 mg at 06/07/21 0521 **OR** aspirin (ASA) suppository 300 mg, 300 mg, Rectal, DAILY, Nabeel Pitts M.D.  •  regadenoson (LEXISCAN) injection SOLN 0.4 mg, 0.4 mg, Intravenous, Once PRN, Nabeel Pitts M.D.  •  aminophylline injection 100 mg, 100 mg, Intravenous, Q5 MIN PRN, Nabeel Pitts M.D.  •  acetaminophen (Tylenol) tablet 650 mg, 650 mg, Oral, Q6HRS PRN, Jojo Castillo M.D.  •  enalaprilat (VASOTEC) injection 1.25 mg, 1.25 mg, Intravenous, Q6HRS PRN, Jojo Castillo M.D.  •  labetalol (NORMODYNE/TRANDATE) injection 10 mg, 10 mg, Intravenous, Q4HRS PRN, Jojo Castillo M.D.  •  ondansetron (ZOFRAN ODT) dispertab 4 mg, 4 mg, Oral, Q4HRS PRN, Jojo Castillo M.D.  •  ondansetron (ZOFRAN) syringe/vial injection 4 mg, 4 mg, Intravenous, Q4HRS PRN, Jojo Castillo M.D.  •  Respiratory Therapy Consult, , Nebulization, Continuous RT, Jojo Castillo M.D.  •  senna-docusate (PERICOLACE or SENOKOT S) 8.6-50 MG  per tablet 2 tablet, 2 tablet, Oral, BID, 2 tablet at 06/07/21 0521 **AND** polyethylene glycol/lytes (MIRALAX) PACKET 1 Packet, 1 Packet, Oral, QDAY PRN **AND** magnesium hydroxide (MILK OF MAGNESIA) suspension 30 mL, 30 mL, Oral, QDAY PRN **AND** bisacodyl (DULCOLAX) suppository 10 mg, 10 mg, Rectal, QDAY PRN, Jojo Castillo M.D.  •  enoxaparin (LOVENOX) inj 40 mg, 40 mg, Subcutaneous, DAILY, Jojo Castillo M.D., 40 mg at 06/09/21 0523  •  haloperidol lactate (HALDOL) injection 2-5 mg, 2-5 mg, Intravenous, Q4HRS PRN, Jojo Castillo M.D., 5 mg at 06/06/21 1346      Fluids    Intake/Output Summary (Last 24 hours) at 6/9/2021 1105  Last data filed at 6/8/2021 1859  Gross per 24 hour   Intake 360 ml   Output 1400 ml   Net -1040 ml       Laboratory  Recent Labs     06/09/21  0209   WBC 6.4   RBC 3.67*   HEMOGLOBIN 11.8*   HEMATOCRIT 35.5*   MCV 96.7   MCH 32.2   MCHC 33.2*   RDW 50.8*   PLATELETCT 202   MPV 9.6     Recent Labs     06/08/21  1319 06/09/21  0209   SODIUM 143 144   POTASSIUM 3.4* 3.3*   CHLORIDE 110 110   CO2 21 25   GLUCOSE 119* 111*   BUN 6* 6*   CREATININE 0.95 0.83   CALCIUM 9.1 9.1                   Imaging  [id number] VIEW   Final Result      1.  No metallic foreign bodies detected.   2.  Right nephrolithiasis.      EC-ECHOCARDIOGRAM LTD W/ CONT   Final Result      CT-HEAD W/O   Final Result      1.  Cerebral atrophy and chronic microvascular ischemic type changes.   2.  No acute intracranial abnormality.      DX-CHEST-PORTABLE (1 VIEW)   Final Result      No acute cardiopulmonary abnormality.      MR-BRAIN-WITH & W/O    (Results Pending)        Assessment/Plan  * Syncope- (present on admission)  Assessment & Plan  Apparent episodes of syncope  CT head showing no acute intracranial abnormalities  Echo ordered to assess, grossly unremarkable  Will check orthostatic vitals   Nothing significant on telemetry found   Fall precautions in place  PTOT    AMS (altered mental status)- (present on  admission)  Assessment & Plan  Patient continues to be encephalopathic, unclear etiology at this time, suspect he has a component of delirium as well as he mentation does seem to wax and wane, suspect possible underlying dementia/alzheimers given his lack of overall improvement and impairment in memory   CT on admission neg for acute process, no focal symptoms to suggest large CVA as etiology   TSH, HIV, RPR, B12 all normal   MRI brain is pending   Started on thiamine  Neuro is following, appreciate their recommendations      Dehydration- (present on admission)  Assessment & Plan  Pt removed IV, Encourage oral intake, pt has poor appetite       Tachycardia- (present on admission)  Assessment & Plan  Rate 114 on admission, now improved, intermittently elevates with activity  Cont. To encourage oral hydration    No significant arrhythmias on telemetry, dc tele   ctm     Elevated lactic acid level- (present on admission)  Assessment & Plan  Lactic acid 2.6 on admission, suspect this is due to poor oral intake   WBC WNL  Cx Neg       VTE prophylaxis: Heparin

## 2021-06-10 ENCOUNTER — APPOINTMENT (OUTPATIENT)
Dept: RADIOLOGY | Facility: MEDICAL CENTER | Age: 66
DRG: 884 | End: 2021-06-10
Attending: STUDENT IN AN ORGANIZED HEALTH CARE EDUCATION/TRAINING PROGRAM
Payer: MEDICARE

## 2021-06-10 LAB
ANION GAP SERPL CALC-SCNC: 11 MMOL/L (ref 7–16)
BUN SERPL-MCNC: 7 MG/DL (ref 8–22)
CALCIUM SERPL-MCNC: 9.2 MG/DL (ref 8.5–10.5)
CHLORIDE SERPL-SCNC: 108 MMOL/L (ref 96–112)
CO2 SERPL-SCNC: 26 MMOL/L (ref 20–33)
CREAT SERPL-MCNC: 0.84 MG/DL (ref 0.5–1.4)
GLUCOSE SERPL-MCNC: 137 MG/DL (ref 65–99)
POTASSIUM SERPL-SCNC: 3.9 MMOL/L (ref 3.6–5.5)
SODIUM SERPL-SCNC: 145 MMOL/L (ref 135–145)

## 2021-06-10 PROCEDURE — 70553 MRI BRAIN STEM W/O & W/DYE: CPT | Mod: ME

## 2021-06-10 PROCEDURE — A9270 NON-COVERED ITEM OR SERVICE: HCPCS | Performed by: INTERNAL MEDICINE

## 2021-06-10 PROCEDURE — 700101 HCHG RX REV CODE 250: Performed by: HOSPITALIST

## 2021-06-10 PROCEDURE — A9270 NON-COVERED ITEM OR SERVICE: HCPCS | Performed by: STUDENT IN AN ORGANIZED HEALTH CARE EDUCATION/TRAINING PROGRAM

## 2021-06-10 PROCEDURE — 700102 HCHG RX REV CODE 250 W/ 637 OVERRIDE(OP): Performed by: INTERNAL MEDICINE

## 2021-06-10 PROCEDURE — 99232 SBSQ HOSP IP/OBS MODERATE 35: CPT | Performed by: NURSE PRACTITIONER

## 2021-06-10 PROCEDURE — 36415 COLL VENOUS BLD VENIPUNCTURE: CPT

## 2021-06-10 PROCEDURE — 700102 HCHG RX REV CODE 250 W/ 637 OVERRIDE(OP): Performed by: STUDENT IN AN ORGANIZED HEALTH CARE EDUCATION/TRAINING PROGRAM

## 2021-06-10 PROCEDURE — 700117 HCHG RX CONTRAST REV CODE 255: Performed by: STUDENT IN AN ORGANIZED HEALTH CARE EDUCATION/TRAINING PROGRAM

## 2021-06-10 PROCEDURE — 700111 HCHG RX REV CODE 636 W/ 250 OVERRIDE (IP): Performed by: STUDENT IN AN ORGANIZED HEALTH CARE EDUCATION/TRAINING PROGRAM

## 2021-06-10 PROCEDURE — 80048 BASIC METABOLIC PNL TOTAL CA: CPT

## 2021-06-10 PROCEDURE — 99232 SBSQ HOSP IP/OBS MODERATE 35: CPT | Performed by: INTERNAL MEDICINE

## 2021-06-10 PROCEDURE — A9576 INJ PROHANCE MULTIPACK: HCPCS | Performed by: STUDENT IN AN ORGANIZED HEALTH CARE EDUCATION/TRAINING PROGRAM

## 2021-06-10 PROCEDURE — 770001 HCHG ROOM/CARE - MED/SURG/GYN PRIV*

## 2021-06-10 RX ADMIN — DOCUSATE SODIUM 50 MG AND SENNOSIDES 8.6 MG 2 TABLET: 8.6; 5 TABLET, FILM COATED ORAL at 05:21

## 2021-06-10 RX ADMIN — LOSARTAN POTASSIUM 25 MG: 25 TABLET, FILM COATED ORAL at 05:20

## 2021-06-10 RX ADMIN — ASPIRIN 325 MG ORAL TABLET 325 MG: 325 PILL ORAL at 05:21

## 2021-06-10 RX ADMIN — ENOXAPARIN SODIUM 40 MG: 40 INJECTION SUBCUTANEOUS at 05:20

## 2021-06-10 RX ADMIN — POTASSIUM CHLORIDE, DEXTROSE MONOHYDRATE AND SODIUM CHLORIDE: 150; 5; 450 INJECTION, SOLUTION INTRAVENOUS at 06:20

## 2021-06-10 RX ADMIN — POTASSIUM CHLORIDE 40 MEQ: 1500 TABLET, EXTENDED RELEASE ORAL at 05:21

## 2021-06-10 RX ADMIN — Medication 100 MG: at 05:21

## 2021-06-10 RX ADMIN — POTASSIUM CHLORIDE, DEXTROSE MONOHYDRATE AND SODIUM CHLORIDE: 150; 5; 450 INJECTION, SOLUTION INTRAVENOUS at 22:42

## 2021-06-10 RX ADMIN — GADOTERIDOL 20 ML: 279.3 INJECTION, SOLUTION INTRAVENOUS at 16:15

## 2021-06-10 RX ADMIN — THERA TABS 1 TABLET: TAB at 05:21

## 2021-06-10 ASSESSMENT — LIFESTYLE VARIABLES
CONSUMPTION TOTAL: NEGATIVE
HAVE PEOPLE ANNOYED YOU BY CRITICIZING YOUR DRINKING: NO
TOTAL SCORE: 0
ON A TYPICAL DAY WHEN YOU DRINK ALCOHOL HOW MANY DRINKS DO YOU HAVE: 0
DOES PATIENT WANT TO STOP DRINKING: NO
HAVE YOU EVER FELT YOU SHOULD CUT DOWN ON YOUR DRINKING: NO
HOW MANY TIMES IN THE PAST YEAR HAVE YOU HAD 5 OR MORE DRINKS IN A DAY: 0
ALCOHOL_USE: NO
EVER FELT BAD OR GUILTY ABOUT YOUR DRINKING: NO
TOTAL SCORE: 0
EVER HAD A DRINK FIRST THING IN THE MORNING TO STEADY YOUR NERVES TO GET RID OF A HANGOVER: NO
TOTAL SCORE: 0
AVERAGE NUMBER OF DAYS PER WEEK YOU HAVE A DRINK CONTAINING ALCOHOL: 0

## 2021-06-10 ASSESSMENT — FIBROSIS 4 INDEX: FIB4 SCORE: 2.02

## 2021-06-10 ASSESSMENT — PAIN DESCRIPTION - PAIN TYPE: TYPE: ACUTE PAIN

## 2021-06-10 NOTE — PROGRESS NOTES
Assumed care of PT A&O 1. Pt resting in bed with no signs of labored breathing. On RA. Call light within reach, bed in lowest position, upper bed rails up. Pt was updated on plan of care for the night.

## 2021-06-10 NOTE — PROGRESS NOTES
[facility] Medicine Daily Progress Note    Date of Service  6/10/2021    Chief Complaint  66 y.o. male admitted 6/4/2021 with altered mental status    Hospital Course    As per Dr. Lara note    Lane is a 66-year-old male with no known past medical history who presented to the hospital on 6/4/2021 after he was found down on the sidewalk after multiple witnessed falls.  Patient was confused at the time of admission.  He had been seen earlier in the day for syncope while crossing the street however at that time left AGAINST MEDICAL ADVICE.  On admission he was tachycardic with a heart rate of 114, anion gap of 23 and a lactic acid of 2.6.  He had a CT which showed no acute intracranial abnormalities.  He also underwent his chest x-ray which showed no acute cardiopulmonary process patient was admitted for further evaluation of altered mental status and syncope.       Interval Problem Update    I evaluated and examined him at the bedside.  I was unable to obtain information from him.  He was AAO x1  This morning he is hemodynamically stable.  BMP is currently pending.  MRI brain with and without contrast is pending.  Patient does not have a capacity to make medical decision.      Consultants/Specialty  Neurology    Code Status  Full Code    Disposition  Pt remains altered and lacks capacity, suspect pt will need long term placement     Review of Systems  Review of Systems   Unable to perform ROS: Mental acuity       Physical Exam  Temp:  [35.9 °C (96.7 °F)-36.7 °C (98 °F)] 36.1 °C (97 °F)  Pulse:  [] 91  Resp:  [16-18] 18  BP: (132-164)/() 143/79  SpO2:  [100 %] 100 %    Physical Exam  Constitutional:       Appearance: Normal appearance.   HENT:      Head: Normocephalic.      Nose: Nose normal.      Mouth/Throat:      Mouth: Mucous membranes are moist.   Eyes:      General:         Right eye: No discharge.         Left eye: No discharge.   Cardiovascular:      Rate and Rhythm: Normal rate and regular  rhythm.      Pulses: Normal pulses.   Pulmonary:      Effort: Pulmonary effort is normal. No respiratory distress.      Breath sounds: Normal breath sounds.   Abdominal:      General: Bowel sounds are normal. There is no distension.      Tenderness: There is no abdominal tenderness.   Musculoskeletal:         General: No swelling or tenderness.      Cervical back: Normal range of motion.   Skin:     General: Skin is warm.      Capillary Refill: Capillary refill takes less than 2 seconds.      Coloration: Skin is not jaundiced or pale.   Neurological:      General: No focal deficit present.      Mental Status: He is alert and oriented to person, place, and time.      Comments: He was able to follow commands per  AAO x1   Psychiatric:         Cognition and Memory: Cognition is not impaired. Memory is not impaired. He does not exhibit impaired recent memory or impaired remote memory.           Current Facility-Administered Medications:   •  potassium chloride SA (Kdur) tablet 40 mEq, 40 mEq, Oral, DAILY, Adia Lara M.D., 40 mEq at 06/10/21 0521  •  thiamine (Vitamin B-1) tablet 100 mg, 100 mg, Oral, DAILY, Ilya Phan M.D., 100 mg at 06/10/21 0521  •  multivitamin (THERAGRAN) tablet 1 tablet, 1 tablet, Oral, DAILY, Ilya Phan M.D., 1 tablet at 06/10/21 0521  •  losartan (COZAAR) tablet 25 mg, 25 mg, Oral, Q DAY, Adia Lara M.D., 25 mg at 06/10/21 0520  •  LORazepam (ATIVAN) injection 1 mg, 1 mg, Intravenous, Once PRN, Adia Lara M.D.  •  dextrose 5 % and 0.45 % NaCl with KCl 20 mEq, , Intravenous, Continuous, Kelvin Densie M.D., Last Rate: 125 mL/hr at 06/10/21 0620, New Bag at 06/10/21 0620  •  aspirin (ASA) tablet 325 mg, 325 mg, Oral, DAILY, 325 mg at 06/10/21 0521 **OR** aspirin (ASA) chewable tab 324 mg, 324 mg, Oral, DAILY, 324 mg at 06/07/21 0521 **OR** aspirin (ASA) suppository 300 mg, 300 mg, Rectal, DAILY, Nabeel Pitts M.D.  •  regadenoson (LEXISCAN) injection SOLN 0.4  mg, 0.4 mg, Intravenous, Once PRN, Nabeel Pitts M.D.  •  aminophylline injection 100 mg, 100 mg, Intravenous, Q5 MIN PRN, Nabeel Pitts M.D.  •  acetaminophen (Tylenol) tablet 650 mg, 650 mg, Oral, Q6HRS PRN, Jojo Castillo M.D.  •  enalaprilat (VASOTEC) injection 1.25 mg, 1.25 mg, Intravenous, Q6HRS PRN, Jojo Castillo M.D.  •  labetalol (NORMODYNE/TRANDATE) injection 10 mg, 10 mg, Intravenous, Q4HRS PRN, Jojo Castillo M.D.  •  ondansetron (ZOFRAN ODT) dispertab 4 mg, 4 mg, Oral, Q4HRS PRN, Jojo Castillo M.D.  •  ondansetron (ZOFRAN) syringe/vial injection 4 mg, 4 mg, Intravenous, Q4HRS PRN, Jojo Castillo M.D.  •  Respiratory Therapy Consult, , Nebulization, Continuous RT, Jojo Castillo M.D.  •  senna-docusate (PERICOLACE or SENOKOT S) 8.6-50 MG per tablet 2 tablet, 2 tablet, Oral, BID, 2 tablet at 06/10/21 0521 **AND** polyethylene glycol/lytes (MIRALAX) PACKET 1 Packet, 1 Packet, Oral, QDAY PRN **AND** magnesium hydroxide (MILK OF MAGNESIA) suspension 30 mL, 30 mL, Oral, QDAY PRN **AND** bisacodyl (DULCOLAX) suppository 10 mg, 10 mg, Rectal, QDAY PRN, Jojo Castillo M.D.  •  enoxaparin (LOVENOX) inj 40 mg, 40 mg, Subcutaneous, DAILY, Jojo Castillo M.D., 40 mg at 06/10/21 0520  •  haloperidol lactate (HALDOL) injection 2-5 mg, 2-5 mg, Intravenous, Q4HRS PRN, Jojo Castillo M.D., 5 mg at 06/06/21 1346      Fluids    Intake/Output Summary (Last 24 hours) at 6/10/2021 0814  Last data filed at 6/10/2021 0718  Gross per 24 hour   Intake 460 ml   Output 200 ml   Net 260 ml       Laboratory  Recent Labs     06/09/21  0209   WBC 6.4   RBC 3.67*   HEMOGLOBIN 11.8*   HEMATOCRIT 35.5*   MCV 96.7   MCH 32.2   MCHC 33.2*   RDW 50.8*   PLATELETCT 202   MPV 9.6     Recent Labs     06/08/21  1319 06/09/21  0209   SODIUM 143 144   POTASSIUM 3.4* 3.3*   CHLORIDE 110 110   CO2 21 25   GLUCOSE 119* 111*   BUN 6* 6*   CREATININE 0.95 0.83   CALCIUM 9.1 9.1                    Imaging  [id number] VIEW   Final Result      1.  No metallic foreign bodies detected.   2.  Right nephrolithiasis.      EC-ECHOCARDIOGRAM LTD W/ CONT   Final Result      CT-HEAD W/O   Final Result      1.  Cerebral atrophy and chronic microvascular ischemic type changes.   2.  No acute intracranial abnormality.      DX-CHEST-PORTABLE (1 VIEW)   Final Result      No acute cardiopulmonary abnormality.      MR-BRAIN-WITH & W/O    (Results Pending)        Assessment/Plan  * Syncope- (present on admission)  Assessment & Plan  Apparent episodes of syncope  CT head showing no acute intracranial abnormalities  Echo ordered to assess, grossly unremarkable  Will check orthostatic vitals   Nothing significant on telemetry found   Fall precautions in place  MRI brain with and without contrast ordered.  PTOT    AMS (altered mental status)- (present on admission)  Assessment & Plan  Patient continues to be encephalopathic, unclear etiology at this time, suspect he has a component of delirium as well as he mentation does seem to wax and wane, suspect possible underlying dementia/alzheimers given his lack of overall improvement and impairment in memory   CT on admission neg for acute process, no focal symptoms to suggest large CVA as etiology   TSH, HIV, RPR, B12 all normal   MRI brain is pending this morning.  Started on thiamine  Neurology is following him.      Dehydration- (present on admission)  Assessment & Plan  Pt removed IV, Encourage oral intake, pt has poor appetite       Tachycardia- (present on admission)  Assessment & Plan  Now improved  Continue to monitor.    Elevated lactic acid level- (present on admission)  Assessment & Plan  Lactic acid 2.6 on admission, suspect this is due to poor oral intake   WBC WNL  Cx Neg     I discussed plan of care during multidisciplinary rounds regarding his current medical condition and discharge plan.    He is unable to provide information for his MRI questionnaire.  I  discussed plan with MRI department regarding his MRI brain with and without contrast.    VTE prophylaxis: Heparin

## 2021-06-10 NOTE — CARE PLAN
Problem: Skin Integrity  Goal: Skin integrity is maintained or improved  Outcome: Progressing     Problem: Fall Risk  Goal: Patient will remain free from falls  Outcome: Progressing  Safety sitter at bedside       Problem: Knowledge Deficit - Standard  Goal: Patient and family/care givers will demonstrate understanding of plan of care, disease process/condition, diagnostic tests and medications  Outcome: Not Progressing

## 2021-06-10 NOTE — CARE PLAN
The patient is Watcher - Medium risk of patient condition declining or worsening    Shift Goals: Patient will remain free from injury.  Clinical Goals: Reduce agitation. Sleep comfortable  Patient Goals: Unable to Assess  Family Goals: N/A    Problem: Safety - Medical Restraint  Goal: Free from restraint(s) (Restraint for Interference with Medical Device)  Outcome: Progressing     Problem: Respiratory  Goal: Patient will achieve/maintain optimum respiratory ventilation and gas exchange  Outcome: Progressing     Problem: Skin Integrity  Goal: Skin integrity is maintained or improved  Outcome: Progressing     Problem: Fall Risk  Goal: Patient will remain free from falls  Outcome: Progressing

## 2021-06-10 NOTE — PROGRESS NOTES
Received bedside report from RN, pt care assumed. Pt AAOx2, with no complaint of pain at this time. No signs of acute distress noted at this time. Pt denies any additional needs at this time. Safety sitter at bedside, call light within reach, hourly rounding initiated.

## 2021-06-10 NOTE — PROGRESS NOTES
"Chief Complaint   Patient presents with   • ALOC       Problem List Items Addressed This Visit     Dehydration     Pt removed IV, Encourage oral intake, pt has poor appetite              Other Visit Diagnoses     Altered mental status, unspecified altered mental status type        Lactic acidosis            Neurology Progress Note    Brief History of present illness:  66-year old male with PMhx ETOH abuse (admits to drinking \"1 quart of Vodka per day\" for \"years\"), further details are limited/unknown who presented to [facility] On 6/4/21 for a chief complaint of altered mental status; patient was apparently found down/wandering near his home by bystanders. There was some report that patient had fallen/syncopized and had LOC; further details are limited. Here, CT head revealed no acute intracranial abnormality; UDS THC +. Since time of his hospitalization, patient has remained disoriented, oriented x 1-2; work up thus far including UA, serum studies (B12, TSH, RPR, ESR/CRP) all unremarkable. Further work up including MRI Brain w/wo contrast is pending.     Interval, 6/10/21:  Patient sitting up in bed; awake and alert. Oriented to self, place, \"medical center.\" Disoriented to time, situation, own age (states that he is 27). No events overnight per nursing; awaiting MRI Brain today.     No changes to HPI as was previously documented.     Past medical history:   History reviewed. No pertinent past medical history.    Past surgical history:   History reviewed. No pertinent surgical history.    Family history:   No family history on file.    Social history:   Social History     Socioeconomic History   • Marital status: Single     Spouse name: Not on file   • Number of children: Not on file   • Years of education: Not on file   • Highest education level: Not on file   Occupational History   • Not on file   Tobacco Use   • Smoking status: Never Smoker   • Smokeless tobacco: Never Used   Vaping Use   • Vaping Use: " "Never used   Substance and Sexual Activity   • Alcohol use: Not Currently     Comment: Pt reports last drink \" in the eighties sometime \"    • Drug use: Not Currently   • Sexual activity: Not Currently   Other Topics Concern   • Not on file   Social History Narrative   • Not on file     Social Determinants of Health     Financial Resource Strain:    • Difficulty of Paying Living Expenses:    Food Insecurity:    • Worried About Running Out of Food in the Last Year:    • Ran Out of Food in the Last Year:    Transportation Needs:    • Lack of Transportation (Medical):    • Lack of Transportation (Non-Medical):    Physical Activity:    • Days of Exercise per Week:    • Minutes of Exercise per Session:    Stress:    • Feeling of Stress :    Social Connections:    • Frequency of Communication with Friends and Family:    • Frequency of Social Gatherings with Friends and Family:    • Attends [employer] Services:    • Active Member of Clubs or Organizations:    • Attends Club or Organization Meetings:    • Marital Status:    Intimate Partner Violence:    • Fear of Current or Ex-Partner:    • Emotionally Abused:    • Physically Abused:    • Sexually Abused:        Current medications:   Current Facility-Administered Medications   Medication Dose   • potassium chloride SA (Kdur) tablet 40 mEq  40 mEq   • thiamine (Vitamin B-1) tablet 100 mg  100 mg   • multivitamin (THERAGRAN) tablet 1 tablet  1 tablet   • losartan (COZAAR) tablet 25 mg  25 mg   • LORazepam (ATIVAN) injection 1 mg  1 mg   • dextrose 5 % and 0.45 % NaCl with KCl 20 mEq     • aspirin (ASA) tablet 325 mg  325 mg    Or   • aspirin (ASA) chewable tab 324 mg  324 mg    Or   • aspirin (ASA) suppository 300 mg  300 mg   • regadenoson (LEXISCAN) injection SOLN 0.4 mg  0.4 mg   • aminophylline injection 100 mg  100 mg   • acetaminophen (Tylenol) tablet 650 mg  650 mg   • enalaprilat (VASOTEC) injection 1.25 mg  1.25 mg   • labetalol (NORMODYNE/TRANDATE) injection 10 mg  " "10 mg   • ondansetron (ZOFRAN ODT) dispertab 4 mg  4 mg   • ondansetron (ZOFRAN) syringe/vial injection 4 mg  4 mg   • Respiratory Therapy Consult     • senna-docusate (PERICOLACE or SENOKOT S) 8.6-50 MG per tablet 2 tablet  2 tablet    And   • polyethylene glycol/lytes (MIRALAX) PACKET 1 Packet  1 Packet    And   • magnesium hydroxide (MILK OF MAGNESIA) suspension 30 mL  30 mL    And   • bisacodyl (DULCOLAX) suppository 10 mg  10 mg   • enoxaparin (LOVENOX) inj 40 mg  40 mg   • haloperidol lactate (HALDOL) injection 2-5 mg  2-5 mg       Medication Allergy:  No Known Allergies      Review of systems:   As noted above; otherwise limited as patient is altered/difficult to engage in exam.       Physical examination:   Vitals:    06/09/21 1918 06/10/21 0438 06/10/21 0718 06/10/21 0900   BP: 143/84 142/83 143/79    Pulse: 98 100 91    Resp: 16 16 18    Temp: 36.4 °C (97.5 °F) 36.7 °C (98 °F) 36.1 °C (97 °F)    TempSrc: Temporal Temporal Temporal    SpO2:       Weight: 90.5 kg (199 lb 8.3 oz)   92.7 kg (204 lb 5.9 oz)   Height:         General: Patient in no acute distress, pleasant and cooperative.  HEENT: Normocephalic, no signs of acute trauma.   Neck: supple, no meningeal signs or carotid bruits. There is normal range of motion. No tenderness on exam.   Chest: clear to auscultation. No cough.   CV: RRR, no murmurs.   Skin: no signs of acute rashes or trauma.   Musculoskeletal: joints exhibit full range of motion, without any pain to palpation. There are no signs of joint or muscle swelling. There is no tenderness to deep palpation of muscles.   Psychiatric: No hallucinatory behavior.       NEUROLOGICAL EXAM:   Mental status, orientation: Awake, alert; oriented to self, \"medical center.\" Disoriented to time, situation.    Speech and language: speech is non dysarthric, non aphasic, however speech is often disorganized, nonsensical. Difficulty to engage. Intermittently names, with repeated stim/encouragement. 0/3 word " recall.    Cranial nerve exam: Pupils are 3-4 mm bilaterally and equally reactive to light. Visual fields are intact by confrontation. Question of mild/slight, bilateral horizontal nystagmus; possibly physiologic; no rotary nystagmus nor ophthalmoplegia, Intact full EOM in all directions of gaze. Face appears symmetric. Sensation in the face is intact to light touch. Uvula is midline. Palate elevates symmetrically. Tongue is midline and without any signs of tongue biting or fasciculations.  Motor exam: Patient moves all extremities spontaneously, full strength throughout. Paratonia present bilaterally.  Palmomental frontal release sign present.   Sensory exam reveals normal sense of light touch and pinprick in all extremities.   Deep tendon reflexes:  Plantar responses are flexor. There is no clonus.   Coordination: shows a normal finger-nose-finger; no dysmetria. Normal rapidly alternating movements.   Gait: Not assessed at this time as patient is a fall risk.       ANCILLARY DATA REVIEWED:     Lab Data Review:  Recent Results (from the past 24 hour(s))   Basic Metabolic Panel    Collection Time: 06/10/21  9:05 AM   Result Value Ref Range    Sodium 145 135 - 145 mmol/L    Potassium 3.9 3.6 - 5.5 mmol/L    Chloride 108 96 - 112 mmol/L    Co2 26 20 - 33 mmol/L    Glucose 137 (H) 65 - 99 mg/dL    Bun 7 (L) 8 - 22 mg/dL    Creatinine 0.84 0.50 - 1.40 mg/dL    Calcium 9.2 8.5 - 10.5 mg/dL    Anion Gap 11.0 7.0 - 16.0   ESTIMATED GFR    Collection Time: 06/10/21  9:05 AM   Result Value Ref Range    GFR If African American >60 >60 mL/min/1.73 m 2    GFR If Non African American >60 >60 mL/min/1.73 m 2       Labs reviewed by me.       Imaging reviewed by me:     [id number] VIEW   Final Result      1.  No metallic foreign bodies detected.   2.  Right nephrolithiasis.      EC-ECHOCARDIOGRAM LTD W/ CONT   Final Result      CT-HEAD W/O   Final Result      1.  Cerebral atrophy and chronic microvascular ischemic type changes.  "  2.  No acute intracranial abnormality.      DX-CHEST-PORTABLE (1 VIEW)   Final Result      No acute cardiopulmonary abnormality.      MR-BRAIN-WITH & W/O    (Results Pending)         Modified [place] Scale (MRS): 1 = No significant disability, despite symptoms; able to perform all usual duties and activities      ASSESSMENT AND PLAN:  66-year old male with PMhx ETOH abuse (admits to drinking \"1 quart of Vodka per day\" for \"years\"), further details are limited/unknown who presented to [facility] On 6/4/21 for a chief complaint of altered mental status; patient was apparently found down/wandering near his home by bystanders; here, CT head revealed no acute intracranial abnormality; other work up including UA, Serum studies have been unrevealing thus far. Patient's exam is suggestive of underlying, previously diagnosed dementia-- patient is paratonic, with positive palmomental reflex. Could consider ETOH induced, alzheimer's, or even vascular dementia. MRI Brain w/wo contrast is still pending; will be helpful to further support the above diagnoses.     Recommendations/Plan:     -q4h and PRN neuro assessment. VS per nursing/unit protocol.   -Obtain MRI Brain w/wo contrast-- delayed for unclear reasons; still pending.   -Check folate level; continue PO Thiamine, Multivitamin daily.   -Continue PT/OT; obtain SLP consultation for speech/lingustic/cognitive eval.   -Case management/social work for dispo, as patient has unclear next of kin.   -If the above are unrevealing, could consider EEG and/or LP In the coming days, though feel these studies likely to be low yield at this point.     Will follow up with results of the above and make additional recommendations accordingly.  The plan of care above has been discussed with Dr. Bennett.     Michelle Maurice, DONATO.P.R.N.  [facility]      "

## 2021-06-11 LAB
ALBUMIN SERPL BCP-MCNC: 3.6 G/DL (ref 3.2–4.9)
ALBUMIN/GLOB SERPL: 1.4 G/DL
ALP SERPL-CCNC: 77 U/L (ref 30–99)
ALT SERPL-CCNC: 67 U/L (ref 2–50)
ANION GAP SERPL CALC-SCNC: 11 MMOL/L (ref 7–16)
AST SERPL-CCNC: 46 U/L (ref 12–45)
BILIRUB SERPL-MCNC: 0.4 MG/DL (ref 0.1–1.5)
BUN SERPL-MCNC: 12 MG/DL (ref 8–22)
CALCIUM SERPL-MCNC: 9.5 MG/DL (ref 8.5–10.5)
CHLORIDE SERPL-SCNC: 110 MMOL/L (ref 96–112)
CO2 SERPL-SCNC: 23 MMOL/L (ref 20–33)
CREAT SERPL-MCNC: 0.94 MG/DL (ref 0.5–1.4)
ERYTHROCYTE [DISTWIDTH] IN BLOOD BY AUTOMATED COUNT: 55.5 FL (ref 35.9–50)
FOLATE SERPL-MCNC: 15.9 NG/ML
GLOBULIN SER CALC-MCNC: 2.5 G/DL (ref 1.9–3.5)
GLUCOSE SERPL-MCNC: 93 MG/DL (ref 65–99)
HCT VFR BLD AUTO: 38.5 % (ref 42–52)
HGB BLD-MCNC: 12.6 G/DL (ref 14–18)
MAGNESIUM SERPL-MCNC: 2.1 MG/DL (ref 1.5–2.5)
MCH RBC QN AUTO: 33.2 PG (ref 27–33)
MCHC RBC AUTO-ENTMCNC: 32.7 G/DL (ref 33.7–35.3)
MCV RBC AUTO: 101.3 FL (ref 81.4–97.8)
PLATELET # BLD AUTO: 222 K/UL (ref 164–446)
PMV BLD AUTO: 9.3 FL (ref 9–12.9)
POTASSIUM SERPL-SCNC: 4.4 MMOL/L (ref 3.6–5.5)
PROT SERPL-MCNC: 6.1 G/DL (ref 6–8.2)
RBC # BLD AUTO: 3.8 M/UL (ref 4.7–6.1)
SODIUM SERPL-SCNC: 144 MMOL/L (ref 135–145)
VIT B1 BLD-MCNC: 100 NMOL/L (ref 70–180)
WBC # BLD AUTO: 6.6 K/UL (ref 4.8–10.8)

## 2021-06-11 PROCEDURE — 97116 GAIT TRAINING THERAPY: CPT | Mod: CQ

## 2021-06-11 PROCEDURE — 700102 HCHG RX REV CODE 250 W/ 637 OVERRIDE(OP): Performed by: STUDENT IN AN ORGANIZED HEALTH CARE EDUCATION/TRAINING PROGRAM

## 2021-06-11 PROCEDURE — 83735 ASSAY OF MAGNESIUM: CPT

## 2021-06-11 PROCEDURE — 700102 HCHG RX REV CODE 250 W/ 637 OVERRIDE(OP): Performed by: INTERNAL MEDICINE

## 2021-06-11 PROCEDURE — 700101 HCHG RX REV CODE 250: Performed by: HOSPITALIST

## 2021-06-11 PROCEDURE — 770001 HCHG ROOM/CARE - MED/SURG/GYN PRIV*

## 2021-06-11 PROCEDURE — 36415 COLL VENOUS BLD VENIPUNCTURE: CPT

## 2021-06-11 PROCEDURE — 97530 THERAPEUTIC ACTIVITIES: CPT

## 2021-06-11 PROCEDURE — 97530 THERAPEUTIC ACTIVITIES: CPT | Mod: CQ

## 2021-06-11 PROCEDURE — A9270 NON-COVERED ITEM OR SERVICE: HCPCS | Performed by: STUDENT IN AN ORGANIZED HEALTH CARE EDUCATION/TRAINING PROGRAM

## 2021-06-11 PROCEDURE — 85027 COMPLETE CBC AUTOMATED: CPT

## 2021-06-11 PROCEDURE — 97535 SELF CARE MNGMENT TRAINING: CPT

## 2021-06-11 PROCEDURE — 700111 HCHG RX REV CODE 636 W/ 250 OVERRIDE (IP): Performed by: STUDENT IN AN ORGANIZED HEALTH CARE EDUCATION/TRAINING PROGRAM

## 2021-06-11 PROCEDURE — 99232 SBSQ HOSP IP/OBS MODERATE 35: CPT | Performed by: INTERNAL MEDICINE

## 2021-06-11 PROCEDURE — A9270 NON-COVERED ITEM OR SERVICE: HCPCS | Performed by: INTERNAL MEDICINE

## 2021-06-11 PROCEDURE — 80053 COMPREHEN METABOLIC PANEL: CPT

## 2021-06-11 PROCEDURE — 92523 SPEECH SOUND LANG COMPREHEN: CPT

## 2021-06-11 PROCEDURE — 82746 ASSAY OF FOLIC ACID SERUM: CPT

## 2021-06-11 RX ADMIN — ASPIRIN 324 MG: 81 TABLET, CHEWABLE ORAL at 05:14

## 2021-06-11 RX ADMIN — POTASSIUM CHLORIDE 40 MEQ: 1500 TABLET, EXTENDED RELEASE ORAL at 05:14

## 2021-06-11 RX ADMIN — THERA TABS 1 TABLET: TAB at 05:14

## 2021-06-11 RX ADMIN — ENOXAPARIN SODIUM 40 MG: 40 INJECTION SUBCUTANEOUS at 05:14

## 2021-06-11 RX ADMIN — POTASSIUM CHLORIDE, DEXTROSE MONOHYDRATE AND SODIUM CHLORIDE: 150; 5; 450 INJECTION, SOLUTION INTRAVENOUS at 06:42

## 2021-06-11 RX ADMIN — HALOPERIDOL LACTATE 5 MG: 5 INJECTION, SOLUTION INTRAMUSCULAR at 19:28

## 2021-06-11 RX ADMIN — LOSARTAN POTASSIUM 25 MG: 25 TABLET, FILM COATED ORAL at 05:16

## 2021-06-11 RX ADMIN — Medication 100 MG: at 05:14

## 2021-06-11 ASSESSMENT — COGNITIVE AND FUNCTIONAL STATUS - GENERAL
CLIMB 3 TO 5 STEPS WITH RAILING: A LOT
EATING MEALS: A LITTLE
WALKING IN HOSPITAL ROOM: A LITTLE
PERSONAL GROOMING: A LITTLE
SUGGESTED CMS G CODE MODIFIER MOBILITY: CK
DAILY ACTIVITIY SCORE: 17
STANDING UP FROM CHAIR USING ARMS: A LITTLE
TURNING FROM BACK TO SIDE WHILE IN FLAT BAD: A LITTLE
DRESSING REGULAR LOWER BODY CLOTHING: A LITTLE
MOVING FROM LYING ON BACK TO SITTING ON SIDE OF FLAT BED: A LITTLE
MOBILITY SCORE: 17
SUGGESTED CMS G CODE MODIFIER DAILY ACTIVITY: CK
HELP NEEDED FOR BATHING: A LOT
MOVING TO AND FROM BED TO CHAIR: A LITTLE
TOILETING: A LITTLE
DRESSING REGULAR UPPER BODY CLOTHING: A LITTLE

## 2021-06-11 ASSESSMENT — GAIT ASSESSMENTS
ASSISTIVE DEVICE: FRONT WHEEL WALKER
DEVIATION: DECREASED BASE OF SUPPORT;BRADYKINETIC
DISTANCE (FEET): 40
GAIT LEVEL OF ASSIST: MINIMAL ASSIST

## 2021-06-11 ASSESSMENT — PAIN DESCRIPTION - PAIN TYPE: TYPE: ACUTE PAIN

## 2021-06-11 ASSESSMENT — FIBROSIS 4 INDEX: FIB4 SCORE: 2.02

## 2021-06-11 NOTE — THERAPY
Occupational Therapy  Daily Treatment     Patient Name: Lane Beard  Age:  66 y.o., Sex:  male  Medical Record #: [id number]  Today's Date: 6/11/2021     Precautions  Precautions: Fall Risk  Comments: confused, poor sequencing    Assessment    Patient seen for OT treatment this morning, received sitting EOB, sitter present. Patient presents with varying cognitive delays and difficulties, unable to initiate without additional cuing, poor command following, etc. He eventually stood up and needed explicit step by step directions to reach the bathroom, including how to move his foot. He was able to get to the toilet and have a BM. While completing pericare, he was unable to do this delicately and would end up with a lot of fecal matter compared to the amount of toilet paper, and therefore it got all over his hand each time. This process took a while. Next, he washed his hands at the sink with step by step cuing. At end of session, he elected to sit in chair. Patient's physical strength appears to be improving, most limited by cognitive deficits at this time and would not be safe to return home without 24/7 supervision.     Plan    Continue current treatment plan.    DC Equipment Recommendations: Unable to determine at this time  Discharge Recommendations: Recommend post-acute placement for additional occupational therapy services prior to discharge home       Objective       06/11/21 1041   Precautions   Precautions Fall Risk   Comments confused, poor sequencing   Cognition    Cognition / Consciousness X   Speech/ Communication Delayed Responses   Level of Consciousness Alert   Ability To Follow Commands 1 Step   Safety Awareness Impaired;Impulsive   New Learning Impaired   Attention Impaired   Sequencing Impaired   Initiation Impaired   Comments Pleasant, poor command following, very poor sequencing   Balance   Sitting Balance (Static) Fair +   Sitting Balance (Dynamic) Fair   Standing Balance (Static) Fair -    Standing Balance (Dynamic) Poor +   Weight Shift Sitting Fair   Weight Shift Standing Fair   Skilled Intervention Compensatory Strategies;Facilitation;Verbal Cuing   Comments With FWW   Bed Mobility    Supine to Sit Minimal Assist   Skilled Intervention Verbal Cuing   Activities of Daily Living   Grooming Standing;Minimal Assist   Lower Body Dressing Minimal Assist   Toileting Minimal Assist   Skilled Intervention Compensatory Strategies;Facilitation;Verbal Cuing   Functional Mobility   Sit to Stand Minimal Assist   Bed, Chair, Wheelchair Transfer Minimal Assist   Toilet Transfers Minimal Assist   Transfer Method Stand Pivot   Mobility sup>sit, STS, toiler xfer   Activity Tolerance   Sitting in Chair 5+ mins   Sitting Edge of Bed 5-7 mins   Standing 10 mins   Short Term Goals   Short Term Goal # 1 Pt will be supervised with ADL transfers   Goal Outcome # 1 Progressing as expected   Short Term Goal # 2 Pt will dress LB with Min A   Goal Outcome # 2 Progressing as expected   Short Term Goal # 3 Pt will sequence grooming tasks standing with less than 2 V/Q's   Goal Outcome # 3 Progressing slower than expected
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
## 2021-06-11 NOTE — CARE PLAN
The patient is Stable - Low risk of patient condition declining or worsening    Shift Goals  Clinical Goals: Reduce agitation. Sleep comfortable  Patient Goals: Unable to Assess  Family Goals: N/A    Progress made toward(s) clinical / shift goals:   Problem: Skin Integrity  Goal: Skin integrity is maintained or improved  Outcome: Progressing     Problem: Fall Risk  Goal: Patient will remain free from falls  Outcome: Progressing     Problem: Knowledge Deficit - Standard  Goal: Patient and family/care givers will demonstrate understanding of plan of care, disease process/condition, diagnostic tests and medications  Outcome: Progressing

## 2021-06-11 NOTE — PROGRESS NOTES
Report received at bedside, pt care assumed, pt is medical. Pt aaox1, no signs of distress noted at this time. Patient resting comfortably in bed. POC discussed with pt and verbalizes no questions. Pt c/o of no pain. Pt denies any additional needs at this time. Bed in lowest position, pt educated on fall risk and verbalized understanding, call light within reach, safety sitter at bedside.

## 2021-06-11 NOTE — CARE PLAN
The patient is Stable - Low risk of patient condition declining or worsening    Shift Goals  Clinical Goals: Reduce agitation. Sleep comfortable  Patient Goals: Unable to Assess  Family Goals: N/A    Progress made toward(s) clinical / shift goals:        Problem: Fluid Volume  Goal: Fluid volume balance will be maintained  Outcome: Progressing     Problem: Skin Integrity  Goal: Skin integrity is maintained or improved  Outcome: Progressing     Problem: Fall Risk  Goal: Patient will remain free from falls  Outcome: Progressing       Patient is not progressing towards the following goals:

## 2021-06-11 NOTE — PROGRESS NOTES
Hospital Medicine Daily Progress Note    Date of Service  6/11/2021    Chief Complaint  66 y.o. male admitted 6/4/2021 with altered mental status    Hospital Course    As per Dr. Lara note    Lane is a 66-year-old male with no known past medical history who presented to the hospital on 6/4/2021 after he was found down on the sidewalk after multiple witnessed falls.  Patient was confused at the time of admission.  He had been seen earlier in the day for syncope while crossing the street however at that time left AGAINST MEDICAL ADVICE.  On admission he was tachycardic with a heart rate of 114, anion gap of 23 and a lactic acid of 2.6.  He had a CT which showed no acute intracranial abnormalities.  He also underwent his chest x-ray which showed no acute cardiopulmonary process patient was admitted for further evaluation of altered mental status and syncope.     He underwent MRI brain with and without contrast on Gavi 10, 2021 and it did not show any acute abnormalities.    Interval Problem Update    I evaluated and examined him at the bedside.  He denies any acute complaints.  He knows that he is in the hospital.  I fill paperwork for his guardianship.  He underwent MRI brain with and without contrast on Gavi 10, 2021 and it did not show any acute abnormalities.  He remains hemodynamically stable.  Labs are pending this morning.  Case management is working on his guardianship.      Consultants/Specialty  Neurology    Code Status  Full Code    Disposition  Pt remains altered and lacks capacity, suspect pt will need long term placement     Review of Systems  Review of Systems   Unable to perform ROS: Mental acuity       Physical Exam  Temp:  [36.1 °C (97 °F)-37.2 °C (99 °F)] 36.8 °C (98.2 °F)  Pulse:  [89-98] 97  Resp:  [18] 18  BP: (120-137)/(66-84) 127/84  SpO2:  [97 %-99 %] 98 %    Physical Exam  Constitutional:       Appearance: Normal appearance.   HENT:      Head: Normocephalic.      Nose: Nose normal.       Mouth/Throat:      Mouth: Mucous membranes are moist.   Eyes:      General:         Right eye: No discharge.         Left eye: No discharge.   Cardiovascular:      Rate and Rhythm: Normal rate and regular rhythm.      Pulses: Normal pulses.   Pulmonary:      Effort: Pulmonary effort is normal. No respiratory distress.      Breath sounds: Normal breath sounds.   Abdominal:      General: Bowel sounds are normal. There is no distension.      Tenderness: There is no abdominal tenderness.   Musculoskeletal:         General: No swelling or tenderness.      Cervical back: Normal range of motion.   Skin:     General: Skin is warm.      Capillary Refill: Capillary refill takes less than 2 seconds.      Coloration: Skin is not jaundiced or pale.   Neurological:      General: No focal deficit present.      Mental Status: He is alert and oriented to person, place, and time.      Comments: He was able to follow commands per  AAO x1   Psychiatric:         Cognition and Memory: Cognition is not impaired. Memory is not impaired. He does not exhibit impaired recent memory or impaired remote memory.     I performed physical exam on June 11, 2021 remain similar without any acute changes.      Current Facility-Administered Medications:   •  potassium chloride SA (Kdur) tablet 40 mEq, 40 mEq, Oral, DAILY, Adia Lara M.D., 40 mEq at 06/11/21 0514  •  thiamine (Vitamin B-1) tablet 100 mg, 100 mg, Oral, DAILY, Ilya Phan M.D., 100 mg at 06/11/21 0514  •  multivitamin (THERAGRAN) tablet 1 tablet, 1 tablet, Oral, DAILY, Ilya Phan M.D., 1 tablet at 06/11/21 0514  •  losartan (COZAAR) tablet 25 mg, 25 mg, Oral, Q DAY, Adia Lara M.D., 25 mg at 06/11/21 0516  •  LORazepam (ATIVAN) injection 1 mg, 1 mg, Intravenous, Once PRN, Adia Lara M.D.  •  dextrose 5 % and 0.45 % NaCl with KCl 20 mEq, , Intravenous, Continuous, Kelvin Denise M.D., Last Rate: 125 mL/hr at 06/11/21 0642, New Bag at 06/11/21 0642  •   aspirin (ASA) tablet 325 mg, 325 mg, Oral, DAILY, 325 mg at 06/10/21 0521 **OR** aspirin (ASA) chewable tab 324 mg, 324 mg, Oral, DAILY, 324 mg at 06/11/21 0514 **OR** aspirin (ASA) suppository 300 mg, 300 mg, Rectal, DAILY, Nabeel Pitts M.D.  •  regadenoson (LEXISCAN) injection SOLN 0.4 mg, 0.4 mg, Intravenous, Once PRN, Nabeel Pitts M.D.  •  aminophylline injection 100 mg, 100 mg, Intravenous, Q5 MIN PRN, Nabeel Pitts M.D.  •  acetaminophen (Tylenol) tablet 650 mg, 650 mg, Oral, Q6HRS PRN, Jojo Castillo M.D.  •  enalaprilat (VASOTEC) injection 1.25 mg, 1.25 mg, Intravenous, Q6HRS PRN, Jojo Castillo M.D.  •  labetalol (NORMODYNE/TRANDATE) injection 10 mg, 10 mg, Intravenous, Q4HRS PRN, Jojo Castillo M.D.  •  ondansetron (ZOFRAN ODT) dispertab 4 mg, 4 mg, Oral, Q4HRS PRN, Jojo Castillo M.D.  •  ondansetron (ZOFRAN) syringe/vial injection 4 mg, 4 mg, Intravenous, Q4HRS PRN, Jojo Castillo M.D.  •  Respiratory Therapy Consult, , Nebulization, Continuous RT, Jojo Castillo M.D.  •  senna-docusate (PERICOLACE or SENOKOT S) 8.6-50 MG per tablet 2 tablet, 2 tablet, Oral, BID, 2 tablet at 06/10/21 0521 **AND** polyethylene glycol/lytes (MIRALAX) PACKET 1 Packet, 1 Packet, Oral, QDAY PRN **AND** magnesium hydroxide (MILK OF MAGNESIA) suspension 30 mL, 30 mL, Oral, QDAY PRN **AND** bisacodyl (DULCOLAX) suppository 10 mg, 10 mg, Rectal, QDAY PRN, Jojo Castillo M.D.  •  enoxaparin (LOVENOX) inj 40 mg, 40 mg, Subcutaneous, DAILY, Jojo Castillo M.D., 40 mg at 06/11/21 0514  •  haloperidol lactate (HALDOL) injection 2-5 mg, 2-5 mg, Intravenous, Q4HRS PRN, Jojo Castillo M.D., 5 mg at 06/06/21 1346      Fluids    Intake/Output Summary (Last 24 hours) at 6/11/2021 0719  Last data filed at 6/11/2021 0533  Gross per 24 hour   Intake 460 ml   Output 575 ml   Net -115 ml       Laboratory  Recent Labs     06/09/21  0209   WBC 6.4   RBC 3.67*   HEMOGLOBIN 11.8*   HEMATOCRIT 35.5*   MCV 96.7    MCH 32.2   MCHC 33.2*   RDW 50.8*   PLATELETCT 202   MPV 9.6     Recent Labs     06/08/21  1319 06/09/21  0209 06/10/21  0905   SODIUM 143 144 145   POTASSIUM 3.4* 3.3* 3.9   CHLORIDE 110 110 108   CO2 21 25 26   GLUCOSE 119* 111* 137*   BUN 6* 6* 7*   CREATININE 0.95 0.83 0.84   CALCIUM 9.1 9.1 9.2                   Imaging  MR-BRAIN-WITH & W/O   Final Result         No acute intracranial process.      Nonspecific T2 hyperintensities in the deep white matter related to chronic microvascular ischemia.      IC-STICPWA-0 VIEW   Final Result      1.  No metallic foreign bodies detected.   2.  Right nephrolithiasis.      EC-ECHOCARDIOGRAM LTD W/ CONT   Final Result      CT-HEAD W/O   Final Result      1.  Cerebral atrophy and chronic microvascular ischemic type changes.   2.  No acute intracranial abnormality.      DX-CHEST-PORTABLE (1 VIEW)   Final Result      No acute cardiopulmonary abnormality.           Assessment/Plan  * Syncope- (present on admission)  Assessment & Plan  Apparent episodes of syncope  CT head showing no acute intracranial abnormalities  Echo ordered to assess, grossly unremarkable  Will check orthostatic vitals   Nothing significant on telemetry found   Fall precautions in place  MRI brain with and without contrast ordered.  PTOT    AMS (altered mental status)- (present on admission)  Assessment & Plan  Patient continues to be encephalopathic, unclear etiology at this time, suspect he has a component of delirium as well as he mentation does seem to wax and wane, suspect possible underlying dementia/alzheimers given his lack of overall improvement and impairment in memory   CT on admission neg for acute process, no focal symptoms to suggest large CVA as etiology   TSH, HIV, RPR, B12 all normal   He underwent MRI brain with and without contrast that did not show any acute abnormalities.  Started on thiamine  Neurology is following him.      Dehydration- (present on admission)  Assessment &  Plan  Pt removed IV, Encourage oral intake, pt has poor appetite       Tachycardia- (present on admission)  Assessment & Plan  Now improved  Continue to monitor.    Elevated lactic acid level- (present on admission)  Assessment & Plan  Lactic acid 2.6 on admission, suspect this is due to poor oral intake   WBC WNL  Cx Neg     I discussed plan of care during multidisciplinary rounds regarding his current medical condition and discharge plan.    Case management is working on his guardianship process.  I fill paperwork for his guardianship.        VTE prophylaxis: Heparin

## 2021-06-11 NOTE — PROGRESS NOTES
Received bedside report from RN, pt care assumed. Pt AAOx self, with no complaint of pain at this time. No signs of acute distress noted at this time. Pt denies any additional needs at this time. Safety sitter at bedside, call light within reach, hourly rounding initiated.

## 2021-06-11 NOTE — THERAPY
"Physical Therapy   Daily Treatment     Patient Name: Lane Beard  Age:  66 y.o., Sex:  male  Medical Record #: [id number]  Today's Date: 6/11/2021     Precautions: Fall Risk    Assessment    Pt pleasant and agreeable to therapy session. He requires extended time to complete all tasks. He presents with delayed responses, poor insight into deficits and decreased safety awareness. He would make off topic comments such as \"can't posion the dog\". Easy to redirect to task. He required Karie for prolonged standing dynamic balance in restroom with two LOB requiring assist. He presented with decreased sequencing when turning with fww, requiring step by step cues. Pt required less assist for gait today and utilized fww. He required Karie for balance, distance limited due to distractions. At this time he would benefit from post acute placement to address current impairments in balance, strength and activity tolerance. Will continue to follow while in house.     Plan    Continue current treatment plan.    DC Equipment Recommendations: Unable to determine at this time  Discharge Recommendations: Recommend post-acute placement for additional physical therapy services prior to discharge home         06/11/21 0825   Other Treatments   Other Treatments Provided Standing dynamic balance for prolonged period of time in restroom. Two LOB requiring assist. Slow to complete tasks    Balance   Sitting Balance (Static) Fair +   Sitting Balance (Dynamic) Fair   Standing Balance (Static) Fair -   Standing Balance (Dynamic) Poor +   Weight Shift Sitting Fair   Weight Shift Standing Fair   Skilled Intervention Verbal Cuing   Comments with fww   Gait Analysis   Gait Level Of Assist Minimal Assist   Assistive Device Front Wheel Walker   Distance (Feet) 40   # of Times Distance was Traveled 1   Deviation Decreased Base Of Support;Bradykinetic   Skilled Intervention Verbal Cuing   Comments no lob with gait, Karie for balance and safety. Step by " step cues for sequencing and fww management when turning    Bed Mobility    Supine to Sit   (at EOB upon arrival )   Sit to Supine   (left up in chair )   Scooting Supervised   Skilled Intervention Verbal Cuing   Functional Mobility   Sit to Stand Minimal Assist   Bed, Chair, Wheelchair Transfer Minimal Assist   Toilet Transfers Minimal Assist   Skilled Intervention Verbal Cuing   Comments STS with Karie and fww, extended time to complete. Turning pt having more difficulty with sequencing and coordination    Short Term Goals    Short Term Goal # 1 pt will be able to complete bed mobility from a flat bed with SPV in 6tx in order to return home   Goal Outcome # 1 goal not met   Short Term Goal # 2 pt will be able to complete functional transfers with SPV and no AD in 6tx in order to progress with therapy   Goal Outcome # 2 Goal not met   Short Term Goal # 3 pt will be able to ambulate 10ft with no AD and SPV in 6tx in order to reduce fall risk   Goal Outcome # 3 Goal not met

## 2021-06-12 LAB
ANION GAP SERPL CALC-SCNC: 9 MMOL/L (ref 7–16)
BUN SERPL-MCNC: 9 MG/DL (ref 8–22)
CALCIUM SERPL-MCNC: 9 MG/DL (ref 8.5–10.5)
CHLORIDE SERPL-SCNC: 111 MMOL/L (ref 96–112)
CO2 SERPL-SCNC: 25 MMOL/L (ref 20–33)
CREAT SERPL-MCNC: 0.85 MG/DL (ref 0.5–1.4)
GLUCOSE SERPL-MCNC: 95 MG/DL (ref 65–99)
POTASSIUM SERPL-SCNC: 3.9 MMOL/L (ref 3.6–5.5)
SODIUM SERPL-SCNC: 145 MMOL/L (ref 135–145)

## 2021-06-12 PROCEDURE — 700102 HCHG RX REV CODE 250 W/ 637 OVERRIDE(OP): Performed by: STUDENT IN AN ORGANIZED HEALTH CARE EDUCATION/TRAINING PROGRAM

## 2021-06-12 PROCEDURE — A9270 NON-COVERED ITEM OR SERVICE: HCPCS | Performed by: STUDENT IN AN ORGANIZED HEALTH CARE EDUCATION/TRAINING PROGRAM

## 2021-06-12 PROCEDURE — 80048 BASIC METABOLIC PNL TOTAL CA: CPT

## 2021-06-12 PROCEDURE — 700111 HCHG RX REV CODE 636 W/ 250 OVERRIDE (IP): Performed by: STUDENT IN AN ORGANIZED HEALTH CARE EDUCATION/TRAINING PROGRAM

## 2021-06-12 PROCEDURE — A9270 NON-COVERED ITEM OR SERVICE: HCPCS | Performed by: INTERNAL MEDICINE

## 2021-06-12 PROCEDURE — 36415 COLL VENOUS BLD VENIPUNCTURE: CPT

## 2021-06-12 PROCEDURE — 700101 HCHG RX REV CODE 250: Performed by: HOSPITALIST

## 2021-06-12 PROCEDURE — 700102 HCHG RX REV CODE 250 W/ 637 OVERRIDE(OP): Performed by: INTERNAL MEDICINE

## 2021-06-12 PROCEDURE — 770001 HCHG ROOM/CARE - MED/SURG/GYN PRIV*

## 2021-06-12 RX ADMIN — POTASSIUM CHLORIDE, DEXTROSE MONOHYDRATE AND SODIUM CHLORIDE: 150; 5; 450 INJECTION, SOLUTION INTRAVENOUS at 22:17

## 2021-06-12 RX ADMIN — POTASSIUM CHLORIDE, DEXTROSE MONOHYDRATE AND SODIUM CHLORIDE: 150; 5; 450 INJECTION, SOLUTION INTRAVENOUS at 14:07

## 2021-06-12 RX ADMIN — LOSARTAN POTASSIUM 25 MG: 25 TABLET, FILM COATED ORAL at 05:06

## 2021-06-12 RX ADMIN — ASPIRIN 324 MG: 81 TABLET, CHEWABLE ORAL at 05:06

## 2021-06-12 RX ADMIN — HALOPERIDOL LACTATE 5 MG: 5 INJECTION, SOLUTION INTRAMUSCULAR at 02:59

## 2021-06-12 RX ADMIN — THERA TABS 1 TABLET: TAB at 05:06

## 2021-06-12 RX ADMIN — ENOXAPARIN SODIUM 40 MG: 40 INJECTION SUBCUTANEOUS at 05:05

## 2021-06-12 RX ADMIN — DOCUSATE SODIUM 50 MG AND SENNOSIDES 8.6 MG 2 TABLET: 8.6; 5 TABLET, FILM COATED ORAL at 05:06

## 2021-06-12 RX ADMIN — Medication 100 MG: at 05:07

## 2021-06-12 RX ADMIN — POTASSIUM CHLORIDE, DEXTROSE MONOHYDRATE AND SODIUM CHLORIDE: 150; 5; 450 INJECTION, SOLUTION INTRAVENOUS at 01:58

## 2021-06-12 ASSESSMENT — FIBROSIS 4 INDEX: FIB4 SCORE: 1.721378477236588914

## 2021-06-12 ASSESSMENT — PAIN DESCRIPTION - PAIN TYPE: TYPE: ACUTE PAIN

## 2021-06-12 NOTE — CARE PLAN
The patient is Stable - Low risk of patient condition declining or worsening    Shift Goals  Clinical Goals: Reduce agitation. Sleep comfortable  Patient Goals: Unable to Assess  Family Goals: N/A    Progress made toward(s) clinical / shift goals:        Problem: Fluid Volume  Goal: Fluid volume balance will be maintained  Outcome: Progressing     Problem: Skin Integrity  Goal: Skin integrity is maintained or improved  Outcome: Progressing     Problem: Fall Risk  Goal: Patient will remain free from falls  Outcome: Progressing

## 2021-06-12 NOTE — PROGRESS NOTES
Report received at bedside, pt care assumed, pt is medical. Pt aaox1 to self, no signs of distress noted at this time. Patient is agitated. Pt c/o of no pain. Pt denies any additional needs at this time. Bed in lowest position, pt educated on fall risk and verbalized understanding, call light within reach, safety sitter at bedside. Will continue hourly rounding.

## 2021-06-12 NOTE — CARE PLAN
The patient is Stable - Low risk of patient condition declining or worsening    Shift Goals  Clinical Goals: Reduce agitation. Sleep comfortable  Patient Goals: Unable to Assess  Family Goals: N/A    Progress made toward(s) clinical / shift goals:        Problem: Skin Integrity  Goal: Skin integrity is maintained or improved  Outcome: Progressing     Problem: Fall Risk  Goal: Patient will remain free from falls  Outcome: Progressing       Patient is not progressing towards the following goals:

## 2021-06-13 PROCEDURE — 700102 HCHG RX REV CODE 250 W/ 637 OVERRIDE(OP): Performed by: INTERNAL MEDICINE

## 2021-06-13 PROCEDURE — 700102 HCHG RX REV CODE 250 W/ 637 OVERRIDE(OP): Performed by: STUDENT IN AN ORGANIZED HEALTH CARE EDUCATION/TRAINING PROGRAM

## 2021-06-13 PROCEDURE — A9270 NON-COVERED ITEM OR SERVICE: HCPCS | Performed by: INTERNAL MEDICINE

## 2021-06-13 PROCEDURE — A9270 NON-COVERED ITEM OR SERVICE: HCPCS | Performed by: STUDENT IN AN ORGANIZED HEALTH CARE EDUCATION/TRAINING PROGRAM

## 2021-06-13 PROCEDURE — 700111 HCHG RX REV CODE 636 W/ 250 OVERRIDE (IP): Performed by: STUDENT IN AN ORGANIZED HEALTH CARE EDUCATION/TRAINING PROGRAM

## 2021-06-13 PROCEDURE — 770001 HCHG ROOM/CARE - MED/SURG/GYN PRIV*

## 2021-06-13 PROCEDURE — 700101 HCHG RX REV CODE 250: Performed by: HOSPITALIST

## 2021-06-13 RX ADMIN — POTASSIUM CHLORIDE, DEXTROSE MONOHYDRATE AND SODIUM CHLORIDE: 150; 5; 450 INJECTION, SOLUTION INTRAVENOUS at 18:50

## 2021-06-13 RX ADMIN — THERA TABS 1 TABLET: TAB at 05:00

## 2021-06-13 RX ADMIN — POTASSIUM CHLORIDE, DEXTROSE MONOHYDRATE AND SODIUM CHLORIDE: 150; 5; 450 INJECTION, SOLUTION INTRAVENOUS at 08:44

## 2021-06-13 RX ADMIN — ENOXAPARIN SODIUM 40 MG: 40 INJECTION SUBCUTANEOUS at 05:00

## 2021-06-13 RX ADMIN — Medication 100 MG: at 05:00

## 2021-06-13 RX ADMIN — ASPIRIN 324 MG: 81 TABLET, CHEWABLE ORAL at 05:00

## 2021-06-13 RX ADMIN — LOSARTAN POTASSIUM 25 MG: 25 TABLET, FILM COATED ORAL at 05:00

## 2021-06-13 ASSESSMENT — PAIN DESCRIPTION - PAIN TYPE
TYPE: ACUTE PAIN

## 2021-06-13 NOTE — PROGRESS NOTES
Received bedside report from RN, pt care assumed, VSS. Patient resting in bed, calm/unlabored breathing. No signs of acute distress noted at this time. Safety sitter at bedside. Bed locked/in lowest position, bed alarm on, call light within reach, hourly rounding initiated.

## 2021-06-13 NOTE — CARE PLAN
The patient is Stable - Low risk of patient condition declining or worsening      Problem: Respiratory  Goal: Patient will achieve/maintain optimum respiratory ventilation and gas exchange  Outcome: Progressing     Problem: Skin Integrity  Goal: Skin integrity is maintained or improved  Outcome: Progressing     Problem: Fall Risk  Goal: Patient will remain free from falls  Outcome: Progressing  Note: Fall precautions in place: treaded slipper socks on, mobility signs posted, hourly rounding, bed in lowest position, belongings and call light are within reach, bed alarm on, safety sitter in place, and near nurses station.

## 2021-06-13 NOTE — PROGRESS NOTES
Report received at bedside, pt care assumed, pt is medical. Pt aaox1, no signs of distress noted at this time. Patient resting comfortably in bed. POC reviewed. Pt c/o of no pain. Pt denies any additional needs at this time. Bed in lowest position, pt educated on fall risk and verbalized understanding, call light within reach, safety sitter at bedside.

## 2021-06-14 PROCEDURE — 700102 HCHG RX REV CODE 250 W/ 637 OVERRIDE(OP): Performed by: INTERNAL MEDICINE

## 2021-06-14 PROCEDURE — A9270 NON-COVERED ITEM OR SERVICE: HCPCS | Performed by: STUDENT IN AN ORGANIZED HEALTH CARE EDUCATION/TRAINING PROGRAM

## 2021-06-14 PROCEDURE — 99231 SBSQ HOSP IP/OBS SF/LOW 25: CPT | Performed by: NURSE PRACTITIONER

## 2021-06-14 PROCEDURE — 770001 HCHG ROOM/CARE - MED/SURG/GYN PRIV*

## 2021-06-14 PROCEDURE — 97535 SELF CARE MNGMENT TRAINING: CPT

## 2021-06-14 PROCEDURE — 700102 HCHG RX REV CODE 250 W/ 637 OVERRIDE(OP): Performed by: STUDENT IN AN ORGANIZED HEALTH CARE EDUCATION/TRAINING PROGRAM

## 2021-06-14 PROCEDURE — A9270 NON-COVERED ITEM OR SERVICE: HCPCS | Performed by: INTERNAL MEDICINE

## 2021-06-14 PROCEDURE — 700111 HCHG RX REV CODE 636 W/ 250 OVERRIDE (IP): Performed by: STUDENT IN AN ORGANIZED HEALTH CARE EDUCATION/TRAINING PROGRAM

## 2021-06-14 PROCEDURE — 700101 HCHG RX REV CODE 250: Performed by: HOSPITALIST

## 2021-06-14 PROCEDURE — 97116 GAIT TRAINING THERAPY: CPT

## 2021-06-14 RX ADMIN — LOSARTAN POTASSIUM 25 MG: 25 TABLET, FILM COATED ORAL at 05:29

## 2021-06-14 RX ADMIN — Medication 100 MG: at 05:28

## 2021-06-14 RX ADMIN — POTASSIUM CHLORIDE, DEXTROSE MONOHYDRATE AND SODIUM CHLORIDE: 150; 5; 450 INJECTION, SOLUTION INTRAVENOUS at 04:08

## 2021-06-14 RX ADMIN — THERA TABS 1 TABLET: TAB at 05:29

## 2021-06-14 RX ADMIN — ENOXAPARIN SODIUM 40 MG: 40 INJECTION SUBCUTANEOUS at 05:28

## 2021-06-14 RX ADMIN — ASPIRIN 324 MG: 81 TABLET, CHEWABLE ORAL at 05:28

## 2021-06-14 ASSESSMENT — GAIT ASSESSMENTS
ASSISTIVE DEVICE: FRONT WHEEL WALKER
GAIT LEVEL OF ASSIST: MINIMAL ASSIST
DISTANCE (FEET): 100
DEVIATION: DECREASED BASE OF SUPPORT;SHUFFLED GAIT

## 2021-06-14 ASSESSMENT — COGNITIVE AND FUNCTIONAL STATUS - GENERAL
HELP NEEDED FOR BATHING: A LITTLE
WALKING IN HOSPITAL ROOM: A LITTLE
MOBILITY SCORE: 18
SUGGESTED CMS G CODE MODIFIER MOBILITY: CK
MOVING FROM LYING ON BACK TO SITTING ON SIDE OF FLAT BED: A LITTLE
PERSONAL GROOMING: A LITTLE
TOILETING: A LITTLE
CLIMB 3 TO 5 STEPS WITH RAILING: A LITTLE
MOVING TO AND FROM BED TO CHAIR: A LITTLE
STANDING UP FROM CHAIR USING ARMS: A LITTLE
DAILY ACTIVITIY SCORE: 21
TURNING FROM BACK TO SIDE WHILE IN FLAT BAD: A LITTLE
SUGGESTED CMS G CODE MODIFIER DAILY ACTIVITY: CJ

## 2021-06-14 ASSESSMENT — PAIN DESCRIPTION - PAIN TYPE: TYPE: ACUTE PAIN;CHRONIC PAIN

## 2021-06-14 NOTE — PROGRESS NOTES
Report received at bedside, pt care assumed, pt is medical. Pt aaox1, no signs of distress noted at this time. Patient resting comfortably in bed. POC discussed with pt and verbalizes no questions. Pt c/o of no pain. Pt denies any additional needs at this time. Bed in lowest position, pt educated on fall risk and verbalized understanding, call light within reach, tele sitter at bedside.

## 2021-06-14 NOTE — CARE PLAN
Problem: Hemodynamics  Goal: Patient's hemodynamics, fluid balance and neurologic status will be stable or improve  Outcome: Progressing     Problem: Fluid Volume  Goal: Fluid volume balance will be maintained  Outcome: Progressing     Problem: Urinary - Renal Perfusion  Goal: Ability to achieve and maintain adequate renal perfusion and functioning will improve  Outcome: Progressing     Problem: Respiratory  Goal: Patient will achieve/maintain optimum respiratory ventilation and gas exchange  Outcome: Progressing     Problem: Physical Regulation  Goal: Diagnostic test results will improve  Outcome: Progressing  Goal: Signs and symptoms of infection will decrease  Outcome: Progressing     Problem: Skin Integrity  Goal: Skin integrity is maintained or improved  Outcome: Progressing     Problem: Fall Risk  Goal: Patient will remain free from falls  Outcome: Progressing  Note: Pt requiring ongoing assistance from sitter to remain safe     Problem: Knowledge Deficit - Standard  Goal: Patient and family/care givers will demonstrate understanding of plan of care, disease process/condition, diagnostic tests and medications  Outcome: Progressing     Problem: Safety - Medical Restraint  Goal: Remains free of injury from restraints (Restraint for Interference with Medical Device)  Outcome: Met  Goal: Free from restraint(s) (Restraint for Interference with Medical Device)  Outcome: Met     Problem: Mechanical Ventilation  Goal: Safe management of artificial airway and ventilation  Outcome: Met  Goal: Successful weaning off mechanical ventilator, spontaneously maintains adequate gas exchange  Outcome: Met  Goal: Patient will be able to express needs and understand communication  Outcome: Met   The patient is Stable - Low risk of patient condition declining or worsening    Shift Goals  Clinical Goals: Reduce agitation. Sleep comfortable  Patient Goals: Unable to Assess  Family Goals: N/A    Progress made toward(s) clinical /  shift goals:  Pt remaining free from falls. Plan of care reviewed with Hospitalist.     Patient is not progressing towards the following goals:

## 2021-06-14 NOTE — THERAPY
Physical Therapy   Daily Treatment     Patient Name: Lane Beard  Age:  66 y.o., Sex:  male  Medical Record #: [id number]  Today's Date: 6/14/2021     Precautions: Fall Risk    Assessment    Pt seen for follow up PT treatment. Pt still confused but progressing well with mobility. Session focused on gait training with and without AD. Pt demonstrated improved safety and balance with FWW and ambulated 100ft x2 with FWW and min assist. 2 LOB while turning requiring assistance to regain NAKUL. Recommended pt use FWW if ambulating with sitter. PT will cont while pt is in acute care setting.     Plan    Continue current treatment plan.    DC Equipment Recommendations: Unable to determine at this time  Discharge Recommendations: Recommend post-acute placement for additional physical therapy services prior to discharge home         06/14/21 0854   Cognition    Cognition / Consciousness X   Level of Consciousness Alert   Comments cooperative but still confused   Neuro-Muscular Treatments   Neuro-Muscular Treatments Compensatory Strategies;Tactile Cuing;Sequencing  (standing/gait with and without AD)   Other Treatments   Other Treatments Provided gait training with FWW and without   Balance   Sitting Balance (Static) Fair +   Sitting Balance (Dynamic) Fair +   Standing Balance (Static) Fair -   Standing Balance (Dynamic) Fair -   Weight Shift Sitting Good   Weight Shift Standing Fair   Skilled Intervention Verbal Cuing;Compensatory Strategies;Sequencing   Comments with FWW   Gait Analysis   Gait Level Of Assist Minimal Assist   Assistive Device Front Wheel Walker   Distance (Feet) 100   # of Times Distance was Traveled 2   Deviation Decreased Base Of Support;Shuffled Gait   # of Stairs Climbed 0   Weight Bearing Status no restrictions   Skilled Intervention Verbal Cuing;Compensatory Strategies   Comments 2 LOB while turning requiring assistance to regain NAKUL   Bed Mobility    Supine to Sit   (up with sitter)   Sit to Supine    (in chair )   Functional Mobility   Sit to Stand Minimal Assist   Bed, Chair, Wheelchair Transfer Minimal Assist   Transfer Method Stand Step   Mobility in room and hallway   Skilled Intervention Verbal Cuing   Short Term Goals    Short Term Goal # 1 pt will be able to complete bed mobility from a flat bed with SPV in 6tx in order to return home   Goal Outcome # 1 goal not met   Short Term Goal # 2 pt will be able to complete functional transfers with SPV and no AD in 6tx in order to progress with therapy   Goal Outcome # 2 Goal not met   Short Term Goal # 3 pt will be able to ambulate 10ft with no AD and SPV in 6tx in order to reduce fall risk   Goal Outcome # 3 Goal not met   Anticipated Discharge Equipment and Recommendations   DC Equipment Recommendations Unable to determine at this time   Discharge Recommendations Recommend post-acute placement for additional physical therapy services prior to discharge home

## 2021-06-14 NOTE — PROGRESS NOTES
[facility] Medicine Daily Progress Note    Date of Service  6/14/2021    Chief Complaint  66 y.o. male admitted 6/4/2021 with altered mental status    Hospital Course  Mr. Beard is a 66-year-old male with no known PMHx who presented to the hospital on 6/4/2021 after he was found down on the sidewalk after multiple witnessed falls.  Patient was confused at the time of admission.  He had been seen earlier in the day for syncope while crossing the street however at that time left AGAINST MEDICAL ADVICE.  On admission he was tachycardic with a heart rate of 114, anion gap of 23 and a lactic acid of 2.6.  He had a CT which showed no acute intracranial abnormalities.  He also underwent his chest x-ray which showed no acute cardiopulmonary process patient was admitted for further evaluation of altered mental status and syncope. Patient throughout this stay has been confused an unable to show any signs of capacity.  He underwent MRI brain with and without contrast on Gavi 10, 2021 and it did not show any acute abnormalities.Guardianship being pursued and patient will need placement.         Interval Problem Update    -Patient seen and examined.  Patient appears to have disorganized thought process.  Patient does not answer the questions being asked of him and goes into tangent topics.  According to reports from bedside nursing, patient describes people as abstract concepts instead of calling people by names.  Patient has been ruled out for possible delirium.  Discussed consulting psych for evaluation.  Patient pending guardianship and disposition planning.  -Plan of care: Continue supportive care; monitor for safety; psych consulted for evaluation; patient pending guardianship and disposition planning.  -Lab work: Reviewed; last obtained on 6/12, unremarkable; we will order labs as warranted  -VSS at this time         Consultants/Specialty  Neurology    Code Status  Full Code    Disposition  Pt remains altered and lacks  capacity, suspect pt will need long term placement     Review of Systems  Review of Systems   Unable to perform ROS: Mental acuity       Physical Exam  Temp:  [36.1 °C (97 °F)-36.6 °C (97.9 °F)] 36.1 °C (97 °F)  Pulse:  [] 92  Resp:  [16-18] 18  BP: (109-152)/(67-82) 152/81  SpO2:  [96 %-99 %] 96 %    Physical Exam  Constitutional:       Appearance: Normal appearance.   HENT:      Head: Normocephalic.      Nose: Nose normal.      Mouth/Throat:      Mouth: Mucous membranes are moist.   Eyes:      General:         Right eye: No discharge.         Left eye: No discharge.   Cardiovascular:      Rate and Rhythm: Normal rate and regular rhythm.      Pulses: Normal pulses.   Pulmonary:      Effort: Pulmonary effort is normal. No respiratory distress.      Breath sounds: Normal breath sounds.   Abdominal:      General: Bowel sounds are normal. There is no distension.      Tenderness: There is no abdominal tenderness.   Musculoskeletal:         General: No swelling or tenderness.      Cervical back: Normal range of motion.   Skin:     General: Skin is warm.      Capillary Refill: Capillary refill takes less than 2 seconds.      Coloration: Skin is not jaundiced or pale.   Neurological:      General: No focal deficit present.      Mental Status: He is alert and oriented to person, place, and time.      Comments: He was able to follow commands per  AAO x1   Psychiatric:         Cognition and Memory: Cognition is not impaired. Memory is not impaired. He does not exhibit impaired recent memory or impaired remote memory.     I performed physical exam on June 11, 2021 remain similar without any acute changes.      Current Facility-Administered Medications:   •  thiamine (Vitamin B-1) tablet 100 mg, 100 mg, Oral, DAILY, Ilya Phan M.D., 100 mg at 06/14/21 0528  •  multivitamin (THERAGRAN) tablet 1 tablet, 1 tablet, Oral, DAILY, Ilya Phan M.D., 1 tablet at 06/14/21 0529  •  losartan (COZAAR) tablet 25 mg, 25 mg,  Oral, Q DAY, Adia Lara M.D., 25 mg at 06/14/21 0529  •  LORazepam (ATIVAN) injection 1 mg, 1 mg, Intravenous, Once PRN, Adia Lara M.D.  •  dextrose 5 % and 0.45 % NaCl with KCl 20 mEq, , Intravenous, Continuous, Kelvin Denise M.D., Last Rate: 125 mL/hr at 06/14/21 0408, New Bag at 06/14/21 0408  •  aspirin (ASA) tablet 325 mg, 325 mg, Oral, DAILY, 325 mg at 06/10/21 0521 **OR** aspirin (ASA) chewable tab 324 mg, 324 mg, Oral, DAILY, 324 mg at 06/14/21 0528 **OR** aspirin (ASA) suppository 300 mg, 300 mg, Rectal, DAILY, Nabeel Pitts M.D.  •  regadenoson (LEXISCAN) injection SOLN 0.4 mg, 0.4 mg, Intravenous, Once PRN, Nabeel Pitts M.D.  •  aminophylline injection 100 mg, 100 mg, Intravenous, Q5 MIN PRN, Nabeel Pitts M.D.  •  acetaminophen (Tylenol) tablet 650 mg, 650 mg, Oral, Q6HRS PRN, Jojo Castillo M.D.  •  enalaprilat (VASOTEC) injection 1.25 mg, 1.25 mg, Intravenous, Q6HRS PRN, Jojo Castillo M.D.  •  labetalol (NORMODYNE/TRANDATE) injection 10 mg, 10 mg, Intravenous, Q4HRS PRN, Jojo Castillo M.D.  •  ondansetron (ZOFRAN ODT) dispertab 4 mg, 4 mg, Oral, Q4HRS PRN, Jojo Castillo M.D.  •  ondansetron (ZOFRAN) syringe/vial injection 4 mg, 4 mg, Intravenous, Q4HRS PRN, Jojo Castillo M.D.  •  Respiratory Therapy Consult, , Nebulization, Continuous RT, Jojo Castillo M.D.  •  senna-docusate (PERICOLACE or SENOKOT S) 8.6-50 MG per tablet 2 tablet, 2 tablet, Oral, BID, 2 tablet at 06/12/21 0506 **AND** polyethylene glycol/lytes (MIRALAX) PACKET 1 Packet, 1 Packet, Oral, QDAY PRN **AND** magnesium hydroxide (MILK OF MAGNESIA) suspension 30 mL, 30 mL, Oral, QDAY PRN **AND** bisacodyl (DULCOLAX) suppository 10 mg, 10 mg, Rectal, QDAY PRN, Jojo Castillo M.D.  •  enoxaparin (LOVENOX) inj 40 mg, 40 mg, Subcutaneous, DAILY, Jojo Castillo M.D., 40 mg at 06/14/21 0528  •  haloperidol lactate (HALDOL) injection 2-5 mg, 2-5 mg, Intravenous, Q4HRS PRN, Jojo Castillo,  M.D., 5 mg at 06/12/21 0259      Fluids    Intake/Output Summary (Last 24 hours) at 6/14/2021 1420  Last data filed at 6/14/2021 0000  Gross per 24 hour   Intake --   Output 400 ml   Net -400 ml       Laboratory      Recent Labs     06/12/21  1256   SODIUM 145   POTASSIUM 3.9   CHLORIDE 111   CO2 25   GLUCOSE 95   BUN 9   CREATININE 0.85   CALCIUM 9.0                   Imaging  MR-BRAIN-WITH & W/O   Final Result         No acute intracranial process.      Nonspecific T2 hyperintensities in the deep white matter related to chronic microvascular ischemia.      MU-ATSCWJV-5 VIEW   Final Result      1.  No metallic foreign bodies detected.   2.  Right nephrolithiasis.      EC-ECHOCARDIOGRAM LTD W/ CONT   Final Result      CT-HEAD W/O   Final Result      1.  Cerebral atrophy and chronic microvascular ischemic type changes.   2.  No acute intracranial abnormality.      DX-CHEST-PORTABLE (1 VIEW)   Final Result      No acute cardiopulmonary abnormality.           Assessment/Plan  * Syncope- (present on admission)  Assessment & Plan  -Apparent episodes of syncope  -CT head showing no acute intracranial abnormalities  -Echo ordered to assess, grossly unremarkable  -Will check orthostatic vitals   -Nothing significant on telemetry found   -Fall precautions in place  -MRI brain with and without contrast ordered.  -PTOT    AMS (altered mental status)- (present on admission)  Assessment & Plan  -Patient continues to be encephalopathic, unclear etiology at this time, suspect he has a component of delirium as well as he mentation does seem to wax and wane, suspect possible underlying dementia/alzheimers given his lack of overall improvement and impairment in memory   -CT on admission neg for acute process, no focal symptoms to suggest large CVA as etiology   -TSH, HIV, RPR, B12 all normal   -He underwent MRI brain with and without contrast that did not show any acute abnormalities.  -Started on thiamine  -Neurology followed  patient  -Psych consulted for possible psychogenic vs delirium     Dehydration- (present on admission)  Assessment & Plan  -Pt removed IV, Encourage oral intake, pt has poor appetite       Tachycardia- (present on admission)  Assessment & Plan  -Now improved  -Continue to monitor.    Elevated lactic acid level- (present on admission)  Assessment & Plan  -Lactic acid 2.6 on admission, suspect this is due to poor oral intake   -WBC WNL  -Cx Neg      VTE prophylaxis: Heparin     ================================================================================================  Please note that this dictation was created using voice recognition software. I have made every reasonable attempt to correct obvious errors, but there may be errors of grammar and possibly content that I did not discover before finalizing the note.    Electronically signed by:  MARK Napoles, MSN, APRN, FNP-C  Hospitalist Services  [facility]  [phone]  [email or web address]  06/14/21    3289

## 2021-06-14 NOTE — THERAPY
"Occupational Therapy  Daily Treatment     Patient Name: Lane Beard  Age:  66 y.o., Sex:  male  Medical Record #: [id number]  Today's Date: 6/14/2021     Precautions: (P) Fall Risk  Comments: confused, poor sequencing    Assessment    Pt seen for OT Tx this morning, received sitting in chair with sitter present. Pt presents with cognitive delays; his is a poor historian, has poor insight and safety awareness. Pt used the walker to get to the toilet and stood during ADL w/ out FWW. Pt has poor sequencing during his shower but was able to complete tasks after use of 1 v/c. Pt completed regina care while standing in shower as well as completed shaving while standing at sink after shower. At end of session pt elected to sit EOB. Pt appears to be improving in his mobility and strength while completing ADLs. Pt's response rate is also improving with less verbal cues, most limited by safety awareness and cognitive deficits such as sequencing. Pt is not safe to return home along at this time. Recommend post acute services or a group home setting with 24/7 spv.     Plan    Continue current treatment plan.    DC Equipment Recommendations: (P) Unable to determine at this time  Discharge Recommendations: (P) Recommend post-acute placement for additional occupational therapy services prior to discharge home    Subjective    \"I want to shave\"       Objective       06/14/21 1024   Cognition    Cognition / Consciousness X   Speech/ Communication Delayed Responses   Orientation Level Not Oriented to Address;Not Oriented to Age   Level of Consciousness Alert   Ability To Follow Commands 1 Step   Safety Awareness Impaired   New Learning Impaired   Attention Impaired   Sequencing Impaired   Initiation Impaired   Comments Pt has poor insite; was more alter than 6/7 session   Other Treatments   Other Treatments Provided standing during shower to perform regina care. Slow to complete tasks   Balance   Sitting Balance (Static) Fair + "   Sitting Balance (Dynamic) Fair +   Standing Balance (Static) Fair -   Standing Balance (Dynamic) Fair -   Weight Shift Sitting Good   Weight Shift Standing Fair   Skilled Intervention Compensatory Strategies;Postural Facilitation;Verbal Cuing   Comments w/ FWW to restroom and with only spv from restroom back to chair   Bed Mobility    Supine to Sit Minimal Assist   Sit to Supine   (pt left EOB with sitter present )   Scooting Supervised   Rolling   (Pt was sitting in chair before we came into room)   Skilled Intervention Verbal Cuing;Postural Facilitation   Activities of Daily Living   Eating Supervision   Grooming Standing;Minimal Assist   Bathing Minimal Assist   Upper Body Dressing Supervision   Lower Body Dressing Supervision   Toileting Minimal Assist   Skilled Intervention Verbal Cuing;Compensatory Strategies;Sequencing   Functional Mobility   Sit to Stand Minimal Assist   Bed, Chair, Wheelchair Transfer Minimal Assist   Toilet Transfers Minimal Assist   Tub / Shower Transfers Minimal Assist   Transfer Method Stand Pivot   Mobility Pt amb without FWW from bathroom to bedside    Skilled Intervention Verbal Cuing   Patient / Family Goals   Patient / Family Goal #1 pt unable to state   Short Term Goals   Short Term Goal # 1 Pt will be supervised with ADL transfers   Goal Outcome # 1 Progressing as expected   Short Term Goal # 2 Pt will dress LB with Min A   Goal Outcome # 2 Goal met, new goal added   Short Term Goal # 2 B  pt will demonstrate good safety awareness during ADLs   Short Term Goal # 3 Pt will sequence grooming tasks standing with less than 2 V/Q's

## 2021-06-14 NOTE — DISCHARGE PLANNING
Anticipated Discharge Disposition: tbd - LTC vs GH     Action: Faxed application for pt to receive guardian from [facility].     Included clinicals, H&P, PT/OT/SLT, MAR,    Barriers to Discharge: placement, guardian,     Plan: LSW to assist as needed and monitor for barriers to discharge.

## 2021-06-14 NOTE — PROGRESS NOTES
Detailed report of care needs given to alejandro Coates.  Reviewed protection of lines, fall protection, and wander protection.  Alejandro reaffirms with RN that he is comfortable performing these tasks with pt.

## 2021-06-15 PROCEDURE — A9270 NON-COVERED ITEM OR SERVICE: HCPCS | Performed by: INTERNAL MEDICINE

## 2021-06-15 PROCEDURE — A9270 NON-COVERED ITEM OR SERVICE: HCPCS | Performed by: STUDENT IN AN ORGANIZED HEALTH CARE EDUCATION/TRAINING PROGRAM

## 2021-06-15 PROCEDURE — 700102 HCHG RX REV CODE 250 W/ 637 OVERRIDE(OP): Performed by: STUDENT IN AN ORGANIZED HEALTH CARE EDUCATION/TRAINING PROGRAM

## 2021-06-15 PROCEDURE — 700111 HCHG RX REV CODE 636 W/ 250 OVERRIDE (IP): Performed by: STUDENT IN AN ORGANIZED HEALTH CARE EDUCATION/TRAINING PROGRAM

## 2021-06-15 PROCEDURE — 770001 HCHG ROOM/CARE - MED/SURG/GYN PRIV*

## 2021-06-15 PROCEDURE — 700102 HCHG RX REV CODE 250 W/ 637 OVERRIDE(OP): Performed by: INTERNAL MEDICINE

## 2021-06-15 RX ADMIN — THERA TABS 1 TABLET: TAB at 06:27

## 2021-06-15 RX ADMIN — ENOXAPARIN SODIUM 40 MG: 40 INJECTION SUBCUTANEOUS at 06:27

## 2021-06-15 RX ADMIN — DOCUSATE SODIUM 50 MG AND SENNOSIDES 8.6 MG 2 TABLET: 8.6; 5 TABLET, FILM COATED ORAL at 06:27

## 2021-06-15 RX ADMIN — LOSARTAN POTASSIUM 25 MG: 25 TABLET, FILM COATED ORAL at 06:27

## 2021-06-15 RX ADMIN — ASPIRIN 325 MG ORAL TABLET 325 MG: 325 PILL ORAL at 06:27

## 2021-06-15 RX ADMIN — Medication 100 MG: at 06:27

## 2021-06-15 ASSESSMENT — PATIENT HEALTH QUESTIONNAIRE - PHQ9
SUM OF ALL RESPONSES TO PHQ9 QUESTIONS 1 AND 2: 0
1. LITTLE INTEREST OR PLEASURE IN DOING THINGS: NOT AT ALL
2. FEELING DOWN, DEPRESSED, IRRITABLE, OR HOPELESS: NOT AT ALL

## 2021-06-15 ASSESSMENT — FIBROSIS 4 INDEX: FIB4 SCORE: 1.721378477236588914

## 2021-06-15 ASSESSMENT — PAIN DESCRIPTION - PAIN TYPE: TYPE: ACUTE PAIN;CHRONIC PAIN

## 2021-06-15 NOTE — CARE PLAN
Problem: Skin Integrity  Goal: Skin integrity is maintained or improved  Outcome: Progressing     Problem: Fall Risk  Goal: Patient will remain free from falls  Outcome: Progressing     Problem: Knowledge Deficit - Standard  Goal: Patient and family/care givers will demonstrate understanding of plan of care, disease process/condition, diagnostic tests and medications  Outcome: Not Progressing

## 2021-06-15 NOTE — CARE PLAN
The patient is Stable - Low risk of patient condition declining or worsening    Shift Goals  Clinical Goals: reduce agitation, check every hour, reeducate on call light  Patient Goals: unable to assess  Family Goals: N/A    Progress made toward(s) clinical / shift goals:    Problem: Hemodynamics  Goal: Patient's hemodynamics, fluid balance and neurologic status will be stable or improve  6/15/2021 1523 by AUREA Bull.N.  Outcome: Progressing  6/15/2021 1519 by AUREA Bull.N.  Outcome: Not Met     Problem: Fluid Volume  Goal: Fluid volume balance will be maintained  6/15/2021 1523 by ABBY BullN.  Outcome: Progressing  6/15/2021 1519 by AUREA Bull.N.  Outcome: Progressing  6/15/2021 1516 by AUREA Bull.N.  Outcome: Progressing     Problem: Urinary - Renal Perfusion  Goal: Ability to achieve and maintain adequate renal perfusion and functioning will improve  6/15/2021 1523 by RUFINA Mcgee R.N.  Outcome: Progressing  6/15/2021 1519 by AUREA Bull.N.  Outcome: Progressing  6/15/2021 1516 by AUREA Bull.N.  Outcome: Progressing     Problem: Respiratory  Goal: Patient will achieve/maintain optimum respiratory ventilation and gas exchange  6/15/2021 1523 by RUFINA Mcgee R.N.  Outcome: Progressing  6/15/2021 1519 by AUREA Bull.N.  Outcome: Progressing  6/15/2021 1516 by RUFINA Mcgee R.N.  Outcome: Progressing     Problem: Fall Risk  Goal: Patient will remain free from falls  Outcome: Progressing     Problem: Knowledge Deficit - Standard  Goal: Patient and family/care givers will demonstrate understanding of plan of care, disease process/condition, diagnostic tests and medications  Outcome: Progressing       Patient is not progressing towards the following goals:      Problem: Hemodynamics  Goal: Patient's hemodynamics, fluid balance and neurologic status will be stable or improve  6/15/2021 1523 by AUREA Bull.N.  Outcome: Progressing  6/15/2021 1519 by AUREA Bull.N.  Outcome: Not Met

## 2021-06-15 NOTE — PROGRESS NOTES
Pt had witnessed unassisted fall at 1820.  Reportedly sitter with patient, and patient was wheeling around on a rolling chair without assistance when he fell.  When RN last in room, pt had been sitting on bed with bed alarm in place.  Extensive and specific report had been given to sitter, including  But not limited to not allowing patient to get up without a staff member able to assist patient present.  Pt reports he hit the top of his head, sitter reports he did not.  Pt neurologically at baseline, pupils even and briskly reactive. MD notified, Vitals within baseline.

## 2021-06-15 NOTE — PROGRESS NOTES
"Received report from day shift RN.  Assumed care at 1900. Pt denies any discomfort at this time.  Call light within reach. Bed locked and in lowest position.  Non skid socks on, Belongings within  Reach.  Will continue to monitor hourly.    Pt A & O-0 when this RN asked his name he stated \"Lucas.\"    "

## 2021-06-15 NOTE — CONSULTS
"Psychiatry consul requested.   Reason for consult: \"Patient has very abstract thought process; nursing concerned that this is not delirium\"  Requesting physician: \"YOUNG Denny\"     Chart reviewed. Pt was admitted after multiple witnessed falls. Presented with AMS on admission.   Neurology has been consulted and is currently following up with the patient.    As per note from CHEMO Garcia on  6/10/21:   \"Patient's exam is suggestive of underlying, previously diagnosed dementia-- patient is paratonic, with positive palmomental reflex. Could consider ETOH induced, alzheimer's, or even vascular dementia. MRI Brain w/wo contrast is still pending; will be helpful to further support the above diagnoses......  \"If the above are unrevealing, could consider EEG and/or LP In the coming days, though feel these studies likely to be low yield at this point.\"    SLP cognitive evaluation results on 6/11: At this time, patient presents with severe-profound deficits across almost all cognitive domains tested. Patient's deficits can be consistent with dementia and patient's baseline cognitive function is unknown\"     Case discussed with CHEMO Garcia, who also agrees that this is dementia.      As per above, pt's presentation is consistent with dementia. There is not documentation of acute psychiatric symptoms or behavior present at this time that needs acute psychiatric care in the inpatient setting.     Psychiatry consult is being canceled at this time.           "

## 2021-06-15 NOTE — PROGRESS NOTES
Edouard Dunlap      Pts friend reached out in concern of this patient being a missing person. Spoke with Edouard in regards to patients plan of care. Edouard let this RN know this patients apartment complex has evicted this pt. Edouard would like to be notified to help with any questions or anything in that matter. This RN will let MD and social work know of this friend.

## 2021-06-15 NOTE — PROGRESS NOTES
Bedside report received from night RN; assumed pt care. Pt assessment complete. Pt AOx1. Reviewed plan of care with pt. Pt is medial. Chart and labs reviewed. Bed in lowest position, and 2 side rails up. Pt educated on call light; call light with in reach.

## 2021-06-16 PROCEDURE — A9270 NON-COVERED ITEM OR SERVICE: HCPCS | Performed by: STUDENT IN AN ORGANIZED HEALTH CARE EDUCATION/TRAINING PROGRAM

## 2021-06-16 PROCEDURE — 700102 HCHG RX REV CODE 250 W/ 637 OVERRIDE(OP): Performed by: STUDENT IN AN ORGANIZED HEALTH CARE EDUCATION/TRAINING PROGRAM

## 2021-06-16 PROCEDURE — 770001 HCHG ROOM/CARE - MED/SURG/GYN PRIV*

## 2021-06-16 PROCEDURE — A9270 NON-COVERED ITEM OR SERVICE: HCPCS | Performed by: INTERNAL MEDICINE

## 2021-06-16 PROCEDURE — 700111 HCHG RX REV CODE 636 W/ 250 OVERRIDE (IP): Performed by: STUDENT IN AN ORGANIZED HEALTH CARE EDUCATION/TRAINING PROGRAM

## 2021-06-16 PROCEDURE — 700102 HCHG RX REV CODE 250 W/ 637 OVERRIDE(OP): Performed by: INTERNAL MEDICINE

## 2021-06-16 PROCEDURE — 97116 GAIT TRAINING THERAPY: CPT

## 2021-06-16 RX ADMIN — ASPIRIN 325 MG ORAL TABLET 325 MG: 325 PILL ORAL at 06:15

## 2021-06-16 RX ADMIN — ENOXAPARIN SODIUM 40 MG: 40 INJECTION SUBCUTANEOUS at 06:15

## 2021-06-16 RX ADMIN — LOSARTAN POTASSIUM 25 MG: 25 TABLET, FILM COATED ORAL at 06:15

## 2021-06-16 RX ADMIN — Medication 100 MG: at 06:15

## 2021-06-16 RX ADMIN — THERA TABS 1 TABLET: TAB at 06:15

## 2021-06-16 RX ADMIN — DOCUSATE SODIUM 50 MG AND SENNOSIDES 8.6 MG 2 TABLET: 8.6; 5 TABLET, FILM COATED ORAL at 06:15

## 2021-06-16 ASSESSMENT — GAIT ASSESSMENTS
GAIT LEVEL OF ASSIST: SUPERVISED
DISTANCE (FEET): 500
DEVIATION: DECREASED BASE OF SUPPORT

## 2021-06-16 ASSESSMENT — PAIN DESCRIPTION - PAIN TYPE: TYPE: ACUTE PAIN;CHRONIC PAIN

## 2021-06-16 ASSESSMENT — COGNITIVE AND FUNCTIONAL STATUS - GENERAL
MOVING FROM LYING ON BACK TO SITTING ON SIDE OF FLAT BED: A LITTLE
SUGGESTED CMS G CODE MODIFIER MOBILITY: CK
CLIMB 3 TO 5 STEPS WITH RAILING: A LITTLE
MOVING TO AND FROM BED TO CHAIR: A LITTLE
TURNING FROM BACK TO SIDE WHILE IN FLAT BAD: A LITTLE
WALKING IN HOSPITAL ROOM: A LITTLE
STANDING UP FROM CHAIR USING ARMS: A LITTLE
MOBILITY SCORE: 18

## 2021-06-16 ASSESSMENT — FIBROSIS 4 INDEX: FIB4 SCORE: 1.721378477236588914

## 2021-06-16 NOTE — CARE PLAN
The patient is Stable - Low risk of patient condition declining or worsening    Shift Goals  Clinical Goals: keeping patient calm   Patient Goals: physical activity, cheese burger  Family Goals: N/A    Progress made toward(s) clinical / shift goals:    Problem: Fluid Volume  Goal: Fluid volume balance will be maintained  Outcome: Progressing     Problem: Skin Integrity  Goal: Skin integrity is maintained or improved  Outcome: Progressing     Problem: Fall Risk  Goal: Patient will remain free from falls  Outcome: Progressing       Patient is not progressing towards the following goals:

## 2021-06-16 NOTE — THERAPY
Physical Therapy   Daily Treatment     Patient Name: Lane Beard  Age:  66 y.o., Sex:  male  Medical Record #: [id number]  Today's Date: 6/16/2021     Precautions: Fall Risk    Assessment    Pt seen for follow up PT treatment. Pt continues to be confused but very pleasant. He seems to be back to his functional baseline ambulating with no AD and SPV due to safety awareness deficits. No further acute PT needs. Defer recommendation to OT and SLP as deficits appear to be cognitive in nature.   Not safe to dc home without 24/7    Plan    Discharge secondary to goals met.    DC Equipment Recommendations: None  Discharge Recommendations: Other - (24/7)         06/16/21 0735   Cognition    Level of Consciousness Alert   Comments continues to be confused but very pleasant   Neuro-Muscular Treatments   Neuro-Muscular Treatments Compensatory Strategies;Tactile Cuing;Verbal Cuing   Comments ambulating with no AD   Other Treatments   Other Treatments Provided focused on gait   Gait Analysis   Gait Level Of Assist Supervised   Assistive Device None   Distance (Feet) 500   # of Times Distance was Traveled 1   Deviation Decreased Base Of Support   # of Stairs Climbed 0   Weight Bearing Status no restrictions   Skilled Intervention Verbal Cuing   Comments no LOB   Bed Mobility    Supine to Sit   (EOB)   Sit to Supine   (in recliner)   Scooting Supervised   Skilled Intervention Verbal Cuing   Functional Mobility   Sit to Stand Supervised   Bed, Chair, Wheelchair Transfer Supervised   Toilet Transfers Supervised   Transfer Method Stand Step   Mobility in room and hallway   Skilled Intervention Verbal Cuing   Short Term Goals    Short Term Goal # 1 pt will be able to complete bed mobility from a flat bed with SPV in 6tx in order to return home   Goal Outcome # 1 Other (see comments)  (not observed but pt has been moving in room with SPV of sitt)   Short Term Goal # 2 pt will be able to complete functional transfers with SPV and no AD  in 6tx in order to progress with therapy   Goal Outcome # 2 Goal met   Short Term Goal # 3 pt will be able to ambulate 10ft with no AD and SPV in 6tx in order to reduce fall risk   Goal Outcome # 3 Goal met   Anticipated Discharge Equipment and Recommendations   DC Equipment Recommendations None   Discharge Recommendations Other -  (24/7)

## 2021-06-16 NOTE — PROGRESS NOTES
Bedside report received from night RN; assumed pt care. Pt assessment complete. Pt AOx1. Reviewed plan of care with pt. Pt is medical. Chart and labs reviewed. Bed in lowest position, and 2 side rails up. Pt educated on call light; call light with in reach.

## 2021-06-17 PROCEDURE — 770001 HCHG ROOM/CARE - MED/SURG/GYN PRIV*

## 2021-06-17 PROCEDURE — 97535 SELF CARE MNGMENT TRAINING: CPT

## 2021-06-17 PROCEDURE — 700102 HCHG RX REV CODE 250 W/ 637 OVERRIDE(OP): Performed by: INTERNAL MEDICINE

## 2021-06-17 PROCEDURE — 700111 HCHG RX REV CODE 636 W/ 250 OVERRIDE (IP): Performed by: STUDENT IN AN ORGANIZED HEALTH CARE EDUCATION/TRAINING PROGRAM

## 2021-06-17 PROCEDURE — 700102 HCHG RX REV CODE 250 W/ 637 OVERRIDE(OP): Performed by: STUDENT IN AN ORGANIZED HEALTH CARE EDUCATION/TRAINING PROGRAM

## 2021-06-17 PROCEDURE — A9270 NON-COVERED ITEM OR SERVICE: HCPCS | Performed by: STUDENT IN AN ORGANIZED HEALTH CARE EDUCATION/TRAINING PROGRAM

## 2021-06-17 PROCEDURE — A9270 NON-COVERED ITEM OR SERVICE: HCPCS | Performed by: INTERNAL MEDICINE

## 2021-06-17 RX ADMIN — Medication 100 MG: at 05:10

## 2021-06-17 RX ADMIN — LOSARTAN POTASSIUM 25 MG: 25 TABLET, FILM COATED ORAL at 05:11

## 2021-06-17 RX ADMIN — THERA TABS 1 TABLET: TAB at 05:10

## 2021-06-17 RX ADMIN — DOCUSATE SODIUM 50 MG AND SENNOSIDES 8.6 MG 2 TABLET: 8.6; 5 TABLET, FILM COATED ORAL at 05:10

## 2021-06-17 RX ADMIN — ASPIRIN 325 MG ORAL TABLET 325 MG: 325 PILL ORAL at 05:10

## 2021-06-17 ASSESSMENT — PAIN DESCRIPTION - PAIN TYPE
TYPE: ACUTE PAIN;CHRONIC PAIN
TYPE: ACUTE PAIN

## 2021-06-17 ASSESSMENT — COGNITIVE AND FUNCTIONAL STATUS - GENERAL
DAILY ACTIVITIY SCORE: 22
TOILETING: A LITTLE
SUGGESTED CMS G CODE MODIFIER DAILY ACTIVITY: CJ
HELP NEEDED FOR BATHING: A LITTLE

## 2021-06-17 ASSESSMENT — PATIENT HEALTH QUESTIONNAIRE - PHQ9
1. LITTLE INTEREST OR PLEASURE IN DOING THINGS: NOT AT ALL
SUM OF ALL RESPONSES TO PHQ9 QUESTIONS 1 AND 2: 0
2. FEELING DOWN, DEPRESSED, IRRITABLE, OR HOPELESS: NOT AT ALL

## 2021-06-17 ASSESSMENT — FIBROSIS 4 INDEX: FIB4 SCORE: 1.721378477236588914

## 2021-06-17 NOTE — THERAPY
"Occupational Therapy  Daily Treatment     Patient Name: Lane Beard  Age:  66 y.o., Sex:  male  Medical Record #: [id number]  Today's Date: 6/17/2021     Precautions: Fall Risk  Comments: confused    Assessment  Pt seen for follow up OT Tx. Pt continues to be confused however given underlying dementia pt will not improve. At this time pt appears to be at/near his functional baseline when completing ADLs. Anticipate pt would be best served residing in a supportive d/c environment with 24/7 spv. No further acute OT needs.    Plan  Discharge secondary to goals met.    DC Equipment Recommendations: Unable to determine at this time  Discharge Recommendations: Other - See above, recommend 24/7    Pt will not be actively followed by acute OT in this setting but can be seen for d/c needs as needed in 30 days from last day of services.     Subjective  \"My mom can teach me how to brush my teeth\"     Objective       06/17/21 1141   Precautions   Precautions Fall Risk   Vitals   O2 (LPM) 0   O2 Delivery Device None - Room Air   Pain   Pain Scales 0 to 10 Scale    Intervention Declines   Pain 0 - 10 Group   Pain Rating Scale (NPRS) 0   Comfort Goal Comfort with Movement;0   Therapist Pain Assessment During Activity;0;Nurse Notified   Non Verbal Descriptors   Non Verbal Scale  Calm   Cognition    Cognition / Consciousness X   Speech/ Communication Delayed Responses   Orientation Level Not Oriented to Address;Not Oriented to Age   Level of Consciousness Alert   Ability To Follow Commands 1 Step   Comments pt is only oreiented to self but very pleasant   Passive ROM Upper Body   Passive ROM Upper Body WDL   Active ROM Upper Body   Active ROM Upper Body  WDL   Strength Upper Body   Upper Body Strength  WDL   Sensation Upper Body   Upper Extremity Sensation  WDL   Upper Body Muscle Tone   Upper Body Muscle Tone  WDL   Neuro-Muscular Treatments   Neuro-Muscular Treatments Compensatory Strategies;Verbal Cuing;Postural Facilitation "   Balance   Sitting Balance (Static) Fair +   Sitting Balance (Dynamic) Fair   Standing Balance (Static) Fair-   Standing Balance (Dynamic) Fair-   Weight Shift Sitting Good   Weight Shift Standing Good   Skilled Intervention Postural Facilitation;Verbal Cuing   Comments no AD unable to use fww safely    Bed Mobility    Supine to Sit   (pt was EOB at start of session w/sitter present)   Sit to Supine   (Pt left at EOB after session with sitter)   Scooting Supervised   Rolling   (pt was sitting EOB before session)   Skilled Intervention Verbal Cuing   Activities of Daily Living   Eating Supervision   Grooming Supervision;Standing   Bathing    Upper Body Dressing Supervision   Lower Body Dressing Supervision   Toileting Supervision   Skilled Intervention Verbal Cuing;Postural Facilitation   How much help from another person does the patient currently need...   6 Clicks Daily Activity Score 22   Functional Mobility   Sit to Stand Supervised   Bed, Chair, Wheelchair Transfer Supervised   Toilet Transfers Supervised   Skilled Intervention Postural Facilitation;Verbal Cuing   Activity Tolerance   Sitting Edge of Bed 8   Standing 9   Patient / Family Goals   Patient / Family Goal #1 pt unable to state   Short Term Goals   Short Term Goal # 1 Pt will be supervised with ADL transfers   Goal Outcome # 1 Progressing as expected   Short Term Goal # 2 B  pt will demonstrate good safety awareness during ADLs   Goal Outcome # 2 B Goal met   Short Term Goal # 3 Pt will sequence grooming tasks standing with less than 2 V/Q's   Goal Outcome # 3 Goal met   Education Group   Education Provided Role of Occupational Therapist   Role of Occupational Therapist Patient Response Patient;Acceptance;Explanation;Verbal Demonstration;Action Demonstration   Anticipated Discharge Equipment and Recommendations   DC Equipment Recommendations Unable to determine at this time   Discharge Recommendations Other -  (see note)   Interdisciplinary Plan of  Care Collaboration   IDT Collaboration with  Nursing   Patient Position at End of Therapy Edge of Bed  (sitter present in room)   Collaboration Comments nsg notified   Session Information   Date / Session Number  6/17 #3 Goals met   Priority 0

## 2021-06-17 NOTE — PROGRESS NOTES
Bedside report received from night RN; assumed pt care. Pt assessment complete. Pt AOx1. Reviewed plan of care with pt. Pt is medical. Chart and labs reviewed. Bed in lowest position and two side rails up. Pt educated on call light: call light within reach.

## 2021-06-17 NOTE — PROGRESS NOTES
Talked with patients friend Edouard. Edouard informed this RN that he will be coming into town tomorrow 6/17 and will stop by.

## 2021-06-17 NOTE — CARE PLAN
The patient is Stable - Low risk of patient condition declining or worsening    Shift Goals  Clinical Goals: keeping patient calm and active  Patient Goals: physical activity  Family Goals: na    Progress made toward(s) clinical / shift goals:    Problem: Fluid Volume  Goal: Fluid volume balance will be maintained  Outcome: Progressing     Problem: Skin Integrity  Goal: Skin integrity is maintained or improved  Outcome: Progressing       Patient is not progressing towards the following goals:      Problem: Knowledge Deficit - Standard  Goal: Patient and family/care givers will demonstrate understanding of plan of care, disease process/condition, diagnostic tests and medications  Outcome: Not Met

## 2021-06-18 PROCEDURE — A9270 NON-COVERED ITEM OR SERVICE: HCPCS | Performed by: INTERNAL MEDICINE

## 2021-06-18 PROCEDURE — 700102 HCHG RX REV CODE 250 W/ 637 OVERRIDE(OP): Performed by: INTERNAL MEDICINE

## 2021-06-18 PROCEDURE — A9270 NON-COVERED ITEM OR SERVICE: HCPCS | Performed by: STUDENT IN AN ORGANIZED HEALTH CARE EDUCATION/TRAINING PROGRAM

## 2021-06-18 PROCEDURE — 700102 HCHG RX REV CODE 250 W/ 637 OVERRIDE(OP): Performed by: STUDENT IN AN ORGANIZED HEALTH CARE EDUCATION/TRAINING PROGRAM

## 2021-06-18 PROCEDURE — 770001 HCHG ROOM/CARE - MED/SURG/GYN PRIV*

## 2021-06-18 PROCEDURE — 99231 SBSQ HOSP IP/OBS SF/LOW 25: CPT | Performed by: NURSE PRACTITIONER

## 2021-06-18 RX ADMIN — THERA TABS 1 TABLET: TAB at 04:56

## 2021-06-18 RX ADMIN — LOSARTAN POTASSIUM 25 MG: 25 TABLET, FILM COATED ORAL at 04:55

## 2021-06-18 RX ADMIN — ASPIRIN 325 MG ORAL TABLET 325 MG: 325 PILL ORAL at 04:55

## 2021-06-18 RX ADMIN — Medication 100 MG: at 04:56

## 2021-06-18 ASSESSMENT — PAIN DESCRIPTION - PAIN TYPE
TYPE: ACUTE PAIN
TYPE: ACUTE PAIN

## 2021-06-18 ASSESSMENT — FIBROSIS 4 INDEX: FIB4 SCORE: 1.721378477236588914

## 2021-06-18 NOTE — CARE PLAN
Problem: Skin Integrity  Goal: Skin integrity is maintained or improved  Outcome: Progressing     Problem: Fall Risk  Goal: Patient will remain free from falls  Outcome: Progressing   The patient is Stable - Low risk of patient condition declining or worsening    Shift Goals  Clinical Goals: maintain free of falls  Patient Goals: rest  Family Goals: n/a    Progress made toward(s) clinical / shift goals:  progressing all high fall precautions are in place.    Patient is not progressing towards the following goals:

## 2021-06-18 NOTE — CARE PLAN
The patient is Stable - Low risk of patient condition declining or worsening    Shift Goals  Clinical Goals: maintain free of falls  Patient Goals: rest  Family Goals: guardianship    Progress made toward(s) clinical / shift goals:    Problem: Skin Integrity  Goal: Skin integrity is maintained or improved  Outcome: Progressing     Problem: Fall Risk  Goal: Patient will remain free from falls  Outcome: Progressing     Problem: Knowledge Deficit - Standard  Goal: Patient and family/care givers will demonstrate understanding of plan of care, disease process/condition, diagnostic tests and medications  Outcome: Progressing       Patient is not progressing towards the following goals: n/a

## 2021-06-18 NOTE — DISCHARGE PLANNING
Care Transition Team Assessment    Met with pt's friends Bret Anne and Edouard at bedside. Friends visiting from [place], [place]. State they were concerned after not hearing from pt for a few weeks and they had filed a missing persons report. They state that last they spoke with pt was about two weeks ago and pt was making incoherent statements. They state they grew concerned when pt stopped playing his video game. Friends share that they've known pt for over 40 years. Pt is a  and per friends pt doesn't have any family. Pt was never  and never had children. They state pt probably has some family in the [facility] but has had no contact with them for several years. They state pt has always been a very isolated and reserved person. Doesn't leave the house much and has had minimal interaction with others ever since he moved to [place] five years ago. Friends state pt didn't have any problems with ETOH or any drug use that they know of.   Friends wanting an update from MD- whether pt will eventually improve or if this is his new baseline. Notified Bret Nunn and Omid that since there is no NOK to make decisions for pt or to assist with discharge planning, guardianship process has been started. Friends state that they would like to look into possibly submitting for guardianship over pt rather than having the state take over guardianship.     Guardianship information packet left in pt's chart for friends to review tomorrow when they visit again.     Information Source  Orientation Level: Oriented to person  Information Given By: Friend      Elopement Risk  Legal Hold: No  Ambulatory or Self Mobile in Wheelchair: Yes  Disoriented: Place-At Risk for Elopement, Time-At Risk for Elopement, Situation-At Risk for Elopement  Psychiatric Symptoms: Hallucinations-at Risk for Elopement, Delusions-at Risk for Elopement  Elopement this Admit: No  Vocalizing Wanting to Leave: No  Displays Behaviors, Body Language Wanting  to Leave: No-Not at Risk for Elopement  Elopement Risk: At Risk for Elopement  Wanderguard On: No (See Comments)  Personal Belongings: Hospital Clothing Only  Environmental Precautions: Sharp or Dangerous Items Removed  Picture of Patient on Inside Chart Front Cover: No (See Comments)  Purple Armband on Patient: No (See Comments)    Interdisciplinary Discharge Planning  Lives with - Patient's Self Care Capacity: Unable To Determine At This Time  Patient or legal guardian wants to designate a caregiver: No  Housing / Facility: Unable To Determine At This Time  Prior Services: Unable To Determine At This Time    Discharge Preparedness  What is your plan after discharge?: Uncertain - pending medical team collaboration  Prior Functional Level: Independent with Activities of Daily Living, Independent with Medication Management  Difficulity with ADLs: None  Difficulity with IADLs: None    Functional Assesment  Prior Functional Level: Independent with Activities of Daily Living, Independent with Medication Management    Finances  Financial Barriers to Discharge: No  Prescription Coverage: Yes    Vision / Hearing Impairment  Right Eye Vision: Impaired, Wears Glasses  Left Eye Vision: Impaired, Wears Glasses    Domestic Abuse  Have you ever been the victim of abuse or violence?: No  Physical Abuse or Sexual Abuse: No  Verbal Abuse or Emotional Abuse: No  Possible Abuse/Neglect Reported to:: Not Applicable    Psychological Assessment  History of Substance Abuse: None  History of Psychiatric Problems: No  Non-compliant with Treatment: No  Newly Diagnosed Illness: Yes    Discharge Risks or Barriers  Discharge risks or barriers?: Mental health, Post-acute placement / services, Lives alone, no community support  Patient risk factors: Cognitive / sensory / physical deficit, Lack of outside supports, Lives alone and no community support, Mental health    Anticipated Discharge Information  Discharge Disposition: Still a Patient  (30)

## 2021-06-18 NOTE — PROGRESS NOTES
Bedside report received from night RN; assumed pt care. Pt assessment complete. Pt AOx1-2. Reviewed plan of care with pt. Pt is medical. Chart and labs reviewed. Bed in lowest position, and 2 side rails up. Pt educated on call light; call light with in reach.

## 2021-06-18 NOTE — PROGRESS NOTES
Bedside report received and patient care assumed. Pt is resting in bed, A&Ox1, with no complaints of pain, and is on RA. Medical. All fall precautions are in place, belongings at bedside table, pt has sitter in room.  Pt was updated on POC, no questions or concerns. Pt educated on use of call light for assistance. Will continue to monitor.

## 2021-06-18 NOTE — PROGRESS NOTES
[facility] Medicine Bi-Weekly Progress Note    Date of Service  6/18/2021    Chief Complaint  66 y.o. male admitted 6/4/2021 with altered mental status    Hospital Course  Mr. Beard is a 66-year-old male with no known PMHx who presented to the hospital on 6/4/2021 after he was found down on the sidewalk after multiple witnessed falls.  Patient was confused at the time of admission.  He had been seen earlier in the day for syncope while crossing the street however at that time left AGAINST MEDICAL ADVICE.  On admission he was tachycardic with a heart rate of 114, anion gap of 23 and a lactic acid of 2.6.  He had a CT which showed no acute intracranial abnormalities.  He also underwent his chest x-ray which showed no acute cardiopulmonary process patient was admitted for further evaluation of altered mental status and syncope. Patient throughout this stay has been confused an unable to show any signs of capacity.  He underwent MRI brain with and without contrast on Gavi 10, 2021 and it did not show any acute abnormalities.Guardianship being pursued and patient will need placement.       Interval Problem Update    6/18- Patient sitting in chair in no acute distress. States he feels fine and has no needs. Patient unable to tell year stating he believed it was 2023, but was not sure. Then started stating that the sitter in his room was going to have a baby in his room. Very disorganized thought process. Guardianship to be pursued. Friends were at bedside later in the day and were requesting to possibly submit court documents to become his guardian.       Consultants/Specialty  Neurology    Code Status  Full Code    Disposition  Pt remains altered and lacks capacity, suspect pt will need long term placement     Review of Systems  Review of Systems   Unable to perform ROS: Mental acuity       Physical Exam  Temp:  [35.9 °C (96.6 °F)-37.1 °C (98.7 °F)] 35.9 °C (96.6 °F)  Pulse:  [69-99] 69  Resp:  [16-18] 18  BP:  (114-145)/(75-82) 128/79  SpO2:  [96 %-99 %] 97 %    Physical Exam  Vitals and nursing note reviewed.   Constitutional:       General: He is awake. He is not in acute distress.     Appearance: Normal appearance. He is overweight.   HENT:      Head: Normocephalic.      Nose: Nose normal.      Mouth/Throat:      Lips: Pink.      Mouth: Mucous membranes are moist.   Eyes:      General: Lids are normal.   Cardiovascular:      Rate and Rhythm: Normal rate.      Heart sounds: Normal heart sounds.   Pulmonary:      Effort: Pulmonary effort is normal. No respiratory distress.      Breath sounds: Normal breath sounds. No decreased breath sounds.   Abdominal:      General: Bowel sounds are normal.      Palpations: Abdomen is soft.      Tenderness: There is no abdominal tenderness.   Musculoskeletal:         General: No swelling or tenderness.      Cervical back: Neck supple.   Skin:     General: Skin is warm and dry.   Neurological:      Mental Status: He is alert. He is disoriented.      Comments: Unclear on date and situation, tangential    Psychiatric:         Mood and Affect: Affect is flat.         Behavior: Behavior is cooperative.         Cognition and Memory: Cognition is impaired. Memory is impaired. He exhibits impaired recent memory and impaired remote memory.         Judgment: Judgment is impulsive.           Current Facility-Administered Medications:   •  thiamine (Vitamin B-1) tablet 100 mg, 100 mg, Oral, DAILY, Ilya Phan M.D., 100 mg at 06/18/21 0456  •  multivitamin (THERAGRAN) tablet 1 tablet, 1 tablet, Oral, DAILY, Ilya Phan M.D., 1 tablet at 06/18/21 0456  •  losartan (COZAAR) tablet 25 mg, 25 mg, Oral, Q DAY, Adia Lara M.D., 25 mg at 06/18/21 0455  •  LORazepam (ATIVAN) injection 1 mg, 1 mg, Intravenous, Once PRN, Adia Lara M.D.  •  aspirin (ASA) tablet 325 mg, 325 mg, Oral, DAILY, 325 mg at 06/18/21 0455 **OR** aspirin (ASA) chewable tab 324 mg, 324 mg, Oral, DAILY, 324 mg  at 06/14/21 0528 **OR** aspirin (ASA) suppository 300 mg, 300 mg, Rectal, DAILY, Nabeel Pitts M.D.  •  regadenoson (LEXISCAN) injection SOLN 0.4 mg, 0.4 mg, Intravenous, Once PRN, Nabeel Pitts M.D.  •  aminophylline injection 100 mg, 100 mg, Intravenous, Q5 MIN PRN, Nabeel Pitts M.D.  •  acetaminophen (Tylenol) tablet 650 mg, 650 mg, Oral, Q6HRS PRN, Jojo Castillo M.D.  •  enalaprilat (VASOTEC) injection 1.25 mg, 1.25 mg, Intravenous, Q6HRS PRN, Jojo Castillo M.D.  •  labetalol (NORMODYNE/TRANDATE) injection 10 mg, 10 mg, Intravenous, Q4HRS PRN, Jojo Castillo M.D.  •  ondansetron (ZOFRAN ODT) dispertab 4 mg, 4 mg, Oral, Q4HRS PRN, Jojo Castillo M.D.  •  ondansetron (ZOFRAN) syringe/vial injection 4 mg, 4 mg, Intravenous, Q4HRS PRN, Jojo Castillo M.D.  •  Respiratory Therapy Consult, , Nebulization, Continuous RT, Jojo Castillo M.D.  •  senna-docusate (PERICOLACE or SENOKOT S) 8.6-50 MG per tablet 2 tablet, 2 tablet, Oral, BID, 2 tablet at 06/17/21 0510 **AND** polyethylene glycol/lytes (MIRALAX) PACKET 1 Packet, 1 Packet, Oral, QDAY PRN **AND** magnesium hydroxide (MILK OF MAGNESIA) suspension 30 mL, 30 mL, Oral, QDAY PRN **AND** bisacodyl (DULCOLAX) suppository 10 mg, 10 mg, Rectal, QDAY PRN, Jojo Castillo M.D.  •  enoxaparin (LOVENOX) inj 40 mg, 40 mg, Subcutaneous, DAILY, Jojo Castillo M.D., 40 mg at 06/16/21 0615  •  haloperidol lactate (HALDOL) injection 2-5 mg, 2-5 mg, Intravenous, Q4HRS PRN, Jojo Castillo M.D., 5 mg at 06/12/21 0259      Fluids    Intake/Output Summary (Last 24 hours) at 6/18/2021 1046  Last data filed at 6/17/2021 1909  Gross per 24 hour   Intake 240 ml   Output --   Net 240 ml       Laboratory                        Imaging  MR-BRAIN-WITH & W/O   Final Result         No acute intracranial process.      Nonspecific T2 hyperintensities in the deep white matter related to chronic microvascular ischemia.      [id number] VIEW   Final Result       1.  No metallic foreign bodies detected.   2.  Right nephrolithiasis.      EC-ECHOCARDIOGRAM LTD W/ CONT   Final Result      CT-HEAD W/O   Final Result      1.  Cerebral atrophy and chronic microvascular ischemic type changes.   2.  No acute intracranial abnormality.      DX-CHEST-PORTABLE (1 VIEW)   Final Result      No acute cardiopulmonary abnormality.           Assessment/Plan  * Syncope- (present on admission)  Assessment & Plan  -Apparent episodes of syncope  -CT head showing no acute intracranial abnormalities  -Echo ordered to assess, grossly unremarkable  -Will check orthostatic vitals   -Nothing significant on telemetry found   -Fall precautions in place  -MRI brain with and without contrast showed no acute findings   -PTOT    AMS (altered mental status)- (present on admission)  Assessment & Plan  -Patient continues to be encephalopathic, unclear etiology at this time, suspect he has a component of delirium as well as he mentation does seem to wax and wane, suspect possible underlying dementia/alzheimers given his lack of overall improvement and impairment in memory   -CT on admission neg for acute process, no focal symptoms to suggest large CVA as etiology   -TSH, HIV, RPR, B12 all normal   -He underwent MRI brain with and without contrast that did not show any acute abnormalities.  -Started on thiamine  -Neurology followed patient  -Psych consulted for possible psychogenic vs delirium     Dehydration- (present on admission)  Assessment & Plan  -Pt removed IV, Encourage oral intake, pt has poor appetite       Tachycardia- (present on admission)  Assessment & Plan  -Now improved  -Continue to monitor.    Elevated lactic acid level- (present on admission)  Assessment & Plan  -Lactic acid 2.6 on admission, suspect this is due to poor oral intake   -WBC WNL  -Cx Neg      VTE prophylaxis: Heparin

## 2021-06-18 NOTE — DISCHARGE PLANNING
Anticipated Discharge Disposition: tbd [facility] vs SNF     Action: Met with pt's friends at bedside who are interested in pursuing guardianship for pt vs the pt being a dwyer of the state under a public guardian.     Provided pt's friends with application for guardianship.     They talked about pt being a , they have his original DD-214. Provided them with information for [facility].     They had information on pt's income. In 2019 he reportedly made about 20,000 resulting in about 1,700 a year.     Pt's friends names and numbers are in chart and live in [place], [place].     Barriers to Discharge: guardianship, medical clearance, discharge plan     Plan: LSW to assist as needed and monitor for barriers to discharge.

## 2021-06-19 PROCEDURE — 700102 HCHG RX REV CODE 250 W/ 637 OVERRIDE(OP): Performed by: STUDENT IN AN ORGANIZED HEALTH CARE EDUCATION/TRAINING PROGRAM

## 2021-06-19 PROCEDURE — 770001 HCHG ROOM/CARE - MED/SURG/GYN PRIV*

## 2021-06-19 PROCEDURE — A9270 NON-COVERED ITEM OR SERVICE: HCPCS | Performed by: STUDENT IN AN ORGANIZED HEALTH CARE EDUCATION/TRAINING PROGRAM

## 2021-06-19 PROCEDURE — A9270 NON-COVERED ITEM OR SERVICE: HCPCS | Performed by: INTERNAL MEDICINE

## 2021-06-19 PROCEDURE — 700102 HCHG RX REV CODE 250 W/ 637 OVERRIDE(OP): Performed by: INTERNAL MEDICINE

## 2021-06-19 RX ADMIN — THERA TABS 1 TABLET: TAB at 04:47

## 2021-06-19 RX ADMIN — Medication 100 MG: at 04:47

## 2021-06-19 RX ADMIN — ASPIRIN 325 MG ORAL TABLET 325 MG: 325 PILL ORAL at 04:47

## 2021-06-19 RX ADMIN — LOSARTAN POTASSIUM 25 MG: 25 TABLET, FILM COATED ORAL at 04:47

## 2021-06-19 ASSESSMENT — FIBROSIS 4 INDEX
FIB4 SCORE: 1.721378477236588914
FIB4 SCORE: 1.721378477236588914

## 2021-06-19 ASSESSMENT — PAIN DESCRIPTION - PAIN TYPE
TYPE: ACUTE PAIN

## 2021-06-19 NOTE — CARE PLAN
Problem: Skin Integrity  Goal: Skin integrity is maintained or improved  Outcome: Progressing     Problem: Fall Risk  Goal: Patient will remain free from falls  Outcome: Progressing     Problem: Knowledge Deficit - Standard  Goal: Patient and family/care givers will demonstrate understanding of plan of care, disease process/condition, diagnostic tests and medications  Outcome: Progressing   The patient is Stable - Low risk of patient condition declining or worsening    Shift Goals  Clinical Goals: free from falls and injuries  Patient Goals: rest  Family Goals: guardianship    Progress made toward(s) clinical / shift goals:  Progressing all high risk fall interventions are in place, sitter at bed side.    Patient is not progressing towards the following goals:

## 2021-06-19 NOTE — PROGRESS NOTES
Bedside report received and patient care assumed. Pt is resting in bed, A&Ox1-2, with no complaints of pain, and is on RA. Pt is medical. Has sitter in room, All fall precautions are in place, belongings at bedside table.  Pt was updated on POC, no questions or concerns. Pt educated on use of call light for assistance. Will continue to monitor.

## 2021-06-19 NOTE — CARE PLAN
The patient is Stable - Low risk of patient condition declining or worsening    Shift Goals  Clinical Goals: free from falls and injuries  Patient Goals: rest  Family Goals: guardianship    Progress made toward(s) clinical / shift goals:    Problem: Urinary - Renal Perfusion  Goal: Ability to achieve and maintain adequate renal perfusion and functioning will improve  Outcome: Progressing     Problem: Skin Integrity  Goal: Skin integrity is maintained or improved  Outcome: Progressing     Problem: Fall Risk  Goal: Patient will remain free from falls  Outcome: Progressing     Patient is not progressing towards the following goals:

## 2021-06-19 NOTE — PROGRESS NOTES
Report received from night shift RN, assumed care of pt. Pt A&Ox3, disoriented to situation. Plan of care discussed with pt, labs and chart reviewed. All needs met at this time. Pt is medical, on RA. Call light within reach, bed locked and in lowest position. All fall precautions and hourly rounding in place, bed alarm on. Sitter at bedside for safety. Will continue to monitor.

## 2021-06-20 PROCEDURE — A9270 NON-COVERED ITEM OR SERVICE: HCPCS | Performed by: STUDENT IN AN ORGANIZED HEALTH CARE EDUCATION/TRAINING PROGRAM

## 2021-06-20 PROCEDURE — 700102 HCHG RX REV CODE 250 W/ 637 OVERRIDE(OP): Performed by: STUDENT IN AN ORGANIZED HEALTH CARE EDUCATION/TRAINING PROGRAM

## 2021-06-20 PROCEDURE — A9270 NON-COVERED ITEM OR SERVICE: HCPCS | Performed by: INTERNAL MEDICINE

## 2021-06-20 PROCEDURE — 700102 HCHG RX REV CODE 250 W/ 637 OVERRIDE(OP): Performed by: INTERNAL MEDICINE

## 2021-06-20 PROCEDURE — 770001 HCHG ROOM/CARE - MED/SURG/GYN PRIV*

## 2021-06-20 PROCEDURE — 700111 HCHG RX REV CODE 636 W/ 250 OVERRIDE (IP): Performed by: STUDENT IN AN ORGANIZED HEALTH CARE EDUCATION/TRAINING PROGRAM

## 2021-06-20 RX ADMIN — Medication 100 MG: at 06:44

## 2021-06-20 RX ADMIN — DOCUSATE SODIUM 50 MG AND SENNOSIDES 8.6 MG 2 TABLET: 8.6; 5 TABLET, FILM COATED ORAL at 06:40

## 2021-06-20 RX ADMIN — ASPIRIN 325 MG ORAL TABLET 325 MG: 325 PILL ORAL at 06:39

## 2021-06-20 RX ADMIN — ENOXAPARIN SODIUM 40 MG: 40 INJECTION SUBCUTANEOUS at 06:39

## 2021-06-20 RX ADMIN — LOSARTAN POTASSIUM 25 MG: 25 TABLET, FILM COATED ORAL at 06:39

## 2021-06-20 RX ADMIN — THERA TABS 1 TABLET: TAB at 06:40

## 2021-06-20 ASSESSMENT — PATIENT HEALTH QUESTIONNAIRE - PHQ9
SUM OF ALL RESPONSES TO PHQ9 QUESTIONS 1 AND 2: 0
2. FEELING DOWN, DEPRESSED, IRRITABLE, OR HOPELESS: NOT AT ALL
1. LITTLE INTEREST OR PLEASURE IN DOING THINGS: NOT AT ALL

## 2021-06-20 ASSESSMENT — PAIN DESCRIPTION - PAIN TYPE
TYPE: ACUTE PAIN
TYPE: ACUTE PAIN

## 2021-06-20 ASSESSMENT — FIBROSIS 4 INDEX
FIB4 SCORE: 1.721378477236588914
FIB4 SCORE: 1.721378477236588914

## 2021-06-20 NOTE — CARE PLAN
Problem: Urinary Elimination  Goal: Establish and maintain regular urinary output  Outcome: Not Progressing     Problem: Fall Risk  Goal: Patient will remain free from falls  6/20/2021 0006 by Saige De La Cruz, R.N.  Outcome: Progressing  6/20/2021 0006 by Saige De La Cruz, RYoanN.  Outcome: Progressing   The patient is Stable - Low risk of patient condition declining or worsening    Shift Goals  Clinical Goals: free from falls and injuries  Patient Goals: rest  Family Goals: guardianship    Progress made toward(s) clinical / shift goals:  Pt has not fallen since previous fall on 6/14, sitter at bedside    Patient is not progressing towards the following goals: Pt had a urinary incontinence episode today

## 2021-06-20 NOTE — CARE PLAN
The patient is Stable - Low risk of patient condition declining or worsening    Shift Goals  Clinical Goals: free from falls and injuries  Patient Goals: rest  Family Goals: guardianship    Progress made toward(s) clinical / shift goals:    Problem: Fall Risk  Goal: Patient will remain free from falls  Outcome: Progressing   All fall precautions in place. Sitter at bedside for impulsivity.   Problem: Urinary Elimination  Goal: Establish and maintain regular urinary output  Outcome: Progressing  Pt voids to toilet appropriately.    Patient is not progressing towards the following goals: Pt does not use call light for assistance. Relies on sitter to communicate needs. Pt educated on use of call light, requires reinforcement.

## 2021-06-20 NOTE — PROGRESS NOTES
Report received from night shift RN, assumed care of pt. Pt is A&Ox1. Plan of care discussed with pt, labs and chart reviewed. All needs met at this time. Pt is medical, no tele. On RA. Call light within reach, bed locked and in lowest position. All fall precautions and hourly rounding in place. Sitter at bedside for safety due to high fall risk and impulsivity. Will continue to monitor.

## 2021-06-20 NOTE — PROGRESS NOTES
Assumed care of PT A&O 3, disoriented to time, patient seems slightly confused at baseline. Pt resting in bed with no signs of labored breathing. On RA.  Call light within reach, bed in lowest position, three bed rails up, sitter at bedside. Pt was updated on plan of care for the day . Will continue to monitor.

## 2021-06-21 PROCEDURE — A9270 NON-COVERED ITEM OR SERVICE: HCPCS | Performed by: STUDENT IN AN ORGANIZED HEALTH CARE EDUCATION/TRAINING PROGRAM

## 2021-06-21 PROCEDURE — 700102 HCHG RX REV CODE 250 W/ 637 OVERRIDE(OP): Performed by: STUDENT IN AN ORGANIZED HEALTH CARE EDUCATION/TRAINING PROGRAM

## 2021-06-21 PROCEDURE — 770001 HCHG ROOM/CARE - MED/SURG/GYN PRIV*

## 2021-06-21 PROCEDURE — 700102 HCHG RX REV CODE 250 W/ 637 OVERRIDE(OP): Performed by: INTERNAL MEDICINE

## 2021-06-21 PROCEDURE — 99231 SBSQ HOSP IP/OBS SF/LOW 25: CPT | Performed by: NURSE PRACTITIONER

## 2021-06-21 PROCEDURE — 700111 HCHG RX REV CODE 636 W/ 250 OVERRIDE (IP): Performed by: STUDENT IN AN ORGANIZED HEALTH CARE EDUCATION/TRAINING PROGRAM

## 2021-06-21 PROCEDURE — A9270 NON-COVERED ITEM OR SERVICE: HCPCS | Performed by: INTERNAL MEDICINE

## 2021-06-21 RX ADMIN — DOCUSATE SODIUM 50 MG AND SENNOSIDES 8.6 MG 2 TABLET: 8.6; 5 TABLET, FILM COATED ORAL at 17:34

## 2021-06-21 RX ADMIN — Medication 100 MG: at 06:10

## 2021-06-21 RX ADMIN — ENOXAPARIN SODIUM 40 MG: 40 INJECTION SUBCUTANEOUS at 06:09

## 2021-06-21 RX ADMIN — THERA TABS 1 TABLET: TAB at 06:09

## 2021-06-21 RX ADMIN — ASPIRIN 325 MG ORAL TABLET 325 MG: 325 PILL ORAL at 06:10

## 2021-06-21 RX ADMIN — LOSARTAN POTASSIUM 25 MG: 25 TABLET, FILM COATED ORAL at 06:10

## 2021-06-21 ASSESSMENT — PATIENT HEALTH QUESTIONNAIRE - PHQ9
2. FEELING DOWN, DEPRESSED, IRRITABLE, OR HOPELESS: NOT AT ALL
SUM OF ALL RESPONSES TO PHQ9 QUESTIONS 1 AND 2: 0
1. LITTLE INTEREST OR PLEASURE IN DOING THINGS: NOT AT ALL

## 2021-06-21 ASSESSMENT — PAIN DESCRIPTION - PAIN TYPE
TYPE: ACUTE PAIN
TYPE: ACUTE PAIN

## 2021-06-21 ASSESSMENT — ENCOUNTER SYMPTOMS
DIZZINESS: 0
CHILLS: 0
SHORTNESS OF BREATH: 0
NAUSEA: 0
EYE PAIN: 0
MYALGIAS: 0
ABDOMINAL PAIN: 0
VOMITING: 0
HEADACHES: 0
COUGH: 0
FEVER: 0
DIARRHEA: 0
MEMORY LOSS: 1
SORE THROAT: 0

## 2021-06-21 ASSESSMENT — FIBROSIS 4 INDEX: FIB4 SCORE: 1.721378477236588914

## 2021-06-21 NOTE — CARE PLAN
The patient is Watcher - Medium risk of patient condition declining or worsening    Shift Goals  Clinical Goals: Safety, remain free from falls, increased mentation, reorient   Patient Goals: Rest  Family Goals: NA    Progress made toward(s) clinical / shift goals:    Problem: Fluid Volume  Goal: Fluid volume balance will be maintained  Outcome: Progressing     Problem: Respiratory  Goal: Patient will achieve/maintain optimum respiratory ventilation and gas exchange  Outcome: Progressing     Problem: Skin Integrity  Goal: Skin integrity is maintained or improved  Outcome: Progressing     Problem: Fall Risk  Goal: Patient will remain free from falls  Outcome: Progressing       Patient is not progressing towards the following goals: 1:1 supervision required, pt is progressing toward downgrade to telesitter. Fall risk decreasing and pt actively progressing toward all goals.

## 2021-06-21 NOTE — PROGRESS NOTES
[facility] Medicine Bi-Weekly Progress Note    Date of Service  6/21/2021    Chief Complaint  66 y.o. male admitted 6/4/2021 with altered mental status    Hospital Course  Mr. Beard is a 66-year-old male with no known PMHx who presented to the hospital on 6/4/2021 after he was found down on the sidewalk after multiple witnessed falls.  Patient was confused at the time of admission.  He had been seen earlier in the day for syncope while crossing the street however at that time left AGAINST MEDICAL ADVICE.  On admission he was tachycardic with a heart rate of 114, anion gap of 23 and a lactic acid of 2.6.  He had a CT which showed no acute intracranial abnormalities.  He also underwent his chest x-ray which showed no acute cardiopulmonary process patient was admitted for further evaluation of altered mental status and syncope. Patient throughout this stay has been confused an unable to show any signs of capacity.  He underwent MRI brain with and without contrast on Gavi 10, 2021 and it did not show any acute abnormalities.Guardianship being pursued and patient will need placement.       Interval Problem Update  6/18- Patient sitting in chair in no acute distress. States he feels fine and has no needs. Patient unable to tell year stating he believed it was 2023, but was not sure. Then started stating that the sitter in his room was going to have a baby in his room. Very disorganized thought process. Guardianship to be pursued. Friends were at bedside later in the day and were requesting to possibly submit court documents to become his guardian.     6/21- Patient sitting in bed, alert and oriented to place and year but not situation. States he can not remember specifics. Explained to patient that he has been having some memory and cognitive issues, and that his friends have stated that they will try to pursue guardianship. Patient at this time is more focused on what he is eating and requesting a hamburger or steak.        Consultants/Specialty  Neurology    Code Status  Full Code    Disposition  Pt remains with cognitive impairments and lacks capacity, suspect pt will need long term placement     Review of Systems  Review of Systems   Constitutional: Negative for chills, fever and malaise/fatigue.   HENT: Negative for congestion and sore throat.    Eyes: Negative for pain.   Respiratory: Negative for cough and shortness of breath.    Cardiovascular: Negative for chest pain and leg swelling.   Gastrointestinal: Negative for abdominal pain, diarrhea, nausea and vomiting.   Genitourinary: Negative for dysuria, frequency and urgency.   Musculoskeletal: Negative for myalgias.   Skin: Negative for rash.   Neurological: Negative for dizziness and headaches.   Psychiatric/Behavioral: Positive for memory loss.       Physical Exam  Temp:  [36.3 °C (97.3 °F)-36.7 °C (98.1 °F)] 36.3 °C (97.3 °F)  Pulse:  [90-99] 90  Resp:  [16] 16  BP: (111-134)/(70-86) 111/70  SpO2:  [92 %-99 %] 99 %    Physical Exam  Vitals and nursing note reviewed.   Constitutional:       General: He is awake. He is not in acute distress.     Appearance: Normal appearance. He is overweight.   HENT:      Head: Normocephalic.      Nose: Nose normal.      Mouth/Throat:      Lips: Pink.      Mouth: Mucous membranes are moist.   Eyes:      General: Lids are normal.   Cardiovascular:      Rate and Rhythm: Normal rate.      Heart sounds: Normal heart sounds.   Pulmonary:      Effort: Pulmonary effort is normal. No respiratory distress.      Breath sounds: Normal breath sounds. No decreased breath sounds.   Abdominal:      General: Bowel sounds are normal.      Palpations: Abdomen is soft.      Tenderness: There is no abdominal tenderness.   Musculoskeletal:         General: No swelling or tenderness.      Cervical back: Neck supple.   Skin:     General: Skin is warm and dry.   Neurological:      Mental Status: He is alert. He is disoriented.      Comments: Unclear on  situation, tangential    Psychiatric:         Mood and Affect: Affect is flat.         Behavior: Behavior is cooperative.         Cognition and Memory: Cognition is impaired. Memory is impaired. He exhibits impaired recent memory and impaired remote memory.         Judgment: Judgment is impulsive.     Exam completed 6/21/2021 and unchanged from prior       Current Facility-Administered Medications:   •  thiamine (Vitamin B-1) tablet 100 mg, 100 mg, Oral, DAILY, Ilya Phan M.D., 100 mg at 06/21/21 0610  •  multivitamin (THERAGRAN) tablet 1 tablet, 1 tablet, Oral, DAILY, Ilya Phan M.D., 1 tablet at 06/21/21 0609  •  losartan (COZAAR) tablet 25 mg, 25 mg, Oral, Q DAY, Adia Lara M.D., 25 mg at 06/21/21 0610  •  LORazepam (ATIVAN) injection 1 mg, 1 mg, Intravenous, Once PRN, Adia Lara M.D.  •  aspirin (ASA) tablet 325 mg, 325 mg, Oral, DAILY, 325 mg at 06/21/21 0610 **OR** aspirin (ASA) chewable tab 324 mg, 324 mg, Oral, DAILY, 324 mg at 06/14/21 0528 **OR** aspirin (ASA) suppository 300 mg, 300 mg, Rectal, DAILY, Nabeel Pitts M.D.  •  regadenoson (LEXISCAN) injection SOLN 0.4 mg, 0.4 mg, Intravenous, Once PRN, Nabeel Pitts M.D.  •  aminophylline injection 100 mg, 100 mg, Intravenous, Q5 MIN PRN, Nabeel Pitts M.D.  •  acetaminophen (Tylenol) tablet 650 mg, 650 mg, Oral, Q6HRS PRN, Jojo Castillo M.D.  •  enalaprilat (VASOTEC) injection 1.25 mg, 1.25 mg, Intravenous, Q6HRS PRN, Jojo Castillo M.D.  •  labetalol (NORMODYNE/TRANDATE) injection 10 mg, 10 mg, Intravenous, Q4HRS PRN, Jojo Castillo M.D.  •  ondansetron (ZOFRAN ODT) dispertab 4 mg, 4 mg, Oral, Q4HRS PRN, Jojo Castillo M.D.  •  ondansetron (ZOFRAN) syringe/vial injection 4 mg, 4 mg, Intravenous, Q4HRS PRN, Jojo Castillo M.D.  •  Respiratory Therapy Consult, , Nebulization, Continuous RT, Jojo Castillo M.D.  •  senna-docusate (PERICOLACE or SENOKOT S) 8.6-50 MG per tablet 2 tablet, 2 tablet, Oral,  BID, 2 tablet at 06/20/21 0640 **AND** polyethylene glycol/lytes (MIRALAX) PACKET 1 Packet, 1 Packet, Oral, QDAY PRN **AND** magnesium hydroxide (MILK OF MAGNESIA) suspension 30 mL, 30 mL, Oral, QDAY PRN **AND** bisacodyl (DULCOLAX) suppository 10 mg, 10 mg, Rectal, QDAY PRN, Jojo Castillo M.D.  •  enoxaparin (LOVENOX) inj 40 mg, 40 mg, Subcutaneous, DAILY, Jojo Castillo M.D., 40 mg at 06/21/21 0609  •  haloperidol lactate (HALDOL) injection 2-5 mg, 2-5 mg, Intravenous, Q4HRS PRN, Jojo Castillo M.D., 5 mg at 06/12/21 0259      Fluids    Intake/Output Summary (Last 24 hours) at 6/21/2021 0908  Last data filed at 6/21/2021 0300  Gross per 24 hour   Intake 780 ml   Output 200 ml   Net 580 ml       Laboratory                        Imaging  MR-BRAIN-WITH & W/O   Final Result         No acute intracranial process.      Nonspecific T2 hyperintensities in the deep white matter related to chronic microvascular ischemia.      AF-EUWNNVA-7 VIEW   Final Result      1.  No metallic foreign bodies detected.   2.  Right nephrolithiasis.      EC-ECHOCARDIOGRAM LTD W/ CONT   Final Result      CT-HEAD W/O   Final Result      1.  Cerebral atrophy and chronic microvascular ischemic type changes.   2.  No acute intracranial abnormality.      DX-CHEST-PORTABLE (1 VIEW)   Final Result      No acute cardiopulmonary abnormality.           Assessment/Plan  * Syncope- (present on admission)  Assessment & Plan  -Apparent episodes of syncope  -CT head showing no acute intracranial abnormalities  -Echo ordered to assess, grossly unremarkable  -Will check orthostatic vitals   -Nothing significant on telemetry found   -Fall precautions in place  -MRI brain with and without contrast showed no acute findings   -PTOT    AMS (altered mental status)- (present on admission)  Assessment & Plan  -Patient continues to be encephalopathic, unclear etiology at this time, suspect he has a component of delirium as well as he mentation does seem to  wax and wane, suspect possible underlying dementia/alzheimers given his lack of overall improvement and impairment in memory   -CT on admission neg for acute process, no focal symptoms to suggest large CVA as etiology   -TSH, HIV, RPR, B12 all normal   -He underwent MRI brain with and without contrast that did not show any acute abnormalities.  -Started on thiamine  -Neurology followed patient  -Psych consulted for possible psychogenic vs delirium   -Friends are attempting to pursue guardianship     Dehydration- (present on admission)  Assessment & Plan  -Pt removed IV, Encourage oral intake, pt has poor appetite       Tachycardia- (present on admission)  Assessment & Plan  -Now improved  -Continue to monitor.    Elevated lactic acid level- (present on admission)  Assessment & Plan  -Lactic acid 2.6 on admission, suspect this is due to poor oral intake   -WBC WNL  -Cx Neg      VTE prophylaxis: Heparin

## 2021-06-21 NOTE — CARE PLAN
The patient is Watcher - Medium risk of patient condition declining or worsening    Shift Goals  Clinical Goals: Safety, remain free from falls, increased mentation, reorient   Patient Goals: Rest  Family Goals: NA    Progress made toward(s) clinical / shift goals:    Problem: Fall Risk  Goal: Patient will remain free from falls  Outcome: Progressing   Pt downgraded to telesitter for safety. Bed alarm on. Pt educated on need to call when wanting to get up. Non-slip socks on.   Problem: Knowledge Deficit - Standard  Goal: Patient and family/care givers will demonstrate understanding of plan of care, disease process/condition, diagnostic tests and medications  Outcome: Progressing  Pt verbalized understanding that he fell and that is why he is in the hospital. He does not recall any of his falls. He verbalizes understanding of use of call light and need for telesitter at this time but requires reinforcement.    Patient is not progressing towards the following goals:

## 2021-06-21 NOTE — PROGRESS NOTES
Bedside report received and patient care assumed. Pt is resting in bed, A&Ox1 oriented to self only, with no complaints of pain, and is on RA. All fall precautions are in place, sitter at bedside. Belongings at bedside table. Pt was updated on POC, no questions or concerns. Pt educated on use of call light for assistance.

## 2021-06-21 NOTE — PROGRESS NOTES
Report received from night shift RN, assumed care of pt. Pt A&Ox2, oriented to person and situation. Plan of care discussed with pt, labs and chart reviewed. All needs met at this time. Pt is medical, no tele. Pt on RA. Call light within reach, bed locked and in lowest position. All fall precautions and hourly rounding in place. Telesitter at bedside for safety. Will continue to monitor.

## 2021-06-22 PROCEDURE — A9270 NON-COVERED ITEM OR SERVICE: HCPCS | Performed by: INTERNAL MEDICINE

## 2021-06-22 PROCEDURE — 700111 HCHG RX REV CODE 636 W/ 250 OVERRIDE (IP): Performed by: STUDENT IN AN ORGANIZED HEALTH CARE EDUCATION/TRAINING PROGRAM

## 2021-06-22 PROCEDURE — 770001 HCHG ROOM/CARE - MED/SURG/GYN PRIV*

## 2021-06-22 PROCEDURE — 700102 HCHG RX REV CODE 250 W/ 637 OVERRIDE(OP): Performed by: STUDENT IN AN ORGANIZED HEALTH CARE EDUCATION/TRAINING PROGRAM

## 2021-06-22 PROCEDURE — A9270 NON-COVERED ITEM OR SERVICE: HCPCS | Performed by: STUDENT IN AN ORGANIZED HEALTH CARE EDUCATION/TRAINING PROGRAM

## 2021-06-22 PROCEDURE — 700102 HCHG RX REV CODE 250 W/ 637 OVERRIDE(OP): Performed by: INTERNAL MEDICINE

## 2021-06-22 RX ADMIN — ENOXAPARIN SODIUM 40 MG: 40 INJECTION SUBCUTANEOUS at 05:14

## 2021-06-22 RX ADMIN — ASPIRIN 325 MG ORAL TABLET 325 MG: 325 PILL ORAL at 05:14

## 2021-06-22 RX ADMIN — THERA TABS 1 TABLET: TAB at 05:15

## 2021-06-22 RX ADMIN — Medication 100 MG: at 05:14

## 2021-06-22 RX ADMIN — DOCUSATE SODIUM 50 MG AND SENNOSIDES 8.6 MG 2 TABLET: 8.6; 5 TABLET, FILM COATED ORAL at 05:15

## 2021-06-22 RX ADMIN — LOSARTAN POTASSIUM 25 MG: 25 TABLET, FILM COATED ORAL at 05:14

## 2021-06-22 ASSESSMENT — PAIN DESCRIPTION - PAIN TYPE
TYPE: ACUTE PAIN;CHRONIC PAIN

## 2021-06-22 ASSESSMENT — FIBROSIS 4 INDEX: FIB4 SCORE: 1.721378477236588914

## 2021-06-22 NOTE — CARE PLAN
The patient is Stable - Low risk of patient condition declining or worsening    Shift Goals  Clinical Goals: Safety, remain free from falls, increase alertness and orientation, call for assistance PRN  Patient Goals: Rest, quite  Family Goals: NA    Progress made toward(s) clinical / shift goals:    Problem: Fluid Volume  Goal: Fluid volume balance will be maintained  Outcome: Progressing     Problem: Urinary - Renal Perfusion  Goal: Ability to achieve and maintain adequate renal perfusion and functioning will improve  Outcome: Progressing     Problem: Respiratory  Goal: Patient will achieve/maintain optimum respiratory ventilation and gas exchange  Outcome: Progressing     Problem: Skin Integrity  Goal: Skin integrity is maintained or improved  Outcome: Progressing     Problem: Fall Risk  Goal: Patient will remain free from falls  Outcome: Progressing       Pt requires 1:1 observation to help prevent falls and reduce fall risk. Pt intermittently calls appropriately. Pt progressing toward goal but progress is slower than expected. Easily redirected.

## 2021-06-22 NOTE — PROGRESS NOTES
Bedside report received and patient care assumed. Pt is resting in bed, A&Ox2, oriented to self and place, disoriented to situation and time. No complaints of pain, on RA. All fall precautions are in place, telesitting active and in room. Pt  belongings at bedside table.  Pt was updated on POC, no questions or concerns. Pt educated on use of call light for assistance.

## 2021-06-22 NOTE — CARE PLAN
Problem: Hemodynamics  Goal: Patient's hemodynamics, fluid balance and neurologic status will be stable or improve  Outcome: Progressing     Problem: Fluid Volume  Goal: Fluid volume balance will be maintained  Outcome: Progressing     Problem: Urinary - Renal Perfusion  Goal: Ability to achieve and maintain adequate renal perfusion and functioning will improve  Outcome: Progressing     Problem: Respiratory  Goal: Patient will achieve/maintain optimum respiratory ventilation and gas exchange  Outcome: Progressing     Problem: Physical Regulation  Goal: Diagnostic test results will improve  Outcome: Progressing     Problem: Skin Integrity  Goal: Skin integrity is maintained or improved  Outcome: Progressing     Assumed care of patient at 0715. Received bedside report from night shift RN. Bed is locked and lowest position, call light within reach. Treaded socks in place. Patient updated on plan of care, no complaints or pain at this time. White board updated. Pt AxOx2 Patient breathing pattern is unlabored. Pt is medical. All needs met at this time. Will continue care and monitoring as ordered.

## 2021-06-23 PROBLEM — D64.9 ANEMIA: Status: ACTIVE | Noted: 2021-06-23

## 2021-06-23 PROBLEM — F12.90 MARIJUANA USE: Status: ACTIVE | Noted: 2021-06-23

## 2021-06-23 PROBLEM — R79.89 ELEVATED LACTIC ACID LEVEL: Status: RESOLVED | Noted: 2021-06-04 | Resolved: 2021-06-23

## 2021-06-23 PROBLEM — R00.0 TACHYCARDIA: Status: RESOLVED | Noted: 2021-06-04 | Resolved: 2021-06-23

## 2021-06-23 PROCEDURE — 700102 HCHG RX REV CODE 250 W/ 637 OVERRIDE(OP): Performed by: STUDENT IN AN ORGANIZED HEALTH CARE EDUCATION/TRAINING PROGRAM

## 2021-06-23 PROCEDURE — 700111 HCHG RX REV CODE 636 W/ 250 OVERRIDE (IP): Performed by: STUDENT IN AN ORGANIZED HEALTH CARE EDUCATION/TRAINING PROGRAM

## 2021-06-23 PROCEDURE — A9270 NON-COVERED ITEM OR SERVICE: HCPCS | Performed by: STUDENT IN AN ORGANIZED HEALTH CARE EDUCATION/TRAINING PROGRAM

## 2021-06-23 PROCEDURE — 99231 SBSQ HOSP IP/OBS SF/LOW 25: CPT | Performed by: NURSE PRACTITIONER

## 2021-06-23 PROCEDURE — 700102 HCHG RX REV CODE 250 W/ 637 OVERRIDE(OP): Performed by: INTERNAL MEDICINE

## 2021-06-23 PROCEDURE — 770004 HCHG ROOM/CARE - ONCOLOGY PRIVATE *

## 2021-06-23 PROCEDURE — A9270 NON-COVERED ITEM OR SERVICE: HCPCS | Performed by: INTERNAL MEDICINE

## 2021-06-23 RX ADMIN — THERA TABS 1 TABLET: TAB at 05:18

## 2021-06-23 RX ADMIN — ENOXAPARIN SODIUM 40 MG: 40 INJECTION SUBCUTANEOUS at 05:18

## 2021-06-23 RX ADMIN — Medication 100 MG: at 05:18

## 2021-06-23 RX ADMIN — DOCUSATE SODIUM 50 MG AND SENNOSIDES 8.6 MG 2 TABLET: 8.6; 5 TABLET, FILM COATED ORAL at 05:18

## 2021-06-23 RX ADMIN — LOSARTAN POTASSIUM 25 MG: 25 TABLET, FILM COATED ORAL at 05:19

## 2021-06-23 RX ADMIN — ASPIRIN 325 MG ORAL TABLET 325 MG: 325 PILL ORAL at 05:18

## 2021-06-23 ASSESSMENT — PAIN DESCRIPTION - PAIN TYPE
TYPE: ACUTE PAIN;CHRONIC PAIN
TYPE: ACUTE PAIN
TYPE: ACUTE PAIN

## 2021-06-23 ASSESSMENT — ENCOUNTER SYMPTOMS
ABDOMINAL PAIN: 0
DIARRHEA: 0
MYALGIAS: 0
CHILLS: 0
HEADACHES: 0
FEVER: 0
COUGH: 0
NAUSEA: 0
EYE PAIN: 0
SORE THROAT: 0
VOMITING: 0
SHORTNESS OF BREATH: 0
MEMORY LOSS: 1
DIZZINESS: 0

## 2021-06-23 ASSESSMENT — FIBROSIS 4 INDEX: FIB4 SCORE: 1.721378477236588914

## 2021-06-23 NOTE — CARE PLAN
Problem: Skin Integrity  Goal: Skin integrity is maintained or improved  Outcome: Progressing     Problem: Fall Risk  Goal: Patient will remain free from falls  Outcome: Progressing  Note: Fall precautions in place: treaded slipper socks on, mobility signs posted, hourly rounding, bed in lowest position, belongings and call light are within reach, bed alarm on, and near nurses station. 1:1 safety sitter at bedside.

## 2021-06-23 NOTE — PROGRESS NOTES
12 hour chart check    Ongoing care no acute issues at this time. Sitter at bedside most of today. Patient resting in bed safely with no c/o anything at this time. Will continue care and monitoring as ordered.

## 2021-06-23 NOTE — PROGRESS NOTES
Received report and assumed care of patient. Patient was updated on the plan of care for the day. Call light within reach, bed in low position, 3 side rails up, bed alarm on, non skid socks on, 1:1 safety sitter at bedside. Fall precautions in place. Will round hourly.

## 2021-06-23 NOTE — PROGRESS NOTES
1200 Patient Transferred to [place] [facility] [address] as ordered. All belonging sent with patient . No further questions asked by Ute COLLADO on [id number], and patient to be safely brought down by bed by transport to new room.

## 2021-06-23 NOTE — PROGRESS NOTES
[facility] Medicine Bi-Weekly Progress Note    Date of Service  6/23/2021    Chief Complaint  66 y.o. male admitted 6/4/2021 with altered mental status    Hospital Course  Mr. Beard is a 66-year-old male with no known PMHx who presented to the hospital on 6/4/2021 after he was found down on the sidewalk after multiple witnessed falls.  Patient was confused at the time of admission.  He had been seen earlier in the day for syncope while crossing the street however at that time left AGAINST MEDICAL ADVICE.  On admission he was tachycardic with a heart rate of 114, anion gap of 23 and a lactic acid of 2.6.  He had a CT which showed no acute intracranial abnormalities.  He also underwent his chest x-ray which showed no acute cardiopulmonary process patient was admitted for further evaluation of altered mental status and syncope. Patient throughout this stay has been confused an unable to show any signs of capacity.  He underwent MRI brain with and without contrast on Gavi 10, 2021 and it did not show any acute abnormalities.Guardianship being pursued and patient will need placement.       Interval Problem Update  6/23: Patient seen and examined. He is alert, oriented to person, place; otherwise not oriented. He does not recall why he is here. He denies complaints. Sitter at bedside.   Apparently, friends are pursuing guardianship.  CM for placement    Consultants/Specialty  Neurology    Code Status  Full Code    Disposition  Pt remains with cognitive impairments and lacks capacity, suspect pt will need long term placement     Review of Systems  Review of Systems   Constitutional: Negative for chills, fever and malaise/fatigue.   HENT: Negative for congestion and sore throat.    Eyes: Negative for pain.   Respiratory: Negative for cough and shortness of breath.    Cardiovascular: Negative for chest pain and leg swelling.   Gastrointestinal: Negative for abdominal pain, diarrhea, nausea and vomiting.   Genitourinary:  Negative for dysuria, frequency and urgency.   Musculoskeletal: Negative for myalgias.   Skin: Negative for rash.   Neurological: Negative for dizziness and headaches.   Psychiatric/Behavioral: Positive for memory loss.       Physical Exam  Temp:  [36.1 °C (97 °F)-37.2 °C (99 °F)] 36.1 °C (97 °F)  Pulse:  [82-96] 82  Resp:  [18] 18  BP: (117-128)/(69-81) 126/70  SpO2:  [97 %-98 %] 97 %    Physical Exam  Vitals and nursing note reviewed.   Constitutional:       General: He is awake. He is not in acute distress.     Appearance: Normal appearance. He is overweight.   HENT:      Head: Normocephalic.      Nose: Nose normal.      Mouth/Throat:      Lips: Pink.      Mouth: Mucous membranes are moist.   Eyes:      General: Lids are normal.   Cardiovascular:      Rate and Rhythm: Normal rate.      Heart sounds: Normal heart sounds.   Pulmonary:      Effort: Pulmonary effort is normal. No respiratory distress.      Breath sounds: Normal breath sounds. No decreased breath sounds.   Abdominal:      General: Bowel sounds are normal.      Palpations: Abdomen is soft.      Tenderness: There is no abdominal tenderness.   Musculoskeletal:         General: No swelling or tenderness.      Cervical back: Neck supple.   Skin:     General: Skin is warm and dry.   Neurological:      Mental Status: He is alert. He is disoriented.      Comments: Unclear on situation, tangential    Psychiatric:         Mood and Affect: Affect is flat.         Behavior: Behavior is cooperative.         Cognition and Memory: Cognition is impaired. Memory is impaired. He exhibits impaired recent memory and impaired remote memory.         Judgment: Judgment is impulsive.     Exam completed 6/23/2021 and unchanged from prior       Current Facility-Administered Medications:   •  thiamine (Vitamin B-1) tablet 100 mg, 100 mg, Oral, DAILY, Ilya Phan M.D., 100 mg at 06/23/21 0518  •  multivitamin (THERAGRAN) tablet 1 tablet, 1 tablet, Oral, DAILY, Ilya SEWELL  LC Phan, 1 tablet at 06/23/21 0518  •  losartan (COZAAR) tablet 25 mg, 25 mg, Oral, Q DAY, Adia Lara M.D., 25 mg at 06/23/21 0519  •  LORazepam (ATIVAN) injection 1 mg, 1 mg, Intravenous, Once PRN, Adia Lara M.D.  •  aspirin (ASA) tablet 325 mg, 325 mg, Oral, DAILY, 325 mg at 06/23/21 0518 **OR** aspirin (ASA) chewable tab 324 mg, 324 mg, Oral, DAILY, 324 mg at 06/14/21 0528 **OR** aspirin (ASA) suppository 300 mg, 300 mg, Rectal, DAILY, Nabeel Pitts M.D.  •  regadenoson (LEXISCAN) injection SOLN 0.4 mg, 0.4 mg, Intravenous, Once PRN, Nabeel Pitts M.D.  •  aminophylline injection 100 mg, 100 mg, Intravenous, Q5 MIN PRN, Nabeel Pitts M.D.  •  acetaminophen (Tylenol) tablet 650 mg, 650 mg, Oral, Q6HRS PRN, Jojo Castillo M.D.  •  enalaprilat (VASOTEC) injection 1.25 mg, 1.25 mg, Intravenous, Q6HRS PRN, Jojo Castillo M.D.  •  labetalol (NORMODYNE/TRANDATE) injection 10 mg, 10 mg, Intravenous, Q4HRS PRN, Jojo Castillo M.D.  •  ondansetron (ZOFRAN ODT) dispertab 4 mg, 4 mg, Oral, Q4HRS PRN, Jojo Castillo M.D.  •  ondansetron (ZOFRAN) syringe/vial injection 4 mg, 4 mg, Intravenous, Q4HRS PRN, Jojo Castillo M.D.  •  Respiratory Therapy Consult, , Nebulization, Continuous RT, Jojo Castillo M.D.  •  senna-docusate (PERICOLACE or SENOKOT S) 8.6-50 MG per tablet 2 tablet, 2 tablet, Oral, BID, 2 tablet at 06/23/21 0518 **AND** polyethylene glycol/lytes (MIRALAX) PACKET 1 Packet, 1 Packet, Oral, QDAY PRN **AND** magnesium hydroxide (MILK OF MAGNESIA) suspension 30 mL, 30 mL, Oral, QDAY PRN **AND** bisacodyl (DULCOLAX) suppository 10 mg, 10 mg, Rectal, QDAY PRN, Jojo Castillo M.D.  •  enoxaparin (LOVENOX) inj 40 mg, 40 mg, Subcutaneous, DAILY, Jojo Castillo M.D., 40 mg at 06/23/21 0518  •  haloperidol lactate (HALDOL) injection 2-5 mg, 2-5 mg, Intravenous, Q4HRS PRN, Jojo Castillo M.D., 5 mg at 06/12/21 0259      Fluids    Intake/Output Summary (Last 24  hours) at 6/23/2021 1020  Last data filed at 6/23/2021 0715  Gross per 24 hour   Intake 800 ml   Output --   Net 800 ml       Laboratory                        Imaging  MR-BRAIN-WITH & W/O   Final Result         No acute intracranial process.      Nonspecific T2 hyperintensities in the deep white matter related to chronic microvascular ischemia.      [id number] VIEW   Final Result      1.  No metallic foreign bodies detected.   2.  Right nephrolithiasis.      EC-ECHOCARDIOGRAM LTD W/ CONT   Final Result      CT-HEAD W/O   Final Result      1.  Cerebral atrophy and chronic microvascular ischemic type changes.   2.  No acute intracranial abnormality.      DX-CHEST-PORTABLE (1 VIEW)   Final Result      No acute cardiopulmonary abnormality.           Assessment/Plan  * AMS (altered mental status)- (present on admission)  Assessment & Plan  Patient continues to be encephalopathic, unclear etiology at this time, suspect he has a component of delirium as well as he mentation does seem to wax and wane, suspect possible underlying dementia/alzheimers given his lack of overall improvement and impairment in memory   CT head and MRI brain negative. TSH, HIV, RPR, B12 normal   - Started on thiamine  - Neurology followed patient  - Psych consulted for possible psychogenic vs delirium   - Friends are attempting to pursue guardianship     Syncope- (present on admission)  Assessment & Plan  Had initially presented to the ED syncope and left AMA; subsequently found down on sidewalk after multiple witness falls. CT head and brain MRI negative. Echo unremarkable. No significant arrhythmias found on tele.   - orthostatic vitals   - Fall precautions   - PTOT    Anemia  Assessment & Plan  Hgb remains mildly low. Currently hgb 12.6, MCV elevated  - iron panel, B12, folate      Marijuana use  Assessment & Plan  Presented with positive drugs screen for cannabinoid   - encourage cessation    Dehydration- (present on admission)  Assessment &  Plan  Resolved  - Encourage oral intake      VTE prophylaxis: lovenox

## 2021-06-23 NOTE — PROGRESS NOTES
4 Eyes Skin Assessment Completed by Vero ESTRELLA RN and HEAVEN Mansfield.    Head WDL  Ears WDL  Nose WDL  Mouth WDL  Neck WDL  Breast/Chest WDL  Shoulder Blades WDL  Spine WDL  (R) Arm/Elbow/Hand Bruising  (L) Arm/Elbow/Hand Bruising  Abdomen WDL  Groin WDL  Scrotum/Coccyx/Buttocks WDL  (R) Leg Bruising  (L) Leg Bruising  (R) Heel/Foot/Toe Redness  (L) Heel/Foot/Toe Redness    Scabbing and bruising noted to bilateral knees due to multiple falls during current hospitalization. Heels red and blanching.       Devices In Places: NA    Interventions In Place Barrier Cream and Pressure Redistribution Mattress    Possible Skin Injury No    Pictures Uploaded Into Epic N/A  Wound Consult Placed N/A  RN Wound Prevention Protocol Ordered No

## 2021-06-23 NOTE — CARE PLAN
The patient is Stable - Low risk of patient condition declining or worsening    Shift Goals  Clinical Goals: Safety, remain free from falls  Patient Goals: Rest, quite      Progress made toward(s) clinical / shift goals:        Problem: Skin Integrity  Goal: Skin integrity is maintained or improved  Outcome: Progressing  Note: Pt is up x1 assist with fww to bathroom. Turns self from side to side in bed. Pillows in use for support and positioning.      Problem: Fall Risk  Goal: Patient will remain free from falls  Outcome: Progressing  Note: Pt is oriented to self only, safety sitter at bedside due to multiple falls during current hospitalization. Oriented pt to room and surroundings. Call light within reach.

## 2021-06-23 NOTE — CARE PLAN
Problem: Hemodynamics  Goal: Patient's hemodynamics, fluid balance and neurologic status will be stable or improve  Outcome: Progressing     Problem: Fluid Volume  Goal: Fluid volume balance will be maintained  Outcome: Progressing     Problem: Urinary - Renal Perfusion  Goal: Ability to achieve and maintain adequate renal perfusion and functioning will improve  Outcome: Progressing     Problem: Respiratory  Goal: Patient will achieve/maintain optimum respiratory ventilation and gas exchange  Outcome: Progressing     Problem: Physical Regulation  Goal: Diagnostic test results will improve  Outcome: Progressing       Assumed care of patient at 0715. Received bedside report from night shift RN. Bed is locked and lowest position, call light within reach. Treaded socks in place. Patient updated on plan of care, no complaints or pain at this time. White board updated. Pt AxOx1-2 Patient breathing pattern is unlabored. Pt is medical. All needs met at this time. Will continue care and monitoring as ordered.

## 2021-06-24 LAB
FERRITIN SERPL-MCNC: 357 NG/ML (ref 22–322)
IRON SATN MFR SERPL: 23 % (ref 15–55)
IRON SERPL-MCNC: 55 UG/DL (ref 50–180)
TIBC SERPL-MCNC: 244 UG/DL (ref 250–450)
UIBC SERPL-MCNC: 189 UG/DL (ref 110–370)
VIT B12 SERPL-MCNC: 519 PG/ML (ref 211–911)

## 2021-06-24 PROCEDURE — 36415 COLL VENOUS BLD VENIPUNCTURE: CPT

## 2021-06-24 PROCEDURE — 700102 HCHG RX REV CODE 250 W/ 637 OVERRIDE(OP): Performed by: INTERNAL MEDICINE

## 2021-06-24 PROCEDURE — A9270 NON-COVERED ITEM OR SERVICE: HCPCS | Performed by: INTERNAL MEDICINE

## 2021-06-24 PROCEDURE — A9270 NON-COVERED ITEM OR SERVICE: HCPCS | Performed by: STUDENT IN AN ORGANIZED HEALTH CARE EDUCATION/TRAINING PROGRAM

## 2021-06-24 PROCEDURE — 83550 IRON BINDING TEST: CPT

## 2021-06-24 PROCEDURE — 700102 HCHG RX REV CODE 250 W/ 637 OVERRIDE(OP): Performed by: STUDENT IN AN ORGANIZED HEALTH CARE EDUCATION/TRAINING PROGRAM

## 2021-06-24 PROCEDURE — 770004 HCHG ROOM/CARE - ONCOLOGY PRIVATE *

## 2021-06-24 PROCEDURE — 700111 HCHG RX REV CODE 636 W/ 250 OVERRIDE (IP): Performed by: STUDENT IN AN ORGANIZED HEALTH CARE EDUCATION/TRAINING PROGRAM

## 2021-06-24 PROCEDURE — 82607 VITAMIN B-12: CPT

## 2021-06-24 PROCEDURE — 82728 ASSAY OF FERRITIN: CPT

## 2021-06-24 PROCEDURE — 83540 ASSAY OF IRON: CPT

## 2021-06-24 RX ADMIN — ENOXAPARIN SODIUM 40 MG: 40 INJECTION SUBCUTANEOUS at 04:39

## 2021-06-24 RX ADMIN — THERA TABS 1 TABLET: TAB at 04:39

## 2021-06-24 RX ADMIN — Medication 100 MG: at 04:39

## 2021-06-24 RX ADMIN — DOCUSATE SODIUM 50 MG AND SENNOSIDES 8.6 MG 2 TABLET: 8.6; 5 TABLET, FILM COATED ORAL at 04:39

## 2021-06-24 RX ADMIN — LOSARTAN POTASSIUM 25 MG: 25 TABLET, FILM COATED ORAL at 04:39

## 2021-06-24 RX ADMIN — ASPIRIN 325 MG ORAL TABLET 325 MG: 325 PILL ORAL at 04:39

## 2021-06-24 ASSESSMENT — PAIN DESCRIPTION - PAIN TYPE
TYPE: ACUTE PAIN
TYPE: ACUTE PAIN

## 2021-06-24 NOTE — CARE PLAN
The patient is Stable - Low risk of patient condition declining or worsening    Shift Goals  Clinical Goals: Safety, remain free from falls  Patient Goals: Rest, quite  Family Goals: NA    Progress made toward(s) clinical / shift goals:        Problem: Fall Risk  Goal: Patient will remain free from falls  Outcome: Progressing  Note: Pt is oriented to self only, easily redirectable. Safety sitter at bedside for fall prevention.      Problem: Nutrition  Goal: Patient's nutritional and fluid intake will be adequate or improve  Outcome: Progressing  Note: Pt has good PO intake, consumes % of meals.

## 2021-06-24 NOTE — CARE PLAN
Problem: Skin Integrity  Goal: Skin integrity is maintained or improved  Outcome: Progressing     Problem: Fall Risk  Goal: Patient will remain free from falls  Outcome: Progressing   The patient is Stable - Low risk of patient condition declining or worsening    Shift Goals  Clinical Goals: Safety, remain free from falls  Patient Goals: Rest, quite  Family Goals: NA    Progress made toward(s) clinical / shift goals:  Patient A/Ox1. Safety sitter a bedside. Bed locked in lowest position. Bed alarm on and sounding. Hourly rounding in place.     Patient is not progressing towards the following goals:

## 2021-06-25 PROCEDURE — 700102 HCHG RX REV CODE 250 W/ 637 OVERRIDE(OP): Performed by: INTERNAL MEDICINE

## 2021-06-25 PROCEDURE — 700102 HCHG RX REV CODE 250 W/ 637 OVERRIDE(OP): Performed by: STUDENT IN AN ORGANIZED HEALTH CARE EDUCATION/TRAINING PROGRAM

## 2021-06-25 PROCEDURE — 770004 HCHG ROOM/CARE - ONCOLOGY PRIVATE *

## 2021-06-25 PROCEDURE — 700111 HCHG RX REV CODE 636 W/ 250 OVERRIDE (IP): Performed by: STUDENT IN AN ORGANIZED HEALTH CARE EDUCATION/TRAINING PROGRAM

## 2021-06-25 PROCEDURE — A9270 NON-COVERED ITEM OR SERVICE: HCPCS | Performed by: STUDENT IN AN ORGANIZED HEALTH CARE EDUCATION/TRAINING PROGRAM

## 2021-06-25 PROCEDURE — A9270 NON-COVERED ITEM OR SERVICE: HCPCS | Performed by: INTERNAL MEDICINE

## 2021-06-25 PROCEDURE — 99231 SBSQ HOSP IP/OBS SF/LOW 25: CPT | Performed by: NURSE PRACTITIONER

## 2021-06-25 RX ADMIN — THERA TABS 1 TABLET: TAB at 04:55

## 2021-06-25 RX ADMIN — LOSARTAN POTASSIUM 25 MG: 25 TABLET, FILM COATED ORAL at 04:55

## 2021-06-25 RX ADMIN — Medication 100 MG: at 04:55

## 2021-06-25 RX ADMIN — ASPIRIN 325 MG ORAL TABLET 325 MG: 325 PILL ORAL at 04:55

## 2021-06-25 RX ADMIN — ENOXAPARIN SODIUM 40 MG: 40 INJECTION SUBCUTANEOUS at 04:55

## 2021-06-25 RX ADMIN — DOCUSATE SODIUM 50 MG AND SENNOSIDES 8.6 MG 2 TABLET: 8.6; 5 TABLET, FILM COATED ORAL at 04:55

## 2021-06-25 ASSESSMENT — ENCOUNTER SYMPTOMS
HEADACHES: 0
NAUSEA: 0
SORE THROAT: 0
DIZZINESS: 0
ABDOMINAL PAIN: 0
MYALGIAS: 0
EYE PAIN: 0
VOMITING: 0
COUGH: 0
CHILLS: 0
SHORTNESS OF BREATH: 0
FEVER: 0
DIARRHEA: 0
MEMORY LOSS: 1

## 2021-06-25 ASSESSMENT — PAIN DESCRIPTION - PAIN TYPE
TYPE: ACUTE PAIN
TYPE: ACUTE PAIN

## 2021-06-25 NOTE — PROGRESS NOTES
"[facility] Medicine Bi-Weekly Progress Note    Date of Service  6/25/2021    Chief Complaint  66 y.o. male admitted 6/4/2021 with altered mental status    Hospital Course  Mr. Beard is a 66-year-old male with no known PMHx who presented to the hospital on 6/4/2021 after he was found down on the sidewalk after multiple witnessed falls.  Patient was confused at the time of admission.  He had been seen earlier in the day for syncope while crossing the street however at that time left AGAINST MEDICAL ADVICE.  On admission he was tachycardic with a heart rate of 114, anion gap of 23 and a lactic acid of 2.6.  He had a CT which showed no acute intracranial abnormalities.  He also underwent his chest x-ray which showed no acute cardiopulmonary process patient was admitted for further evaluation of altered mental status and syncope. Patient throughout this stay has been confused an unable to show any signs of capacity.  He underwent MRI brain with and without contrast on Gavi 10, 2021 and it did not show any acute abnormalities.Guardianship being pursued and patient will need placement.       Interval Problem Update  6/25: Patient seen and examined. He is eating lunch, denies complaints. He is oriented to person only. When asked why he is in the hospital he says, \"I was told I kept falling\"; however, he does not recall on his own. Sitter at bedside reports he has been steady on his feet, has been calm and cooperative.   Friends are apparently pursuing guardianship.  CM for placement    Consultants/Specialty  Neurology    Code Status  Full Code    Disposition  Pt remains with cognitive impairments and lacks capacity, suspect pt will need long term placement     Review of Systems  Review of Systems   Constitutional: Negative for chills, fever and malaise/fatigue.   HENT: Negative for congestion and sore throat.    Eyes: Negative for pain.   Respiratory: Negative for cough and shortness of breath.    Cardiovascular: Negative " for chest pain and leg swelling.   Gastrointestinal: Negative for abdominal pain, diarrhea, nausea and vomiting.   Genitourinary: Negative for dysuria, frequency and urgency.   Musculoskeletal: Negative for myalgias.   Skin: Negative for rash.   Neurological: Negative for dizziness and headaches.   Psychiatric/Behavioral: Positive for memory loss.       Physical Exam  Temp:  [36.7 °C (98 °F)-37.1 °C (98.8 °F)] 37.1 °C (98.8 °F)  Pulse:  [75-87] 87  Resp:  [16-18] 18  BP: (117-142)/(77-98) 120/77  SpO2:  [95 %-99 %] 99 %    Physical Exam  Vitals and nursing note reviewed.   Constitutional:       General: He is awake. He is not in acute distress.     Appearance: Normal appearance. He is overweight.   HENT:      Head: Normocephalic.      Nose: Nose normal.      Mouth/Throat:      Lips: Pink.      Mouth: Mucous membranes are moist.   Eyes:      General: Lids are normal.   Cardiovascular:      Rate and Rhythm: Normal rate.      Heart sounds: Normal heart sounds.   Pulmonary:      Effort: Pulmonary effort is normal. No respiratory distress.      Breath sounds: Normal breath sounds. No decreased breath sounds.   Abdominal:      General: Bowel sounds are normal.      Palpations: Abdomen is soft.      Tenderness: There is no abdominal tenderness.   Musculoskeletal:         General: No swelling or tenderness.      Cervical back: Neck supple.   Skin:     General: Skin is warm and dry.   Neurological:      Mental Status: He is alert. He is disoriented.      Comments: Unclear on situation, tangential    Psychiatric:         Mood and Affect: Affect is flat.         Behavior: Behavior is cooperative.         Cognition and Memory: Cognition is impaired. Memory is impaired. He exhibits impaired recent memory and impaired remote memory.         Judgment: Judgment is impulsive.     Exam completed 6/25/2021 and unchanged from prior       Current Facility-Administered Medications:   •  thiamine (Vitamin B-1) tablet 100 mg, 100 mg, Oral,  DAILY, Ilya Phan M.D., 100 mg at 06/25/21 0455  •  multivitamin (THERAGRAN) tablet 1 tablet, 1 tablet, Oral, DAILY, Ilya Phan M.D., 1 tablet at 06/25/21 0455  •  losartan (COZAAR) tablet 25 mg, 25 mg, Oral, Q DAY, Adia Lara M.D., 25 mg at 06/25/21 0455  •  LORazepam (ATIVAN) injection 1 mg, 1 mg, Intravenous, Once PRN, Adia Lara M.D.  •  aspirin (ASA) tablet 325 mg, 325 mg, Oral, DAILY, 325 mg at 06/25/21 0455 **OR** aspirin (ASA) chewable tab 324 mg, 324 mg, Oral, DAILY, 324 mg at 06/14/21 0528 **OR** aspirin (ASA) suppository 300 mg, 300 mg, Rectal, DAILY, Nabeel Pitts M.D.  •  regadenoson (LEXISCAN) injection SOLN 0.4 mg, 0.4 mg, Intravenous, Once PRN, Nabeel Pitts M.D.  •  aminophylline injection 100 mg, 100 mg, Intravenous, Q5 MIN PRN, Nabeel Pitts M.D.  •  acetaminophen (Tylenol) tablet 650 mg, 650 mg, Oral, Q6HRS PRN, Jojo Castillo M.D.  •  enalaprilat (VASOTEC) injection 1.25 mg, 1.25 mg, Intravenous, Q6HRS PRN, Jojo Castillo M.D.  •  labetalol (NORMODYNE/TRANDATE) injection 10 mg, 10 mg, Intravenous, Q4HRS PRN, Jojo Castillo M.D.  •  ondansetron (ZOFRAN ODT) dispertab 4 mg, 4 mg, Oral, Q4HRS PRN, Jojo Castillo M.D.  •  ondansetron (ZOFRAN) syringe/vial injection 4 mg, 4 mg, Intravenous, Q4HRS PRN, Jojo Castillo M.D.  •  Respiratory Therapy Consult, , Nebulization, Continuous RT, Jojo Castillo M.D.  •  senna-docusate (PERICOLACE or SENOKOT S) 8.6-50 MG per tablet 2 tablet, 2 tablet, Oral, BID, 2 tablet at 06/25/21 0455 **AND** polyethylene glycol/lytes (MIRALAX) PACKET 1 Packet, 1 Packet, Oral, QDAY PRN **AND** magnesium hydroxide (MILK OF MAGNESIA) suspension 30 mL, 30 mL, Oral, QDAY PRN **AND** bisacodyl (DULCOLAX) suppository 10 mg, 10 mg, Rectal, QDAY PRN, Jojo Castillo M.D.  •  enoxaparin (LOVENOX) inj 40 mg, 40 mg, Subcutaneous, DAILY, Jojo Castillo M.D., 40 mg at 06/25/21 0452  •  haloperidol lactate (HALDOL) injection 2-5  mg, 2-5 mg, Intravenous, Q4HRS PRN, Jojo Castillo M.D., 5 mg at 06/12/21 0259      Fluids  No intake or output data in the 24 hours ending 06/25/21 1221    Laboratory                        Imaging  MR-BRAIN-WITH & W/O   Final Result         No acute intracranial process.      Nonspecific T2 hyperintensities in the deep white matter related to chronic microvascular ischemia.      [id number] VIEW   Final Result      1.  No metallic foreign bodies detected.   2.  Right nephrolithiasis.      EC-ECHOCARDIOGRAM LTD W/ CONT   Final Result      CT-HEAD W/O   Final Result      1.  Cerebral atrophy and chronic microvascular ischemic type changes.   2.  No acute intracranial abnormality.      DX-CHEST-PORTABLE (1 VIEW)   Final Result      No acute cardiopulmonary abnormality.           Assessment/Plan  * AMS (altered mental status)- (present on admission)  Assessment & Plan  Dementia likely contributing. SLP cognitive evaluation results on 6/11shows severe-profound deficits across almost all cognitive domains tested, consistent with dementia. He was evaluated by psychiatry on 6/15 who also agrees with likely dementia dx, and since he is without acute psychiatric symptoms or behavior present, psychiatry consult cancelled. Evaluated by neurology. CT head and MRI brain negative. TSH, HIV, RPR, B12 normal.  - Started on thiamine  - Friends are attempting to pursue guardianship     Syncope- (present on admission)  Assessment & Plan  Had initially presented to the ED syncope and left AMA; subsequently found down on sidewalk after multiple witness falls. CT head and brain MRI negative. Echo unremarkable. No significant arrhythmias found on tele   - orthostatic vitals   - Fall precautions   - PTOT    Anemia  Assessment & Plan  Hgb remains mildly low. Currently hgb 12.6, MCV elevated. Iron panel normal. B12 normal, folate normal on 6/11. Patient asymptomatic.   - momitor    Marijuana use  Assessment & Plan  Presented with  positive drugs screen for cannabinoid   - encourage cessation    Dehydration- (present on admission)  Assessment & Plan  Resolved  - Encourage oral intake      VTE prophylaxis: lovenox

## 2021-06-25 NOTE — CARE PLAN
Problem: Skin Integrity  Goal: Skin integrity is maintained or improved  Outcome: Progressing     Problem: Knowledge Deficit - Standard  Goal: Patient and family/care givers will demonstrate understanding of plan of care, disease process/condition, diagnostic tests and medications  Outcome: Progressing   The patient is Stable - Low risk of patient condition declining or worsening    Shift Goals  Clinical Goals: Safety, remain free from falls  Patient Goals: Rest, quite  Family Goals: NA

## 2021-06-25 NOTE — CARE PLAN
The patient is Stable - Low risk of patient condition declining or worsening    Shift Goals  Clinical Goals: Safety, remain free from falls  Patient Goals: Rest, quite  Family Goals: NA    Progress made toward(s) clinical / shift goals:        Problem: Fall Risk  Goal: Patient will remain free from falls  Outcome: Progressing  Note: Pt is oriented to self only, bed alarm on for safety, sitter at bedside for fall prevention.      Problem: Mobility  Goal: Patient's capacity to carry out activities will improve  Outcome: Progressing  Note: Pt is a standby assist with steady gait. Ambulates to the bathroom frequently, turns self from side to side in bed.

## 2021-06-26 PROCEDURE — A9270 NON-COVERED ITEM OR SERVICE: HCPCS | Performed by: INTERNAL MEDICINE

## 2021-06-26 PROCEDURE — 700111 HCHG RX REV CODE 636 W/ 250 OVERRIDE (IP): Performed by: STUDENT IN AN ORGANIZED HEALTH CARE EDUCATION/TRAINING PROGRAM

## 2021-06-26 PROCEDURE — A9270 NON-COVERED ITEM OR SERVICE: HCPCS | Performed by: STUDENT IN AN ORGANIZED HEALTH CARE EDUCATION/TRAINING PROGRAM

## 2021-06-26 PROCEDURE — 700102 HCHG RX REV CODE 250 W/ 637 OVERRIDE(OP): Performed by: STUDENT IN AN ORGANIZED HEALTH CARE EDUCATION/TRAINING PROGRAM

## 2021-06-26 PROCEDURE — 700102 HCHG RX REV CODE 250 W/ 637 OVERRIDE(OP): Performed by: INTERNAL MEDICINE

## 2021-06-26 PROCEDURE — 770004 HCHG ROOM/CARE - ONCOLOGY PRIVATE *

## 2021-06-26 RX ADMIN — LOSARTAN POTASSIUM 25 MG: 25 TABLET, FILM COATED ORAL at 05:07

## 2021-06-26 RX ADMIN — ASPIRIN 325 MG ORAL TABLET 325 MG: 325 PILL ORAL at 05:07

## 2021-06-26 RX ADMIN — DOCUSATE SODIUM 50 MG AND SENNOSIDES 8.6 MG 2 TABLET: 8.6; 5 TABLET, FILM COATED ORAL at 18:03

## 2021-06-26 RX ADMIN — DOCUSATE SODIUM 50 MG AND SENNOSIDES 8.6 MG 2 TABLET: 8.6; 5 TABLET, FILM COATED ORAL at 05:07

## 2021-06-26 RX ADMIN — Medication 100 MG: at 05:07

## 2021-06-26 RX ADMIN — ENOXAPARIN SODIUM 40 MG: 40 INJECTION SUBCUTANEOUS at 05:06

## 2021-06-26 RX ADMIN — THERA TABS 1 TABLET: TAB at 05:07

## 2021-06-26 ASSESSMENT — PAIN DESCRIPTION - PAIN TYPE: TYPE: ACUTE PAIN

## 2021-06-26 NOTE — CARE PLAN
Problem: Skin Integrity  Goal: Skin integrity is maintained or improved  Outcome: Progressing     Problem: Fall Risk  Goal: Patient will remain free from falls  Outcome: Progressing   The patient is Stable - Low risk of patient condition declining or worsening    Shift Goals  Clinical Goals: Safety, remain free from falls  Patient Goals: Rest, quite  Family Goals: NA

## 2021-06-26 NOTE — PROGRESS NOTES
"Received report from [facility], assumed care of pt 0700.  Pt able to tell me he was in hospital \"because I've been dizzy\" stated year as 2023, didn't know the month.  Sitter at bedside. Discussed safety and why sitter at bedside.   No IV.   All needs met at this time.  "

## 2021-06-27 PROCEDURE — 700102 HCHG RX REV CODE 250 W/ 637 OVERRIDE(OP): Performed by: STUDENT IN AN ORGANIZED HEALTH CARE EDUCATION/TRAINING PROGRAM

## 2021-06-27 PROCEDURE — A9270 NON-COVERED ITEM OR SERVICE: HCPCS | Performed by: INTERNAL MEDICINE

## 2021-06-27 PROCEDURE — A9270 NON-COVERED ITEM OR SERVICE: HCPCS | Performed by: STUDENT IN AN ORGANIZED HEALTH CARE EDUCATION/TRAINING PROGRAM

## 2021-06-27 PROCEDURE — 770004 HCHG ROOM/CARE - ONCOLOGY PRIVATE *

## 2021-06-27 PROCEDURE — 700111 HCHG RX REV CODE 636 W/ 250 OVERRIDE (IP): Performed by: STUDENT IN AN ORGANIZED HEALTH CARE EDUCATION/TRAINING PROGRAM

## 2021-06-27 PROCEDURE — 700102 HCHG RX REV CODE 250 W/ 637 OVERRIDE(OP): Performed by: INTERNAL MEDICINE

## 2021-06-27 RX ADMIN — DOCUSATE SODIUM 50 MG AND SENNOSIDES 8.6 MG 2 TABLET: 8.6; 5 TABLET, FILM COATED ORAL at 04:32

## 2021-06-27 RX ADMIN — LOSARTAN POTASSIUM 25 MG: 25 TABLET, FILM COATED ORAL at 04:32

## 2021-06-27 RX ADMIN — ENOXAPARIN SODIUM 40 MG: 40 INJECTION SUBCUTANEOUS at 04:31

## 2021-06-27 RX ADMIN — THERA TABS 1 TABLET: TAB at 04:32

## 2021-06-27 RX ADMIN — Medication 100 MG: at 04:40

## 2021-06-27 RX ADMIN — ASPIRIN 324 MG: 81 TABLET, CHEWABLE ORAL at 04:32

## 2021-06-27 RX ADMIN — DOCUSATE SODIUM 50 MG AND SENNOSIDES 8.6 MG 2 TABLET: 8.6; 5 TABLET, FILM COATED ORAL at 18:04

## 2021-06-27 ASSESSMENT — FIBROSIS 4 INDEX: FIB4 SCORE: 1.721378477236588914

## 2021-06-27 ASSESSMENT — PAIN DESCRIPTION - PAIN TYPE: TYPE: ACUTE PAIN

## 2021-06-27 NOTE — CARE PLAN
"The patient is Stable - Low risk of patient condition declining or worsening    Shift Goals  Clinical Goals: Safety, remain free from falls  Patient Goals: Rest, quite  Family Goals: NA    Progress made toward(s) clinical / shift goals:  see cp notes    Problem: Fall Risk  Goal: Patient will remain free from falls  Outcome: Progressing  Note: Pt with hx of falls. Pt has safety sitter at bedside. Pt free from fall/injury this shift. Offered to ambulate pt in hallways, pt declined citing being \"too tired\". Education provided.       Patient is not progressing towards the following goals:      Problem: Self Care  Goal: Patient will have the ability to perform ADLs independently or with assistance (bathe, groom, dress, toilet and feed)  Outcome: Not Progressing  Note: PT up to bathroom 6 times today, pt with confusion over items in bathroom I.e. toilet vs. sink and their purposes. Requires cues.      "

## 2021-06-27 NOTE — PROGRESS NOTES
"Patient fell asleep approx 8:am till 9:45.    Patient ordered a \" snack \" cheeseburger, fries, and lemonade and ate 100%.  Now he's back in bed to rest/sleep @ 2:53pm.      "

## 2021-06-27 NOTE — CARE PLAN
Problem: Skin Integrity  Goal: Skin integrity is maintained or improved  Outcome: Progressing     Problem: Fall Risk  Goal: Patient will remain free from falls  Outcome: Progressing     The patient is Watcher - Medium risk of patient condition declining or worsening    Shift Goals  Clinical Goals: Safety, remain free from falls  Patient Goals: Rest, quite  Family Goals: NA    Progress made toward(s) clinical / shift goals:  Pt turns self side to side, sitter at bedside.     Patient is not progressing towards the following goals:

## 2021-06-28 PROCEDURE — 700102 HCHG RX REV CODE 250 W/ 637 OVERRIDE(OP): Performed by: STUDENT IN AN ORGANIZED HEALTH CARE EDUCATION/TRAINING PROGRAM

## 2021-06-28 PROCEDURE — 770004 HCHG ROOM/CARE - ONCOLOGY PRIVATE *

## 2021-06-28 PROCEDURE — A9270 NON-COVERED ITEM OR SERVICE: HCPCS | Performed by: STUDENT IN AN ORGANIZED HEALTH CARE EDUCATION/TRAINING PROGRAM

## 2021-06-28 PROCEDURE — A9270 NON-COVERED ITEM OR SERVICE: HCPCS | Performed by: INTERNAL MEDICINE

## 2021-06-28 PROCEDURE — 700111 HCHG RX REV CODE 636 W/ 250 OVERRIDE (IP): Performed by: STUDENT IN AN ORGANIZED HEALTH CARE EDUCATION/TRAINING PROGRAM

## 2021-06-28 PROCEDURE — 700102 HCHG RX REV CODE 250 W/ 637 OVERRIDE(OP): Performed by: INTERNAL MEDICINE

## 2021-06-28 RX ADMIN — ENOXAPARIN SODIUM 40 MG: 40 INJECTION SUBCUTANEOUS at 05:33

## 2021-06-28 RX ADMIN — DOCUSATE SODIUM 50 MG AND SENNOSIDES 8.6 MG 2 TABLET: 8.6; 5 TABLET, FILM COATED ORAL at 05:33

## 2021-06-28 RX ADMIN — THERA TABS 1 TABLET: TAB at 05:33

## 2021-06-28 RX ADMIN — ASPIRIN 325 MG ORAL TABLET 325 MG: 325 PILL ORAL at 05:33

## 2021-06-28 RX ADMIN — LOSARTAN POTASSIUM 25 MG: 25 TABLET, FILM COATED ORAL at 05:33

## 2021-06-28 RX ADMIN — Medication 100 MG: at 05:33

## 2021-06-28 ASSESSMENT — COGNITIVE AND FUNCTIONAL STATUS - GENERAL
SUGGESTED CMS G CODE MODIFIER DAILY ACTIVITY: CJ
STANDING UP FROM CHAIR USING ARMS: A LITTLE
MOBILITY SCORE: 21
PERSONAL GROOMING: A LOT
SUGGESTED CMS G CODE MODIFIER MOBILITY: CJ
TOILETING: A LITTLE
HELP NEEDED FOR BATHING: A LITTLE
CLIMB 3 TO 5 STEPS WITH RAILING: A LITTLE
DAILY ACTIVITIY SCORE: 20
WALKING IN HOSPITAL ROOM: A LITTLE

## 2021-06-28 ASSESSMENT — PAIN DESCRIPTION - PAIN TYPE: TYPE: ACUTE PAIN

## 2021-06-28 NOTE — CARE PLAN
The patient is Stable - Low risk of patient condition declining or worsening    Shift Goals  Clinical Goals: Pt will remain free from falls  Patient Goals: Rest and nap  Family Goals: n.a    Progress made toward(s) clinical / shift goals:        Problem: Fall Risk  Goal: Patient will remain free from falls  Outcome: Progressing  Note: Room near nurses station  Tele sitter in use  Hourly rounding and scheduled toileting in place  Bed alarm on and sounding  Lap belt on and patient able to demonstrate ability to remove lap belt       Patient is not progressing towards the following goals: N/A

## 2021-06-28 NOTE — PROGRESS NOTES
I certify that the patient requires continued medically necessary hospital services for the treatment of altered mental status, and will remain in the hospital in for greater than 20 days. Discharge plan is anticipated to be long term placement, which is actively being pursued.

## 2021-06-29 PROCEDURE — A9270 NON-COVERED ITEM OR SERVICE: HCPCS | Performed by: STUDENT IN AN ORGANIZED HEALTH CARE EDUCATION/TRAINING PROGRAM

## 2021-06-29 PROCEDURE — A9270 NON-COVERED ITEM OR SERVICE: HCPCS | Performed by: INTERNAL MEDICINE

## 2021-06-29 PROCEDURE — 770004 HCHG ROOM/CARE - ONCOLOGY PRIVATE *

## 2021-06-29 PROCEDURE — 700102 HCHG RX REV CODE 250 W/ 637 OVERRIDE(OP): Performed by: STUDENT IN AN ORGANIZED HEALTH CARE EDUCATION/TRAINING PROGRAM

## 2021-06-29 PROCEDURE — 700111 HCHG RX REV CODE 636 W/ 250 OVERRIDE (IP): Performed by: STUDENT IN AN ORGANIZED HEALTH CARE EDUCATION/TRAINING PROGRAM

## 2021-06-29 PROCEDURE — 99231 SBSQ HOSP IP/OBS SF/LOW 25: CPT | Performed by: NURSE PRACTITIONER

## 2021-06-29 PROCEDURE — 700102 HCHG RX REV CODE 250 W/ 637 OVERRIDE(OP): Performed by: INTERNAL MEDICINE

## 2021-06-29 RX ADMIN — DOCUSATE SODIUM 50 MG AND SENNOSIDES 8.6 MG 2 TABLET: 8.6; 5 TABLET, FILM COATED ORAL at 05:10

## 2021-06-29 RX ADMIN — LOSARTAN POTASSIUM 25 MG: 25 TABLET, FILM COATED ORAL at 05:10

## 2021-06-29 RX ADMIN — ASPIRIN 325 MG ORAL TABLET 325 MG: 325 PILL ORAL at 05:10

## 2021-06-29 RX ADMIN — Medication 100 MG: at 05:10

## 2021-06-29 RX ADMIN — ENOXAPARIN SODIUM 40 MG: 40 INJECTION SUBCUTANEOUS at 05:10

## 2021-06-29 RX ADMIN — THERA TABS 1 TABLET: TAB at 05:10

## 2021-06-29 ASSESSMENT — ENCOUNTER SYMPTOMS
ABDOMINAL PAIN: 0
COUGH: 0
SORE THROAT: 0
EYE PAIN: 0
VOMITING: 0
MEMORY LOSS: 1
HEADACHES: 0
CHILLS: 0
DIZZINESS: 0
DIARRHEA: 0
FEVER: 0
SHORTNESS OF BREATH: 0
NAUSEA: 0
MYALGIAS: 0

## 2021-06-29 ASSESSMENT — PAIN DESCRIPTION - PAIN TYPE: TYPE: ACUTE PAIN

## 2021-06-29 NOTE — PROGRESS NOTES
A&Ox1-oriented to self only. Denies pain and nausea. Lap belt on, patient demonstrates removal. Tele sitter in place. Hallucinations, patient reports seeing rodents and referring to a lady in his room. Impulsive; gets up without calling for assistance to void. Room near nurse's station. Fall precautions and hourly rounding in place. Call light and belongings within reach.

## 2021-06-29 NOTE — PROGRESS NOTES
"[facility] Medicine Bi-Weekly Progress Note    Date of Service  6/29/2021    Chief Complaint  66 y.o. male admitted 6/4/2021 with altered mental status    Hospital Course  Mr. Beard is a 66-year-old male with no known PMHx who presented to the hospital on 6/4/2021 after he was found down on the sidewalk after multiple witnessed falls.  Patient was confused at the time of admission.  He had been seen earlier in the day for syncope while crossing the street however at that time left AGAINST MEDICAL ADVICE.  On admission he was tachycardic with a heart rate of 114, anion gap of 23 and a lactic acid of 2.6.  He had a CT scan of the head, which revealed no acute intracranial abnormalities.  Chest x-ray completed, which showed no acute cardiopulmonary process.  Patient was admitted for further evaluation of altered mental status and syncope. Patient throughout this stay has been confused an lack of capacity was established.   He underwent MRI brain with and without contrast on Gavi 10, 2021, no acute abnormalities noted. Guardianship being pursued and patient will need placement.       Interval Problem Update  6/29- Patient resting in bed, states no acute events or complaints at this time. Does not like the hospital food. When attempting to discuss situation patient started talking about stuff pig sitting on his tray table and how the \"pig performs security for him and transferred over with the last encounter\". Continuing to work on guardianship and placement.       Consultants/Specialty  Neurology    Code Status  Full Code    Disposition  Pt remains with cognitive impairments and lacks capacity, suspect pt will need long term placement with guardianship     Review of Systems  Review of Systems   Constitutional: Negative for chills, fever and malaise/fatigue.   HENT: Negative for congestion and sore throat.    Eyes: Negative for pain.   Respiratory: Negative for cough and shortness of breath.    Cardiovascular: Negative " for chest pain and leg swelling.   Gastrointestinal: Negative for abdominal pain, diarrhea, nausea and vomiting.   Genitourinary: Negative for dysuria, frequency and urgency.   Musculoskeletal: Negative for myalgias.   Skin: Negative for rash.   Neurological: Negative for dizziness and headaches.   Psychiatric/Behavioral: Positive for memory loss.       Physical Exam  Temp:  [37.1 °C (98.7 °F)-37.5 °C (99.5 °F)] 37.1 °C (98.8 °F)  Pulse:  [78-95] 91  Resp:  [14-18] 18  BP: (109-123)/() 109/72  SpO2:  [98 %-99 %] 99 %    Physical Exam  Vitals and nursing note reviewed.   Constitutional:       General: He is awake. He is not in acute distress.     Appearance: Normal appearance. He is overweight.   HENT:      Head: Normocephalic.      Nose: Nose normal.      Mouth/Throat:      Lips: Pink.      Mouth: Mucous membranes are moist.   Eyes:      General: Lids are normal.   Cardiovascular:      Rate and Rhythm: Normal rate.      Heart sounds: Normal heart sounds.   Pulmonary:      Effort: Pulmonary effort is normal. No respiratory distress.      Breath sounds: Normal breath sounds. No decreased breath sounds.   Abdominal:      General: Bowel sounds are normal.      Palpations: Abdomen is soft.      Tenderness: There is no abdominal tenderness.   Musculoskeletal:         General: No swelling or tenderness.      Cervical back: Neck supple.   Skin:     General: Skin is warm and dry.   Neurological:      Mental Status: He is alert. He is disoriented.      Comments: Unclear on situation, tangential    Psychiatric:         Mood and Affect: Affect is flat.         Behavior: Behavior is cooperative.         Cognition and Memory: Cognition is impaired. Memory is impaired. He exhibits impaired recent memory and impaired remote memory.         Judgment: Judgment is impulsive.     Exam completed 6/29/2021 and unchanged from prior         Fluids    Intake/Output Summary (Last 24 hours) at 6/29/2021 1002  Last data filed at  6/29/2021 0816  Gross per 24 hour   Intake 620 ml   Output --   Net 620 ml       Laboratory                        Imaging  MR-BRAIN-WITH & W/O   Final Result         No acute intracranial process.      Nonspecific T2 hyperintensities in the deep white matter related to chronic microvascular ischemia.      [id number] VIEW   Final Result      1.  No metallic foreign bodies detected.   2.  Right nephrolithiasis.      EC-ECHOCARDIOGRAM LTD W/ CONT   Final Result      CT-HEAD W/O   Final Result      1.  Cerebral atrophy and chronic microvascular ischemic type changes.   2.  No acute intracranial abnormality.      DX-CHEST-PORTABLE (1 VIEW)   Final Result      No acute cardiopulmonary abnormality.           Assessment/Plan  * AMS (altered mental status)- (present on admission)  Assessment & Plan  Dementia likely contributing. SLP cognitive evaluation results on 6/11 shows severe-profound deficits across almost all cognitive domains tested, consistent with dementia. He was evaluated by psychiatry on 6/15 who also agree with likely dementia dx, and since he is without acute psychiatric symptoms or behaviors, psychiatry consult cancelled. Evaluated by neurology. CT head and MRI brain negative. TSH, HIV, RPR, B12 normal.  - Started on thiamine  - Friends are attempting to pursue guardianship     Anemia  Assessment & Plan  Hgb remains mildly low. Currently hgb 12.6, MCV elevated. Iron panel normal. B12 normal, folate normal on 6/11. Patient asymptomatic.   - momitor    Marijuana use  Assessment & Plan  Presented with positive drugs screen for cannabinoid   - encourage cessation    Dehydration- (present on admission)  Assessment & Plan  Resolved  - Encourage oral intake    Syncope- (present on admission)  Assessment & Plan  Had initially presented to the ED for syncope and left AMA; subsequently found down on sidewalk after multiple witnessed falls. CT head and brain MRI negative. Echo unremarkable. No significant  arrhythmias found on tele   - orthostatic vitals - completed   - Fall precautions   - PT/OT      VTE prophylaxis: Heparin

## 2021-06-29 NOTE — CARE PLAN
The patient is Stable - Low risk of patient condition declining or worsening    Shift Goals  Clinical Goals: Safety  Patient Goals: Rest  Family Goals: n.a    Progress made toward(s) clinical / shift goals:  Patient is impulsive and will get up without calling for assistance. Fall precautions in place and patient free from falls this shift. Resting comfortably in bed    Problem: Skin Integrity  Goal: Skin integrity is maintained or improved  Outcome: Progressing     Problem: Fall Risk  Goal: Patient will remain free from falls  Outcome: Progressing       Patient is not progressing towards the following goals:      Problem: Knowledge Deficit - Standard  Goal: Patient and family/care givers will demonstrate understanding of plan of care, disease process/condition, diagnostic tests and medications  Outcome: Not Progressing

## 2021-06-29 NOTE — CARE PLAN
The patient is Stable - Low risk of patient condition declining or worsening    Shift Goals  Clinical Goals: Pt will remain free from falls  Patient Goals: Take a nap  Family Goals: n.a    Progress made toward(s) clinical / shift goals:      Problem: Fall Risk  Goal: Patient will remain free from falls  Outcome: Progressing  Note: Bed alarm on  Room near nurses station  Lap belt in place - pt able to demonstrate removal of lap belt to this RN  Tele sitter in place for safety    Patient is not progressing towards the following goals: N/A

## 2021-06-30 PROCEDURE — 700102 HCHG RX REV CODE 250 W/ 637 OVERRIDE(OP): Performed by: STUDENT IN AN ORGANIZED HEALTH CARE EDUCATION/TRAINING PROGRAM

## 2021-06-30 PROCEDURE — 700111 HCHG RX REV CODE 636 W/ 250 OVERRIDE (IP): Performed by: STUDENT IN AN ORGANIZED HEALTH CARE EDUCATION/TRAINING PROGRAM

## 2021-06-30 PROCEDURE — 700102 HCHG RX REV CODE 250 W/ 637 OVERRIDE(OP): Performed by: INTERNAL MEDICINE

## 2021-06-30 PROCEDURE — A9270 NON-COVERED ITEM OR SERVICE: HCPCS | Performed by: INTERNAL MEDICINE

## 2021-06-30 PROCEDURE — A9270 NON-COVERED ITEM OR SERVICE: HCPCS | Performed by: STUDENT IN AN ORGANIZED HEALTH CARE EDUCATION/TRAINING PROGRAM

## 2021-06-30 PROCEDURE — 770004 HCHG ROOM/CARE - ONCOLOGY PRIVATE *

## 2021-06-30 RX ADMIN — ASPIRIN 325 MG ORAL TABLET 325 MG: 325 PILL ORAL at 05:27

## 2021-06-30 RX ADMIN — ENOXAPARIN SODIUM 40 MG: 40 INJECTION SUBCUTANEOUS at 05:27

## 2021-06-30 RX ADMIN — Medication 100 MG: at 05:27

## 2021-06-30 RX ADMIN — LOSARTAN POTASSIUM 25 MG: 25 TABLET, FILM COATED ORAL at 05:27

## 2021-06-30 RX ADMIN — DOCUSATE SODIUM 50 MG AND SENNOSIDES 8.6 MG 2 TABLET: 8.6; 5 TABLET, FILM COATED ORAL at 17:51

## 2021-06-30 RX ADMIN — THERA TABS 1 TABLET: TAB at 05:27

## 2021-06-30 RX ADMIN — DOCUSATE SODIUM 50 MG AND SENNOSIDES 8.6 MG 2 TABLET: 8.6; 5 TABLET, FILM COATED ORAL at 05:27

## 2021-06-30 RX ADMIN — ONDANSETRON 4 MG: 4 TABLET, ORALLY DISINTEGRATING ORAL at 14:31

## 2021-06-30 ASSESSMENT — PAIN DESCRIPTION - PAIN TYPE
TYPE: ACUTE PAIN
TYPE: ACUTE PAIN

## 2021-06-30 NOTE — CARE PLAN
The patient is Stable - Low risk of patient condition declining or worsening    Shift Goals  Clinical Goals: Safety  Patient Goals: Sleep  Family Goals: n.a    Progress made toward(s) clinical / shift goals:  sleep    Patient is not progressing towards the following goals:      Problem: Knowledge Deficit - Standard  Goal: Patient and family/care givers will demonstrate understanding of plan of care, disease process/condition, diagnostic tests and medications  Outcome: Not Progressing     Problem: Communication  Goal: The ability to communicate needs accurately and effectively will improve  Outcome: Not Progressing     Problem: Skin Integrity  Goal: Skin integrity is maintained or improved  Outcome: Progressing     Problem: Fall Risk  Goal: Patient will remain free from falls  Outcome: Progressing  Note: Bedside commitment, lap belt, and tele sitter in place; pt's bed alarm is on; bed is locked in lowest position

## 2021-06-30 NOTE — DISCHARGE PLANNING
Anticipated Discharge Disposition: TBD, group home vs. SNF for long term placement.    Action: This RN,  reviewed patient's chart, met with patient and friend Edouard at bedside. Friend had two checks that he took out of his brief case, he asked if he could have patient sign the checks because he did not want patient to be evicted from his apartment. I informed friend that patient could not sign the checks, he does not have capacity, and furthermore [facility] has started the guardianship process. Friend stated that he was interested in becoming patient's guardian, I stated that was fine, however [facility] has also started the guardianship process and faxed application to the [facility] Public Guardians office. Friend informed me that his friend was in the  and he pulled out patient's  document.     Barriers to Discharge: Pending guardianship/placement/cost of care    Plan: HCM to follow for public guardianship, find placement for patient once he has a public guardian. GH vs. SNF with long term care.     Brigitte Hinds RN,

## 2021-06-30 NOTE — CARE PLAN
The patient is Stable - Low risk of patient condition declining or worsening    Shift Goals  Clinical Goals: Safety  Patient Goals: Sleep  Family Goals: n.a    Progress made toward(s) clinical / shift goals:  Patient remains free from falls and injury, tele sitter and bedside commitment in place. Patient frequently awake throughout night shift, not meeting patient goal.     Patient is not progressing towards the following goals:      Problem: Knowledge Deficit - Standard  Goal: Patient and family/care givers will demonstrate understanding of plan of care, disease process/condition, diagnostic tests and medications  Outcome: Not Progressing     Problem: Communication  Goal: The ability to communicate needs accurately and effectively will improve  Outcome: Not Progressing     Problem: Mobility  Goal: Patient's capacity to carry out activities will improve  Outcome: Not Progressing     Problem: Self Care  Goal: Patient will have the ability to perform ADLs independently or with assistance (bathe, groom, dress, toilet and feed)  Outcome: Not Progressing

## 2021-07-01 PROCEDURE — A9270 NON-COVERED ITEM OR SERVICE: HCPCS | Performed by: INTERNAL MEDICINE

## 2021-07-01 PROCEDURE — A9270 NON-COVERED ITEM OR SERVICE: HCPCS | Performed by: STUDENT IN AN ORGANIZED HEALTH CARE EDUCATION/TRAINING PROGRAM

## 2021-07-01 PROCEDURE — 700102 HCHG RX REV CODE 250 W/ 637 OVERRIDE(OP): Performed by: INTERNAL MEDICINE

## 2021-07-01 PROCEDURE — 770004 HCHG ROOM/CARE - ONCOLOGY PRIVATE *

## 2021-07-01 PROCEDURE — 700111 HCHG RX REV CODE 636 W/ 250 OVERRIDE (IP): Performed by: STUDENT IN AN ORGANIZED HEALTH CARE EDUCATION/TRAINING PROGRAM

## 2021-07-01 PROCEDURE — 700102 HCHG RX REV CODE 250 W/ 637 OVERRIDE(OP): Performed by: STUDENT IN AN ORGANIZED HEALTH CARE EDUCATION/TRAINING PROGRAM

## 2021-07-01 RX ADMIN — DOCUSATE SODIUM 50 MG AND SENNOSIDES 8.6 MG 2 TABLET: 8.6; 5 TABLET, FILM COATED ORAL at 17:22

## 2021-07-01 RX ADMIN — ASPIRIN 325 MG ORAL TABLET 325 MG: 325 PILL ORAL at 05:06

## 2021-07-01 RX ADMIN — LOSARTAN POTASSIUM 25 MG: 25 TABLET, FILM COATED ORAL at 05:06

## 2021-07-01 RX ADMIN — DOCUSATE SODIUM 50 MG AND SENNOSIDES 8.6 MG 2 TABLET: 8.6; 5 TABLET, FILM COATED ORAL at 05:06

## 2021-07-01 RX ADMIN — Medication 100 MG: at 05:06

## 2021-07-01 RX ADMIN — THERA TABS 1 TABLET: TAB at 05:06

## 2021-07-01 RX ADMIN — ENOXAPARIN SODIUM 40 MG: 40 INJECTION SUBCUTANEOUS at 05:07

## 2021-07-01 ASSESSMENT — PAIN DESCRIPTION - PAIN TYPE
TYPE: ACUTE PAIN
TYPE: ACUTE PAIN

## 2021-07-01 ASSESSMENT — FIBROSIS 4 INDEX: FIB4 SCORE: 1.721378477236588914

## 2021-07-01 NOTE — CARE PLAN
The patient is Stable - Low risk of patient condition declining or worsening    Shift Goals  Clinical Goals: safety  Patient Goals: sleep; rest  Family Goals: n.a    Progress made toward(s) clinical / shift goals:  safety, rest    Patient is not progressing towards the following goals: N/A      Problem: Fall Risk  Goal: Patient will remain free from falls  Outcome: Progressing  Note: Pt's bed alarm is on; tele sitter and lap belt are in use; bed is locked and in lowest position; does not call for assistance appropriately, but reoriented on the use of the call light       Problem: Communication  Goal: The ability to communicate needs accurately and effectively will improve  Outcome: Progressing  Note: Pt is alert and oriented to person only; is able to communicate needs effectively

## 2021-07-01 NOTE — CARE PLAN
The patient is Stable - Low risk of patient condition declining or worsening    Shift Goals  Clinical Goals: Safety  Patient Goals: Sleep  Family Goals: n.a    Progress made toward(s) clinical / shift goals:  Patient remains free from injury and falls. Did not sleep much this shift; hallucinations keep him awake    Patient is not progressing towards the following goals:      Problem: Knowledge Deficit - Standard  Goal: Patient and family/care givers will demonstrate understanding of plan of care, disease process/condition, diagnostic tests and medications  Outcome: Not Progressing     Problem: Self Care  Goal: Patient will have the ability to perform ADLs independently or with assistance (bathe, groom, dress, toilet and feed)  Outcome: Not Progressing

## 2021-07-02 PROCEDURE — 99231 SBSQ HOSP IP/OBS SF/LOW 25: CPT | Performed by: NURSE PRACTITIONER

## 2021-07-02 PROCEDURE — 700102 HCHG RX REV CODE 250 W/ 637 OVERRIDE(OP): Performed by: STUDENT IN AN ORGANIZED HEALTH CARE EDUCATION/TRAINING PROGRAM

## 2021-07-02 PROCEDURE — 770004 HCHG ROOM/CARE - ONCOLOGY PRIVATE *

## 2021-07-02 PROCEDURE — 700111 HCHG RX REV CODE 636 W/ 250 OVERRIDE (IP): Performed by: STUDENT IN AN ORGANIZED HEALTH CARE EDUCATION/TRAINING PROGRAM

## 2021-07-02 PROCEDURE — A9270 NON-COVERED ITEM OR SERVICE: HCPCS | Performed by: STUDENT IN AN ORGANIZED HEALTH CARE EDUCATION/TRAINING PROGRAM

## 2021-07-02 PROCEDURE — 700102 HCHG RX REV CODE 250 W/ 637 OVERRIDE(OP): Performed by: INTERNAL MEDICINE

## 2021-07-02 PROCEDURE — A9270 NON-COVERED ITEM OR SERVICE: HCPCS | Performed by: INTERNAL MEDICINE

## 2021-07-02 RX ADMIN — ENOXAPARIN SODIUM 40 MG: 40 INJECTION SUBCUTANEOUS at 06:11

## 2021-07-02 RX ADMIN — DOCUSATE SODIUM 50 MG AND SENNOSIDES 8.6 MG 2 TABLET: 8.6; 5 TABLET, FILM COATED ORAL at 06:12

## 2021-07-02 RX ADMIN — DOCUSATE SODIUM 50 MG AND SENNOSIDES 8.6 MG 2 TABLET: 8.6; 5 TABLET, FILM COATED ORAL at 17:38

## 2021-07-02 RX ADMIN — LOSARTAN POTASSIUM 25 MG: 25 TABLET, FILM COATED ORAL at 06:14

## 2021-07-02 RX ADMIN — ASPIRIN 324 MG: 81 TABLET, CHEWABLE ORAL at 06:12

## 2021-07-02 RX ADMIN — Medication 100 MG: at 06:12

## 2021-07-02 RX ADMIN — THERA TABS 1 TABLET: TAB at 06:12

## 2021-07-02 ASSESSMENT — ENCOUNTER SYMPTOMS
SORE THROAT: 0
NAUSEA: 0
SHORTNESS OF BREATH: 0
VOMITING: 0
DIZZINESS: 0
DIARRHEA: 0
MYALGIAS: 0
EYE PAIN: 0
CHILLS: 0
ABDOMINAL PAIN: 0
FEVER: 0
COUGH: 0
MEMORY LOSS: 1
HEADACHES: 0

## 2021-07-02 ASSESSMENT — PAIN DESCRIPTION - PAIN TYPE: TYPE: ACUTE PAIN

## 2021-07-02 NOTE — PROGRESS NOTES
Pt AO x 1  telesiter in place.   Lap belt on, pt demonstrates ability to remove lap belt without assistance   Vitals signs stable  Pt den chest pain or SOB  O2 sat >90% on RA breathing unlabored   Pt denies pain.   Pt denies N/V/D  + voiding, + flatus, + bowel sounds   Pt ambulates with standby assistance   Bed alarm is on.     Updated plan of care discussed with pt. Safety education done. Falls precautions in place.   Pt safety maintained. Hourly rounding done.

## 2021-07-02 NOTE — CARE PLAN
The patient is Stable - Low risk of patient condition declining or worsening    Shift Goals  Clinical Goals: pt will be safe from falls  Patient Goals: sleep; rest  Family Goals: n.a    Progress made toward(s) clinical / shift goals:  free from falls    Patient is not progressing towards the following goals: N/A      Problem: Skin Integrity  Goal: Skin integrity is maintained or improved  Outcome: Progressing     Problem: Fall Risk  Goal: Patient will remain free from falls  Outcome: Progressing  Note: Pt's bed is locked in lowest position; bed alarm is on; lap belt and tele sitter in place      Problem: Communication  Goal: The ability to communicate needs accurately and effectively will improve  Outcome: Progressing  Note: Pt is able to communicate needs effectively

## 2021-07-02 NOTE — CARE PLAN
The patient is Stable - Low risk of patient condition declining or worsening    Shift Goals  Clinical Goals: pt safety will be maintained   Patient Goals: sleep; rest  Family Goals: n.a    Progress made toward(s) clinical / shift goals:  Bed alarm in place, Telesitter at bedside. Lap belt on pt, pt demonstrates ability to remove lap belt without assistance. Frequent rounding done. Pt safety maintained.     Patient is not progressing towards the following goals:

## 2021-07-02 NOTE — PROGRESS NOTES
"[facility] Medicine Bi-Weekly Progress Note    Date of Service  7/2/2021    Chief Complaint  66 y.o. male admitted 6/4/2021 with altered mental status    Hospital Course  Mr. Beard is a 66-year-old male with no known PMHx who presented to the hospital on 6/4/2021 after he was found down on the sidewalk after multiple witnessed falls.  Patient was confused at the time of admission.  He had been seen earlier in the day for syncope while crossing the street however at that time left AGAINST MEDICAL ADVICE.  On admission he was tachycardic with a heart rate of 114, anion gap of 23 and a lactic acid of 2.6.  He had a CT scan of the head, which revealed no acute intracranial abnormalities.  Chest x-ray completed, which showed no acute cardiopulmonary process.  Patient was admitted for further evaluation of altered mental status and syncope. Patient throughout this stay has been confused an lack of capacity was established.   He underwent MRI brain with and without contrast on Gavi 10, 2021, no acute abnormalities noted. Guardianship being pursued and patient will need placement.       Interval Problem Update  6/29- Patient resting in bed, states no acute events or complaints at this time. Does not like the hospital food. When attempting to discuss situation patient started talking about stuff pig sitting on his tray table and how the \"pig performs security for him and transferred over with the last encounter\". Continuing to work on guardianship and placement.     7/3- Patient resting in bed eating breakfast. States no pain and no needs other than his fork closer. Patient remains confused to situation but very cooperative and pleasant.     Consultants/Specialty  Neurology    Code Status  Full Code    Disposition  Pt remains with cognitive impairments and lacks capacity, suspect pt will need long term placement with guardianship     Review of Systems  Review of Systems   Constitutional: Negative for chills, fever and " malaise/fatigue.   HENT: Negative for congestion and sore throat.    Eyes: Negative for pain.   Respiratory: Negative for cough and shortness of breath.    Cardiovascular: Negative for chest pain and leg swelling.   Gastrointestinal: Negative for abdominal pain, diarrhea, nausea and vomiting.   Genitourinary: Negative for dysuria, frequency and urgency.   Musculoskeletal: Negative for myalgias.   Skin: Negative for rash.   Neurological: Negative for dizziness and headaches.   Psychiatric/Behavioral: Positive for memory loss.       Physical Exam  Temp:  [36.6 °C (97.8 °F)-37 °C (98.6 °F)] 36.7 °C (98.1 °F)  Pulse:  [84-91] 86  Resp:  [18] 18  BP: (100-129)/(69-85) 120/76  SpO2:  [96 %-98 %] 96 %    Physical Exam  Vitals and nursing note reviewed.   Constitutional:       General: He is awake. He is not in acute distress.     Appearance: Normal appearance. He is overweight.   HENT:      Head: Normocephalic.      Nose: Nose normal.      Mouth/Throat:      Lips: Pink.      Mouth: Mucous membranes are moist.   Eyes:      General: Lids are normal.   Cardiovascular:      Rate and Rhythm: Normal rate.      Heart sounds: Normal heart sounds.   Pulmonary:      Effort: Pulmonary effort is normal. No respiratory distress.      Breath sounds: Normal breath sounds. No decreased breath sounds.   Abdominal:      General: Bowel sounds are normal.      Palpations: Abdomen is soft.      Tenderness: There is no abdominal tenderness.   Musculoskeletal:         General: No swelling or tenderness.      Cervical back: Neck supple.   Skin:     General: Skin is warm and dry.   Neurological:      Mental Status: He is alert. He is disoriented.      Comments: Unclear on situation, tangential    Psychiatric:         Mood and Affect: Affect is flat.         Behavior: Behavior is cooperative.         Cognition and Memory: Cognition is impaired. Memory is impaired. He exhibits impaired recent memory and impaired remote memory.         Judgment:  Judgment is impulsive.     Exam completed 7/2/2021 and unchanged from prior         Fluids  No intake or output data in the 24 hours ending 07/02/21 1036    Laboratory                        Imaging  MR-BRAIN-WITH & W/O   Final Result         No acute intracranial process.      Nonspecific T2 hyperintensities in the deep white matter related to chronic microvascular ischemia.      [id number] VIEW   Final Result      1.  No metallic foreign bodies detected.   2.  Right nephrolithiasis.      EC-ECHOCARDIOGRAM LTD W/ CONT   Final Result      CT-HEAD W/O   Final Result      1.  Cerebral atrophy and chronic microvascular ischemic type changes.   2.  No acute intracranial abnormality.      DX-CHEST-PORTABLE (1 VIEW)   Final Result      No acute cardiopulmonary abnormality.           Assessment/Plan  * AMS (altered mental status)- (present on admission)  Assessment & Plan  Dementia likely contributing. SLP cognitive evaluation results on 6/11 shows severe-profound deficits across almost all cognitive domains tested, consistent with dementia. He was evaluated by psychiatry on 6/15 who also agree with likely dementia dx, and since he is without acute psychiatric symptoms or behaviors, psychiatry consult cancelled. Evaluated by neurology. CT head and MRI brain negative. TSH, HIV, RPR, B12 normal.  - Started on thiamine  - Friends are attempting to pursue guardianship     Anemia  Assessment & Plan  Hgb remains mildly low. Currently hgb 12.6, MCV elevated. Iron panel normal. B12 normal, folate normal on 6/11. Patient asymptomatic.   - momitor    Marijuana use  Assessment & Plan  Presented with positive drugs screen for cannabinoid   - encourage cessation    Dehydration- (present on admission)  Assessment & Plan  Resolved  - Encourage oral intake    Syncope- (present on admission)  Assessment & Plan  Had initially presented to the ED for syncope and left AMA; subsequently found down on sidewalk after multiple witnessed falls.  CT head and brain MRI negative. Echo unremarkable. No significant arrhythmias found on tele   - orthostatic vitals - completed   - Fall precautions   - PT/OT      VTE prophylaxis: Heparin

## 2021-07-03 PROCEDURE — 700102 HCHG RX REV CODE 250 W/ 637 OVERRIDE(OP): Performed by: INTERNAL MEDICINE

## 2021-07-03 PROCEDURE — 700111 HCHG RX REV CODE 636 W/ 250 OVERRIDE (IP): Performed by: STUDENT IN AN ORGANIZED HEALTH CARE EDUCATION/TRAINING PROGRAM

## 2021-07-03 PROCEDURE — 700102 HCHG RX REV CODE 250 W/ 637 OVERRIDE(OP): Performed by: STUDENT IN AN ORGANIZED HEALTH CARE EDUCATION/TRAINING PROGRAM

## 2021-07-03 PROCEDURE — A9270 NON-COVERED ITEM OR SERVICE: HCPCS | Performed by: STUDENT IN AN ORGANIZED HEALTH CARE EDUCATION/TRAINING PROGRAM

## 2021-07-03 PROCEDURE — 770004 HCHG ROOM/CARE - ONCOLOGY PRIVATE *

## 2021-07-03 PROCEDURE — A9270 NON-COVERED ITEM OR SERVICE: HCPCS | Performed by: INTERNAL MEDICINE

## 2021-07-03 RX ADMIN — DOCUSATE SODIUM 50 MG AND SENNOSIDES 8.6 MG 2 TABLET: 8.6; 5 TABLET, FILM COATED ORAL at 16:57

## 2021-07-03 RX ADMIN — ENOXAPARIN SODIUM 40 MG: 40 INJECTION SUBCUTANEOUS at 04:20

## 2021-07-03 RX ADMIN — THERA TABS 1 TABLET: TAB at 04:20

## 2021-07-03 RX ADMIN — LOSARTAN POTASSIUM 25 MG: 25 TABLET, FILM COATED ORAL at 04:20

## 2021-07-03 RX ADMIN — Medication 100 MG: at 04:20

## 2021-07-03 RX ADMIN — ASPIRIN 325 MG ORAL TABLET 325 MG: 325 PILL ORAL at 04:20

## 2021-07-03 RX ADMIN — DOCUSATE SODIUM 50 MG AND SENNOSIDES 8.6 MG 2 TABLET: 8.6; 5 TABLET, FILM COATED ORAL at 04:20

## 2021-07-03 ASSESSMENT — PAIN DESCRIPTION - PAIN TYPE: TYPE: ACUTE PAIN

## 2021-07-03 NOTE — PROGRESS NOTES
Pt disoriented with delusions.  Tele sitter in place. Lap belt in place.  Pt demonstrated that he can remove lap belt. Call light within reach.  Room near nursing station.

## 2021-07-03 NOTE — CARE PLAN
Problem: Skin Integrity  Goal: Skin integrity is maintained or improved  Note: Q2 turns in progress     Problem: Self Care  Goal: Patient will have the ability to perform ADLs independently or with assistance (bathe, groom, dress, toilet and feed)  Note: Pt encouraged to perform ADLs   The patient is Stable - Low risk of patient condition declining or worsening    Shift Goals  Clinical Goals: [place] patient as needed, fall safety  Patient Goals: Rest comfortably  Family Goals: n.a    Progress made toward(s) clinical / shift goals:  pt able to brush teeth this AM    Patient is not progressing towards the following goals:

## 2021-07-03 NOTE — CARE PLAN
"The patient is Watcher - Medium risk of patient condition declining or worsening    Shift Goals  Clinical Goals: [place] patient as needed, fall safety  Patient Goals: Rest comfortably  Family Goals: n.a    Progress made toward(s) clinical / shift goals: See notes below for progress on clinical/shift goals (not progressing)    Problem: Skin Integrity  Goal: Skin integrity is maintained or improved  Outcome: Progressing       Patient is not progressing towards the following goals:      Problem: Fall Risk  Goal: Patient will remain free from falls  Outcome: Not Progressing  Note: Patient has been getting out of bed repeatedly all night, setting off bed alarm. Needs to be reoriented frequently, has not suffered any falls so far.     Problem: Knowledge Deficit - Standard  Goal: Patient and family/care givers will demonstrate understanding of plan of care, disease process/condition, diagnostic tests and medications  Outcome: Not Progressing  Note: Patient only oriented to self, doesn't remember where he is or why he needs to be here. Is having delusions, makes comments like \"that water has been poisoned\" and \"you're only keeping me here to kill me\"     Problem: Communication  Goal: The ability to communicate needs accurately and effectively will improve  Outcome: Not Progressing  Note: Patient has been educated numerous times on the use of the call light, rarely uses it or uses it inappropriately     "

## 2021-07-04 PROCEDURE — A9270 NON-COVERED ITEM OR SERVICE: HCPCS | Performed by: STUDENT IN AN ORGANIZED HEALTH CARE EDUCATION/TRAINING PROGRAM

## 2021-07-04 PROCEDURE — 700102 HCHG RX REV CODE 250 W/ 637 OVERRIDE(OP): Performed by: STUDENT IN AN ORGANIZED HEALTH CARE EDUCATION/TRAINING PROGRAM

## 2021-07-04 PROCEDURE — 700102 HCHG RX REV CODE 250 W/ 637 OVERRIDE(OP): Performed by: INTERNAL MEDICINE

## 2021-07-04 PROCEDURE — 770004 HCHG ROOM/CARE - ONCOLOGY PRIVATE *

## 2021-07-04 PROCEDURE — 700111 HCHG RX REV CODE 636 W/ 250 OVERRIDE (IP): Performed by: STUDENT IN AN ORGANIZED HEALTH CARE EDUCATION/TRAINING PROGRAM

## 2021-07-04 PROCEDURE — A9270 NON-COVERED ITEM OR SERVICE: HCPCS | Performed by: INTERNAL MEDICINE

## 2021-07-04 RX ADMIN — THERA TABS 1 TABLET: TAB at 06:15

## 2021-07-04 RX ADMIN — ASPIRIN 325 MG ORAL TABLET 325 MG: 325 PILL ORAL at 06:15

## 2021-07-04 RX ADMIN — ENOXAPARIN SODIUM 40 MG: 40 INJECTION SUBCUTANEOUS at 06:15

## 2021-07-04 RX ADMIN — Medication 100 MG: at 06:15

## 2021-07-04 RX ADMIN — LOSARTAN POTASSIUM 25 MG: 25 TABLET, FILM COATED ORAL at 06:15

## 2021-07-04 ASSESSMENT — PAIN DESCRIPTION - PAIN TYPE
TYPE: CHRONIC PAIN
TYPE: ACUTE PAIN
TYPE: ACUTE PAIN

## 2021-07-04 NOTE — CARE PLAN
Problem: Fall Risk  Goal: Patient will remain free from falls  Outcome: Progressing  Note: Pt remains free from falls this shift.     Problem: Self Care  Goal: Patient will have the ability to perform ADLs independently or with assistance (bathe, groom, dress, toilet and feed)  Outcome: Progressing  Note: Pt up sba to the bathroom just must be constantly monitored d/t risk for falls because of cognition.     Problem: Skin Integrity  Goal: Skin integrity is maintained or improved  Note: Pt has scattered bruising and scabbing d/t previous falls. No open wounds. Pt turns and repositions himself in bed.     The patient is Watcher - Medium risk of patient condition declining or worsening    Shift Goals  Clinical Goals: fall prevention; monitor for agitation  Patient Goals: n/a pt would not respond with an answer

## 2021-07-04 NOTE — CARE PLAN
"The patient is Watcher - Medium risk of patient condition declining or worsening    Shift Goals  Clinical Goals: Fall safety  Patient Goals: N/A (states, \"I need to get out of here\")  Family Goals: n.a    Progress made toward(s) clinical / shift goals: See note below about fall risk    Problem: Skin Integrity  Goal: Skin integrity is maintained or improved  Outcome: Progressing     Problem: Fall Risk  Goal: Patient will remain free from falls  Outcome: Progressing  Note: Patient has only attempted to get out of bed once so far this shift, staff made it to the room before he finished getting his lap belt off. Overall, has been more compliant this shift.       Patient is not progressing towards the following goals:      Problem: Communication  Goal: The ability to communicate needs accurately and effectively will improve  Outcome: Not Progressing  Note: Patient still not using his call light to alert staff of his needs. Re-educated on the use of his call light     "

## 2021-07-05 PROCEDURE — 700102 HCHG RX REV CODE 250 W/ 637 OVERRIDE(OP): Performed by: INTERNAL MEDICINE

## 2021-07-05 PROCEDURE — A9270 NON-COVERED ITEM OR SERVICE: HCPCS | Performed by: STUDENT IN AN ORGANIZED HEALTH CARE EDUCATION/TRAINING PROGRAM

## 2021-07-05 PROCEDURE — A9270 NON-COVERED ITEM OR SERVICE: HCPCS | Performed by: INTERNAL MEDICINE

## 2021-07-05 PROCEDURE — 700102 HCHG RX REV CODE 250 W/ 637 OVERRIDE(OP): Performed by: STUDENT IN AN ORGANIZED HEALTH CARE EDUCATION/TRAINING PROGRAM

## 2021-07-05 PROCEDURE — 770004 HCHG ROOM/CARE - ONCOLOGY PRIVATE *

## 2021-07-05 PROCEDURE — 700111 HCHG RX REV CODE 636 W/ 250 OVERRIDE (IP): Performed by: STUDENT IN AN ORGANIZED HEALTH CARE EDUCATION/TRAINING PROGRAM

## 2021-07-05 RX ADMIN — Medication 100 MG: at 05:54

## 2021-07-05 RX ADMIN — ENOXAPARIN SODIUM 40 MG: 40 INJECTION SUBCUTANEOUS at 05:54

## 2021-07-05 RX ADMIN — THERA TABS 1 TABLET: TAB at 05:54

## 2021-07-05 RX ADMIN — LOSARTAN POTASSIUM 25 MG: 25 TABLET, FILM COATED ORAL at 05:54

## 2021-07-05 RX ADMIN — ASPIRIN 325 MG ORAL TABLET 325 MG: 325 PILL ORAL at 05:54

## 2021-07-05 ASSESSMENT — PAIN DESCRIPTION - PAIN TYPE: TYPE: ACUTE PAIN

## 2021-07-05 NOTE — PROGRESS NOTES
Assumed care of pt at shift change. Pt is on RA with no signs of acute distress. Pt is calm this AM. Denied any pain. Telesitter in use.  All comfort measures in place. Call light by bedside. Bed locked and in lowest position. Hourly rounding in place.

## 2021-07-05 NOTE — CARE PLAN
The patient is Watcher - Medium risk of patient condition declining or worsening    Shift Goals  Clinical Goals: fall prevention; monitor for agitation  Patient Goals: n/a pt would not respond with an answer    Progress made toward(s) clinical / shift goals: See note below regarding fall prevention    Problem: Skin Integrity  Goal: Skin integrity is maintained or improved  Outcome: Progressing       Patient is not progressing towards the following goals:      Problem: Fall Risk  Goal: Patient will remain free from falls  Outcome: Not Progressing  Note: Patient continues to try to get out of bed every couple of hours. Lap belt in place (patient knows how to remove), telesitter in the room. Bed alarm on     Problem: Communication  Goal: The ability to communicate needs accurately and effectively will improve  Outcome: Not Progressing  Note: Patient does not use his call light appropriately, doesn't fully understand how it works. Calls to talk about delusions/hallucinations he's having, but won't call before attempting to get out of bed

## 2021-07-05 NOTE — PROGRESS NOTES
Tonight at 0215, patient got out of bed and left his room. CNA attempted to get him to go back into his room, he continued to walk around the unit, fists clenched, having paranoid delusions. He made it nursing home around the unit following the CNA before security was called and myself and another nurse were able to detain him. Patient was staggering unsteadily but did not suffer a fall. Patient struggled against us as we led him back to his room and put his lap belt back on. We readied wrist restraints but were able to talk the patient down enough to not require them at this time. They are still prepared for use if necessary. Patient continues to be more paranoid than normal.

## 2021-07-05 NOTE — CARE PLAN
The patient is Stable - Low risk of patient condition declining or worsening    Shift Goals  Clinical Goals: Pt will remain free from fall  Patient Goals: n/a   Family Goals: n.a    Progress made toward(s) clinical / shift goals:  Fall precautions in place, frequent toiletting provided, Pt is impulsive and educated to call before getting out of bed. Pt frequently rounded on.     Patient is not progressing towards the following goals:

## 2021-07-06 PROCEDURE — A9270 NON-COVERED ITEM OR SERVICE: HCPCS | Performed by: STUDENT IN AN ORGANIZED HEALTH CARE EDUCATION/TRAINING PROGRAM

## 2021-07-06 PROCEDURE — 99231 SBSQ HOSP IP/OBS SF/LOW 25: CPT | Performed by: NURSE PRACTITIONER

## 2021-07-06 PROCEDURE — A9270 NON-COVERED ITEM OR SERVICE: HCPCS | Performed by: INTERNAL MEDICINE

## 2021-07-06 PROCEDURE — 700102 HCHG RX REV CODE 250 W/ 637 OVERRIDE(OP): Performed by: STUDENT IN AN ORGANIZED HEALTH CARE EDUCATION/TRAINING PROGRAM

## 2021-07-06 PROCEDURE — 700111 HCHG RX REV CODE 636 W/ 250 OVERRIDE (IP): Performed by: STUDENT IN AN ORGANIZED HEALTH CARE EDUCATION/TRAINING PROGRAM

## 2021-07-06 PROCEDURE — 770004 HCHG ROOM/CARE - ONCOLOGY PRIVATE *

## 2021-07-06 PROCEDURE — 700102 HCHG RX REV CODE 250 W/ 637 OVERRIDE(OP): Performed by: INTERNAL MEDICINE

## 2021-07-06 RX ADMIN — Medication 100 MG: at 05:40

## 2021-07-06 RX ADMIN — LOSARTAN POTASSIUM 25 MG: 25 TABLET, FILM COATED ORAL at 05:40

## 2021-07-06 RX ADMIN — ASPIRIN 325 MG ORAL TABLET 325 MG: 325 PILL ORAL at 05:40

## 2021-07-06 RX ADMIN — THERA TABS 1 TABLET: TAB at 05:40

## 2021-07-06 RX ADMIN — ENOXAPARIN SODIUM 40 MG: 40 INJECTION SUBCUTANEOUS at 05:40

## 2021-07-06 RX ADMIN — DOCUSATE SODIUM 50 MG AND SENNOSIDES 8.6 MG 2 TABLET: 8.6; 5 TABLET, FILM COATED ORAL at 17:48

## 2021-07-06 ASSESSMENT — ENCOUNTER SYMPTOMS
FEVER: 0
DIZZINESS: 0
ABDOMINAL PAIN: 0
CHILLS: 0
WHEEZING: 0
VOMITING: 0
NERVOUS/ANXIOUS: 0
WEAKNESS: 0
DIARRHEA: 0
COUGH: 0
EYE PAIN: 0
PALPITATIONS: 0
HEADACHES: 0
SINUS PAIN: 0
CONSTIPATION: 0
SORE THROAT: 0
SHORTNESS OF BREATH: 0
MEMORY LOSS: 1
NAUSEA: 0
MYALGIAS: 0

## 2021-07-06 ASSESSMENT — PAIN DESCRIPTION - PAIN TYPE: TYPE: ACUTE PAIN

## 2021-07-06 ASSESSMENT — FIBROSIS 4 INDEX: FIB4 SCORE: 1.721378477236588914

## 2021-07-06 NOTE — CARE PLAN
The patient is Stable - Low risk of patient condition declining or worsening    Shift Goals  Clinical Goals: Patient will remain free of falls this shift  Patient Goals: unable to assess pt would not answer appropriately  Family Goals: n/a    Progress made toward(s) clinical / shift goals:  Patient has remained free of falls so far this shift      Problem: Skin Integrity  Goal: Skin integrity is maintained or improved  Outcome: Progressing     Problem: Fall Risk  Goal: Patient will remain free from falls  Outcome: Progressing     Problem: Nutrition  Goal: Patient's nutritional and fluid intake will be adequate or improve  Outcome: Progressing  Goal: Enteral nutrition will be maintained or improve  Outcome: Progressing  Goal: Enteral nutrition will be maintained or improve  Outcome: Progressing     Problem: Mobility  Goal: Patient's capacity to carry out activities will improve  Outcome: Progressing     Problem: Self Care  Goal: Patient will have the ability to perform ADLs independently or with assistance (bathe, groom, dress, toilet and feed)  Outcome: Progressing       Patient is not progressing towards the following goals:  Patient is delusional/having hallucinations.    Problem: Knowledge Deficit - Standard  Goal: Patient and family/care givers will demonstrate understanding of plan of care, disease process/condition, diagnostic tests and medications  Outcome: Not Progressing     Problem: Communication  Goal: The ability to communicate needs accurately and effectively will improve  Outcome: Not Progressing

## 2021-07-06 NOTE — CARE PLAN
The patient is Watcher - Medium risk of patient condition declining or worsening    Shift Goals  Clinical Goals: Reorient patient as needed, fall safety  Patient Goals: n/a pt would not respond with an answer    Progress made toward(s) clinical / shift goals: See note below for progress of clinical goals:    Problem: Skin Integrity  Goal: Skin integrity is maintained or improved  Outcome: Progressing     Problem: Fall Risk  Goal: Patient will remain free from falls  Outcome: Progressing  Note: Patient has been compliant so far this shift (almost 5 hours in), but did have an incident last night of leaving the bed. See other note from this AM regarding the incident.       Patient is not progressing towards the following goals:      Problem: Knowledge Deficit - Standard  Goal: Patient and family/care givers will demonstrate understanding of plan of care, disease process/condition, diagnostic tests and medications  Outcome: Not Progressing  Note: Patient doesn't have an understanding as to why he's in the hospital, very confused and in denial about the fact that he's had multiple falls with witnesses

## 2021-07-06 NOTE — PROGRESS NOTES
[facility] Medicine Bi-Weekly Progress Note    Date of Service  7/6/2021    Chief Complaint  66 y.o. male admitted 6/4/2021 with altered mental status    Hospital Course  Mr. Beard is a 66-year-old male with no known PMHx who presented to the hospital on 6/4/2021 after he was found down on the sidewalk after multiple witnessed falls.  Patient was confused at the time of admission.  He had been seen earlier in the day for syncope while crossing the street however at that time left AGAINST MEDICAL ADVICE.  On admission he was tachycardic with a heart rate of 114, anion gap of 23 and a lactic acid of 2.6.  He had a CT scan of the head, which revealed no acute intracranial abnormalities.  Chest x-ray completed, which showed no acute cardiopulmonary process.  Patient was admitted for further evaluation of altered mental status and syncope. Patient throughout this stay has been confused an lack of capacity was established.   He underwent MRI brain with and without contrast on Gavi 10, 2021, no acute abnormalities noted. Guardianship being pursued and patient will need placement.       Interval Problem Update  Patient lying in bed, pleasant and cooperative.  He is alert and oriented to self and place.  He is disoriented to exact day and situation.  He denies pain, dizziness, headache and any other problems.  -full strength  -No changes to current plan of care    Consultants/Specialty  Neurology    Code Status  Full Code    Disposition  Pt remains with cognitive impairments and lacks capacity, suspect pt will need long term placement with guardianship     Review of Systems  Review of Systems   Constitutional: Negative for chills, fever and malaise/fatigue.   HENT: Negative for sinus pain and sore throat.    Eyes: Negative for pain.   Respiratory: Negative for cough, shortness of breath and wheezing.    Cardiovascular: Negative for chest pain and palpitations.   Gastrointestinal: Negative for abdominal pain, constipation,  diarrhea, nausea and vomiting.   Genitourinary: Negative for dysuria, frequency and urgency.   Musculoskeletal: Negative for joint pain and myalgias.   Neurological: Negative for dizziness, weakness and headaches.   Psychiatric/Behavioral: Positive for memory loss. The patient is not nervous/anxious.        Physical Exam  Temp:  [36.7 °C (98 °F)-37.2 °C (98.9 °F)] 37.2 °C (98.9 °F)  Pulse:  [71-81] 76  Resp:  [16-18] 18  BP: (112-121)/(66-71) 112/68  SpO2:  [97 %-99 %] 99 %    Physical Exam  Vitals and nursing note reviewed.   Constitutional:       General: He is awake. He is not in acute distress.     Appearance: Normal appearance. He is overweight.   HENT:      Head: Normocephalic.      Nose: Nose normal.      Mouth/Throat:      Lips: Pink.      Mouth: Mucous membranes are moist.   Eyes:      General: Lids are normal.   Cardiovascular:      Rate and Rhythm: Normal rate.      Heart sounds: Normal heart sounds.   Pulmonary:      Effort: Pulmonary effort is normal. No respiratory distress.      Breath sounds: Normal breath sounds. No decreased breath sounds.   Abdominal:      General: Bowel sounds are normal.      Palpations: Abdomen is soft.      Tenderness: There is no abdominal tenderness.   Musculoskeletal:         General: No swelling or tenderness.      Cervical back: Neck supple.   Skin:     General: Skin is warm and dry.   Neurological:      Mental Status: He is alert. He is disoriented.      Comments: Unclear on situation, tangential    Psychiatric:         Mood and Affect: Affect is flat.         Behavior: Behavior is cooperative.         Cognition and Memory: Cognition is impaired. Memory is impaired. He exhibits impaired recent memory and impaired remote memory.         Judgment: Judgment is impulsive.     Exam completed 7/6/2021 and unchanged from prior     Fluids    Intake/Output Summary (Last 24 hours) at 7/6/2021 1256  Last data filed at 7/5/2021 1400  Gross per 24 hour   Intake 240 ml   Output --    Net 240 ml       Laboratory                        Imaging  MR-BRAIN-WITH & W/O   Final Result         No acute intracranial process.      Nonspecific T2 hyperintensities in the deep white matter related to chronic microvascular ischemia.      [id number] VIEW   Final Result      1.  No metallic foreign bodies detected.   2.  Right nephrolithiasis.      EC-ECHOCARDIOGRAM LTD W/ CONT   Final Result      CT-HEAD W/O   Final Result      1.  Cerebral atrophy and chronic microvascular ischemic type changes.   2.  No acute intracranial abnormality.      DX-CHEST-PORTABLE (1 VIEW)   Final Result      No acute cardiopulmonary abnormality.           Assessment/Plan  * AMS (altered mental status)- (present on admission)  Assessment & Plan  Dementia likely contributing. SLP cognitive evaluation results on 6/11 shows severe-profound deficits across almost all cognitive domains tested, consistent with dementia. He was evaluated by psychiatry on 6/15 who also agree with likely dementia dx, and since he is without acute psychiatric symptoms or behaviors, psychiatry consult cancelled. Evaluated by neurology. CT head and MRI brain negative. TSH, HIV, RPR, B12 normal.  - Started on thiamine  - Friends are attempting to pursue guardianship     Anemia  Assessment & Plan  Hgb remains mildly low. Currently hgb 12.6, MCV elevated. Iron panel normal. B12 normal, folate normal on 6/11. Patient asymptomatic.   - monitor    Syncope- (present on admission)  Assessment & Plan  Had initially presented to the ED for syncope and left AMA; subsequently found down on sidewalk after multiple witnessed falls. CT head and brain MRI negative. Echo unremarkable. No significant arrhythmias found on tele   - orthostatic vitals - completed   - Fall precautions   - continue PT/OT    Marijuana use  Assessment & Plan  Presented with positive drugs screen for cannabinoid   - encourage cessation    Dehydration- (present on admission)  Assessment &  Plan  Resolved  - Encourage oral intake      VTE prophylaxis: Lovenox    KG Farrar.

## 2021-07-06 NOTE — DISCHARGE PLANNING
Anticipated Discharge Disposition: TBD, Group Home vs. SNF for long term placement. Patient pending guardianship.     Action: This RN,  completed a chart review, patient is a long term patient at [facility], will most likely transfer to [place] [id number] or  when bed is available.     Patient remains with cognitive impairments and lacks capacity. His affect is flat, has impaired recent memory, impaired remote memory, judgement is impulsive per Provider's note, 07/06/21.     Once patient has been here 30 days, (07/08/21, [employer] should be contacted for initiation of institutional Medicaid, if patient qualifies.     Barriers to Discharge: Pending guardianship, placement    Plan: HCM to follow for public guardianship, find placement once patient has a public guardian OR his friends become guardian? Friends visiting from [place], (were in the service together) stated they were also going to pursue guardianship. [facility] has already sent paperwork to [facility] Public Guardian's office. Placement: Group Home vs. SNF for long term care.     On 07/08/21, please sent an Email to [employer] asking if patient qualifies for institutional Medicaid. Thank you!  Brigitte Hinds RN,

## 2021-07-06 NOTE — PROGRESS NOTES
Assumed care of patient at 0700. Received report from night shift RN. Patient alert, but disorientated x4. Able to tell this RN his first name, but not last name. Knew birthday was in May, but could not state correct day/year. Denies pain, SOB, dizziness, and nausea. This RN assisted patient to bathroom - steady gait, but remains impulsive. Patient demonstrated ability to remove lap belt on his own. Tele sitter remains in place for safety. Call light within reach. Hourly rounds and bed alarm in place. Bed locked in lowest position. Will continue to implement plan of care.

## 2021-07-07 PROCEDURE — A9270 NON-COVERED ITEM OR SERVICE: HCPCS | Performed by: STUDENT IN AN ORGANIZED HEALTH CARE EDUCATION/TRAINING PROGRAM

## 2021-07-07 PROCEDURE — 700102 HCHG RX REV CODE 250 W/ 637 OVERRIDE(OP): Performed by: INTERNAL MEDICINE

## 2021-07-07 PROCEDURE — 700102 HCHG RX REV CODE 250 W/ 637 OVERRIDE(OP): Performed by: STUDENT IN AN ORGANIZED HEALTH CARE EDUCATION/TRAINING PROGRAM

## 2021-07-07 PROCEDURE — 770004 HCHG ROOM/CARE - ONCOLOGY PRIVATE *

## 2021-07-07 PROCEDURE — 700111 HCHG RX REV CODE 636 W/ 250 OVERRIDE (IP): Performed by: STUDENT IN AN ORGANIZED HEALTH CARE EDUCATION/TRAINING PROGRAM

## 2021-07-07 PROCEDURE — A9270 NON-COVERED ITEM OR SERVICE: HCPCS | Performed by: INTERNAL MEDICINE

## 2021-07-07 RX ADMIN — ENOXAPARIN SODIUM 40 MG: 40 INJECTION SUBCUTANEOUS at 04:31

## 2021-07-07 RX ADMIN — DOCUSATE SODIUM 50 MG AND SENNOSIDES 8.6 MG 2 TABLET: 8.6; 5 TABLET, FILM COATED ORAL at 17:10

## 2021-07-07 RX ADMIN — Medication 100 MG: at 04:32

## 2021-07-07 RX ADMIN — ASPIRIN 325 MG ORAL TABLET 325 MG: 325 PILL ORAL at 04:32

## 2021-07-07 RX ADMIN — LOSARTAN POTASSIUM 25 MG: 25 TABLET, FILM COATED ORAL at 04:32

## 2021-07-07 RX ADMIN — THERA TABS 1 TABLET: TAB at 04:32

## 2021-07-07 RX ADMIN — DOCUSATE SODIUM 50 MG AND SENNOSIDES 8.6 MG 2 TABLET: 8.6; 5 TABLET, FILM COATED ORAL at 04:31

## 2021-07-07 ASSESSMENT — PAIN DESCRIPTION - PAIN TYPE
TYPE: ACUTE PAIN

## 2021-07-07 NOTE — CARE PLAN
Problem: Fall Risk  Goal: Patient will remain free from falls  Outcome: Progressing     Problem: Communication  Goal: The ability to communicate needs accurately and effectively will improve  Outcome: Progressing   The patient is Stable - Low risk of patient condition declining or worsening    Shift Goals  Clinical Goals: Patient will remain free of falls this shift  Patient Goals: unable to assess pt would not answer appropriately  Family Goals: n/a    Progress made toward(s) clinical / shift goals:  POC discussed with patient. No learning evidenced. Patient is A/Ox1-2. Patient is impulsive when trying to get out of bed. Bed alarm on, Bed is locked and in the lowest position. Telesitter in place. Bed side commitment in place. Hourly rounding in place.    Patient is not progressing towards the following goals:

## 2021-07-07 NOTE — CARE PLAN
Problem: Skin Integrity  Goal: Skin integrity is maintained or improved  Outcome: Progressing     Problem: Fall Risk  Goal: Patient will remain free from falls  Outcome: Progressing     Problem: Mobility  Goal: Patient's capacity to carry out activities will improve  Outcome: Progressing   The patient is Stable - Low risk of patient condition declining or worsening    Shift Goals; no falls  Clinical Goals: Patient will remain free of falls this shift  Patient Goals: unable to assess pt would not answer appropriately  Family Goals: n/a    Progress made toward(s) clinical / shift goals:  no falls, denies pain    Patient is not progressing towards the following goals:

## 2021-07-07 NOTE — PROGRESS NOTES
0715 assumed care. Resting in bed and in no distress. Bed in low position, call light w/in reach, strip bed alarm on.

## 2021-07-07 NOTE — PROGRESS NOTES
Denies pain. VSS, afebrile. Alert, oriented x2/forgetful. Telesitter in progress. Marco A diet, consumed >75% of meal. Voids w/out problems. Amb to bathrrom w/ hand held assist. Will continue to monitor comfort and safety.

## 2021-07-08 PROCEDURE — 700111 HCHG RX REV CODE 636 W/ 250 OVERRIDE (IP): Performed by: STUDENT IN AN ORGANIZED HEALTH CARE EDUCATION/TRAINING PROGRAM

## 2021-07-08 PROCEDURE — 700102 HCHG RX REV CODE 250 W/ 637 OVERRIDE(OP): Performed by: STUDENT IN AN ORGANIZED HEALTH CARE EDUCATION/TRAINING PROGRAM

## 2021-07-08 PROCEDURE — 700102 HCHG RX REV CODE 250 W/ 637 OVERRIDE(OP): Performed by: INTERNAL MEDICINE

## 2021-07-08 PROCEDURE — A9270 NON-COVERED ITEM OR SERVICE: HCPCS | Performed by: INTERNAL MEDICINE

## 2021-07-08 PROCEDURE — 770004 HCHG ROOM/CARE - ONCOLOGY PRIVATE *

## 2021-07-08 PROCEDURE — A9270 NON-COVERED ITEM OR SERVICE: HCPCS | Performed by: STUDENT IN AN ORGANIZED HEALTH CARE EDUCATION/TRAINING PROGRAM

## 2021-07-08 RX ADMIN — DOCUSATE SODIUM 50 MG AND SENNOSIDES 8.6 MG 2 TABLET: 8.6; 5 TABLET, FILM COATED ORAL at 05:07

## 2021-07-08 RX ADMIN — ENOXAPARIN SODIUM 40 MG: 40 INJECTION SUBCUTANEOUS at 05:06

## 2021-07-08 RX ADMIN — Medication 100 MG: at 05:07

## 2021-07-08 RX ADMIN — THERA TABS 1 TABLET: TAB at 05:06

## 2021-07-08 RX ADMIN — LOSARTAN POTASSIUM 25 MG: 25 TABLET, FILM COATED ORAL at 05:13

## 2021-07-08 RX ADMIN — ASPIRIN 325 MG ORAL TABLET 325 MG: 325 PILL ORAL at 05:07

## 2021-07-08 ASSESSMENT — PAIN DESCRIPTION - PAIN TYPE
TYPE: ACUTE PAIN
TYPE: ACUTE PAIN

## 2021-07-08 NOTE — CARE PLAN
Problem: Skin Integrity  Goal: Skin integrity is maintained or improved  Outcome: Progressing     Problem: Fall Risk  Goal: Patient will remain free from falls  Outcome: Progressing        The patient is Stable - Low risk of patient condition declining or worsening    Shift Goals  Clinical Goals: Patient will remain free of falls this shift  Patient Goals: unable to assess pt would not answer appropriately  Family Goals: n/a    Progress made toward(s) clinical / shift goals:  Patient is A/Ox1, POC discussed, no learning evidence. Patient is impulsive at times, but is easily redirected by the tele sitter. Bed locked in lowest position. Bed alarm on. Tele sitter in place. Hourly rounding in place.     Patient is not progressing towards the following goals:

## 2021-07-08 NOTE — CARE PLAN
The patient is Stable - Low risk of patient condition declining or worsening    Shift Goals  Clinical Goals: safety and free from falls  Patient Goals: be comfortable  Family Goals: n/a    Progress made toward(s) clinical / shift goals:  Pt denies any pain or discomfort, free from falls  Problem: Skin Integrity  Goal: Skin integrity is maintained or improved  Outcome: Progressing  Note: q2 turns, marleen risk assessment, applied barrier cream, waffle overlay in place, bilateral heels floated with pillows     Problem: Fall Risk  Goal: Patient will remain free from falls  Outcome: Progressing  Note: Treaded socks in place, bed in the lowest position, bed alarm on, call light and belongings within reach,        Patient is not progressing towards the following goals:

## 2021-07-08 NOTE — PROGRESS NOTES
Pharmacy Pharmacotherapy Consult for LOS >30 days    Admit Date: 6/4/2021      Medications were reviewed for appropriateness and ongoing need.     Current Facility-Administered Medications   Medication Dose Route Frequency Provider Last Rate Last Admin   • thiamine (Vitamin B-1) tablet 100 mg  100 mg Oral DAILY Ilya Phan M.D.   100 mg at 07/08/21 0507   • multivitamin (THERAGRAN) tablet 1 tablet  1 tablet Oral DAILY Ilya Phan M.D.   1 tablet at 07/08/21 0506   • losartan (COZAAR) tablet 25 mg  25 mg Oral Q DAY Adia Lara M.D.   25 mg at 07/08/21 0513   • LORazepam (ATIVAN) injection 1 mg  1 mg Intravenous Once PRN Adia Lara M.D.       • aspirin (ASA) tablet 325 mg  325 mg Oral DAILY Nabeel iPtts M.D.   325 mg at 07/08/21 0507    Or   • aspirin (ASA) chewable tab 324 mg  324 mg Oral DAILY Nabeel Pitts M.D.   324 mg at 07/02/21 0612    Or   • aspirin (ASA) suppository 300 mg  300 mg Rectal DAILY Nabeel Pitts M.D.       • regadenoson (LEXISCAN) injection SOLN 0.4 mg  0.4 mg Intravenous Once PRN Nabeel Pitts M.D.       • aminophylline injection 100 mg  100 mg Intravenous Q5 MIN PRN Nabeel Pitts M.D.       • acetaminophen (Tylenol) tablet 650 mg  650 mg Oral Q6HRS PRN Jojo Castillo M.D.       • enalaprilat (VASOTEC) injection 1.25 mg  1.25 mg Intravenous Q6HRS PRN Jojo Castillo M.D.       • labetalol (NORMODYNE/TRANDATE) injection 10 mg  10 mg Intravenous Q4HRS PRN Jojo Castillo M.D.       • ondansetron (ZOFRAN ODT) dispertab 4 mg  4 mg Oral Q4HRS PRN Jojo Castillo M.D.   4 mg at 06/30/21 1431   • ondansetron (ZOFRAN) syringe/vial injection 4 mg  4 mg Intravenous Q4HRS PRN Jojo Castillo M.D.       • Respiratory Therapy Consult   Nebulization Continuous RT Jojo Castillo M.D.       • senna-docusate (PERICOLACE or SENOKOT S) 8.6-50 MG per tablet 2 tablet  2 tablet Oral BID Jojo Castillo M.D.   2 tablet at 07/08/21 0507    And   •  polyethylene glycol/lytes (MIRALAX) PACKET 1 Packet  1 Packet Oral QDAY PRN Jojo Castillo M.D.        And   • magnesium hydroxide (MILK OF MAGNESIA) suspension 30 mL  30 mL Oral QDAY PRN Jojo Castillo M.D.        And   • bisacodyl (DULCOLAX) suppository 10 mg  10 mg Rectal QDAY PRN Jojo Castillo M.D.       • enoxaparin (LOVENOX) inj 40 mg  40 mg Subcutaneous DAILY Jojo Castillo M.D.   40 mg at 07/08/21 0506   • haloperidol lactate (HALDOL) injection 2-5 mg  2-5 mg Intravenous Q4HRS PRN Jojo Castillo M.D.   5 mg at 06/12/21 0259     Recommendations:  1. Recommend discontinuation of PRN lorazepam (ordered 6/8 and never used), PRN haldol (last used 6/12), PRN enalaprilat and labetalol (ordered 6/4 and never used).   2. There are no other recommendations at this time.     Jeannie Traore, PharmD, BCOP

## 2021-07-08 NOTE — PROGRESS NOTES
Report received. Assumed care. Pt in bed awake. A/O x1. Tele sitter onVSS.  Denies pain, SOB. Assessment complete. Discussed POC, monitor VS, pain control, mobility, safety, DC planning , pt verbalizes understanding. Explained importance of calling before getting OOB. Call light and belongings within reach. Bed alarm on. Bed in the lowest position. Treaded socks in place. Hourly rounding in progress. Will continue to monitor .

## 2021-07-09 PROCEDURE — 770004 HCHG ROOM/CARE - ONCOLOGY PRIVATE *

## 2021-07-09 PROCEDURE — A9270 NON-COVERED ITEM OR SERVICE: HCPCS | Performed by: NURSE PRACTITIONER

## 2021-07-09 PROCEDURE — 700102 HCHG RX REV CODE 250 W/ 637 OVERRIDE(OP): Performed by: NURSE PRACTITIONER

## 2021-07-09 PROCEDURE — A9270 NON-COVERED ITEM OR SERVICE: HCPCS | Performed by: INTERNAL MEDICINE

## 2021-07-09 PROCEDURE — 700102 HCHG RX REV CODE 250 W/ 637 OVERRIDE(OP): Performed by: INTERNAL MEDICINE

## 2021-07-09 PROCEDURE — 700111 HCHG RX REV CODE 636 W/ 250 OVERRIDE (IP): Performed by: STUDENT IN AN ORGANIZED HEALTH CARE EDUCATION/TRAINING PROGRAM

## 2021-07-09 PROCEDURE — 99231 SBSQ HOSP IP/OBS SF/LOW 25: CPT | Performed by: NURSE PRACTITIONER

## 2021-07-09 RX ADMIN — LOSARTAN POTASSIUM 25 MG: 25 TABLET, FILM COATED ORAL at 09:38

## 2021-07-09 RX ADMIN — ENOXAPARIN SODIUM 40 MG: 40 INJECTION SUBCUTANEOUS at 09:42

## 2021-07-09 RX ADMIN — Medication 100 MG: at 09:41

## 2021-07-09 RX ADMIN — ASPIRIN 324 MG: 81 TABLET, CHEWABLE ORAL at 09:39

## 2021-07-09 ASSESSMENT — ENCOUNTER SYMPTOMS
MYALGIAS: 0
DIARRHEA: 0
MEMORY LOSS: 1
COUGH: 0
WEAKNESS: 0
NAUSEA: 0
CONSTIPATION: 0
ABDOMINAL PAIN: 0
DIZZINESS: 0
SHORTNESS OF BREATH: 0
HEADACHES: 0
PALPITATIONS: 0
WHEEZING: 0
EYE PAIN: 0
FEVER: 0
SINUS PAIN: 0
VOMITING: 0
CHILLS: 0
NERVOUS/ANXIOUS: 0
SORE THROAT: 0

## 2021-07-09 ASSESSMENT — PAIN DESCRIPTION - PAIN TYPE
TYPE: ACUTE PAIN
TYPE: ACUTE PAIN

## 2021-07-09 NOTE — CARE PLAN
The patient is Stable - Low risk of patient condition declining or worsening    Shift Goals  Clinical Goals: safety and free from falls  Patient Goals: be comfortable  Family Goals: n/a    Progress made toward(s) clinical / shift goals:    Problem: Fall Risk  Goal: Patient will remain free from falls  Outcome: Progressing  Note: 1-1 sitter in place; pt in room near nursing station.       Patient is not progressing towards the following goals:      Problem: Communication  Goal: The ability to communicate needs accurately and effectively will improve  Outcome: Not Progressing  Note: Pt unable to make needs known; frequently requires reorientation.

## 2021-07-09 NOTE — PROGRESS NOTES
"[facility] Medicine Bi-Weekly Progress Note    Date of Service  7/9/2021    Chief Complaint  66 y.o. male admitted 6/4/2021 with altered mental status    Hospital Course  Mr. Beard is a 66-year-old male with no known PMHx who presented to the hospital on 6/4/2021 after he was found down on the sidewalk after multiple witnessed falls.  Patient was confused at the time of admission.  He had been seen earlier in the day for syncope while crossing the street however at that time left AGAINST MEDICAL ADVICE.  On admission he was tachycardic with a heart rate of 114, anion gap of 23 and a lactic acid of 2.6.  He had a CT scan of the head, which revealed no acute intracranial abnormalities.  Chest x-ray completed, which showed no acute cardiopulmonary process.  Patient was admitted for further evaluation of altered mental status and syncope. Patient throughout this stay has been confused an lack of capacity was established.   He underwent MRI brain with and without contrast on Gavi 10, 2021, no acute abnormalities noted. Guardianship being pursued and patient will need placement.       Interval Problem Update  Patient lying in bed, pleasant and cooperative.  He is alert and oriented to self and place.  He is disoriented to exact day and situation.  He refused his morning medications because \"10 or 12 bad guys took some shots.\"  After education the patient agrees to take medications. He denies pain, dizziness, headache and any other problems.  -Tangential speech  -Discussed with nursing who successfully administered medications  -No changes to current plan of care    Consultants/Specialty  Neurology    Code Status  Full Code    Disposition  Pt remains with cognitive impairments and lacks capacity, suspect pt will need long term placement with guardianship     Review of Systems  Review of Systems   Constitutional: Negative for chills, fever and malaise/fatigue.   HENT: Negative for sinus pain and sore throat.    Eyes: " Negative for pain.   Respiratory: Negative for cough, shortness of breath and wheezing.    Cardiovascular: Negative for chest pain and palpitations.   Gastrointestinal: Negative for abdominal pain, constipation, diarrhea, nausea and vomiting.   Genitourinary: Negative for dysuria, frequency and urgency.   Musculoskeletal: Negative for joint pain and myalgias.   Neurological: Negative for dizziness, weakness and headaches.   Psychiatric/Behavioral: Positive for memory loss. The patient is not nervous/anxious.        Physical Exam  Temp:  [36.1 °C (97 °F)-37.1 °C (98.8 °F)] 37.1 °C (98.8 °F)  Pulse:  [] 98  Resp:  [17-18] 18  BP: ()/(66-94) 108/66  SpO2:  [97 %-98 %] 97 %    Physical Exam  Vitals and nursing note reviewed.   Constitutional:       General: He is awake. He is not in acute distress.     Appearance: Normal appearance. He is overweight.   HENT:      Head: Normocephalic.      Nose: Nose normal.      Mouth/Throat:      Lips: Pink.      Mouth: Mucous membranes are moist.   Eyes:      General: Lids are normal.   Cardiovascular:      Rate and Rhythm: Normal rate.      Heart sounds: Normal heart sounds.   Pulmonary:      Effort: Pulmonary effort is normal. No respiratory distress.      Breath sounds: Normal breath sounds. No decreased breath sounds.   Abdominal:      General: Bowel sounds are normal.      Palpations: Abdomen is soft.      Tenderness: There is no abdominal tenderness.   Musculoskeletal:         General: No swelling or tenderness.      Cervical back: Neck supple.   Skin:     General: Skin is warm and dry.   Neurological:      Mental Status: He is alert. He is disoriented.      Comments: Unclear on situation, tangential    Psychiatric:         Mood and Affect: Affect is flat.         Behavior: Behavior is cooperative.         Cognition and Memory: Cognition is impaired. Memory is impaired. He exhibits impaired recent memory and impaired remote memory.         Judgment: Judgment is  impulsive.     Exam completed 7/9/2021 and unchanged from prior     Fluids  No intake or output data in the 24 hours ending 07/09/21 1300    Laboratory                        Imaging  MR-BRAIN-WITH & W/O   Final Result         No acute intracranial process.      Nonspecific T2 hyperintensities in the deep white matter related to chronic microvascular ischemia.      [id number] VIEW   Final Result      1.  No metallic foreign bodies detected.   2.  Right nephrolithiasis.      EC-ECHOCARDIOGRAM LTD W/ CONT   Final Result      CT-HEAD W/O   Final Result      1.  Cerebral atrophy and chronic microvascular ischemic type changes.   2.  No acute intracranial abnormality.      DX-CHEST-PORTABLE (1 VIEW)   Final Result      No acute cardiopulmonary abnormality.           Assessment/Plan  * AMS (altered mental status)- (present on admission)  Assessment & Plan  Dementia likely contributing. SLP cognitive evaluation results on 6/11 shows severe-profound deficits across almost all cognitive domains tested, consistent with dementia. He was evaluated by psychiatry on 6/15 who also agree with likely dementia dx, and since he is without acute psychiatric symptoms or behaviors, psychiatry consult cancelled. Evaluated by neurology. CT head and MRI brain negative. TSH, HIV, RPR, B12 normal.  - Started on thiamine  - Friends are attempting to pursue guardianship     Anemia  Assessment & Plan  Hgb remains mildly low. Currently hgb 12.6, MCV elevated. Iron panel normal. B12 normal, folate normal on 6/11. Patient asymptomatic.   - monitor    Syncope- (present on admission)  Assessment & Plan  Had initially presented to the ED for syncope and left AMA; subsequently found down on sidewalk after multiple witnessed falls. CT head and brain MRI negative. Echo unremarkable. No significant arrhythmias found on tele   - orthostatic vitals - completed   - Fall precautions   - continue PT/OT    Marijuana use  Assessment & Plan  Presented with  positive drugs screen for cannabinoid   - encourage cessation    Dehydration- (present on admission)  Assessment & Plan  Resolved  - Encourage oral intake      VTE prophylaxis: Lovenox    KG Farrar.

## 2021-07-10 PROCEDURE — A9270 NON-COVERED ITEM OR SERVICE: HCPCS | Performed by: INTERNAL MEDICINE

## 2021-07-10 PROCEDURE — A9270 NON-COVERED ITEM OR SERVICE: HCPCS | Performed by: NURSE PRACTITIONER

## 2021-07-10 PROCEDURE — 700102 HCHG RX REV CODE 250 W/ 637 OVERRIDE(OP): Performed by: STUDENT IN AN ORGANIZED HEALTH CARE EDUCATION/TRAINING PROGRAM

## 2021-07-10 PROCEDURE — A9270 NON-COVERED ITEM OR SERVICE: HCPCS | Performed by: STUDENT IN AN ORGANIZED HEALTH CARE EDUCATION/TRAINING PROGRAM

## 2021-07-10 PROCEDURE — 770004 HCHG ROOM/CARE - ONCOLOGY PRIVATE *

## 2021-07-10 PROCEDURE — 700111 HCHG RX REV CODE 636 W/ 250 OVERRIDE (IP): Performed by: STUDENT IN AN ORGANIZED HEALTH CARE EDUCATION/TRAINING PROGRAM

## 2021-07-10 PROCEDURE — 700102 HCHG RX REV CODE 250 W/ 637 OVERRIDE(OP): Performed by: INTERNAL MEDICINE

## 2021-07-10 PROCEDURE — 700102 HCHG RX REV CODE 250 W/ 637 OVERRIDE(OP): Performed by: NURSE PRACTITIONER

## 2021-07-10 RX ADMIN — THERA TABS 1 TABLET: TAB at 06:13

## 2021-07-10 RX ADMIN — ASPIRIN 325 MG ORAL TABLET 325 MG: 325 PILL ORAL at 06:13

## 2021-07-10 RX ADMIN — Medication 100 MG: at 06:13

## 2021-07-10 RX ADMIN — ENOXAPARIN SODIUM 40 MG: 40 INJECTION SUBCUTANEOUS at 06:13

## 2021-07-10 RX ADMIN — LOSARTAN POTASSIUM 25 MG: 25 TABLET, FILM COATED ORAL at 06:13

## 2021-07-10 RX ADMIN — DOCUSATE SODIUM 50 MG AND SENNOSIDES 8.6 MG 2 TABLET: 8.6; 5 TABLET, FILM COATED ORAL at 06:13

## 2021-07-10 ASSESSMENT — PAIN DESCRIPTION - PAIN TYPE: TYPE: ACUTE PAIN

## 2021-07-10 NOTE — CARE PLAN
The patient is Stable - Low risk of patient condition declining or worsening    Shift Goals  Clinical Goals: safety  Patient Goals: rest  Family Goals: n/a    Progress made toward(s) clinical / shift goals:    Problem: Skin Integrity  Goal: Skin integrity is maintained or improved  Outcome: Progressing  Note: Pt turns self side-to-side frequently. Skin assessed thoroughly.     Problem: Fall Risk  Goal: Patient will remain free from falls  Outcome: Progressing  Note: Pt in room close to nursing station. Telesitter in place. Education provided on calling before attempting to mobilize. Pt displaying increasing compliance w/ call light use and mobilization assistance.        Patient is not progressing towards the following goals:

## 2021-07-10 NOTE — CARE PLAN
Problem: Fall Risk  Goal: Patient will remain free from falls  Outcome: Progressing  Note: High fall risk precautions, BSC, tele sitter     Problem: Nutrition  Goal: Enteral nutrition will be maintained or improve  Outcome: Progressing   The patient is Stable - Low risk of patient condition declining or worsening    Shift Goals  Clinical Goals: safety  Patient Goals: rest  Family Goals: n/a    Progress made toward(s) clinical / shift goals:  pt. Redirectable, has not fallen. Pt. Sleeping for most of night.    Patient is not progressing towards the following goals:

## 2021-07-11 PROCEDURE — A9270 NON-COVERED ITEM OR SERVICE: HCPCS | Performed by: INTERNAL MEDICINE

## 2021-07-11 PROCEDURE — 700102 HCHG RX REV CODE 250 W/ 637 OVERRIDE(OP): Performed by: NURSE PRACTITIONER

## 2021-07-11 PROCEDURE — 770004 HCHG ROOM/CARE - ONCOLOGY PRIVATE *

## 2021-07-11 PROCEDURE — A9270 NON-COVERED ITEM OR SERVICE: HCPCS | Performed by: NURSE PRACTITIONER

## 2021-07-11 PROCEDURE — 700102 HCHG RX REV CODE 250 W/ 637 OVERRIDE(OP): Performed by: INTERNAL MEDICINE

## 2021-07-11 PROCEDURE — 700111 HCHG RX REV CODE 636 W/ 250 OVERRIDE (IP): Performed by: STUDENT IN AN ORGANIZED HEALTH CARE EDUCATION/TRAINING PROGRAM

## 2021-07-11 NOTE — CARE PLAN
The patient is Stable - Low risk of patient condition declining or worsening    Shift Goals  Clinical Goals: Pt will remain free from falls  Patient Goals: Pt will rest and shower  Family Goals: n/a    Progress made toward(s) clinical / shift goals:      Pt was able to shower this morning with assistance from the CNA    Problem: Fall Risk  Goal: Patient will remain free from falls  Outcome: Progressing  Note: Pt has had 4 falls this admission    Tele sitter in place for pt redirection  Pt is in a desk bed  Scheduled toileting and hourly rounding in place      Patient is not progressing towards the following goals: N/A

## 2021-07-12 PROCEDURE — 770004 HCHG ROOM/CARE - ONCOLOGY PRIVATE *

## 2021-07-12 ASSESSMENT — FIBROSIS 4 INDEX: FIB4 SCORE: 1.721378477236588914

## 2021-07-12 NOTE — CARE PLAN
Problem: Self Care  Goal: Patient will have the ability to perform ADLs independently or with assistance (bathe, groom, dress, toilet and feed)  Outcome: Progressing  Note: Patient requires minimal assistance in ADL's       The patient is Stable - Low risk of patient condition declining or worsening    Shift Goals  Clinical Goals: safety, free of falls  Patient Goals: Pt will rest and shower  Family Goals: n/a    Progress made toward(s) clinical / shift goals:  tele sitter, assistance when getting OOB    Patient is not progressing towards the following goals:      Problem: Communication  Goal: The ability to communicate needs accurately and effectively will improve  Outcome: Not Progressing  Note: Paranoid this morning, having hallucinations

## 2021-07-12 NOTE — DISCHARGE PLANNING
Anticipated Discharge Disposition: TBD, [facility] vs SNF for long term placement, pt pending guardianship    Action: LSW sent email to [employer], screen for medicaid, see if pt was candidate for institutional medicaid. LSW received email back from [employer], they are unable to screen due to pt's mentation, unable to gather pt's financial information. Once public guardian is established, pt will then be able to apply for Medicaid.     Barriers to Discharge: Guardianship, unable to apply for Medicaid    Plan: LSW to follow as needed

## 2021-07-12 NOTE — CARE PLAN
The patient is Stable - Low risk of patient condition declining or worsening    Shift Goals  Clinical Goals: Pt will remain free from falls  Patient Goals: Pt will rest and shower  Family Goals: n/a    Progress made toward(s) clinical / shift goals:    Problem: Skin Integrity  Goal: Skin integrity is maintained or improved  Outcome: Progressing     Problem: Fall Risk  Goal: Patient will remain free from falls  Outcome: Progressing     Problem: Knowledge Deficit - Standard  Goal: Patient and family/care givers will demonstrate understanding of plan of care, disease process/condition, diagnostic tests and medications  Outcome: Progressing     Problem: Communication  Goal: The ability to communicate needs accurately and effectively will improve  Outcome: Progressing     Problem: Nutrition  Goal: Patient's nutritional and fluid intake will be adequate or improve  Outcome: Progressing  Goal: Enteral nutrition will be maintained or improve  Outcome: Progressing  Goal: Enteral nutrition will be maintained or improve  Outcome: Progressing     Problem: Mobility  Goal: Patient's capacity to carry out activities will improve  Outcome: Progressing     Problem: Self Care  Goal: Patient will have the ability to perform ADLs independently or with assistance (bathe, groom, dress, toilet and feed)  Outcome: Progressing       Patient is not progressing towards the following goals:

## 2021-07-12 NOTE — PROGRESS NOTES
"Pt appears to be paranoid. Refusing medications and morning vitals. Talking about stories of \"invisable ink writing.\" and \"that's not how they do that.\" will continue to monitor.  "

## 2021-07-13 PROCEDURE — A9270 NON-COVERED ITEM OR SERVICE: HCPCS | Performed by: NURSE PRACTITIONER

## 2021-07-13 PROCEDURE — 770004 HCHG ROOM/CARE - ONCOLOGY PRIVATE *

## 2021-07-13 PROCEDURE — 700102 HCHG RX REV CODE 250 W/ 637 OVERRIDE(OP): Performed by: NURSE PRACTITIONER

## 2021-07-13 PROCEDURE — A9270 NON-COVERED ITEM OR SERVICE: HCPCS | Performed by: STUDENT IN AN ORGANIZED HEALTH CARE EDUCATION/TRAINING PROGRAM

## 2021-07-13 PROCEDURE — 700102 HCHG RX REV CODE 250 W/ 637 OVERRIDE(OP): Performed by: INTERNAL MEDICINE

## 2021-07-13 PROCEDURE — 700102 HCHG RX REV CODE 250 W/ 637 OVERRIDE(OP): Performed by: STUDENT IN AN ORGANIZED HEALTH CARE EDUCATION/TRAINING PROGRAM

## 2021-07-13 PROCEDURE — A9270 NON-COVERED ITEM OR SERVICE: HCPCS | Performed by: INTERNAL MEDICINE

## 2021-07-13 PROCEDURE — 700111 HCHG RX REV CODE 636 W/ 250 OVERRIDE (IP): Performed by: STUDENT IN AN ORGANIZED HEALTH CARE EDUCATION/TRAINING PROGRAM

## 2021-07-13 RX ADMIN — DOCUSATE SODIUM 50 MG AND SENNOSIDES 8.6 MG 2 TABLET: 8.6; 5 TABLET, FILM COATED ORAL at 05:08

## 2021-07-13 RX ADMIN — Medication 100 MG: at 05:09

## 2021-07-13 RX ADMIN — LOSARTAN POTASSIUM 25 MG: 25 TABLET, FILM COATED ORAL at 05:08

## 2021-07-13 RX ADMIN — ENOXAPARIN SODIUM 40 MG: 40 INJECTION SUBCUTANEOUS at 05:09

## 2021-07-13 RX ADMIN — ASPIRIN 325 MG ORAL TABLET 325 MG: 325 PILL ORAL at 05:08

## 2021-07-13 RX ADMIN — THERA TABS 1 TABLET: TAB at 05:08

## 2021-07-13 NOTE — CARE PLAN
The patient is Watcher - Medium risk of patient condition declining or worsening    Shift Goals  Clinical Goals: safety, free of falls  Patient Goals: Pt will rest and shower  Family Goals: n/a    Progress made toward(s) clinical / shift goals:    Problem: Skin Integrity  Goal: Skin integrity is maintained or improved  Outcome: Progressing     Problem: Fall Risk  Goal: Patient will remain free from falls  Outcome: Progressing     Problem: Communication  Goal: The ability to communicate needs accurately and effectively will improve  Outcome: Progressing     Problem: Mobility  Goal: Patient's capacity to carry out activities will improve  Outcome: Progressing       Patient is not progressing towards the following goals:

## 2021-07-14 PROCEDURE — A9270 NON-COVERED ITEM OR SERVICE: HCPCS | Performed by: INTERNAL MEDICINE

## 2021-07-14 PROCEDURE — 700102 HCHG RX REV CODE 250 W/ 637 OVERRIDE(OP): Performed by: INTERNAL MEDICINE

## 2021-07-14 PROCEDURE — 700102 HCHG RX REV CODE 250 W/ 637 OVERRIDE(OP): Performed by: NURSE PRACTITIONER

## 2021-07-14 PROCEDURE — A9270 NON-COVERED ITEM OR SERVICE: HCPCS | Performed by: NURSE PRACTITIONER

## 2021-07-14 PROCEDURE — 770004 HCHG ROOM/CARE - ONCOLOGY PRIVATE *

## 2021-07-14 PROCEDURE — 700102 HCHG RX REV CODE 250 W/ 637 OVERRIDE(OP): Performed by: STUDENT IN AN ORGANIZED HEALTH CARE EDUCATION/TRAINING PROGRAM

## 2021-07-14 PROCEDURE — A9270 NON-COVERED ITEM OR SERVICE: HCPCS | Performed by: STUDENT IN AN ORGANIZED HEALTH CARE EDUCATION/TRAINING PROGRAM

## 2021-07-14 RX ADMIN — ASPIRIN 324 MG: 81 TABLET, CHEWABLE ORAL at 06:16

## 2021-07-14 RX ADMIN — THERA TABS 1 TABLET: TAB at 06:16

## 2021-07-14 RX ADMIN — DOCUSATE SODIUM 50 MG AND SENNOSIDES 8.6 MG 2 TABLET: 8.6; 5 TABLET, FILM COATED ORAL at 18:11

## 2021-07-14 RX ADMIN — LOSARTAN POTASSIUM 25 MG: 25 TABLET, FILM COATED ORAL at 06:23

## 2021-07-14 RX ADMIN — Medication 100 MG: at 06:16

## 2021-07-14 ASSESSMENT — PAIN DESCRIPTION - PAIN TYPE
TYPE: ACUTE PAIN
TYPE: ACUTE PAIN

## 2021-07-14 NOTE — CARE PLAN
Problem: Nutritional:  Goal: Achieve adequate nutritional intake  Description: Patient will consume >50% of meals  Outcome: Progressing   See RD note.

## 2021-07-14 NOTE — DIETARY
Nutrition services: Day 36 of admit.  Lane Beard is a 66 y.o. male with admitting DX of AMS with syncope and collapse.  Weekly re-screen complete and PO intake appears to be variable.  Discussed patient with RN Shyla, who reports that patient eats ~ 1.5 - 2 meals per day. Of note, RN also reports that patient is paranoid and thinks we are poisoning him - history of refusing meds and vitals noted.  Guardianship pending.    I met with patient this afternoon at bedside.  He reports that his appetite has been okay.  He reports prior to admit he cut back on how much he was eating because he thought he was eating too much.  He is uncertain of his usual body weight.  Discussed with patient the importance of adequate nutrition and weight maintenance while in the hospital.  Patient seemed somewhat confused by my questions but was able to answer everything appropriately. Discussed meal preferences and snacks.       Assessment:  Height: 182.9 cm (6')  Weight: 89.4 kg (197 lb 1.5 oz)  Body mass index is 26.73 kg/m²., BMI classification: Overweight  Diet/Intake: Regular diet    Evaluation:   1. No PMHx noted  2. No recent chem panel   3. Pertinent meds: Thiamine, lovenox, losartan, multivitamin, Zofran PRN  4. Last BM 7/12 per chart  5. Weight highly variable this admit. 98.3 kg - 89.4 kg per stand-up scale though weight appears fairly stable most recently ~ 90 kg.    Malnutrition Risk: Does not meet criteria at this time.    Recommendations/Plan:  1. Will provide whole milk and fruit with all meals per patients request.    2. Encourage intake of PO.  3. Document intake of all PO  as % taken in ADL's to provide interdisciplinary communication across all shifts.   4. Monitor weight.  5. Nutrition rep will continue to see patient for ongoing meal and snack preferences.

## 2021-07-14 NOTE — CARE PLAN
The patient is Stable - Low risk of patient condition declining or worsening    Shift Goals  Clinical Goals: safety  Patient Goals: sleep  Family Goals: n/a    Progress made toward(s) clinical / shift goals:        Problem: Skin Integrity  Goal: Skin integrity is maintained or improved  Outcome: Progressing  Note: Pt turns self side to side, out of bed frequently, barrier cream     Problem: Fall Risk  Goal: Patient will remain free from falls  Outcome: Progressing  Note: Bed alarm on, bed in lowest position, call light and personal belongings within reach, room near nurses station       Patient is not progressing towards the following goals:

## 2021-07-15 PROCEDURE — 700102 HCHG RX REV CODE 250 W/ 637 OVERRIDE(OP): Performed by: STUDENT IN AN ORGANIZED HEALTH CARE EDUCATION/TRAINING PROGRAM

## 2021-07-15 PROCEDURE — 700102 HCHG RX REV CODE 250 W/ 637 OVERRIDE(OP): Performed by: NURSE PRACTITIONER

## 2021-07-15 PROCEDURE — 700102 HCHG RX REV CODE 250 W/ 637 OVERRIDE(OP): Performed by: INTERNAL MEDICINE

## 2021-07-15 PROCEDURE — A9270 NON-COVERED ITEM OR SERVICE: HCPCS | Performed by: NURSE PRACTITIONER

## 2021-07-15 PROCEDURE — A9270 NON-COVERED ITEM OR SERVICE: HCPCS | Performed by: INTERNAL MEDICINE

## 2021-07-15 PROCEDURE — 770004 HCHG ROOM/CARE - ONCOLOGY PRIVATE *

## 2021-07-15 PROCEDURE — A9270 NON-COVERED ITEM OR SERVICE: HCPCS | Performed by: STUDENT IN AN ORGANIZED HEALTH CARE EDUCATION/TRAINING PROGRAM

## 2021-07-15 RX ADMIN — THERA TABS 1 TABLET: TAB at 05:58

## 2021-07-15 RX ADMIN — ASPIRIN 325 MG ORAL TABLET 325 MG: 325 PILL ORAL at 05:58

## 2021-07-15 RX ADMIN — Medication 100 MG: at 05:58

## 2021-07-15 RX ADMIN — DOCUSATE SODIUM 50 MG AND SENNOSIDES 8.6 MG 2 TABLET: 8.6; 5 TABLET, FILM COATED ORAL at 05:58

## 2021-07-15 RX ADMIN — LOSARTAN POTASSIUM 25 MG: 25 TABLET, FILM COATED ORAL at 05:58

## 2021-07-15 NOTE — CARE PLAN
The patient is Stable - Low risk of patient condition declining or worsening    Shift Goals  Clinical Goals: Pt will remain free from falls  Patient Goals: rest  Family Goals: n/a    Progress made toward(s) clinical / shift goals:      Problem: Skin Integrity  Goal: Skin integrity is maintained or improved  Outcome: Progressing     Problem: Fall Risk  Goal: Patient will remain free from falls  Outcome: Progressing  Note: Bed alarm on, bed in lowest position, call light and personal belongings within reach, educated to call if in need of assistance, non skid socks, room near nurses station       Patient is not progressing towards the following goals:

## 2021-07-15 NOTE — CARE PLAN
The patient is Stable - Low risk of patient condition declining or worsening    Shift Goals  Clinical Goals: Pt will remain free from falls  Patient Goals: Nap  Family Goals: n/a    Progress made toward(s) clinical / shift goals:        Problem: Fall Risk  Goal: Patient will remain free from falls  Outcome: Progressing  Note: Pt has remained free from falls  Tele sitter on  Pt is in a desk bed  Hourly rounding in place  Bed alarm on       Patient is not progressing towards the following goals:

## 2021-07-16 PROCEDURE — A9270 NON-COVERED ITEM OR SERVICE: HCPCS | Performed by: NURSE PRACTITIONER

## 2021-07-16 PROCEDURE — 700102 HCHG RX REV CODE 250 W/ 637 OVERRIDE(OP): Performed by: NURSE PRACTITIONER

## 2021-07-16 PROCEDURE — 99231 SBSQ HOSP IP/OBS SF/LOW 25: CPT | Performed by: NURSE PRACTITIONER

## 2021-07-16 PROCEDURE — 700102 HCHG RX REV CODE 250 W/ 637 OVERRIDE(OP): Performed by: INTERNAL MEDICINE

## 2021-07-16 PROCEDURE — 770004 HCHG ROOM/CARE - ONCOLOGY PRIVATE *

## 2021-07-16 PROCEDURE — A9270 NON-COVERED ITEM OR SERVICE: HCPCS | Performed by: INTERNAL MEDICINE

## 2021-07-16 RX ADMIN — LOSARTAN POTASSIUM 25 MG: 25 TABLET, FILM COATED ORAL at 06:09

## 2021-07-16 RX ADMIN — THERA TABS 1 TABLET: TAB at 06:09

## 2021-07-16 RX ADMIN — Medication 100 MG: at 06:09

## 2021-07-16 RX ADMIN — ASPIRIN 325 MG ORAL TABLET 325 MG: 325 PILL ORAL at 06:09

## 2021-07-16 ASSESSMENT — ENCOUNTER SYMPTOMS
DIZZINESS: 0
SORE THROAT: 0
HEADACHES: 0
CHILLS: 0
WEAKNESS: 0
MYALGIAS: 0
MEMORY LOSS: 1
CONSTIPATION: 0
PALPITATIONS: 0
SHORTNESS OF BREATH: 0
DIARRHEA: 0
NAUSEA: 0
NERVOUS/ANXIOUS: 0
COUGH: 0
FEVER: 0
WHEEZING: 0
EYE PAIN: 0
VOMITING: 0
SINUS PAIN: 0
ABDOMINAL PAIN: 0

## 2021-07-16 ASSESSMENT — PAIN DESCRIPTION - PAIN TYPE: TYPE: ACUTE PAIN

## 2021-07-16 NOTE — PROGRESS NOTES
[facility] Medicine Bi-Weekly Progress Note    Date of Service  7/16/2021    Chief Complaint  66 y.o. male admitted 6/4/2021 with altered mental status    Hospital Course  Mr. Beard is a 66-year-old male with no known PMHx who presented to the hospital on 6/4/2021 after he was found down on the sidewalk after multiple witnessed falls.  Patient was confused at the time of admission.  He had been seen earlier in the day for syncope while crossing the street however at that time left AGAINST MEDICAL ADVICE.  On admission he was tachycardic with a heart rate of 114, anion gap of 23 and a lactic acid of 2.6.  He had a CT scan of the head, which revealed no acute intracranial abnormalities.  Chest x-ray completed, which showed no acute cardiopulmonary process.  Patient was admitted for further evaluation of altered mental status and syncope. Patient throughout this stay has been confused an lack of capacity was established.   He underwent MRI brain with and without contrast on Gavi 10, 2021, no acute abnormalities noted. Guardianship being pursued and patient will need placement.       Interval Problem Update  7/16- Patient resting in bed in dark room. Asked patient if he would like to open blinds so he can have some sunshine. Patient stated it hurts his eyes and he preferred the dark. Discussed concerns with constant darkness and interfering with healthy sleep habits and mood. Patient reluctant but agreed to some light during the day. Attempting to transfer to [address] when bed available.     Consultants/Specialty  Neurology    Code Status  Full Code    Disposition  Pt remains with cognitive impairments and lacks capacity, suspect pt will need long term placement with guardianship     Review of Systems  Review of Systems   Constitutional: Negative for chills, fever and malaise/fatigue.   HENT: Negative for sinus pain and sore throat.    Eyes: Negative for pain.   Respiratory: Negative for cough, shortness of  breath and wheezing.    Cardiovascular: Negative for chest pain and palpitations.   Gastrointestinal: Negative for abdominal pain, constipation, diarrhea, nausea and vomiting.   Genitourinary: Negative for dysuria, frequency and urgency.   Musculoskeletal: Negative for joint pain and myalgias.   Neurological: Negative for dizziness, weakness and headaches.   Psychiatric/Behavioral: Positive for memory loss. The patient is not nervous/anxious.        Physical Exam  Temp:  [36.1 °C (97 °F)-36.9 °C (98.5 °F)] 36.1 °C (97 °F)  Pulse:  [74-87] 87  Resp:  [17-18] 17  BP: (103-137)/(57-79) 135/73  SpO2:  [98 %-100 %] 98 %    Physical Exam  Vitals and nursing note reviewed.   Constitutional:       General: He is awake. He is not in acute distress.     Appearance: Normal appearance. He is overweight.   HENT:      Head: Normocephalic.      Nose: Nose normal.      Mouth/Throat:      Lips: Pink.      Mouth: Mucous membranes are moist.   Eyes:      General: Lids are normal.   Cardiovascular:      Rate and Rhythm: Normal rate.      Heart sounds: Normal heart sounds.   Pulmonary:      Effort: Pulmonary effort is normal. No respiratory distress.      Breath sounds: Normal breath sounds. No decreased breath sounds.   Abdominal:      General: Bowel sounds are normal.      Palpations: Abdomen is soft.      Tenderness: There is no abdominal tenderness.   Musculoskeletal:         General: No swelling or tenderness.      Cervical back: Neck supple.   Skin:     General: Skin is warm and dry.   Neurological:      Mental Status: He is alert. He is disoriented.      Comments: Unclear on situation, tangential    Psychiatric:         Mood and Affect: Affect is flat.         Behavior: Behavior is cooperative.         Cognition and Memory: Cognition is impaired. Memory is impaired. He exhibits impaired recent memory and impaired remote memory.         Judgment: Judgment is impulsive.     Exam completed 7/16/2021 and unchanged from prior      Fluids  No intake or output data in the 24 hours ending 07/16/21 0916    Laboratory                        Imaging  MR-BRAIN-WITH & W/O   Final Result         No acute intracranial process.      Nonspecific T2 hyperintensities in the deep white matter related to chronic microvascular ischemia.      [id number] VIEW   Final Result      1.  No metallic foreign bodies detected.   2.  Right nephrolithiasis.      EC-ECHOCARDIOGRAM LTD W/ CONT   Final Result      CT-HEAD W/O   Final Result      1.  Cerebral atrophy and chronic microvascular ischemic type changes.   2.  No acute intracranial abnormality.      DX-CHEST-PORTABLE (1 VIEW)   Final Result      No acute cardiopulmonary abnormality.           Assessment/Plan  * AMS (altered mental status)- (present on admission)  Assessment & Plan  Dementia likely contributing. SLP cognitive evaluation results on 6/11 shows severe-profound deficits across almost all cognitive domains tested, consistent with dementia. He was evaluated by psychiatry on 6/15 who also agree with likely dementia dx, and since he is without acute psychiatric symptoms or behaviors, psychiatry consult cancelled. Evaluated by neurology. CT head and MRI brain negative. TSH, HIV, RPR, B12 normal.  - Started on thiamine  - Friends are attempting to pursue guardianship     Anemia  Assessment & Plan  Hgb remains mildly low. Currently hgb 12.6, MCV elevated. Iron panel normal. B12 normal, folate normal on 6/11. Patient asymptomatic.   - monitor    Marijuana use  Assessment & Plan  Presented with positive drugs screen for cannabinoid   - encourage cessation    Dehydration- (present on admission)  Assessment & Plan  Resolved  - Encourage oral intake    Syncope- (present on admission)  Assessment & Plan  Had initially presented to the ED for syncope and left AMA; subsequently found down on sidewalk after multiple witnessed falls. CT head and brain MRI negative. Echo unremarkable. No significant arrhythmias  found on tele   - orthostatic vitals - completed   - Fall precautions   - continue PT/OT      VTE prophylaxis: Lovenox    HOLLY BrianRYoanN.

## 2021-07-16 NOTE — CARE PLAN
The patient is Stable - Low risk of patient condition declining or worsening    Shift Goals  Clinical Goals: Pt will remain free from falls  Patient Goals: CHRISTIANE  Family Goals: n/a    Progress made toward(s) clinical / shift goals:        Problem: Fall Risk  Goal: Patient will remain free from falls  Outcome: Progressing  Note: Tele sitter in place  Hourly rounding in place  Bed alarm on  Pt is on a desk bed       Patient is not progressing towards the following goals:

## 2021-07-16 NOTE — CARE PLAN
The patient is Stable - Low risk of patient condition declining or worsening    Shift Goals  Clinical Goals: pt will not fall  Patient Goals: CHRISTIANE  Family Goals: N/A    Progress made toward(s) clinical / shift goals:    Problem: Skin Integrity  Goal: Skin integrity is maintained or improved  Outcome: Progressing     Problem: Fall Risk  Goal: Patient will remain free from falls  Outcome: Progressing     Problem: Communication  Goal: The ability to communicate needs accurately and effectively will improve  Outcome: Progressing     Problem: Nutrition  Goal: Patient's nutritional and fluid intake will be adequate or improve  Outcome: Progressing  Goal: Enteral nutrition will be maintained or improve  Outcome: Progressing  Goal: Enteral nutrition will be maintained or improve  Outcome: Progressing     Problem: Mobility  Goal: Patient's capacity to carry out activities will improve  Outcome: Progressing     Problem: Self Care  Goal: Patient will have the ability to perform ADLs independently or with assistance (bathe, groom, dress, toilet and feed)  Outcome: Progressing       Patient is not progressing towards the following goals:      Problem: Knowledge Deficit - Standard  Goal: Patient and family/care givers will demonstrate understanding of plan of care, disease process/condition, diagnostic tests and medications  Outcome: Not Progressing  Note: Pt alert to self only, no learning evidence when educated on POC.

## 2021-07-16 NOTE — CARE PLAN
The patient is Stable - Low risk of patient condition declining or worsening    Shift Goals  Clinical Goals: Remain free from falls  Patient Goals: CHRISTIANE  Family Goals: N/A    Progress made toward(s) clinical / shift goals:        Problem: Skin Integrity  Goal: Skin integrity is maintained or improved  Outcome: Progressing  Note: Pt turns self from side to side frequently.      Problem: Fall Risk  Goal: Patient will remain free from falls  Outcome: Progressing  Note: Pt is oriented to self only, impulsive. Tele sitter in place for safety, bed alarm on, pt room in front of nursing station.

## 2021-07-17 PROCEDURE — A9270 NON-COVERED ITEM OR SERVICE: HCPCS | Performed by: INTERNAL MEDICINE

## 2021-07-17 PROCEDURE — 770006 HCHG ROOM/CARE - MED/SURG/GYN SEMI*

## 2021-07-17 PROCEDURE — A9270 NON-COVERED ITEM OR SERVICE: HCPCS | Performed by: NURSE PRACTITIONER

## 2021-07-17 PROCEDURE — 700102 HCHG RX REV CODE 250 W/ 637 OVERRIDE(OP): Performed by: INTERNAL MEDICINE

## 2021-07-17 PROCEDURE — 700102 HCHG RX REV CODE 250 W/ 637 OVERRIDE(OP): Performed by: NURSE PRACTITIONER

## 2021-07-17 RX ADMIN — LOSARTAN POTASSIUM 25 MG: 25 TABLET, FILM COATED ORAL at 05:09

## 2021-07-17 RX ADMIN — Medication 100 MG: at 05:09

## 2021-07-17 RX ADMIN — ASPIRIN 325 MG ORAL TABLET 325 MG: 325 PILL ORAL at 05:09

## 2021-07-17 RX ADMIN — THERA TABS 1 TABLET: TAB at 05:09

## 2021-07-17 NOTE — PROGRESS NOTES
Arrived from [facility] via bed.  RN brought tele sitter.  Denied any pain.  Oriented to new environments.  Verbalized understanding.  Strip and built in bed alarm on.

## 2021-07-17 NOTE — PROGRESS NOTES
Received report from NOC RN and assumed care of pt. Pt is A&Ox1, oriented to self only. Pt is resting in bed on room air. Pt reports no pain or discomfort. POC discussed with pt; all questions answered. Telesitter in the room for safety. Pt ambulating up to bathroom this morning with standby assist. Pt does not call when wanting to get out of bed. Bed locked in lowest position, call light within reach, bed alarm and built in bed alarm on, charge RN notified of pt's fall risk, will move pt to desk bed when available.

## 2021-07-17 NOTE — CARE PLAN
The patient is Stable - Low risk of patient condition declining or worsening    Shift Goals  Clinical Goals: remain free from falls  Patient Goals: CHRISTIANE  Family Goals: N/A    Progress made toward(s) clinical / shift goals:    Problem: Skin Integrity  Goal: Skin integrity is maintained or improved  Outcome: Progressing     Problem: Fall Risk  Goal: Patient will remain free from falls  Outcome: Progressing     Problem: Knowledge Deficit - Standard  Goal: Patient and family/care givers will demonstrate understanding of plan of care, disease process/condition, diagnostic tests and medications  Outcome: Progressing     Problem: Communication  Goal: The ability to communicate needs accurately and effectively will improve  Outcome: Progressing     Problem: Nutrition  Goal: Patient's nutritional and fluid intake will be adequate or improve  Outcome: Progressing  Goal: Enteral nutrition will be maintained or improve  Outcome: Progressing  Goal: Enteral nutrition will be maintained or improve  Outcome: Progressing     Problem: Mobility  Goal: Patient's capacity to carry out activities will improve  Outcome: Progressing     Problem: Self Care  Goal: Patient will have the ability to perform ADLs independently or with assistance (bathe, groom, dress, toilet and feed)  Outcome: Progressing       Patient is not progressing towards the following goals:

## 2021-07-17 NOTE — PROGRESS NOTES
1 RN Skin Assessment completed.   Patient's risk of skin breakdown: Low    Devices in place and skin assessed under:   [] Pulse Ox  [] PIV [] Central Line [] SCDs []Purewick  [] Olsen  []Condom Cath   [] BMS        []  Cortrak   []  Oxymask   [] Bipap    [] Nasal Canula   [] N/A   [] Other(specify):     Right Ear  [x] WDL                or           [] Skin issue present (please provide assessment/description below)    Left Ear  [x] WDL                or           [] Skin issue present (please provide assessment/description below)    Right Elbow:  [x] WDL               or           [] Skin issue present (please provide assessment/description below)    Left Elbow:   [x] WDL                or           [] Skin issue present (please provide assessment/description below)    Coccyx/Buttocks:  [x] WDL                or           [] Skin issue present (please provide assessment/description below)    Right Heel/Foot/Toes:  [] WDL                or           [x] Skin issue present (please provide assessment/description below)  Black nail to 3rd toe     Left Heel/Foot/Toes:   [x] WDL              or           [] Skin issue present (please provide assessment/description below)      **Describe any other skin issues in areas not already listed from above list:   [] N/A   Scattered scabs to bilateral knees    Interventions In Place: Pillows and Pressure Redistribution Mattress    **If any new or digressing skin issues are found, fill out the selection below.    Possible Skin Injury No  Pictures Uploaded Into Epic: N/A  Wound Consult Placed: N/A  RN Wound Prevention Protocol Ordered: No    What new preventative interventions did you add? N/A

## 2021-07-17 NOTE — CARE PLAN
The patient is Stable - Low risk of patient condition declining or worsening    Shift Goals  Clinical Goals: pt will not fall during shift  Patient Goals: CHRISTIANE  Family Goals: N/A    Progress made toward(s) clinical / shift goals:  Assess patient's fall risk. Implement fall precautions. Educate patient on proper use of the call light. Ensure patient's bed is locked in lowest position, call light within reach, ensure bed alarm is on, built in bed alarm on.       Patient is not progressing towards the following goals:

## 2021-07-17 NOTE — PROGRESS NOTES
4 Eyes Skin Assessment Completed by HEAVEN Maher and HEAVEN Hines.    Head WDL  Ears Redness and blanching  Nose WDL  Mouth WDL  Neck WDL  Breast/Chest WDL  Shoulder Blades WDL  Spine WDL  (R) Arm/Elbow/Hand Redness and blanching  (L) Arm/Elbow/Hand WDL  Abdomen WDL  Groin WDL  Scrotum/Coccyx/Buttocks WDL  (R) Leg Redness and Scab  (L) Leg Scab  (R) Heel/Foot/Toe WDL except 3rd toe nail black  (L) Heel/Foot/Toe WDL          Devices In Places none      Interventions In Place Waffle Overlay    Possible Skin Injury No    Pictures Uploaded Into Epic N/A  Wound Consult Placed N/A  RN Wound Prevention Protocol Ordered No

## 2021-07-18 PROCEDURE — A9270 NON-COVERED ITEM OR SERVICE: HCPCS | Performed by: NURSE PRACTITIONER

## 2021-07-18 PROCEDURE — 770006 HCHG ROOM/CARE - MED/SURG/GYN SEMI*

## 2021-07-18 PROCEDURE — 700102 HCHG RX REV CODE 250 W/ 637 OVERRIDE(OP): Performed by: NURSE PRACTITIONER

## 2021-07-18 PROCEDURE — 700102 HCHG RX REV CODE 250 W/ 637 OVERRIDE(OP): Performed by: INTERNAL MEDICINE

## 2021-07-18 PROCEDURE — A9270 NON-COVERED ITEM OR SERVICE: HCPCS | Performed by: INTERNAL MEDICINE

## 2021-07-18 RX ADMIN — THERA TABS 1 TABLET: TAB at 05:23

## 2021-07-18 RX ADMIN — LOSARTAN POTASSIUM 25 MG: 25 TABLET, FILM COATED ORAL at 05:23

## 2021-07-18 RX ADMIN — ASPIRIN 325 MG ORAL TABLET 325 MG: 325 PILL ORAL at 05:23

## 2021-07-18 RX ADMIN — Medication 100 MG: at 05:23

## 2021-07-18 NOTE — PROGRESS NOTES
Oriented to self only.  Denied any pain.  Impulsive and doesn't call for assist.  Steady except one time when he was trying to  something from floor.  Continues to have strip and built in bed alarm.  Tele sitter in placed.  Charge RN also knows that pt needs desk bed.      1 RN Skin Assessment completed.   Patient's risk of skin breakdown: Low    Devices in place and skin assessed under:   [] Pulse Ox  [] PIV [] Central Line [] SCDs []Purewick  [] Olsen  []Condom Cath   [] BMS        []  Cortrak   []  Oxymask   [] Bipap    [] Nasal Canula   [x] N/A   [] Other(specify):     Right Ear  [x] WDL                or           [] Skin issue present (please provide assessment/description below)    Left Ear  [x] WDL                or           [] Skin issue present (please provide assessment/description below)    Right Elbow:  [x] WDL               or           [] Skin issue present (please provide assessment/description below)    Left Elbow:   [x] WDL                or           [] Skin issue present (please provide assessment/description below)    Coccyx/Buttocks:  [x] WDL                or           [] Skin issue present (please provide assessment/description below)    Right Heel/Foot/Toes:  [] WDL                or           [x] Skin issue present (please provide assessment/description below)  3rd toe has black nail    Left Heel/Foot/Toes:   [x] WDL              or           [] Skin issue present (please provide assessment/description below)      **Describe any other skin issues in areas not already listed from above list:   [] N/A  Redness and scabs on both knees    Interventions In Place: Waffle Overlay

## 2021-07-18 NOTE — PROGRESS NOTES
1 RN Skin Assessment completed.   Patient's risk of skin breakdown: Low     Devices in place and skin assessed under:   []? Pulse Ox  []? PIV []? Central Line []? SCDs []?Purewick  []? Olsen  []?Condom Cath   []? BMS        []?  Cortrak   []?  Oxymask   []? Bipap    []? Nasal Canula   [x]? N/A   []? Other(specify):      Right Ear  [x]? WDL                or           []? Skin issue present (please provide assessment/description below)     Left Ear  [x]? WDL                or           []? Skin issue present (please provide assessment/description below)     Right Elbow:  [x]? WDL               or           []? Skin issue present (please provide assessment/description below)     Left Elbow:   [x]? WDL                or           []? Skin issue present (please provide assessment/description below)     Coccyx/Buttocks:  [x]? WDL                or           []? Skin issue present (please provide assessment/description below)     Right Heel/Foot/Toes:  []? WDL                or           [x]? Skin issue present (please provide assessment/description below)  3rd toe has black nail     Left Heel/Foot/Toes:   [x]? WDL              or           []? Skin issue present (please provide assessment/description below)        **Describe any other skin issues in areas not already listed from above list:   []? N/A  Redness and scabs on both knees     Interventions In Place: Waffle mattress overlay in place

## 2021-07-18 NOTE — PROGRESS NOTES
Assumed care of patient this shift. Patient is alert and oriented to self only.  Able to make his needs known. Denied complaints of pain this shift when asked. Up with assist  in room and to bathroom. Fall prevention tactics in place, bed locked and in lowest position and bed alarm on for safety.

## 2021-07-18 NOTE — CARE PLAN
The patient is Stable - Low risk of patient condition declining or worsening    Shift Goals  Clinical Goals: remain fall free.  Patient Goals: CHRISTIANE  Family Goals: N/A    Progress made toward(s) clinical / shift goals:  pt continues to have strip and built in bed alarm.  Pt also has tele sitter.      Problem: Fall Risk  Goal: Patient will remain free from falls    Patient is not progressing towards the following goals:

## 2021-07-19 PROCEDURE — A9270 NON-COVERED ITEM OR SERVICE: HCPCS | Performed by: NURSE PRACTITIONER

## 2021-07-19 PROCEDURE — 770006 HCHG ROOM/CARE - MED/SURG/GYN SEMI*

## 2021-07-19 PROCEDURE — 700102 HCHG RX REV CODE 250 W/ 637 OVERRIDE(OP): Performed by: NURSE PRACTITIONER

## 2021-07-19 PROCEDURE — 700102 HCHG RX REV CODE 250 W/ 637 OVERRIDE(OP): Performed by: INTERNAL MEDICINE

## 2021-07-19 PROCEDURE — A9270 NON-COVERED ITEM OR SERVICE: HCPCS | Performed by: INTERNAL MEDICINE

## 2021-07-19 PROCEDURE — 99231 SBSQ HOSP IP/OBS SF/LOW 25: CPT | Performed by: NURSE PRACTITIONER

## 2021-07-19 RX ADMIN — LOSARTAN POTASSIUM 25 MG: 25 TABLET, FILM COATED ORAL at 04:46

## 2021-07-19 RX ADMIN — ASPIRIN 325 MG ORAL TABLET 325 MG: 325 PILL ORAL at 04:46

## 2021-07-19 RX ADMIN — THERA TABS 1 TABLET: TAB at 04:46

## 2021-07-19 RX ADMIN — Medication 100 MG: at 04:46

## 2021-07-19 ASSESSMENT — ENCOUNTER SYMPTOMS: MEMORY LOSS: 1

## 2021-07-19 ASSESSMENT — FIBROSIS 4 INDEX: FIB4 SCORE: 1.721378477236588914

## 2021-07-19 NOTE — CARE PLAN
The patient is Stable - Low risk of patient condition declining or worsening    Shift Goals  Clinical Goals: Patient will remain free from falls     Progress made toward(s) clinical / shift goals:    Fall prevention tactics are in place, bed locked and in lowest position, non-skid footwear in use and bed alarm in place for safety. Patient remained free from falls this shift.       Problem: Skin Integrity  Goal: Skin integrity is maintained or improved  Outcome: Progressing     Problem: Fall Risk  Goal: Patient will remain free from falls  Outcome: Progressing     Problem: Communication  Goal: The ability to communicate needs accurately and effectively will improve  Outcome: Progressing     Problem: Mobility  Goal: Patient's capacity to carry out activities will improve  Outcome: Progressing

## 2021-07-19 NOTE — PROGRESS NOTES
Pt is A&Ox1- disoriented to time, place, situation. Pt is resting in bed, no signs of labored breathing or pain. Pt on RA. Call light & personal belongings within reach, bed in lowest position & locked, and bed frame and normal bed alarm is on. Fall precautions in place and education provided on how to use call light. Pt shows to evidence of learning from fall education, tele sitter is in place to prevent potential falls. Pt updated on plan of care for the shift. Pt declines any additional needs at this time.        1 RN Skin Assessment completed.   Patient's risk of skin breakdown: Low    Devices in place and skin assessed under:   [] Pulse Ox  [] PIV [] Central Line [] SCDs []Purewick  [] Olsen  []Condom Cath   [] BMS        []  Cortrak   []  Oxymask   [] Bipap    [] Nasal Canula   [x] N/A   [] Other(specify):     Right Ear  [x] WDL                or           [] Skin issue present (please provide assessment/description below)    Left Ear  [x] WDL                or           [] Skin issue present (please provide assessment/description below)    Right Elbow:  [x] WDL               or           [] Skin issue present (please provide assessment/description below)    Left Elbow:   [] WDL                or           [x] Skin issue present (please provide assessment/description below)  Small scab  Coccyx/Buttocks:  [x] WDL                or           [] Skin issue present (please provide assessment/description below)    Right Heel/Foot/Toes:  [x] WDL                or           [] Skin issue present (please provide assessment/description below)    Left Heel/Foot/Toes:   [x] WDL              or           [] Skin issue present (please provide assessment/description below)      **Describe any other skin issues in areas not already listed from above list:   [x] N/A    Interventions In Place: Pillows    **If any new or digressing skin issues are found, fill out the selection below.    Possible Skin Injury No  Pictures  Uploaded Into Epic: N/A  Wound Consult Placed: N/A  RN Wound Prevention Protocol Ordered: No    What new preventative interventions did you add? N/A

## 2021-07-19 NOTE — CARE PLAN
Problem: Fall Risk  Goal: Patient will remain free from falls  Outcome: Progressing  Note: Fall precautions in place. Bed in lowest position. Non-skid socks in place. Personal possessions within reach. Mobility sign on door. Bed-alarm on. Call light within reach. Pt educated regarding fall prevention and states understanding.  Tele sitter in place      The patient is Stable - Low risk of patient condition declining or worsening    Shift Goals  Clinical Goals: pt will remain free from injury, reorientation when needed   Patient Goals: CHRISTIANE  Family Goals: N/A    Progress made toward(s) clinical / shift goals:  Pt is free from injury, hourly rounding in place.    Patient is not progressing towards the following goals:      Problem: Knowledge Deficit - Standard  Goal: Patient and family/care givers will demonstrate understanding of plan of care, disease process/condition, diagnostic tests and medications  Outcome: Not Progressing  Note: Pt educated regarding plan of care and medications. All questions answered.

## 2021-07-19 NOTE — CARE PLAN
Problem: Nutritional:  Goal: Achieve adequate nutritional intake  Description: Patient will consume >50% of meals  Outcome: Progressing   PO % most documented meals since last RD visit; however, minimal/variable documentation available - record of meals as % taken in ADLs requested for accurate assessment of intake. New weekly measured weight also requested.    RD continues to follow.

## 2021-07-19 NOTE — DISCHARGE PLANNING
Anticipated Discharge Disposition: TBD    Action: It appears the guardianship packet was sent to [facility] Guardians office on 6/10/2.  Email to Marshall Abraham, requested he forward the packet to ELLIE ARGUETA received guardianship packet  ELLIE emailed guaridnship packet to Anjelica FAROOQ to patients friend Edouard Ramirez [phone]; ELLIE introduced himself and role in care.  ELLIE asked Edouard if he was planning on being patient's guardian.  Edouard stated that he isn't nor are any of patient's other friends.  Stating too many barriers.  ELLIE asked Edouard if he has patient's ID and .  Edouard stated no but he can get copies sent to ELLIE.    ELLIE emailed Edouard at [email or web address]  Ellie asked for copies of patient's ID,  and monthly income and source    Barriers to Discharge: guardianship    Plan: continue to monitor for discharge barriers

## 2021-07-19 NOTE — PROGRESS NOTES
RN Skin Assessment completed.   Patient's risk of skin breakdown: Low     Devices in place and skin assessed under:   []?? Pulse Ox  []?? PIV []?? Central Line []?? SCDs []??Purewick  []?? Olsen  []??Condom Cath   []?? BMS        []??  Cortrak   []??  Oxymask   []?? Bipap    []?? Nasal Canula   [x]?? N/A   []?? Other(specify):      Right Ear  [x]?? WDL                or           []?? Skin issue present (please provide assessment/description below)     Left Ear  [x]?? WDL                or           []?? Skin issue present (please provide assessment/description below)     Right Elbow:  [x]?? WDL               or           []?? Skin issue present (please provide assessment/description below)     Left Elbow:   [x]?? WDL                or           []?? Skin issue present (please provide assessment/description below)     Coccyx/Buttocks:  [x]?? WDL                or           []?? Skin issue present (please provide assessment/description below)     Right Heel/Foot/Toes:  []?? WDL                or           [x]?? Skin issue present (please provide assessment/description below)  3rd toe has black nail     Left Heel/Foot/Toes:   [x]?? WDL              or           []?? Skin issue present (please provide assessment/description below)        **Describe any other skin issues in areas not already listed from above list:   []?? N/A  Redness and scabs on both knees     Interventions In Place: Waffle mattress overlay in place

## 2021-07-19 NOTE — PROGRESS NOTES
[facility] Medicine Bi-Weekly Progress Note    Date of Service  7/19/2021    Chief Complaint  66 y.o. male admitted 6/4/2021 with altered mental status    Hospital Course  Mr. Beard is a 66-year-old male with no known PMHx who presented to the hospital on 6/4/2021 after he was found down on the sidewalk after multiple witnessed falls.  Patient was confused at the time of admission.  He had been seen earlier in the day for syncope while crossing the street however at that time left AGAINST MEDICAL ADVICE.  On admission he was tachycardic with a heart rate of 114, anion gap of 23 and a lactic acid of 2.6.  He had a CT scan of the head, which revealed no acute intracranial abnormalities.  Chest x-ray completed, which showed no acute cardiopulmonary process.  Patient was admitted for further evaluation of altered mental status and syncope. Patient throughout this stay has been confused an lack of capacity was established.   He underwent MRI brain with and without contrast on Gavi 10, 2021, no acute abnormalities noted. Guardianship being pursued and patient will need placement.       Interval Problem Update  7/16- Patient resting in bed in dark room. Asked patient if he would like to open blinds so he can have some sunshine. Patient stated it hurts his eyes and he preferred the dark. Discussed concerns with constant darkness and interfering with healthy sleep habits and mood. Patient reluctant but agreed to some light during the day. Attempting to transfer to [address] when bed available.     7/19- Patient resting in bed, states it is too cold outside for him to get out of bed today. Reminded patient that it is July and temperature is expected to be in the 90's. Patient did not believe this statement. No medical complaints. Pursuing guardianship.       Consultants/Specialty  Neurology    Code Status  Full Code    Disposition  Pt remains with cognitive impairments and lacks capacity,  pt will need long term  placement with guardianship     Review of Systems  Review of Systems   Unable to perform ROS: Dementia   Psychiatric/Behavioral: Positive for memory loss.       Physical Exam  Temp:  [36 °C (96.8 °F)-36.6 °C (97.8 °F)] 36.1 °C (97 °F)  Pulse:  [71-81] 81  Resp:  [16-17] 17  BP: (119-136)/(70-85) 130/77  SpO2:  [97 %-99 %] 99 %    Physical Exam  Vitals and nursing note reviewed.   Constitutional:       General: He is awake. He is not in acute distress.     Appearance: Normal appearance. He is overweight.   HENT:      Head: Normocephalic.      Nose: Nose normal.      Mouth/Throat:      Lips: Pink.      Mouth: Mucous membranes are moist.   Eyes:      General: Lids are normal.   Cardiovascular:      Rate and Rhythm: Normal rate.      Heart sounds: Normal heart sounds.   Pulmonary:      Effort: Pulmonary effort is normal. No respiratory distress.      Breath sounds: Normal breath sounds. No decreased breath sounds.   Abdominal:      General: Bowel sounds are normal.      Palpations: Abdomen is soft.      Tenderness: There is no abdominal tenderness.   Musculoskeletal:         General: No swelling or tenderness.      Cervical back: Neck supple.   Skin:     General: Skin is warm and dry.   Neurological:      Mental Status: He is alert. He is disoriented.      Comments: Unclear on situation, tangential    Psychiatric:         Mood and Affect: Affect is flat.         Behavior: Behavior is cooperative.         Cognition and Memory: Cognition is impaired. Memory is impaired. He exhibits impaired recent memory and impaired remote memory.         Judgment: Judgment is impulsive.     Exam completed 7/19/2021 and unchanged from prior     Fluids    Intake/Output Summary (Last 24 hours) at 7/19/2021 0731  Last data filed at 7/18/2021 1000  Gross per 24 hour   Intake 240 ml   Output --   Net 240 ml       Laboratory                        Imaging  MR-BRAIN-WITH & W/O   Final Result         No acute intracranial process.       Nonspecific T2 hyperintensities in the deep white matter related to chronic microvascular ischemia.      [id number] VIEW   Final Result      1.  No metallic foreign bodies detected.   2.  Right nephrolithiasis.      EC-ECHOCARDIOGRAM LTD W/ CONT   Final Result      CT-HEAD W/O   Final Result      1.  Cerebral atrophy and chronic microvascular ischemic type changes.   2.  No acute intracranial abnormality.      DX-CHEST-PORTABLE (1 VIEW)   Final Result      No acute cardiopulmonary abnormality.           Assessment/Plan  * AMS (altered mental status)- (present on admission)  Assessment & Plan  Dementia likely contributing. SLP cognitive evaluation results on 6/11 shows severe-profound deficits across almost all cognitive domains tested, consistent with dementia. He was evaluated by psychiatry on 6/15 who also agree with likely dementia dx, and since he is without acute psychiatric symptoms or behaviors, psychiatry consult cancelled. Evaluated by neurology. CT head and MRI brain negative. TSH, HIV, RPR, B12 normal.  - Started on thiamine  - Friends are attempting to pursue guardianship     Anemia  Assessment & Plan  Hgb remains mildly low. Currently hgb 12.6, MCV elevated. Iron panel normal. B12 normal, folate normal on 6/11. Patient asymptomatic.   - monitor    Marijuana use  Assessment & Plan  Presented with positive drugs screen for cannabinoid   - encourage cessation    Dehydration- (present on admission)  Assessment & Plan  Resolved  - Encourage oral intake    Syncope- (present on admission)  Assessment & Plan  Had initially presented to the ED for syncope and left AMA; subsequently found down on sidewalk after multiple witnessed falls. CT head and brain MRI negative. Echo unremarkable. No significant arrhythmias found on tele   - orthostatic vitals - completed   - Fall precautions   - continue PT/OT      VTE prophylaxis: Lovenox    PRAFUL BrianPYoanRJOSEPH.

## 2021-07-20 PROCEDURE — 700102 HCHG RX REV CODE 250 W/ 637 OVERRIDE(OP): Performed by: INTERNAL MEDICINE

## 2021-07-20 PROCEDURE — A9270 NON-COVERED ITEM OR SERVICE: HCPCS | Performed by: NURSE PRACTITIONER

## 2021-07-20 PROCEDURE — A9270 NON-COVERED ITEM OR SERVICE: HCPCS | Performed by: INTERNAL MEDICINE

## 2021-07-20 PROCEDURE — 700102 HCHG RX REV CODE 250 W/ 637 OVERRIDE(OP): Performed by: NURSE PRACTITIONER

## 2021-07-20 PROCEDURE — 770006 HCHG ROOM/CARE - MED/SURG/GYN SEMI*

## 2021-07-20 RX ADMIN — LOSARTAN POTASSIUM 25 MG: 25 TABLET, FILM COATED ORAL at 04:53

## 2021-07-20 RX ADMIN — ASPIRIN 325 MG ORAL TABLET 325 MG: 325 PILL ORAL at 04:53

## 2021-07-20 RX ADMIN — Medication 100 MG: at 04:53

## 2021-07-20 RX ADMIN — THERA TABS 1 TABLET: TAB at 04:53

## 2021-07-20 NOTE — PROGRESS NOTES
Pt is A&Ox1- disoriented to time, place, situation. Pt is receptive to reorientation. Pt is resting in bed, no signs of labored breathing or pain. Pt on RA. Bed alarm is on, and tele sitter is in place.Pt was relocated to a bed closer to the nursing station during day shift. Pt ate all of dinner with some encouragement, previously was stating he had no appetite. Pt updated on plan of care for the shift. Pt declines any additional needs at this time.        1 RN Skin Assessment completed.   Patient's risk of skin breakdown: Low    Devices in place and skin assessed under:   [] Pulse Ox  [] PIV [] Central Line [] SCDs []Purewick  [] Olsen  []Condom Cath   [] BMS        []  Cortrak   []  Oxymask   [] Bipap    [] Nasal Canula   [x] N/A   [] Other(specify):     Right Ear  [x] WDL                or           [] Skin issue present (please provide assessment/description below)    Left Ear  [x] WDL                or           [] Skin issue present (please provide assessment/description below)    Right Elbow:  [x] WDL               or           [] Skin issue present (please provide assessment/description below)    Left Elbow:   [] WDL                or           [x] Skin issue present (please provide assessment/description below)  Scab from previous fall- healing     Coccyx/Buttocks:  [x] WDL                or           [] Skin issue present (please provide assessment/description below)    Right Heel/Foot/Toes:  [x] WDL                or           [] Skin issue present (please provide assessment/description below)    Left Heel/Foot/Toes:   [x] WDL              or           [] Skin issue present (please provide assessment/description below)      **Describe any other skin issues in areas not already listed from above list:   [] N/A  Scabbing bilateral knees, from previous fall    Interventions In Place: Waffle Overlay, Pillows and Pressure Redistribution Mattress    **If any new or digressing skin issues are found, fill out the  selection below.    Possible Skin Injury No  Pictures Uploaded Into Epic: N/A  Wound Consult Placed: N/A  RN Wound Prevention Protocol Ordered: No    What new preventative interventions did you add? N/A

## 2021-07-20 NOTE — CARE PLAN
The patient is Stable - Low risk of patient condition declining or worsening    Shift Goals  Clinical Goals: Patient will remain free from falls       Progress made toward(s) clinical / shift goals:  Fall prevention tactics are in place, bed locked and in lowest position, non-skid footwear in use and bed alarm in place for safety. Patient moved to bed closer to nurses station this shift. Patient remained free from falls this shift.     Problem: Skin Integrity  Goal: Skin integrity is maintained or improved  Outcome: Progressing     Problem: Fall Risk  Goal: Patient will remain free from falls  Outcome: Progressing     Problem: Mobility  Goal: Patient's capacity to carry out activities will improve  Outcome: Progressing

## 2021-07-20 NOTE — PROGRESS NOTES
RN Skin Assessment completed.   Patient's risk of skin breakdown: Low     Devices in place and skin assessed under:   []??? Pulse Ox  []??? PIV []??? Central Line []??? SCDs []???Purewick  []??? Olsen  []???Condom Cath   []??? BMS        []???  Cortrak   []???  Oxymask   []??? Bipap    []??? Nasal Canula   [x]??? N/A   []??? Other(specify):      Right Ear  [x]??? WDL                or           []??? Skin issue present (please provide assessment/description below)     Left Ear  [x]??? WDL                or           []??? Skin issue present (please provide assessment/description below)     Right Elbow:  [x]??? WDL               or           []??? Skin issue present (please provide assessment/description below)     Left Elbow:   [x]??? WDL                or           []??? Skin issue present (please provide assessment/description below)     Coccyx/Buttocks:  [x]??? WDL                or           []??? Skin issue present (please provide assessment/description below)     Right Heel/Foot/Toes:  []??? WDL                or           [x]??? Skin issue present (please provide assessment/description below)  3rd toe has black nail     Left Heel/Foot/Toes:   [x]??? WDL              or           []??? Skin issue present (please provide assessment/description below)        **Describe any other skin issues in areas not already listed from above list:   []??? N/A  Redness and scabs on both knees     Interventions In Place: Waffle mattress overlay in place

## 2021-07-20 NOTE — PROGRESS NOTES
Assumed care of patient this shift. Patient is alert and oriented to self only.  Able to make his needs known. Denied complaints of pain this shift when asked. Up with assist of one  in room and to bathroom. Fall prevention tactics in place, bed locked and in lowest position and bed alarm on for safety

## 2021-07-20 NOTE — CARE PLAN
Problem: Communication  Goal: The ability to communicate needs accurately and effectively will improve  Outcome: Progressing  Flowsheets (Taken 7/19/2021 2310)  Communication:   Assessed patient's ability to understand and communicate   Oriented patient to call light   Reoriented patient to environment     Problem: Nutrition  Goal: Patient's nutritional and fluid intake will be adequate or improve  Outcome: Progressing  Flowsheets (Taken 7/19/2021 2000)  Oral Nutrition:   Dinner   Between % Consumed  Note: Encouragement to eat meals is helpful for patient. He forgets when he eats/ if he is hungry etc.          The patient is Stable - Low risk of patient condition declining or worsening    Shift Goals  Clinical Goals: Eat dinner, remain free from falls   Patient Goals: CHRISTIANE  Family Goals: N/A    Progress made toward(s) clinical / shift goals: Pt ate 100% of dinner tonight, tele sitter and fall precautions in place.    Patient is not progressing towards the following goals:

## 2021-07-21 PROCEDURE — A9270 NON-COVERED ITEM OR SERVICE: HCPCS | Performed by: NURSE PRACTITIONER

## 2021-07-21 PROCEDURE — 94760 N-INVAS EAR/PLS OXIMETRY 1: CPT

## 2021-07-21 PROCEDURE — 770006 HCHG ROOM/CARE - MED/SURG/GYN SEMI*

## 2021-07-21 PROCEDURE — 700102 HCHG RX REV CODE 250 W/ 637 OVERRIDE(OP): Performed by: NURSE PRACTITIONER

## 2021-07-21 PROCEDURE — A9270 NON-COVERED ITEM OR SERVICE: HCPCS | Performed by: INTERNAL MEDICINE

## 2021-07-21 PROCEDURE — 700102 HCHG RX REV CODE 250 W/ 637 OVERRIDE(OP): Performed by: INTERNAL MEDICINE

## 2021-07-21 RX ADMIN — ASPIRIN 325 MG ORAL TABLET 325 MG: 325 PILL ORAL at 04:37

## 2021-07-21 RX ADMIN — Medication 100 MG: at 04:36

## 2021-07-21 RX ADMIN — THERA TABS 1 TABLET: TAB at 04:36

## 2021-07-21 RX ADMIN — LOSARTAN POTASSIUM 25 MG: 25 TABLET, FILM COATED ORAL at 04:37

## 2021-07-21 ASSESSMENT — PAIN DESCRIPTION - PAIN TYPE: TYPE: ACUTE PAIN

## 2021-07-21 NOTE — PROGRESS NOTES
Received report from day shift RN. Pt care assumed, RA. No signs of distress. Pt is AAOx1. Call light within reach, hourly rounding initiated. Tele sitter in place for Safety and bed-alarm on. Denies having pain at this time.

## 2021-07-21 NOTE — CARE PLAN
Problem: Skin Integrity  Goal: Skin integrity is maintained or improved  Outcome: Progressing  Note: Pt turned and positioned every two hours. Incontinence care provided and barrier paste applied as needed.       Problem: Knowledge Deficit - Standard  Goal: Patient and family/care givers will demonstrate understanding of plan of care, disease process/condition, diagnostic tests and medications  Outcome: Progressing  Note: Pt educated regarding plan of care and medications. All questions answered.           The patient is Stable - Low risk of patient condition declining or worsening    Shift Goals  Clinical Goals: Pt will remain free from injury  Patient Goals: CHRISTIANE  Family Goals: N/A    Progress made toward(s) clinical / shift goals:  pt is resting safely, tele sitter in place, bed alarm on, hourly rounding     Patient is not progressing towards the following goals:

## 2021-07-21 NOTE — PROGRESS NOTES
Pt is A&Ox1, disoriented to place, situation and time.. Pt is resting in bed, no signs of labored breathing or pain. Pt on RA. Call light & personal belongings within reach, bed in lowest position & locked, and bed alarm is on. Tele sitter is in place. Pt is easily redirected, can be impulsive to getting out of bed. Pt updated on plan of care for the shift. Pt declines any additional needs at this time.        1 RN Skin Assessment completed.   Patient's risk of skin breakdown: Low    Devices in place and skin assessed under:   [] Pulse Ox  [] PIV [] Central Line [] SCDs []Purewick  [] Olsen  []Condom Cath   [] BMS        []  Cortrak   []  Oxymask   [] Bipap    [] Nasal Canula   [x] N/A   [] Other(specify):     Right Ear  [x] WDL                or           [] Skin issue present (please provide assessment/description below)    Left Ear  [x] WDL                or           [] Skin issue present (please provide assessment/description below)    Right Elbow:  [] WDL               or           [x] Skin issue present (please provide assessment/description below)   scab   Left Elbow:   [x] WDL                or           [] Skin issue present (please provide assessment/description below)    Coccyx/Buttocks:  [x] WDL                or           [] Skin issue present (please provide assessment/description below)    Right Heel/Foot/Toes:  [] WDL                or           [x] Skin issue present (please provide assessment/description below)   third toe has black nail  Left Heel/Foot/Toes:   [x] WDL              or           [] Skin issue present (please provide assessment/description below)      **Describe any other skin issues in areas not already listed from above list:   [] N/A  Scabs bilateral knees  Interventions In Place: Pillows and Pressure Redistribution Mattress    **If any new or digressing skin issues are found, fill out the selection below.    Possible Skin Injury No  Pictures Uploaded Into Epic: N/A  Wound  Consult Placed: N/A  RN Wound Prevention Protocol Ordered: No    What new preventative interventions did you add? N/A

## 2021-07-22 PROCEDURE — 99233 SBSQ HOSP IP/OBS HIGH 50: CPT | Performed by: NURSE PRACTITIONER

## 2021-07-22 PROCEDURE — 94760 N-INVAS EAR/PLS OXIMETRY 1: CPT

## 2021-07-22 PROCEDURE — 770006 HCHG ROOM/CARE - MED/SURG/GYN SEMI*

## 2021-07-22 ASSESSMENT — PAIN DESCRIPTION - PAIN TYPE: TYPE: ACUTE PAIN

## 2021-07-22 ASSESSMENT — ENCOUNTER SYMPTOMS: MEMORY LOSS: 1

## 2021-07-22 NOTE — PROGRESS NOTES
Pt moved to a different room on same unit. Report given to HEAVEN Eubanks. Pt transported via hospital bed with belongings.

## 2021-07-22 NOTE — CARE PLAN
Problem: Nutritional:  Goal: Achieve adequate nutritional intake  Description: Patient will consume >50% of meals  Outcome: Met   Per ADL documentation pt is consuming % of most meals. Weight trend shows variability over admit, most recent weight comparable to weights from early June however. RD available prn - and will monitor per dept policy

## 2021-07-22 NOTE — PROGRESS NOTES
Received report from HEAVEN Malhotra. Pt transferred to [address]. Telesitter discontinued at this time. Pt is A&Ox1, oriented to self only. Pt oriented to new room and call light. Bed alarm on, pt in desk bed close to nurse's station.

## 2021-07-22 NOTE — PROGRESS NOTES
"[facility] Medicine Bi-Weekly Progress Note    Date of Service  7/22/2021    Chief Complaint  66 y.o. male admitted 6/4/2021 with altered mental status    Hospital Course  Mr. Beard is a 66-year-old male with no known PMHx who presented to the hospital on 6/4/2021 after he was found down on the sidewalk after multiple witnessed falls.  Patient was confused at the time of admission.  He had been seen earlier in the day for syncope while crossing the street however at that time left AGAINST MEDICAL ADVICE.  On admission he was tachycardic with a heart rate of 114, anion gap of 23 and a lactic acid of 2.6.  He had a CT scan of the head, which revealed no acute intracranial abnormalities.  Chest x-ray completed, which showed no acute cardiopulmonary process.  Patient was admitted for further evaluation of altered mental status and syncope. Patient throughout this stay has been confused an lack of capacity was established.   He underwent MRI brain with and without contrast on Gavi 10, 2021, no acute abnormalities noted. Guardianship being pursued and patient will need placement.       Interval Problem Update  7/20:  Patient is resting in bed.  He states :\"I guess I'll just lie here until someone tells me what to do\".  Denies acute pain or discomfort.       Consultants/Specialty  Neurology    Code Status  Full Code    Disposition  Pt remains with cognitive impairments and lacks capacity,  pt will need long term placement with guardianship     Review of Systems  Review of Systems   Unable to perform ROS: Dementia   Psychiatric/Behavioral: Positive for memory loss.       Physical Exam  Temp:  [36.7 °C (98.1 °F)-37 °C (98.6 °F)] 36.7 °C (98.1 °F)  Pulse:  [] 90  Resp:  [17-18] 18  BP: (109-128)/(70-79) 121/70  SpO2:  [94 %-99 %] 94 %    Physical Exam  Vitals and nursing note reviewed.   Constitutional:       General: He is awake. He is not in acute distress.     Appearance: Normal appearance. He is overweight. "   HENT:      Head: Normocephalic.      Nose: Nose normal.      Mouth/Throat:      Lips: Pink.      Mouth: Mucous membranes are moist.   Eyes:      General: Lids are normal.   Cardiovascular:      Rate and Rhythm: Normal rate.      Heart sounds: Normal heart sounds.   Pulmonary:      Effort: Pulmonary effort is normal. No respiratory distress.      Breath sounds: Normal breath sounds. No decreased breath sounds.   Abdominal:      General: Bowel sounds are normal.      Palpations: Abdomen is soft.      Tenderness: There is no abdominal tenderness.   Musculoskeletal:         General: No swelling or tenderness.      Cervical back: Neck supple.   Skin:     General: Skin is warm and dry.   Neurological:      Mental Status: He is alert. He is disoriented.      Comments: Unclear on situation, tangential    Psychiatric:         Mood and Affect: Affect is flat.         Behavior: Behavior is cooperative.         Cognition and Memory: Cognition is impaired. Memory is impaired. He exhibits impaired recent memory and impaired remote memory.         Judgment: Judgment is impulsive.     Exam completed 7/22/2021 and unchanged from prior     Fluids  No intake or output data in the 24 hours ending 07/22/21 0924    Laboratory                        Imaging  MR-BRAIN-WITH & W/O   Final Result         No acute intracranial process.      Nonspecific T2 hyperintensities in the deep white matter related to chronic microvascular ischemia.      PY-SSMLEMI-4 VIEW   Final Result      1.  No metallic foreign bodies detected.   2.  Right nephrolithiasis.      EC-ECHOCARDIOGRAM LTD W/ CONT   Final Result      CT-HEAD W/O   Final Result      1.  Cerebral atrophy and chronic microvascular ischemic type changes.   2.  No acute intracranial abnormality.      DX-CHEST-PORTABLE (1 VIEW)   Final Result      No acute cardiopulmonary abnormality.           Assessment/Plan  * AMS (altered mental status)- (present on admission)  Assessment & Plan  Dementia  likely contributing. SLP cognitive evaluation results on 6/11 shows severe-profound deficits across almost all cognitive domains tested, consistent with dementia. He was evaluated by psychiatry on 6/15 who also agree with likely dementia dx, and since he is without acute psychiatric symptoms or behaviors, psychiatry consult cancelled. Evaluated by neurology. CT head and MRI brain negative. TSH, HIV, RPR, B12 normal.  - Started on thiamine  - Friends are attempting to pursue guardianship     Anemia  Assessment & Plan  Hgb remains mildly low. Currently hgb 12.6, MCV elevated. Iron panel normal. B12 normal, folate normal on 6/11. Patient asymptomatic.   - monitor    Marijuana use  Assessment & Plan  Presented with positive drugs screen for cannabinoid   - encourage cessation    Dehydration- (present on admission)  Assessment & Plan  Resolved  - Encourage oral intake    Syncope- (present on admission)  Assessment & Plan  Had initially presented to the ED for syncope and left AMA; subsequently found down on sidewalk after multiple witnessed falls. CT head and brain MRI negative. Echo unremarkable. No significant arrhythmias found on tele   - orthostatic vitals - completed   - Fall precautions   - continue PT/OT      VTE prophylaxis: Lovenox d/c'd secondary to refusal.  SCDs.    KG Leon.

## 2021-07-22 NOTE — PROGRESS NOTES
Received report from NOC RN, pt care assumed, VS stable on RA. Call light within reach, Tele sitter in place for safety. Bed-alarm on. Pt is AAOx1. No signs of distress. Denies having pain at this time. Hourly rounding initiated.       1 RN Skin Assessment completed.   Patient's risk of skin breakdown: Low     Devices in place and skin assessed under:   []?? Pulse Ox  []?? PIV []?? Central Line []?? SCDs []??Purewick  []?? Olsen  []??Condom Cath   []?? BMS        []??  Cortrak   []??  Oxymask   []?? Bipap    []?? Nasal Canula   [x]?? N/A   []?? Other(specify):      Right Ear  [x]?? WDL                or           []?? Skin issue present (please provide assessment/description below)     Left Ear  [x]?? WDL                or           []?? Skin issue present (please provide assessment/description below)     Right Elbow:  []?? WDL               or           [x]?? Skin issue present (please provide assessment/description below)   scab   Left Elbow:   [x]?? WDL                or           []?? Skin issue present (please provide assessment/description below)     Coccyx/Buttocks:  [x]?? WDL                or           []?? Skin issue present (please provide assessment/description below)     Right Heel/Foot/Toes:  []?? WDL                or           [x]?? Skin issue present (please provide assessment/description below)  Black nail on third toe   Left Heel/Foot/Toes:   [x]?? WDL              or           []?? Skin issue present (please provide assessment/description below)        **Describe any other skin issues in areas not already listed from above list:   []?? N/A  Scabs and redness on bilateral knees  Interventions In Place: Pillows and Pressure Redistribution Mattress     **If any new or digressing skin issues are found, fill out the selection below.     Possible Skin Injury No  Pictures Uploaded Into Epic: N/A  Wound Consult Placed: N/A  RN Wound Prevention Protocol Ordered: No    What new preventative interventions  did you add? N/A

## 2021-07-22 NOTE — DISCHARGE PLANNING
Medical Social Work  SW received email form Edouard Ramirez, with the following attachments, ID/ and copy of patient's Social Security payments for 2020.  These items were forwarded to Anjelica Sullivan.

## 2021-07-22 NOTE — PROGRESS NOTES
1 RN Skin Assessment completed.   Patient's risk of skin breakdown: Low     Devices in place and skin assessed under:   []? Pulse Ox  []? PIV []? Central Line []? SCDs []?Purewick  []? Olsen  []?Condom Cath   []? BMS        []?  Cortrak   []?  Oxymask   []? Bipap    []? Nasal Canula   [x]? N/A   []? Other(specify):      Right Ear  [x]? WDL                or           []? Skin issue present (please provide assessment/description below)     Left Ear  [x]? WDL                or           []? Skin issue present (please provide assessment/description below)     Right Elbow:  []? WDL               or           [x]? Skin issue present (please provide assessment/description below)   scab   Left Elbow:   [x]? WDL                or           []? Skin issue present (please provide assessment/description below)     Coccyx/Buttocks:  [x]? WDL                or           []? Skin issue present (please provide assessment/description below)     Right Heel/Foot/Toes:  []? WDL                or           [x]? Skin issue present (please provide assessment/description below)  Black nail on third toe   Left Heel/Foot/Toes:   [x]? WDL              or           []? Skin issue present (please provide assessment/description below)        **Describe any other skin issues in areas not already listed from above list:   []? N/A  Scabs and redness on bilateral knees  Interventions In Place: Pillows and Pressure Redistribution Mattress     **If any new or digressing skin issues are found, fill out the selection below.     Possible Skin Injury No  Pictures Uploaded Into Epic: N/A  Wound Consult Placed: N/A  RN Wound Prevention Protocol Ordered: No    What new preventative interventions did you add? N/A

## 2021-07-22 NOTE — PROGRESS NOTES
Pt is resting in bed, denied pain. Warm blanket provided. Denied further needs. Telesitter in place, pt can be impulsive. Bed rails up. Reinforced use of call light for assistance. Refused full skin check, education provided.

## 2021-07-22 NOTE — CARE PLAN
The patient is Watcher - Medium risk of patient condition declining or worsening    Shift Goals  Clinical Goals: pt will remain free from falls  Patient Goals:   Family Goals: N/A    Progress made toward(s) clinical / shift goals: Bed alarm on. Telesitter in place. Bed rails up. Call light and belongings within reach. Reinforced use of call light for assistance.     Patient is not progressing towards the following goals:

## 2021-07-23 PROCEDURE — 770006 HCHG ROOM/CARE - MED/SURG/GYN SEMI*

## 2021-07-23 PROCEDURE — A9270 NON-COVERED ITEM OR SERVICE: HCPCS | Performed by: NURSE PRACTITIONER

## 2021-07-23 PROCEDURE — 700102 HCHG RX REV CODE 250 W/ 637 OVERRIDE(OP): Performed by: NURSE PRACTITIONER

## 2021-07-23 PROCEDURE — 700102 HCHG RX REV CODE 250 W/ 637 OVERRIDE(OP): Performed by: INTERNAL MEDICINE

## 2021-07-23 PROCEDURE — A9270 NON-COVERED ITEM OR SERVICE: HCPCS | Performed by: INTERNAL MEDICINE

## 2021-07-23 RX ADMIN — Medication 100 MG: at 05:17

## 2021-07-23 RX ADMIN — THERA TABS 1 TABLET: TAB at 05:18

## 2021-07-23 RX ADMIN — LOSARTAN POTASSIUM 25 MG: 25 TABLET, FILM COATED ORAL at 05:24

## 2021-07-23 RX ADMIN — ASPIRIN 325 MG ORAL TABLET 325 MG: 325 PILL ORAL at 05:18

## 2021-07-23 ASSESSMENT — PAIN DESCRIPTION - PAIN TYPE
TYPE: ACUTE PAIN
TYPE: ACUTE PAIN

## 2021-07-23 NOTE — PROGRESS NOTES
1 RN Skin Assessment completed.   Patient's risk of skin breakdown: Low    Devices in place and skin assessed under:   [] Pulse Ox  [] PIV [] Central Line [] SCDs []Purewick  [] Olsen  []Condom Cath   [] BMS        []  Cortrak   []  Oxymask   [] Bipap    [] Nasal Canula   [x] N/A   [] Other(specify):     Right Ear  [x] WDL                or           [] Skin issue present (please provide assessment/description below)    Left Ear  [x] WDL                or           [] Skin issue present (please provide assessment/description below)    Right Elbow:  [x] WDL               or           [] Skin issue present (please provide assessment/description below)    Left Elbow:   [x] WDL                or           [] Skin issue present (please provide assessment/description below)    Coccyx/Buttocks:  [x] WDL                or           [] Skin issue present (please provide assessment/description below)    Right Heel/Foot/Toes:  [x] WDL                or           [] Skin issue present (please provide assessment/description below)    Left Heel/Foot/Toes:   [x] WDL              or           [] Skin issue present (please provide assessment/description below)      **Describe any other skin issues in areas not already listed from above list:   [] N/A  Scabs and bruising to bilateral knees      Interventions In Place: Pillows and Pressure Redistribution Mattress, encourage pt to get out of bed    **If any new or digressing skin issues are found, fill out the selection below.    Possible Skin Injury No  Pictures Uploaded Into Epic: N/A  Wound Consult Placed: N/A  RN Wound Prevention Protocol Ordered: No    What new preventative interventions did you add? N/A
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
## 2021-07-23 NOTE — PROGRESS NOTES
Received report from NOC RN and assumed care of pt. Pt is A&Ox1, oriented to self. Pt is resting in bed on room air. Pt reports no pain or discomfort. POC discussed with pt; all questions answered. Pt ambulating up to the bathroom and up to chair at nurse's station with standby assist. Pt calling out for help, not using call light. Pt is pleasant and cooperative with nursing staff. Bed locked in lowest position, call light within reach.

## 2021-07-23 NOTE — CARE PLAN
The patient is Stable - Low risk of patient condition declining or worsening    Shift Goals  Clinical Goals: pt will remain free from falls  Patient Goals: CHRISTIANE  Family Goals: N/A    Progress made toward(s) clinical / shift goals:  patient ambulates with steady gait, non skid socks applied, standby assist.    Patient is not progressing towards the following goals:

## 2021-07-24 PROCEDURE — 770006 HCHG ROOM/CARE - MED/SURG/GYN SEMI*

## 2021-07-24 PROCEDURE — A9270 NON-COVERED ITEM OR SERVICE: HCPCS | Performed by: INTERNAL MEDICINE

## 2021-07-24 PROCEDURE — A9270 NON-COVERED ITEM OR SERVICE: HCPCS | Performed by: NURSE PRACTITIONER

## 2021-07-24 PROCEDURE — 700102 HCHG RX REV CODE 250 W/ 637 OVERRIDE(OP): Performed by: INTERNAL MEDICINE

## 2021-07-24 PROCEDURE — A9270 NON-COVERED ITEM OR SERVICE: HCPCS | Performed by: STUDENT IN AN ORGANIZED HEALTH CARE EDUCATION/TRAINING PROGRAM

## 2021-07-24 PROCEDURE — 700102 HCHG RX REV CODE 250 W/ 637 OVERRIDE(OP): Performed by: NURSE PRACTITIONER

## 2021-07-24 PROCEDURE — 99233 SBSQ HOSP IP/OBS HIGH 50: CPT | Performed by: NURSE PRACTITIONER

## 2021-07-24 PROCEDURE — 700102 HCHG RX REV CODE 250 W/ 637 OVERRIDE(OP): Performed by: STUDENT IN AN ORGANIZED HEALTH CARE EDUCATION/TRAINING PROGRAM

## 2021-07-24 RX ADMIN — DOCUSATE SODIUM 50 MG AND SENNOSIDES 8.6 MG 2 TABLET: 8.6; 5 TABLET, FILM COATED ORAL at 06:10

## 2021-07-24 RX ADMIN — ASPIRIN 325 MG ORAL TABLET 325 MG: 325 PILL ORAL at 06:10

## 2021-07-24 RX ADMIN — LOSARTAN POTASSIUM 25 MG: 25 TABLET, FILM COATED ORAL at 06:10

## 2021-07-24 RX ADMIN — THERA TABS 1 TABLET: TAB at 06:10

## 2021-07-24 RX ADMIN — Medication 100 MG: at 06:10

## 2021-07-24 ASSESSMENT — ENCOUNTER SYMPTOMS: MEMORY LOSS: 1

## 2021-07-24 ASSESSMENT — FIBROSIS 4 INDEX: FIB4 SCORE: 1.721378477236588914

## 2021-07-24 NOTE — PROGRESS NOTES
Assumed care of patient this shift. Patient is alert and oriented to self only.  Able to make his needs known. Denied complaints of pain when asked. Up with assist of one  in room and to bathroom. Fall prevention tactics in place, bed locked and in lowest position and bed alarm on for safety

## 2021-07-24 NOTE — PROGRESS NOTES
1 RN Skin Assessment completed.   Patient's risk of skin breakdown: Low     Devices in place and skin assessed under:   []? Pulse Ox  []? PIV []? Central Line []? SCDs []?Purewick  []? Olsen  []?Condom Cath   []? BMS        []?  Cortrak   []?  Oxymask   []? Bipap    []? Nasal Canula   [x]? N/A   []? Other(specify):      Right Ear  [x]? WDL                or           []? Skin issue present (please provide assessment/description below)     Left Ear  [x]? WDL                or           []? Skin issue present (please provide assessment/description below)     Right Elbow:  [x]? WDL               or           []? Skin issue present (please provide assessment/description below)     Left Elbow:   [x]? WDL                or           []? Skin issue present (please provide assessment/description below)     Coccyx/Buttocks:  [x]? WDL                or           []? Skin issue present (please provide assessment/description below)     Right Heel/Foot/Toes:  [x]? WDL                or           []? Skin issue present (please provide assessment/description below)     Left Heel/Foot/Toes:   [x]? WDL              or           []? Skin issue present (please provide assessment/description below)        **Describe any other skin issues in areas not already listed from above list:   []? N/A  Scabs and bruising to bilateral knees      Interventions In Place: Pillows and Pressure Redistribution Mattress

## 2021-07-24 NOTE — PROGRESS NOTES
"[facility] Medicine Bi-Weekly Progress Note    Date of Service  7/24/2021    Chief Complaint  66 y.o. male admitted 6/4/2021 with altered mental status    Hospital Course  Mr. Beard is a 66-year-old male with no known PMHx who presented to the hospital on 6/4/2021 after he was found down on the sidewalk after multiple witnessed falls.  Patient was confused at the time of admission.  He had been seen earlier in the day for syncope while crossing the street however at that time left AGAINST MEDICAL ADVICE.  On admission he was tachycardic with a heart rate of 114, anion gap of 23 and a lactic acid of 2.6.  He had a CT scan of the head, which revealed no acute intracranial abnormalities.  Chest x-ray completed, which showed no acute cardiopulmonary process.  Patient was admitted for further evaluation of altered mental status and syncope. Patient throughout this stay has been confused an lack of capacity was established.   He underwent MRI brain with and without contrast on Gavi 10, 2021, no acute abnormalities noted. Guardianship being pursued and patient will need placement.       Interval Problem Update  7/22:  Patient is resting in bed.  He states :\"I guess I'll just lie here until someone tells me what to do\".  Denies acute pain or discomfort.   7/24: Patient seen and examined.  He is sitting up at nurses station in calm mood.  Continues to be oriented to self only.  No acute needs at this time.      Consultants/Specialty  Neurology    Code Status  Full Code    Disposition  Pt remains with cognitive impairments and lacks capacity,  pt will need long term placement with guardianship     Review of Systems  Review of Systems   Unable to perform ROS: Dementia   Psychiatric/Behavioral: Positive for memory loss.       Physical Exam  Temp:  [36.1 °C (97 °F)-36.5 °C (97.7 °F)] 36.1 °C (97 °F)  Pulse:  [] 68  Resp:  [15-18] 15  BP: (108-114)/(60-76) 108/60  SpO2:  [94 %-99 %] 94 %    Physical Exam  Vitals and " nursing note reviewed.   Constitutional:       General: He is awake. He is not in acute distress.     Appearance: Normal appearance. He is overweight.   HENT:      Head: Normocephalic.      Nose: Nose normal.      Mouth/Throat:      Lips: Pink.      Mouth: Mucous membranes are moist.   Eyes:      General: Lids are normal.   Cardiovascular:      Rate and Rhythm: Normal rate.      Heart sounds: Normal heart sounds.   Pulmonary:      Effort: Pulmonary effort is normal. No respiratory distress.      Breath sounds: Normal breath sounds. No decreased breath sounds.   Abdominal:      General: Bowel sounds are normal.      Palpations: Abdomen is soft.      Tenderness: There is no abdominal tenderness.   Musculoskeletal:         General: No swelling or tenderness.      Cervical back: Neck supple.   Skin:     General: Skin is warm and dry.   Neurological:      Mental Status: He is alert. He is disoriented.      Comments: Unclear on situation, tangential    Psychiatric:         Mood and Affect: Affect is flat.         Behavior: Behavior is cooperative.         Cognition and Memory: Cognition is impaired. Memory is impaired. He exhibits impaired recent memory and impaired remote memory.         Judgment: Judgment is impulsive.     Exam completed 7/24/2021 and unchanged from prior     Fluids    Intake/Output Summary (Last 24 hours) at 7/24/2021 1130  Last data filed at 7/23/2021 2000  Gross per 24 hour   Intake 720 ml   Output --   Net 720 ml       Laboratory                        Imaging  MR-BRAIN-WITH & W/O   Final Result         No acute intracranial process.      Nonspecific T2 hyperintensities in the deep white matter related to chronic microvascular ischemia.      [id number] VIEW   Final Result      1.  No metallic foreign bodies detected.   2.  Right nephrolithiasis.      EC-ECHOCARDIOGRAM LTD W/ CONT   Final Result      CT-HEAD W/O   Final Result      1.  Cerebral atrophy and chronic microvascular ischemic type  changes.   2.  No acute intracranial abnormality.      DX-CHEST-PORTABLE (1 VIEW)   Final Result      No acute cardiopulmonary abnormality.           Assessment/Plan  * AMS (altered mental status)- (present on admission)  Assessment & Plan  Dementia likely contributing. SLP cognitive evaluation results on 6/11 shows severe-profound deficits across almost all cognitive domains tested, consistent with dementia. He was evaluated by psychiatry on 6/15 who also agree with likely dementia dx, and since he is without acute psychiatric symptoms or behaviors, psychiatry consult cancelled. Evaluated by neurology. CT head and MRI brain negative. TSH, HIV, RPR, B12 normal.  - Started on thiamine  - Friends are attempting to pursue guardianship     Anemia  Assessment & Plan  Hgb remains mildly low. Currently hgb 12.6, MCV elevated. Iron panel normal. B12 normal, folate normal on 6/11. Patient asymptomatic.   - monitor    Marijuana use  Assessment & Plan  Presented with positive drugs screen for cannabinoid   - encourage cessation    Dehydration- (present on admission)  Assessment & Plan  Resolved  - Encourage oral intake    Syncope- (present on admission)  Assessment & Plan  Had initially presented to the ED for syncope and left AMA; subsequently found down on sidewalk after multiple witnessed falls. CT head and brain MRI negative. Echo unremarkable. No significant arrhythmias found on tele   - orthostatic vitals - completed   - Fall precautions   - continue PT/OT      VTE prophylaxis: Lovenox d/c'd secondary to refusal.  SCDs.    KG Leon.

## 2021-07-24 NOTE — PROGRESS NOTES
Received report and assumed care at shift change. A&O x 1, alert only to self. Patient sitting at communal table.  Cooperative with cares. No complain of pain or distress. Fall precautions in place, bed locked and in lowest position. Needs attended to.      RN Skin Assessment completed.   Patient's risk of skin breakdown: Low     Devices in place and skin assessed under:   []? Pulse Ox  []? PIV []? Central Line []? SCDs []?Purewick  []? Olsen  []?Condom Cath   []? BMS        []?  Cortrak   []?  Oxymask   []? Bipap    []? Nasal Canula   [x]? N/A   []? Other(specify):      Right Ear  [x]? WDL                or           []? Skin issue present (please provide assessment/description below)     Left Ear  [x]? WDL                or           []? Skin issue present (please provide assessment/description below)     Right Elbow:  []? WDL               or           [x]? Skin issue present (please provide assessment/description below) scab     Left Elbow:   [x]? WDL                or           []? Skin issue present (please provide assessment/description below)     Coccyx/Buttocks:  [x]? WDL                or           []? Skin issue present (please provide assessment/description below)     Right Heel/Foot/Toes:  [x]? WDL                or           []? Skin issue present (please provide assessment/description below)     Left Heel/Foot/Toes:   [x]? WDL              or           []? Skin issue present (please provide assessment/description below)        **Describe any other skin issues in areas not already listed from above list:   []? N/A  Scabs and bruising to bilateral knees      Interventions In Place: Pressure redistribution mattress, pillow for support.     **If any new or digressing skin issues are found, fill out the selection below.     Possible Skin Injury No  Pictures Uploaded Into Epic: N/A  Wound Consult Placed: N/A  RN Wound Prevention Protocol Ordered: No    What new preventative interventions did you add? N/A

## 2021-07-25 PROCEDURE — 700102 HCHG RX REV CODE 250 W/ 637 OVERRIDE(OP): Performed by: NURSE PRACTITIONER

## 2021-07-25 PROCEDURE — A9270 NON-COVERED ITEM OR SERVICE: HCPCS | Performed by: STUDENT IN AN ORGANIZED HEALTH CARE EDUCATION/TRAINING PROGRAM

## 2021-07-25 PROCEDURE — A9270 NON-COVERED ITEM OR SERVICE: HCPCS | Performed by: NURSE PRACTITIONER

## 2021-07-25 PROCEDURE — 700102 HCHG RX REV CODE 250 W/ 637 OVERRIDE(OP): Performed by: STUDENT IN AN ORGANIZED HEALTH CARE EDUCATION/TRAINING PROGRAM

## 2021-07-25 PROCEDURE — 770006 HCHG ROOM/CARE - MED/SURG/GYN SEMI*

## 2021-07-25 RX ADMIN — THERA TABS 1 TABLET: TAB at 07:54

## 2021-07-25 RX ADMIN — Medication 100 MG: at 07:54

## 2021-07-25 RX ADMIN — DOCUSATE SODIUM 50 MG AND SENNOSIDES 8.6 MG 2 TABLET: 8.6; 5 TABLET, FILM COATED ORAL at 07:54

## 2021-07-25 RX ADMIN — LOSARTAN POTASSIUM 25 MG: 25 TABLET, FILM COATED ORAL at 07:54

## 2021-07-25 NOTE — PROGRESS NOTES
Pt is A&Ox1, to self. VSS and on RA. Pt very rude, and confrontational. Security was called, pt redirectable back to room. Call light & personal belongings within reach, bed in lowest position & locked, and bed alarm is on.        1 RN Skin Assessment completed.   Patient's risk of skin breakdown: Low    Devices in place and skin assessed under:   [] Pulse Ox  [] PIV [] Central Line [] SCDs []Purewick  [] Olsen  []Condom Cath   [] BMS        []  Cortrak   []  Oxymask   [] Bipap    [] Nasal Canula   [x] N/A   [] Other(specify):     Right Ear  [x] WDL                or           [] Skin issue present (please provide assessment/description below)    Left Ear  [x] WDL                or           [] Skin issue present (please provide assessment/description below)    Right Elbow:  [] WDL               or           [x] Skin issue present (please provide assessment/description below)  -scab    Left Elbow:   [x] WDL                or           [] Skin issue present (please provide assessment/description below)    Coccyx/Buttocks:  [x] WDL                or           [] Skin issue present (please provide assessment/description below)    Right Heel/Foot/Toes:  [] WDL                or           [x] Skin issue present (please provide assessment/description below)  -third toe black nail    Left Heel/Foot/Toes:   [x] WDL              or           [] Skin issue present (please provide assessment/description below)      **Describe any other skin issues in areas not already listed from above list:   [] N/A  -scabs to bilateral knees.     Interventions In Place: Pillows and Pressure Redistribution Mattress    **If any new or digressing skin issues are found, fill out the selection below.    Possible Skin Injury No  Pictures Uploaded Into Epic: N/A  Wound Consult Placed: N/A  RN Wound Prevention Protocol Ordered: No    What new preventative interventions did you add? N/A

## 2021-07-25 NOTE — PROGRESS NOTES
1 RN Skin Assessment completed.   Patient's risk of skin breakdown: Low     Devices in place and skin assessed under:   []?? Pulse Ox  []?? PIV []?? Central Line []?? SCDs []??Purewick  []?? Olsen  []??Condom Cath   []?? BMS        []??  Cortrak   []??  Oxymask   []?? Bipap    []?? Nasal Canula   [x]?? N/A   []?? Other(specify):      Right Ear  [x]?? WDL                or           []?? Skin issue present (please provide assessment/description below)     Left Ear  [x]?? WDL                or           []?? Skin issue present (please provide assessment/description below)     Right Elbow:  [x]?? WDL               or           []?? Skin issue present (please provide assessment/description below)     Left Elbow:   [x]?? WDL                or           []?? Skin issue present (please provide assessment/description below)     Coccyx/Buttocks:  [x]?? WDL                or           []?? Skin issue present (please provide assessment/description below)     Right Heel/Foot/Toes:  [x]?? WDL                or           []?? Skin issue present (please provide assessment/description below)     Left Heel/Foot/Toes:   [x]?? WDL              or           []?? Skin issue present (please provide assessment/description below)        **Describe any other skin issues in areas not already listed from above list:   []?? N/A  Scabs and bruising to bilateral knees      Interventions In Place: Pillows and Pressure Redistribution Mattress

## 2021-07-25 NOTE — CARE PLAN
The patient is Stable - Low risk of patient condition declining or worsening    Shift Goals  Clinical Goals: patient will remain free from falls  Patient Goals: CHRISTIANE  Family Goals: N/A    Progress made toward(s) clinical / shift goals: Fall precautions in place. Pt up close to nurses station. Bedrails up. Bed in lowest position and locked.  Call light and phone within reach. Bed alarm on.     Patient is not progressing towards the following goals:

## 2021-07-26 PROCEDURE — 770006 HCHG ROOM/CARE - MED/SURG/GYN SEMI*

## 2021-07-26 PROCEDURE — A9270 NON-COVERED ITEM OR SERVICE: HCPCS | Performed by: STUDENT IN AN ORGANIZED HEALTH CARE EDUCATION/TRAINING PROGRAM

## 2021-07-26 PROCEDURE — A9270 NON-COVERED ITEM OR SERVICE: HCPCS | Performed by: NURSE PRACTITIONER

## 2021-07-26 PROCEDURE — 700102 HCHG RX REV CODE 250 W/ 637 OVERRIDE(OP): Performed by: NURSE PRACTITIONER

## 2021-07-26 PROCEDURE — 700102 HCHG RX REV CODE 250 W/ 637 OVERRIDE(OP): Performed by: STUDENT IN AN ORGANIZED HEALTH CARE EDUCATION/TRAINING PROGRAM

## 2021-07-26 RX ADMIN — Medication 100 MG: at 07:32

## 2021-07-26 RX ADMIN — THERA TABS 1 TABLET: TAB at 07:32

## 2021-07-26 RX ADMIN — LOSARTAN POTASSIUM 25 MG: 25 TABLET, FILM COATED ORAL at 07:32

## 2021-07-26 ASSESSMENT — PAIN DESCRIPTION - PAIN TYPE: TYPE: ACUTE PAIN

## 2021-07-26 NOTE — PROGRESS NOTES
Report received by alexandra RN. Assumed care of pt. Assessment complete. Pt A&Ox1 to self, VSS and on RA. Pt in no apparent signs of distress, denies pain. Pt preferred to stay in room and eat dinner than go out to the nurses station. Extra warm blanket provided. Pt currently resting in bed. All needs met at this time. Call light within reach, bed in lowest position, and pt has no further questions at this time.        1 RN Skin Assessment completed.   Patient's risk of skin breakdown: Low    Devices in place and skin assessed under:   [] Pulse Ox  [] PIV [] Central Line [] SCDs []Purewick  [] Olsen  []Condom Cath   [] BMS        []  Cortrak   []  Oxymask   [] Bipap    [] Nasal Canula   [x] N/A   [] Other(specify):     Right Ear  [x] WDL                or           [] Skin issue present (please provide assessment/description below)    Left Ear  [x] WDL                or           [] Skin issue present (please provide assessment/description below)    Right Elbow:  [] WDL               or           [x] Skin issue present (please provide assessment/description below)  -scab    Left Elbow:   [x] WDL                or           [] Skin issue present (please provide assessment/description below)    Coccyx/Buttocks:  [x] WDL                or           [] Skin issue present (please provide assessment/description below)    Right Heel/Foot/Toes:  [] WDL                or           [x] Skin issue present (please provide assessment/description below)  -third toe black nail.     Left Heel/Foot/Toes:   [x] WDL              or           [] Skin issue present (please provide assessment/description below)      **Describe any other skin issues in areas not already listed from above list:   [] N/A  -scabs to bilateral knees.     Interventions In Place: Pillows and Pressure Redistribution Mattress    **If any new or digressing skin issues are found, fill out the selection below.    Possible Skin Injury No  Pictures Uploaded Into Epic:  N/A  Wound Consult Placed: N/A  RN Wound Prevention Protocol Ordered: No    What new preventative interventions did you add? N/A

## 2021-07-26 NOTE — PROGRESS NOTES
Assumed care of patient this shift. Patient is alert and oriented to self only.  Able to make his needs known. Denied complaints of pain when asked. Up with stand by assist in room and to bathroom and sitting up at communal table throughout morning. Fall prevention tactics in place, bed locked and in lowest position and bed alarm on for safety

## 2021-07-26 NOTE — PROGRESS NOTES
1 RN Skin Assessment completed.   Patient's risk of skin breakdown: Low     Devices in place and skin assessed under:   []? Pulse Ox  []? PIV []? Central Line []? SCDs []?Purewick  []? Olsen  []?Condom Cath   []? BMS        []?  Cortrak   []?  Oxymask   []? Bipap    []? Nasal Canula   [x]? N/A   []? Other(specify):      Right Ear  [x]? WDL                or           []? Skin issue present (please provide assessment/description below)     Left Ear  [x]? WDL                or           []? Skin issue present (please provide assessment/description below)     Right Elbow:  []? WDL               or           [x]? Skin issue present (please provide assessment/description below)  Scab     Left Elbow:   [x]? WDL                or           []? Skin issue present (please provide assessment/description below)     Coccyx/Buttocks:  [x]? WDL                or           []? Skin issue present (please provide assessment/description below)     Right Heel/Foot/Toes:  []? WDL                or           [x]? Skin issue present (please provide assessment/description below)  Third toe black nail.      Left Heel/Foot/Toes:   [x]? WDL              or           []? Skin issue present (please provide assessment/description below)        **Describe any other skin issues in areas not already listed from above list:   []? N/A  Scabs, redness to bilateral knees.      Interventions In Place: Pillows and Pressure Redistribution Mattress

## 2021-07-27 PROBLEM — B35.1 ONYCHOMYCOSIS: Status: ACTIVE | Noted: 2021-07-27

## 2021-07-27 PROBLEM — R41.82 AMS (ALTERED MENTAL STATUS): Status: RESOLVED | Noted: 2021-06-04 | Resolved: 2021-07-27

## 2021-07-27 PROBLEM — R41.89 COGNITIVE IMPAIRMENT: Status: ACTIVE | Noted: 2021-07-27

## 2021-07-27 PROCEDURE — 700102 HCHG RX REV CODE 250 W/ 637 OVERRIDE(OP): Performed by: NURSE PRACTITIONER

## 2021-07-27 PROCEDURE — 770006 HCHG ROOM/CARE - MED/SURG/GYN SEMI*

## 2021-07-27 PROCEDURE — 99231 SBSQ HOSP IP/OBS SF/LOW 25: CPT | Performed by: HOSPITALIST

## 2021-07-27 PROCEDURE — A9270 NON-COVERED ITEM OR SERVICE: HCPCS | Performed by: NURSE PRACTITIONER

## 2021-07-27 RX ADMIN — LOSARTAN POTASSIUM 25 MG: 25 TABLET, FILM COATED ORAL at 07:57

## 2021-07-27 RX ADMIN — Medication 100 MG: at 07:57

## 2021-07-27 RX ADMIN — THERA TABS 1 TABLET: TAB at 07:57

## 2021-07-27 ASSESSMENT — PAIN DESCRIPTION - PAIN TYPE: TYPE: ACUTE PAIN

## 2021-07-27 NOTE — PROGRESS NOTES
"Assumed care of pt at shift change, report received. Pt resting in bed comfortably. Does not answer to orientation questions appropriately, states \"why does it matter\", \"you should know that\" and \"we are in a torture chamber where I'm held captive\". Denies pain, states \"I'm never in pain\". No other sings of discomfort noted. Bed alarm in place. Fall precautions in place.    "

## 2021-07-27 NOTE — PROGRESS NOTES
1 RN Skin Assessment completed.   Patient's risk of skin breakdown: Low     Devices in place and skin assessed under:   []?? Pulse Ox  []?? PIV []?? Central Line []?? SCDs []??Purewick  []?? Olsen  []??Condom Cath   []?? BMS        []??  Cortrak   []??  Oxymask   []?? Bipap    []?? Nasal Canula   [x]?? N/A   []?? Other(specify):      Right Ear  [x]?? WDL                or           []?? Skin issue present (please provide assessment/description below)     Left Ear  [x]?? WDL                or           []?? Skin issue present (please provide assessment/description below)     Right Elbow:  []?? WDL               or           [x]?? Skin issue present (please provide assessment/description below)  Scab     Left Elbow:   [x]?? WDL                or           []?? Skin issue present (please provide assessment/description below)     Coccyx/Buttocks:  [x]?? WDL                or           []?? Skin issue present (please provide assessment/description below)     Right Heel/Foot/Toes:  []?? WDL                or           [x]?? Skin issue present (please provide assessment/description below)  Third toe black nail.      Left Heel/Foot/Toes:   [x]?? WDL              or           []?? Skin issue present (please provide assessment/description below)        **Describe any other skin issues in areas not already listed from above list:   []?? N/A  Scabs, redness to bilateral knees.      Interventions In Place: Pillows and Pressure Redistribution Mattress
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
## 2021-07-28 PROCEDURE — A9270 NON-COVERED ITEM OR SERVICE: HCPCS | Performed by: NURSE PRACTITIONER

## 2021-07-28 PROCEDURE — 700102 HCHG RX REV CODE 250 W/ 637 OVERRIDE(OP): Performed by: NURSE PRACTITIONER

## 2021-07-28 PROCEDURE — 700102 HCHG RX REV CODE 250 W/ 637 OVERRIDE(OP): Performed by: STUDENT IN AN ORGANIZED HEALTH CARE EDUCATION/TRAINING PROGRAM

## 2021-07-28 PROCEDURE — 770006 HCHG ROOM/CARE - MED/SURG/GYN SEMI*

## 2021-07-28 PROCEDURE — A9270 NON-COVERED ITEM OR SERVICE: HCPCS | Performed by: STUDENT IN AN ORGANIZED HEALTH CARE EDUCATION/TRAINING PROGRAM

## 2021-07-28 RX ADMIN — POLYETHYLENE GLYCOL 3350 1 PACKET: 17 POWDER, FOR SOLUTION ORAL at 08:39

## 2021-07-28 RX ADMIN — THERA TABS 1 TABLET: TAB at 08:38

## 2021-07-28 RX ADMIN — Medication 100 MG: at 08:38

## 2021-07-28 RX ADMIN — LOSARTAN POTASSIUM 25 MG: 25 TABLET, FILM COATED ORAL at 08:38

## 2021-07-28 RX ADMIN — DOCUSATE SODIUM 50 MG AND SENNOSIDES 8.6 MG 2 TABLET: 8.6; 5 TABLET, FILM COATED ORAL at 08:38

## 2021-07-28 ASSESSMENT — COGNITIVE AND FUNCTIONAL STATUS - GENERAL
MOBILITY SCORE: 21
HELP NEEDED FOR BATHING: A LITTLE
CLIMB 3 TO 5 STEPS WITH RAILING: A LITTLE
PERSONAL GROOMING: A LOT
DAILY ACTIVITIY SCORE: 20
STANDING UP FROM CHAIR USING ARMS: A LITTLE
WALKING IN HOSPITAL ROOM: A LITTLE
SUGGESTED CMS G CODE MODIFIER DAILY ACTIVITY: CJ
SUGGESTED CMS G CODE MODIFIER MOBILITY: CJ
TOILETING: A LITTLE

## 2021-07-28 ASSESSMENT — PAIN DESCRIPTION - PAIN TYPE
TYPE: ACUTE PAIN
TYPE: ACUTE PAIN

## 2021-07-28 NOTE — ASSESSMENT & PLAN NOTE
Terbinafine x 12 weeks, can be followed outpatient if discharged earlier  LPS previously consulted for nail care    10/13: Patient will require CMP as outpatient to check for elevation in LFTs.

## 2021-07-28 NOTE — ASSESSMENT & PLAN NOTE
Likely underlying dementia. No reversible causes found  Guardianship hearing 10/15/2021    10/15: Pending placement, as per case management patient might be discharging on Monday.

## 2021-07-28 NOTE — PROGRESS NOTES
Pt alert and oriented to self, ambulating through the hallway. Wanderguard has been placed on L ankle for elopement behavior. Skin WNL.

## 2021-07-28 NOTE — PROGRESS NOTES
[facility] Medicine Daily Progress Note    Date of Service  7/27/2021    Chief Complaint  Lane Beard is a 66 y.o. male admitted 6/4/2021 with found down    Hospital Course  Mr. Beard is a 66-year-old male with no known PMHx who presented to the hospital on 6/4/2021 after he was found down on the sidewalk after multiple witnessed falls.  Patient was confused at the time of admission.  He had been seen earlier in the day for syncope while crossing the street however at that time left AGAINST MEDICAL ADVICE.  On admission he was tachycardic with a heart rate of 114, anion gap of 23 and a lactic acid of 2.6.  He had a CT scan of the head, which revealed no acute intracranial abnormalities.  Chest x-ray completed, which showed no acute cardiopulmonary process.  Patient was admitted for further evaluation of altered mental status and syncope. Patient throughout this stay has been confused an lack of capacity was established.   He underwent MRI brain with and without contrast on Gavi 10, 2021, no acute abnormalities noted. Guardianship being pursued and patient will need placement.       Interval Problem Update  7/27/2021: Patient seen and evaluated on the medical floor.  His guardianship meeting is on 7/30/2021.  He is oriented only to person.  He has significant onychomycosis and wound care has been consulted for toenail trimming.    I have personally seen and examined the patient at bedside. I discussed the plan of care with bedside RN.    Consultants/Specialty  Neurology   Psychiatry   Code Status  Full Code    Disposition  Patient is medically cleared.   Anticipate discharge to group home  I have placed the appropriate orders for post-discharge needs.    Review of Systems  Review of Systems   Unable to perform ROS: Dementia        Physical Exam  Temp:  [36.4 °C (97.6 °F)-37.2 °C (99 °F)] 37.2 °C (99 °F)  Pulse:  [74-87] 83  Resp:  [17-18] 17  BP: ()/(62-74) 89/62  SpO2:  [92 %-97 %] 94 %    Physical  Exam  Vitals and nursing note reviewed.   Constitutional:       Appearance: Normal appearance.   Cardiovascular:      Rate and Rhythm: Normal rate and regular rhythm.   Pulmonary:      Effort: Pulmonary effort is normal.      Breath sounds: Normal breath sounds.   Musculoskeletal:      Comments: Severe onychomycosis toenails   Neurological:      Mental Status: He is alert.      Comments: Oriented to person         Fluids    Intake/Output Summary (Last 24 hours) at 7/27/2021 1801  Last data filed at 7/27/2021 1430  Gross per 24 hour   Intake 486 ml   Output --   Net 486 ml       Laboratory                        Imaging  MR-BRAIN-WITH & W/O   Final Result         No acute intracranial process.      Nonspecific T2 hyperintensities in the deep white matter related to chronic microvascular ischemia.      [id number] VIEW   Final Result      1.  No metallic foreign bodies detected.   2.  Right nephrolithiasis.      EC-ECHOCARDIOGRAM LTD W/ CONT   Final Result      CT-HEAD W/O   Final Result      1.  Cerebral atrophy and chronic microvascular ischemic type changes.   2.  No acute intracranial abnormality.      DX-CHEST-PORTABLE (1 VIEW)   Final Result      No acute cardiopulmonary abnormality.           Assessment/Plan  * Cognitive impairment- (present on admission)  Assessment & Plan  He was evaluated by neurology. CT head and MRI brain negative. TSH, HIV, RPR, B12 normal.  Pending guardianship   His condition is consistent with dementia      Onychomycosis- (present on admission)  Assessment & Plan  Wound care consult for trimming toenails    Anemia  Assessment & Plan  Hgb remains mildly low. Currently hgb 12.6, MCV elevated. Iron panel normal. B12 normal, folate normal on 6/11. Patient asymptomatic.   - monitor    Marijuana use  Assessment & Plan  Presented with positive drugs screen for cannabinoid      Dehydration- (present on admission)  Assessment & Plan  Resolved  - Encourage oral intake    Syncope- (present on  admission)  Assessment & Plan  Had initially presented to the ED for syncope and left AMA; subsequently found down on sidewalk after multiple witnessed falls. CT head and brain MRI negative. Echo unremarkable. No significant arrhythmias found on tele   - Fall precautions          VTE prophylaxis: ambulatory    I have performed a physical exam and reviewed and updated ROS and Plan today (7/27/2021)..

## 2021-07-29 PROCEDURE — 700102 HCHG RX REV CODE 250 W/ 637 OVERRIDE(OP): Performed by: NURSE PRACTITIONER

## 2021-07-29 PROCEDURE — A9270 NON-COVERED ITEM OR SERVICE: HCPCS | Performed by: NURSE PRACTITIONER

## 2021-07-29 PROCEDURE — 770006 HCHG ROOM/CARE - MED/SURG/GYN SEMI*

## 2021-07-29 RX ADMIN — THERA TABS 1 TABLET: TAB at 08:07

## 2021-07-29 RX ADMIN — Medication 100 MG: at 08:07

## 2021-07-29 RX ADMIN — LOSARTAN POTASSIUM 25 MG: 25 TABLET, FILM COATED ORAL at 08:07

## 2021-07-29 ASSESSMENT — PAIN DESCRIPTION - PAIN TYPE: TYPE: ACUTE PAIN

## 2021-07-29 NOTE — PROGRESS NOTES
1 RN Skin Assessment completed.   Patient's risk of skin breakdown: Low     Devices in place and skin assessed under:   []??? Pulse Ox  []??? PIV []??? Central Line []??? SCDs []???Purewick  []??? Olsen  []???Condom Cath   []??? BMS        []???  Cortrak   []???  Oxymask   []??? Bipap    []??? Nasal Canula   []??? N/A   [x]??? Other(specify): Wanderguard      Right Ear  [x]??? WDL                or           []??? Skin issue present (please provide assessment/description below)     Left Ear  [x]??? WDL                or           []??? Skin issue present (please provide assessment/description below)     Right Elbow:  []??? WDL               or           [x]??? Skin issue present (please provide assessment/description below)  Scab     Left Elbow:   [x]??? WDL                or           []??? Skin issue present (please provide assessment/description below)     Coccyx/Buttocks:  [x]??? WDL                or           []??? Skin issue present (please provide assessment/description below)     Right Heel/Foot/Toes:  []??? WDL                or           [x]??? Skin issue present (please provide assessment/description below)  Third toe black nail, peeling skin     Left Heel/Foot/Toes:   []??? WDL              or           [x]??? Skin issue present (please provide assessment/description below)   Peeling skin      **Describe any other skin issues in areas not already listed from above list:   []??? N/A  Scabs, redness to bilateral knees.      Interventions In Place: Pillows and Pressure Redistribution Mattress, pt up ad sandee

## 2021-07-29 NOTE — PROGRESS NOTES
Received bedside report and accepted care of patient.    Pt is currently A/ox1, room air with no visible or stated sign of distress, discomfort, or SOB. Pt is currently standby assist to the bathroom and nurses station.    Patient currently resting in bed in no visible or stated signs of distress. Bed alarm in place, controls on and bed in locked and lowest position. Call light and personal possessions within reach. Patient educated about use of call light and verbalized understanding      1 RN Skin Assessment completed.   Patient's risk of skin breakdown: Low    Devices in place and skin assessed under:   [] Pulse Ox  [] PIV [] Central Line [] SCDs []Purewick  [] Olsen  []Condom Cath   [] BMS        []  Cortrak   []  Oxymask   [] Bipap    [] Nasal Canula   [x] N/A   [] Other(specify):     Right Ear  [x] WDL                or           [] Skin issue present (please provide assessment/description below)    Left Ear  [x] WDL                or           [] Skin issue present (please provide assessment/description below)    Right Elbow:  [] WDL               or           [x] Skin issue present (please provide assessment/description below)  scab  Left Elbow:   [x] WDL                or           [] Skin issue present (please provide assessment/description below)    Coccyx/Buttocks:  [x] WDL                or           [] Skin issue present (please provide assessment/description below)    Right Heel/Foot/Toes:  [] WDL                or           [x] Skin issue present (please provide assessment/description below)   third toe nail black  Left Heel/Foot/Toes:   [x] WDL              or           [] Skin issue present (please provide assessment/description below)      **Describe any other skin issues in areas not already listed from above list:   [] N/A    Interventions In Place: Pillows and Pressure Redistribution Mattress

## 2021-07-29 NOTE — CARE PLAN
The patient is Stable - Low risk of patient condition declining or worsening    Shift Goals  Clinical Goals: Pt will remain free of injury   Patient Goals: CHRISTIANE  Family Goals: N/A    Progress made toward(s) clinical / shift goals:  All fall precautions in place. Pt sleeping with minimal interruptions throughout the night. Clutter free environment provided to pt.      Patient is not progressing towards the following goals:  N/A

## 2021-07-29 NOTE — PROGRESS NOTES
Received report from HEAVEN Doss and assumed care of pt. Pt A&OX3, disoriented to time . Pt on RA.  Denies any pain or discomfort at this time. Pt sleeping comfortably lying in bed.  Pt up and ambulating with a steady gait during this RN shift, fall precautions reinforced.  Call light education provided. POC discussed. Denies further needs at this time. Bed locked and in lowest position. Bed alarm on, call light within reach & hourly rounding in place

## 2021-07-30 PROCEDURE — A9270 NON-COVERED ITEM OR SERVICE: HCPCS | Performed by: NURSE PRACTITIONER

## 2021-07-30 PROCEDURE — 700102 HCHG RX REV CODE 250 W/ 637 OVERRIDE(OP): Performed by: NURSE PRACTITIONER

## 2021-07-30 PROCEDURE — 770006 HCHG ROOM/CARE - MED/SURG/GYN SEMI*

## 2021-07-30 RX ADMIN — THERA TABS 1 TABLET: TAB at 08:02

## 2021-07-30 RX ADMIN — LOSARTAN POTASSIUM 25 MG: 25 TABLET, FILM COATED ORAL at 08:02

## 2021-07-30 RX ADMIN — Medication 100 MG: at 08:02

## 2021-07-30 ASSESSMENT — FIBROSIS 4 INDEX: FIB4 SCORE: 1.721378477236588914

## 2021-07-30 ASSESSMENT — PAIN DESCRIPTION - PAIN TYPE: TYPE: ACUTE PAIN

## 2021-07-30 NOTE — CARE PLAN
The patient is Watcher - Medium risk of patient condition declining or worsening    Shift Goals  Clinical Goals: Pt will remain free from falls  Patient Goals:   Family Goals:     Progress made toward(s) clinical / shift goals: Bed alarm on. Call light an belongings within reach. Room by nursing by nursing station. Treaded socks on.    Patient is not progressing towards the following goals:

## 2021-07-30 NOTE — PROGRESS NOTES
Pt is resting in bed, denied pain. Denied further needs. Bed rails up. Reinforced use of call light for assistance. Refused full skin check, education provided. Bed alarm on and room by nursing station.

## 2021-07-31 PROCEDURE — A9270 NON-COVERED ITEM OR SERVICE: HCPCS | Performed by: NURSE PRACTITIONER

## 2021-07-31 PROCEDURE — 99232 SBSQ HOSP IP/OBS MODERATE 35: CPT | Performed by: HOSPITALIST

## 2021-07-31 PROCEDURE — 700102 HCHG RX REV CODE 250 W/ 637 OVERRIDE(OP): Performed by: NURSE PRACTITIONER

## 2021-07-31 PROCEDURE — 770006 HCHG ROOM/CARE - MED/SURG/GYN SEMI*

## 2021-07-31 RX ADMIN — Medication 100 MG: at 08:58

## 2021-07-31 RX ADMIN — THERA TABS 1 TABLET: TAB at 08:58

## 2021-07-31 ASSESSMENT — PAIN DESCRIPTION - PAIN TYPE
TYPE: ACUTE PAIN
TYPE: ACUTE PAIN

## 2021-07-31 ASSESSMENT — FIBROSIS 4 INDEX
FIB4 SCORE: 1.721378477236588914
FIB4 SCORE: 1.721378477236588914

## 2021-07-31 NOTE — CARE PLAN
The patient is Stable - Low risk of patient condition declining or worsening    Shift Goals  Clinical Goals: Skin integrity will be maintained  Patient Goals:  Family Goals:    Progress made toward(s) clinical / shift goals: Encouraged pt to turn frequently in bed. Please see 1 RN skin check.    Patient is not progressing towards the following goals:

## 2021-07-31 NOTE — PROGRESS NOTES
1 RN Skin Assessment completed.   Patient's risk of skin breakdown: Low    Devices in place and skin assessed under:   [] Pulse Ox  [] PIV [] Central Line [] SCDs []Purewick  [] Olsen  []Condom Cath   [] BMS        []  Cortrak   []  Oxymask   [] Bipap    [] Nasal Canula   [x] N/A   [] Other(specify):     Right Ear  [x] WDL                or           [] Skin issue present (please provide assessment/description below)    Left Ear  [x] WDL                or           [] Skin issue present (please provide assessment/description below)    Right Elbow:  [] WDL               or           [x] Skin issue present (please provide assessment/description below) scabs noted     Left Elbow:   [x] WDL                or           [] Skin issue present (please provide assessment/description below)    Coccyx/Buttocks:  [x] WDL                or           [] Skin issue present (please provide assessment/description below)    Right Heel/Foot/Toes:  [] WDL                or           [x] Skin issue present (please provide assessment/description below) three toe nail: black    Left Heel/Foot/Toes:   [x] WDL              or           [] Skin issue present (please provide assessment/description below)      **Describe any other skin issues in areas not already listed from above list:   [x] N/A    Interventions In Place: Pillows and Pressure Redistribution Mattress        Received report, patient resting in bed, no signs of distress noted. Bed alarm in place, controls on and bed in locked and lowest position. Call light and personal possessions within reach.

## 2021-07-31 NOTE — PROGRESS NOTES
[facility] Medicine Daily Progress Note    Date of Service  7/31/2021    Chief Complaint  Lane Beard is a 66 y.o. male admitted 6/4/2021 with found down    Hospital Course  Mr. Beard is a 66-year-old male with no known PMHx who presented to the hospital on 6/4/2021 after he was found down on the sidewalk after multiple witnessed falls.  Patient was confused at the time of admission.  He had been seen earlier in the day for syncope while crossing the street however at that time left AGAINST MEDICAL ADVICE.  On admission he was tachycardic with a heart rate of 114, anion gap of 23 and a lactic acid of 2.6.  He had a CT scan of the head, which revealed no acute intracranial abnormalities.  Chest x-ray completed, which showed no acute cardiopulmonary process.  Patient was admitted for further evaluation of altered mental status and syncope. Patient throughout this stay has been confused an lack of capacity was established.   He underwent MRI brain with and without contrast on Gavi 10, 2021, no acute abnormalities noted. Guardianship being pursued and patient will need placement.       Interval Problem Update  7/27/2021: Patient seen and evaluated on the medical floor.  His guardianship meeting is on 7/30/2021.  He is oriented only to person.  He has significant onychomycosis and wound care has been consulted for toenail trimming.  7/31: Mr. Beard was evaluated and examined on the medical floor. Guardianship status is unknown at this time. BP has been generally low: systolic low hundreds up to 121. He has been on Cozaar 25 mg which will be stopped.     I have personally seen and examined the patient at bedside. I discussed the plan of care with bedside RN.    Consultants/Specialty  Neurology   Psychiatry   Code Status  Full Code    Disposition  Patient is medically cleared.   Anticipate discharge to group home  I have placed the appropriate orders for post-discharge needs.    Review of Systems  Review of  Systems   Unable to perform ROS: Dementia        Physical Exam  Temp:  [36.2 °C (97.1 °F)-36.4 °C (97.6 °F)] 36.4 °C (97.6 °F)  Pulse:  [70-97] 70  Resp:  [18] 18  BP: (100-105)/(63-66) 100/63  SpO2:  [98 %-99 %] 98 %    Physical Exam  Vitals and nursing note reviewed.   Constitutional:       General: He is not in acute distress.     Appearance: Normal appearance.   Cardiovascular:      Rate and Rhythm: Normal rate and regular rhythm.   Pulmonary:      Effort: Pulmonary effort is normal.      Breath sounds: Normal breath sounds.   Abdominal:      General: There is no distension.      Tenderness: There is no abdominal tenderness.   Musculoskeletal:      Comments: Severe onychomycosis toenails   Neurological:      Mental Status: He is alert.      Comments: Oriented to person  Pleasant  Compliant with exam         Fluids    Intake/Output Summary (Last 24 hours) at 7/31/2021 0750  Last data filed at 7/30/2021 1946  Gross per 24 hour   Intake 740 ml   Output --   Net 740 ml       Laboratory                        Imaging  MR-BRAIN-WITH & W/O   Final Result         No acute intracranial process.      Nonspecific T2 hyperintensities in the deep white matter related to chronic microvascular ischemia.      [id number] VIEW   Final Result      1.  No metallic foreign bodies detected.   2.  Right nephrolithiasis.      EC-ECHOCARDIOGRAM LTD W/ CONT   Final Result      CT-HEAD W/O   Final Result      1.  Cerebral atrophy and chronic microvascular ischemic type changes.   2.  No acute intracranial abnormality.      DX-CHEST-PORTABLE (1 VIEW)   Final Result      No acute cardiopulmonary abnormality.           Assessment/Plan  * Cognitive impairment- (present on admission)  Assessment & Plan  He was evaluated by neurology. CT head and MRI brain negative. TSH, HIV, RPR, B12 normal.  Pending guardianship   His condition is consistent with dementia      Onychomycosis- (present on admission)  Assessment & Plan  Wound care consult for  trimming toenails    Anemia  Assessment & Plan  Hgb remains mildly low. Currently hgb 12.6, MCV elevated. Iron panel normal. B12 normal, folate normal on 6/11. Patient asymptomatic.   - monitor    Marijuana use  Assessment & Plan  Presented with positive drugs screen for cannabinoid      Dehydration- (present on admission)  Assessment & Plan  Resolved  - Encourage oral intake    Syncope- (present on admission)  Assessment & Plan  Had initially presented to the ED for syncope and left AMA; subsequently found down on sidewalk after multiple witnessed falls. CT head and brain MRI negative. Echo unremarkable. No significant arrhythmias found on tele   - Fall precautions          VTE prophylaxis: ambulatory    I have performed a physical exam and reviewed and updated ROS and Plan today (7/31/2021)..

## 2021-08-01 PROCEDURE — A9270 NON-COVERED ITEM OR SERVICE: HCPCS | Performed by: NURSE PRACTITIONER

## 2021-08-01 PROCEDURE — 700102 HCHG RX REV CODE 250 W/ 637 OVERRIDE(OP): Performed by: NURSE PRACTITIONER

## 2021-08-01 PROCEDURE — 770006 HCHG ROOM/CARE - MED/SURG/GYN SEMI*

## 2021-08-01 RX ADMIN — THERA TABS 1 TABLET: TAB at 08:12

## 2021-08-01 RX ADMIN — Medication 100 MG: at 09:00

## 2021-08-01 ASSESSMENT — FIBROSIS 4 INDEX: FIB4 SCORE: 1.721378477236588914

## 2021-08-01 ASSESSMENT — PAIN DESCRIPTION - PAIN TYPE
TYPE: ACUTE PAIN
TYPE: ACUTE PAIN

## 2021-08-01 NOTE — PROGRESS NOTES
Report received and assumed Care at 0658. Pt ambulating around the floor. AOx3 with confusion, responds appropriately. Denies pain and SOB. Plan of care discussed. Explained importance of calling before getting OOB and pt verbalizes understanding. WG on left ankle. Call light and belongings within reach, treaded slipper socks on, bed alarm set once pt gets back into bed in lowest position.

## 2021-08-01 NOTE — CARE PLAN
The patient is Watcher - Medium risk of patient condition declining or worsening    Shift Goals  Clinical Goals: Pt will remain free from falls  Patient Goals:   Family Goals: N/A    Progress made toward(s) clinical / shift goals:  Bed alarm on. Treaded socks on. Call light and belongings within reach. Room by nursing station. Reinforced fall education.    Patient is not progressing towards the following goals:

## 2021-08-01 NOTE — PROGRESS NOTES
Pt is resting in bed, denied pain. A&Ox1, reoriented pt. Denied further needs. Bed rails up. Reinforced use of call light for assistance. Refused full skin check, education provided. Bed alarm on and room by nursing station.

## 2021-08-02 PROCEDURE — 770006 HCHG ROOM/CARE - MED/SURG/GYN SEMI*

## 2021-08-02 PROCEDURE — A9270 NON-COVERED ITEM OR SERVICE: HCPCS | Performed by: NURSE PRACTITIONER

## 2021-08-02 PROCEDURE — 700102 HCHG RX REV CODE 250 W/ 637 OVERRIDE(OP): Performed by: NURSE PRACTITIONER

## 2021-08-02 RX ADMIN — THERA TABS 1 TABLET: TAB at 08:49

## 2021-08-02 RX ADMIN — Medication 100 MG: at 08:49

## 2021-08-02 ASSESSMENT — PAIN DESCRIPTION - PAIN TYPE: TYPE: ACUTE PAIN

## 2021-08-02 NOTE — PROGRESS NOTES
1 RN Skin Assessment completed.   Patient's risk of skin breakdown: Low    Devices in place and skin assessed under:   [] Pulse Ox  [] PIV [] Central Line [] SCDs []Purewick  [] Olsen  []Condom Cath   [] BMS        []  Cortrak   []  Oxymask   [] Bipap    [] Nasal Canula   [x] N/A   [] Other(specify):     Right Ear  [x] WDL                or           [] Skin issue present (please provide assessment/description below)    Left Ear  [x] WDL                or           [] Skin issue present (please provide assessment/description below)    Right Elbow:  [x] WDL               or           [] Skin issue present (please provide assessment/description below)    Left Elbow:   [x] WDL                or           [] Skin issue present (please provide assessment/description below)    Coccyx/Buttocks:  [] WDL                or           [x] Skin issue present (please provide assessment/description below)  Pink, red, blanching    Right Heel/Foot/Toes:  [x] WDL                or           [] Skin issue present (please provide assessment/description below)    Left Heel/Foot/Toes:   [x] WDL              or           [] Skin issue present (please provide assessment/description below)      **Describe any other skin issues in areas not already listed from above list:   [x] N/A    Interventions In Place: Heels Loaded W/Pillows and Pressure Redistribution Mattress    **If any new or digressing skin issues are found, fill out the selection below.    Possible Skin Injury No  Pictures Uploaded Into Epic: N/A  Wound Consult Placed: N/A  RN Wound Prevention Protocol Ordered: No    What new preventative interventions did you add? N/A

## 2021-08-02 NOTE — PROGRESS NOTES
Pt is A&Ox2, VSS on RA.  Pt is able to ambulate with SBA and FWW.  Pt reports 0/10 pain, medicated per MAR.

## 2021-08-02 NOTE — CARE PLAN
The patient is Stable - Low risk of patient condition declining or worsening    Shift Goals  Clinical Goals: Pt will remain free from falls and injuries      Progress made toward(s) clinical / shift goals:  Pt did not sustain fall or injury during shift. Bed locked in lowest position, upper rails raised, treaded socks on, bed alarm on, call light within reach.    Patient is not progressing towards the following goals:

## 2021-08-02 NOTE — PROGRESS NOTES
Pt A&Ox3 on RA, denies pain. All needs met at this time. No signs of cute distress observed, pt currently sleeping. Bed locked in lowest position, upper rails raised, bed alarm on, call light within reach. Hourly rounding in place.      1 RN Skin Assessment completed.   Patient's risk of skin breakdown: Low    Devices in place and skin assessed under:   [] Pulse Ox  [] PIV [] Central Line [] SCDs []Purewick  [] Olsen  []Condom Cath   [] BMS        []  Cortrak   []  Oxymask   [] Bipap    [] Nasal Canula   [] N/A   [x] Other(specify):  WG left ankle    Right Ear  [x] WDL                or           [] Skin issue present (please provide assessment/description below)    Left Ear  [x] WDL                or           [] Skin issue present (please provide assessment/description below)    Right Elbow:  [] WDL               or           [x] Skin issue present (please provide assessment/description below)   pink/red, blanching  Left Elbow:   [] WDL                or           [x] Skin issue present (please provide assessment/description below)  Pink/red, blanching  Coccyx/Buttocks:  [x] WDL                or           [] Skin issue present (please provide assessment/description below)    Right Heel/Foot/Toes:  [x] WDL                or           [] Skin issue present (please provide assessment/description below)    Left Heel/Foot/Toes:   [x] WDL              or           [] Skin issue present (please provide assessment/description below)      **Describe any other skin issues in areas not already listed from above list:   [] N/A   Scabs to bilateral knees  Interventions In Place: Pillows, Elbow Mepilex and Pressure Redistribution Mattress    **If any new or digressing skin issues are found, fill out the selection below.    Possible Skin Injury No  Pictures Uploaded Into Epic: N/A  Wound Consult Placed: N/A  RN Wound Prevention Protocol Ordered: No    What new preventative interventions did you add? Elbow Mepilex

## 2021-08-03 PROCEDURE — A9270 NON-COVERED ITEM OR SERVICE: HCPCS | Performed by: NURSE PRACTITIONER

## 2021-08-03 PROCEDURE — 700102 HCHG RX REV CODE 250 W/ 637 OVERRIDE(OP): Performed by: NURSE PRACTITIONER

## 2021-08-03 PROCEDURE — 770006 HCHG ROOM/CARE - MED/SURG/GYN SEMI*

## 2021-08-03 RX ADMIN — THERA TABS 1 TABLET: TAB at 08:00

## 2021-08-03 RX ADMIN — Medication 100 MG: at 08:00

## 2021-08-03 ASSESSMENT — PAIN DESCRIPTION - PAIN TYPE: TYPE: ACUTE PAIN

## 2021-08-03 NOTE — CARE PLAN
The patient is Stable - Low risk of patient condition declining or worsening    Shift Goals  Clinical Goals: Pt will remain free of falls/injuries      Progress made toward(s) clinical / shift goals:  Pt did not sustain fall or injury during shift. Bed locked in lowest position, upper rails raised, treaded socks on, bed alarm on, call light within reach.    Patient is not progressing towards the following goals:      Problem: Knowledge Deficit - Standard  Goal: Patient and family/care givers will demonstrate understanding of plan of care, disease process/condition, diagnostic tests and medications  Outcome: Not Progressing

## 2021-08-03 NOTE — PROGRESS NOTES
Pt A&Ox3 on RA, denies pain. All needs met at this time. No signs of acute distress observed, pt currently sleeping. Bed locked in lowest position, upper rails raised, bed alarm on, call light within reach. Hourly rounding in place.        1 RN Skin Assessment completed.   Patient's risk of skin breakdown: Low     Devices in place and skin assessed under:   []? Pulse Ox  []? PIV []? Central Line []? SCDs []?Purewick  []? Olsen  []?Condom Cath   []? BMS        []?  Cortrak   []?  Oxymask   []? Bipap    []? Nasal Canula   []? N/A   [x]? Other(specify):  WG left ankle     Right Ear  [x]? WDL                or           []? Skin issue present (please provide assessment/description below)     Left Ear  [x]? WDL                or           []? Skin issue present (please provide assessment/description below)     Right Elbow:  []? WDL               or           [x]? Skin issue present (please provide assessment/description below)   pink/red, blanching    Left Elbow:   []? WDL                or           [x]? Skin issue present (please provide assessment/description below)  Pink/red, blanching    Coccyx/Buttocks:  [x]? WDL                or           []? Skin issue present (please provide assessment/description below)     Right Heel/Foot/Toes:  [x]? WDL                or           []? Skin issue present (please provide assessment/description below)     Left Heel/Foot/Toes:   [x]? WDL              or           []? Skin issue present (please provide assessment/description below)        **Describe any other skin issues in areas not already listed from above list:   []? N/A   Scabs to bilateral knees  Interventions In Place: Pillows, and Pressure Redistribution Mattress, *Note: Pt removes elbow mepilex whenever applied     **If any new or digressing skin issues are found, fill out the selection below.     Possible Skin Injury No  Pictures Uploaded Into Epic: N/A  Wound Consult Placed: N/A  RN Wound Prevention Protocol Ordered:  No    What new preventative interventions did you add? N/A

## 2021-08-04 PROCEDURE — A9270 NON-COVERED ITEM OR SERVICE: HCPCS | Performed by: NURSE PRACTITIONER

## 2021-08-04 PROCEDURE — A9270 NON-COVERED ITEM OR SERVICE: HCPCS | Performed by: STUDENT IN AN ORGANIZED HEALTH CARE EDUCATION/TRAINING PROGRAM

## 2021-08-04 PROCEDURE — 700102 HCHG RX REV CODE 250 W/ 637 OVERRIDE(OP): Performed by: NURSE PRACTITIONER

## 2021-08-04 PROCEDURE — 770006 HCHG ROOM/CARE - MED/SURG/GYN SEMI*

## 2021-08-04 PROCEDURE — 700102 HCHG RX REV CODE 250 W/ 637 OVERRIDE(OP): Performed by: STUDENT IN AN ORGANIZED HEALTH CARE EDUCATION/TRAINING PROGRAM

## 2021-08-04 RX ADMIN — THERA TABS 1 TABLET: TAB at 09:16

## 2021-08-04 RX ADMIN — DOCUSATE SODIUM 50 MG AND SENNOSIDES 8.6 MG 2 TABLET: 8.6; 5 TABLET, FILM COATED ORAL at 19:19

## 2021-08-04 RX ADMIN — Medication 100 MG: at 09:16

## 2021-08-04 RX ADMIN — DOCUSATE SODIUM 50 MG AND SENNOSIDES 8.6 MG 2 TABLET: 8.6; 5 TABLET, FILM COATED ORAL at 14:16

## 2021-08-04 ASSESSMENT — PAIN DESCRIPTION - PAIN TYPE: TYPE: ACUTE PAIN

## 2021-08-04 NOTE — PROGRESS NOTES
Pt is A&Ox3, VSS on RA.  Pt is able to ambulate with SBA and FWW.  Pt reports 0/10 pain, medicated per MAR.

## 2021-08-04 NOTE — CARE PLAN
The patient is Stable - Low risk of patient condition declining or worsening    Shift Goals  Clinical Goals: Pt will remain free of falls.      Progress made toward(s) clinical / shift goals:   Pt did not sustain fall during shift. Bed locked in lowest position, upper rails raised, treaded socks on, bed alarm on, call light within reach.    Patient is not progressing towards the following goals:      Problem: Knowledge Deficit - Standard  Goal: Patient and family/care givers will demonstrate understanding of plan of care, disease process/condition, diagnostic tests and medications  8/4/2021 0311 by Devora Morrison R.N.  Outcome: Not Progressing

## 2021-08-04 NOTE — PROGRESS NOTES
Pt A&Ox1 on RA, denies pain. All needs met at this time. No signs of acute distress observed at this time. Bed locked in lowest position, upper rails raised, treaded socks on, bed alarm on, call light within reach. Hourly rounding in place.        1 RN Skin Assessment completed.   Patient's risk of skin breakdown: Low     Devices in place and skin assessed under:   []?? Pulse Ox  []?? PIV []?? Central Line []?? SCDs []??Purewick  []?? Olsen  []??Condom Cath   []?? BMS        []??  Cortrak   []??  Oxymask   []?? Bipap    []?? Nasal Canula   []?? N/A   [x]?? Other(specify):  WG left ankle     Right Ear  [x]?? WDL                or           []?? Skin issue present (please provide assessment/description below)     Left Ear  [x]?? WDL                or           []?? Skin issue present (please provide assessment/description below)     Right Elbow:  []?? WDL               or           [x]?? Skin issue present (please provide assessment/description below)   pink/red, blanching     Left Elbow:   []?? WDL                or           [x]?? Skin issue present (please provide assessment/description below)  Pink/red, blanching     Coccyx/Buttocks:  [x]?? WDL                or           []?? Skin issue present (please provide assessment/description below)     Right Heel/Foot/Toes:  [x]?? WDL                or           []?? Skin issue present (please provide assessment/description below)     Left Heel/Foot/Toes:   [x]?? WDL              or           []?? Skin issue present (please provide assessment/description below)        **Describe any other skin issues in areas not already listed from above list:   []?? N/A   Scabs to bilateral knees  Interventions In Place: Pillows, and Pressure Redistribution Mattress     **If any new or digressing skin issues are found, fill out the selection below.     Possible Skin Injury No  Pictures Uploaded Into Epic: N/A  Wound Consult Placed: N/A  RN Wound Prevention Protocol Ordered: No    What  new preventative interventions did you add? N/A

## 2021-08-05 PROCEDURE — A9270 NON-COVERED ITEM OR SERVICE: HCPCS | Performed by: STUDENT IN AN ORGANIZED HEALTH CARE EDUCATION/TRAINING PROGRAM

## 2021-08-05 PROCEDURE — 700102 HCHG RX REV CODE 250 W/ 637 OVERRIDE(OP): Performed by: NURSE PRACTITIONER

## 2021-08-05 PROCEDURE — 700102 HCHG RX REV CODE 250 W/ 637 OVERRIDE(OP): Performed by: STUDENT IN AN ORGANIZED HEALTH CARE EDUCATION/TRAINING PROGRAM

## 2021-08-05 PROCEDURE — A9270 NON-COVERED ITEM OR SERVICE: HCPCS | Performed by: NURSE PRACTITIONER

## 2021-08-05 PROCEDURE — 770006 HCHG ROOM/CARE - MED/SURG/GYN SEMI*

## 2021-08-05 RX ADMIN — THERA TABS 1 TABLET: TAB at 10:01

## 2021-08-05 RX ADMIN — DOCUSATE SODIUM 50 MG AND SENNOSIDES 8.6 MG 2 TABLET: 8.6; 5 TABLET, FILM COATED ORAL at 10:01

## 2021-08-05 RX ADMIN — Medication 100 MG: at 10:02

## 2021-08-05 ASSESSMENT — PAIN DESCRIPTION - PAIN TYPE: TYPE: ACUTE PAIN

## 2021-08-05 NOTE — PROGRESS NOTES
Received bedside report and accepted care of patient.  Patient currently resting in bed in no visible or stated distress.  Bed controls on and bed in locked position.  Bed alarm on.  Call light and personal possessions within reach.  Plan of care to include monitoring of BP, assistance with ADL's, and continued discharge planning efforts.  Patient verbalizes agreement with plan of care, and has no additional questions or concerns at this time.  Will continue to update notes/plan of care as needed throughout shift.

## 2021-08-05 NOTE — PROGRESS NOTES
Pt A&Ox1 on RA, requires frequent reorientation, denies pain. Pt refused dinner, enjoys talking to roommate. Low BP at beginning of shift, pt asymptomatic up SB with steady gait. All needs met at this time. No signs of acute distress observed at this time, pt currently sleeping. Bed locked in lowest position, upper rails raised, treaded socks on, bed alarm on, call light within reach. Hourly rounding in place.        1 RN Skin Assessment completed.   Patient's risk of skin breakdown: Low     Devices in place and skin assessed under:   []??? Pulse Ox  []??? PIV []??? Central Line []??? SCDs []???Purewick  []??? Olsen  []???Condom Cath   []??? BMS        []???  Cortrak   []???  Oxymask   []??? Bipap    []??? Nasal Canula   []??? N/A   [x]??? Other(specify):  WG left ankle     Right Ear  [x]??? WDL                or           []??? Skin issue present (please provide assessment/description below)     Left Ear  [x]??? WDL                or           []??? Skin issue present (please provide assessment/description below)     Right Elbow:  []??? WDL               or           [x]??? Skin issue present (please provide assessment/description below)   pink/red, blanching     Left Elbow:   []??? WDL                or           [x]??? Skin issue present (please provide assessment/description below)  Pink/red, blanching     Coccyx/Buttocks:  [x]??? WDL                or           []??? Skin issue present (please provide assessment/description below)     Right Heel/Foot/Toes:  [x]??? WDL                or           []??? Skin issue present (please provide assessment/description below)     Left Heel/Foot/Toes:   [x]??? WDL              or           []??? Skin issue present (please provide assessment/description below)        **Describe any other skin issues in areas not already listed from above list:   []??? N/A   Scabs to bilateral knees  Interventions In Place: Pillows, and Pressure Redistribution Mattress     **If any new or  digressing skin issues are found, fill out the selection below.     Possible Skin Injury No  Pictures Uploaded Into Epic: N/A  Wound Consult Placed: N/A  RN Wound Prevention Protocol Ordered: No    What new preventative interventions did you add? N/A

## 2021-08-05 NOTE — CARE PLAN
The patient is Stable - Low risk of patient condition declining or worsening    Shift Goals  Clinical Goals: Pt willl remain free of falls      Progress made toward(s) clinical / shift goals:  Pt did not sustain fall during shift. Bed locked in lowest position, upper rails raised, treaded socks on, bed alarm on, call light within reach.    Patient is not progressing towards the following goals:      Problem: Knowledge Deficit - Standard  Goal: Patient and family/care givers will demonstrate understanding of plan of care, disease process/condition, diagnostic tests and medications  Outcome: Not Progressing

## 2021-08-06 PROCEDURE — 770006 HCHG ROOM/CARE - MED/SURG/GYN SEMI*

## 2021-08-06 PROCEDURE — A9270 NON-COVERED ITEM OR SERVICE: HCPCS | Performed by: NURSE PRACTITIONER

## 2021-08-06 PROCEDURE — 700102 HCHG RX REV CODE 250 W/ 637 OVERRIDE(OP): Performed by: NURSE PRACTITIONER

## 2021-08-06 PROCEDURE — 700102 HCHG RX REV CODE 250 W/ 637 OVERRIDE(OP): Performed by: STUDENT IN AN ORGANIZED HEALTH CARE EDUCATION/TRAINING PROGRAM

## 2021-08-06 PROCEDURE — A9270 NON-COVERED ITEM OR SERVICE: HCPCS | Performed by: STUDENT IN AN ORGANIZED HEALTH CARE EDUCATION/TRAINING PROGRAM

## 2021-08-06 RX ADMIN — THERA TABS 1 TABLET: TAB at 09:00

## 2021-08-06 RX ADMIN — Medication 100 MG: at 09:00

## 2021-08-06 RX ADMIN — DOCUSATE SODIUM 50 MG AND SENNOSIDES 8.6 MG 2 TABLET: 8.6; 5 TABLET, FILM COATED ORAL at 09:00

## 2021-08-06 ASSESSMENT — PAIN DESCRIPTION - PAIN TYPE: TYPE: ACUTE PAIN

## 2021-08-06 NOTE — PROGRESS NOTES
Pt is A&Ox1, oriented to self only, but knew what year it was. Pt denies any pain at this time. Pt c/o diarrhea but is up independently to the bathroom so no evidence. Pt states last loose stool was this am, will attempt to monitor and ask MD for PRN anti-diarrheal. Bed alarm on for safety.

## 2021-08-07 PROCEDURE — 700102 HCHG RX REV CODE 250 W/ 637 OVERRIDE(OP): Performed by: STUDENT IN AN ORGANIZED HEALTH CARE EDUCATION/TRAINING PROGRAM

## 2021-08-07 PROCEDURE — A9270 NON-COVERED ITEM OR SERVICE: HCPCS | Performed by: NURSE PRACTITIONER

## 2021-08-07 PROCEDURE — A9270 NON-COVERED ITEM OR SERVICE: HCPCS | Performed by: STUDENT IN AN ORGANIZED HEALTH CARE EDUCATION/TRAINING PROGRAM

## 2021-08-07 PROCEDURE — 770006 HCHG ROOM/CARE - MED/SURG/GYN SEMI*

## 2021-08-07 PROCEDURE — 700102 HCHG RX REV CODE 250 W/ 637 OVERRIDE(OP): Performed by: NURSE PRACTITIONER

## 2021-08-07 RX ADMIN — Medication 100 MG: at 10:06

## 2021-08-07 RX ADMIN — THERA TABS 1 TABLET: TAB at 10:06

## 2021-08-07 ASSESSMENT — PAIN DESCRIPTION - PAIN TYPE: TYPE: ACUTE PAIN

## 2021-08-07 NOTE — PROGRESS NOTES
AAOx1, self. Currently resting in bed. C/o 0/10 pain, declining intervention at this time. Medications taken w/o difficulty. -N/V. -N/T. Denies new onset of chest pain/SOB. +BS in all 4 quadrants, last BM yesterday per pt. Standby assist. Wanderguard in place to L ankle. POC discussed, denies further needs at this time. Fall precautions in place. Bed alarm on, call light within reach & hourly rounding in place.

## 2021-08-07 NOTE — PROGRESS NOTES
I certify that the patient requires continued medically necessary hospital services for dementia without safe home environment and will remain in the hospital for 30+ days.  Discharge plan is anticipated to be long-term care with guardianship.

## 2021-08-07 NOTE — PROGRESS NOTES
Pt is A&Ox1, oriented to self only. Pt denies any pain at this time. Bed alarm on for safety. Wanderguard on left ankle.

## 2021-08-07 NOTE — PROGRESS NOTES
No acute complaints.  Discussed patient with .  Patient has no next of kin, has some family in [place] who may be interested in guardianship.  Guardianship paperwork submitted 2 weeks ago.

## 2021-08-08 PROCEDURE — 700102 HCHG RX REV CODE 250 W/ 637 OVERRIDE(OP): Performed by: NURSE PRACTITIONER

## 2021-08-08 PROCEDURE — A9270 NON-COVERED ITEM OR SERVICE: HCPCS | Performed by: STUDENT IN AN ORGANIZED HEALTH CARE EDUCATION/TRAINING PROGRAM

## 2021-08-08 PROCEDURE — 700102 HCHG RX REV CODE 250 W/ 637 OVERRIDE(OP): Performed by: STUDENT IN AN ORGANIZED HEALTH CARE EDUCATION/TRAINING PROGRAM

## 2021-08-08 PROCEDURE — A9270 NON-COVERED ITEM OR SERVICE: HCPCS | Performed by: NURSE PRACTITIONER

## 2021-08-08 PROCEDURE — 770006 HCHG ROOM/CARE - MED/SURG/GYN SEMI*

## 2021-08-08 RX ADMIN — THERA TABS 1 TABLET: TAB at 08:55

## 2021-08-08 RX ADMIN — DOCUSATE SODIUM 50 MG AND SENNOSIDES 8.6 MG 2 TABLET: 8.6; 5 TABLET, FILM COATED ORAL at 11:20

## 2021-08-08 RX ADMIN — Medication 100 MG: at 08:55

## 2021-08-08 ASSESSMENT — PAIN DESCRIPTION - PAIN TYPE: TYPE: ACUTE PAIN

## 2021-08-08 NOTE — CARE PLAN
The patient is Watcher - Medium risk of patient condition declining or worsening    Shift Goals  Clinical Goals: Reamin fall free  Patient Goals:  Family Goals: N/A    Progress made toward(s) clinical / shift goals:  Pt did not fall during shift.  Fall precautions in place, hourly rounds in place, bed alarm on.    Patient is not progressing towards the following goals:

## 2021-08-08 NOTE — PROGRESS NOTES
1 RN Skin Assessment completed.   Patient's risk of skin breakdown: Low    Devices in place and skin assessed under:   [] Pulse Ox  [] PIV [] Central Line [] SCDs []Purewick  [] Olsen  []Condom Cath   [] BMS        []  Cortrak   []  Oxymask   [] Bipap    [] Nasal Canula   [] N/A   [x] Other(specify): Wanderguard in place to L ankle.    Right Ear  [x] WDL                or           [] Skin issue present (please provide assessment/description below)    Left Ear  [x] WDL                or           [] Skin issue present (please provide assessment/description below)    Right Elbow:  [x] WDL               or           [] Skin issue present (please provide assessment/description below)    Left Elbow:   [x] WDL                or           [] Skin issue present (please provide assessment/description below)    Coccyx/Buttocks:  [x] WDL                or           [] Skin issue present (please provide assessment/description below)    Right Heel/Foot/Toes:  [x] WDL                or           [] Skin issue present (please provide assessment/description below)    Left Heel/Foot/Toes:   [x] WDL              or           [] Skin issue present (please provide assessment/description below)      **Describe any other skin issues in areas not already listed from above list: Scabbing to bilateral knees  [] N/A    Interventions In Place: Pillows and Pressure Redistribution Mattress    **If any new or digressing skin issues are found, fill out the selection below.    Possible Skin Injury No  Pictures Uploaded Into Epic: N/A  Wound Consult Placed: N/A  RN Wound Prevention Protocol Ordered: No    What new preventative interventions did you add? N/A

## 2021-08-08 NOTE — PROGRESS NOTES
1 RN Skin Assessment completed.   Patient's risk of skin breakdown: Low     Devices in place and skin assessed under:   []? Pulse Ox  []? PIV []? Central Line []? SCDs []?Purewick  []? Olsen  []?Condom Cath   []? BMS        []?  Cortrak   []?  Oxymask   []? Bipap    []? Nasal Canula   []? N/A   [x]? Other(specify): Wanderguard in place to L ankle.     Right Ear  [x]? WDL                or           []? Skin issue present (please provide assessment/description below)     Left Ear  [x]? WDL                or           []? Skin issue present (please provide assessment/description below)     Right Elbow:  [x]? WDL               or           []? Skin issue present (please provide assessment/description below)     Left Elbow:   [x]? WDL                or           []? Skin issue present (please provide assessment/description below)     Coccyx/Buttocks:  [x]? WDL                or           []? Skin issue present (please provide assessment/description below)     Right Heel/Foot/Toes:  [x]? WDL                or           []? Skin issue present (please provide assessment/description below)     Left Heel/Foot/Toes:   [x]? WDL              or           []? Skin issue present (please provide assessment/description below)        **Describe any other skin issues in areas not already listed from above list: Scabbing to bilateral knees  []? N/A     Interventions In Place: Pillows and Pressure Redistribution Mattress     **If any new or digressing skin issues are found, fill out the selection below.     Possible Skin Injury No  Pictures Uploaded Into Epic: N/A  Wound Consult Placed: N/A  RN Wound Prevention Protocol Ordered: No    What new preventative interventions did you add? N/A

## 2021-08-08 NOTE — PROGRESS NOTES
Pt is A&O X3, on RA, VSS.  Pt is able to ambulate by self, semi steady gait.  Bed alarm on, hourly rounds in place.  Pt denies having pain at present time.    1 RN Skin Assessment completed.   Patient's risk of skin breakdown: Low    Devices in place and skin assessed under:   [] Pulse Ox  [] PIV [] Central Line [] SCDs []Purewick  [] Olsen  []Condom Cath   [] BMS        []  Cortrak   []  Oxymask   [] Bipap    [] Nasal Canula   [] N/A   [] Other(specify):     Right Ear  [x] WDL                or           [] Skin issue present (please provide assessment/description below)    Left Ear  [x] WDL                or           [] Skin issue present (please provide assessment/description below)    Right Elbow:  [x] WDL               or           [] Skin issue present (please provide assessment/description below)    Left Elbow:   [x] WDL                or           [] Skin issue present (please provide assessment/description below)    Coccyx/Buttocks:  [x] WDL                or           [] Skin issue present (please provide assessment/description below)    Right Heel/Foot/Toes:  [x] WDL                or           [] Skin issue present (please provide assessment/description below)    Left Heel/Foot/Toes:   [x] WDL              or           [] Skin issue present (please provide assessment/description below)      **Describe any other skin issues in areas not already listed from above list:   [] N/A    Interventions In Place: Pressure Redistribution Mattress    **If any new or digressing skin issues are found, fill out the selection below.    Possible Skin Injury No  Pictures Uploaded Into Epic: N/A  Wound Consult Placed: N/A  RN Wound Prevention Protocol Ordered: No    What new preventative interventions did you add? N/A

## 2021-08-09 PROCEDURE — 700102 HCHG RX REV CODE 250 W/ 637 OVERRIDE(OP): Performed by: STUDENT IN AN ORGANIZED HEALTH CARE EDUCATION/TRAINING PROGRAM

## 2021-08-09 PROCEDURE — A9270 NON-COVERED ITEM OR SERVICE: HCPCS | Performed by: NURSE PRACTITIONER

## 2021-08-09 PROCEDURE — 700102 HCHG RX REV CODE 250 W/ 637 OVERRIDE(OP): Performed by: NURSE PRACTITIONER

## 2021-08-09 PROCEDURE — A9270 NON-COVERED ITEM OR SERVICE: HCPCS | Performed by: STUDENT IN AN ORGANIZED HEALTH CARE EDUCATION/TRAINING PROGRAM

## 2021-08-09 PROCEDURE — 770006 HCHG ROOM/CARE - MED/SURG/GYN SEMI*

## 2021-08-09 RX ADMIN — ACETAMINOPHEN 650 MG: 325 TABLET, FILM COATED ORAL at 16:19

## 2021-08-09 RX ADMIN — Medication 100 MG: at 09:32

## 2021-08-09 RX ADMIN — THERA TABS 1 TABLET: TAB at 09:32

## 2021-08-09 ASSESSMENT — PAIN DESCRIPTION - PAIN TYPE: TYPE: ACUTE PAIN

## 2021-08-09 NOTE — PROGRESS NOTES
AAOx1, self. Currently resting in bed. C/o 0/10 pain, declining intervention at this time. Medications taken w/o difficulty. -N/V. -N/T. Denies new onset of chest pain/SOB. +BS in all 4 quadrants, last BM yesterday per pt. Standby assist. Wanderguard in place to L ankle. POC discussed, denies further needs at this time. Fall precautions in place. Bed alarm on, call light within reach & hourly rounding in place.     1 RN Skin Assessment completed.   Patient's risk of skin breakdown: Low     Devices in place and skin assessed under:   []?? Pulse Ox  []?? PIV []?? Central Line []?? SCDs []??Purewick  []?? Olsen  []??Condom Cath   []?? BMS        []??  Cortrak   []??  Oxymask   []?? Bipap    []?? Nasal Canula   []?? N/A   [x]?? Other(specify): Wanderguard in place to L ankle.     Right Ear  [x]?? WDL                or           []?? Skin issue present (please provide assessment/description below)     Left Ear  [x]?? WDL                or           []?? Skin issue present (please provide assessment/description below)     Right Elbow:  [x]?? WDL               or           []?? Skin issue present (please provide assessment/description below)     Left Elbow:   [x]?? WDL                or           []?? Skin issue present (please provide assessment/description below)     Coccyx/Buttocks:  [x]?? WDL                or           []?? Skin issue present (please provide assessment/description below)     Right Heel/Foot/Toes:  [x]?? WDL                or           []?? Skin issue present (please provide assessment/description below)     Left Heel/Foot/Toes:   [x]?? WDL              or           []?? Skin issue present (please provide assessment/description below)        **Describe any other skin issues in areas not already listed from above list: Scabbing to bilateral knees  []?? N/A     Interventions In Place: Pillows and Pressure Redistribution Mattress     **If any new or digressing skin issues are found, fill out the selection  below.     Possible Skin Injury No  Pictures Uploaded Into Epic: N/A  Wound Consult Placed: N/A  RN Wound Prevention Protocol Ordered: No    What new preventative interventions did you add? N/A

## 2021-08-09 NOTE — PROGRESS NOTES
Pt is A&O X3, on RA.  Pt is able to ambulate by self, semi steady gait.  Bed alarm on, hourly rounds in place.  Pt denies having pain at present time.     1 RN Skin Assessment completed.   Patient's risk of skin breakdown: Low     Devices in place and skin assessed under:   []? Pulse Ox  []? PIV []? Central Line []? SCDs []?Purewick  []? Olsen  []?Condom Cath   []? BMS        []?  Cortrak   []?  Oxymask   []? Bipap    []? Nasal Canula   []? N/A   []? Other(specify):      Right Ear  [x]? WDL                or           []? Skin issue present (please provide assessment/description below)     Left Ear  [x]? WDL                or           []? Skin issue present (please provide assessment/description below)     Right Elbow:  [x]? WDL               or           []? Skin issue present (please provide assessment/description below)     Left Elbow:   [x]? WDL                or           []? Skin issue present (please provide assessment/description below)     Coccyx/Buttocks:  [x]? WDL                or           []? Skin issue present (please provide assessment/description below)     Right Heel/Foot/Toes:  [x]? WDL                or           []? Skin issue present (please provide assessment/description below)     Left Heel/Foot/Toes:   [x]? WDL              or           []? Skin issue present (please provide assessment/description below)        **Describe any other skin issues in areas not already listed from above list:   []? N/A     Interventions In Place: Pressure Redistribution Mattress     **If any new or digressing skin issues are found, fill out the selection below.     Possible Skin Injury No  Pictures Uploaded Into Epic: N/A  Wound Consult Placed: N/A  RN Wound Prevention Protocol Ordered: No    What new preventative interventions did you add? N/A

## 2021-08-09 NOTE — CARE PLAN
The patient is Watcher - Medium risk of patient condition declining or worsening    Shift Goals  Clinical Goals: Remain fall free  Patient Goals: Rest  Family Goals: N/A    Progress made toward(s) clinical / shift goals:  Pt did not experience any falls during the shift.    Patient is not progressing towards the following goals:

## 2021-08-10 PROCEDURE — A9270 NON-COVERED ITEM OR SERVICE: HCPCS | Performed by: STUDENT IN AN ORGANIZED HEALTH CARE EDUCATION/TRAINING PROGRAM

## 2021-08-10 PROCEDURE — 700102 HCHG RX REV CODE 250 W/ 637 OVERRIDE(OP): Performed by: STUDENT IN AN ORGANIZED HEALTH CARE EDUCATION/TRAINING PROGRAM

## 2021-08-10 PROCEDURE — 770006 HCHG ROOM/CARE - MED/SURG/GYN SEMI*

## 2021-08-10 PROCEDURE — 700102 HCHG RX REV CODE 250 W/ 637 OVERRIDE(OP): Performed by: NURSE PRACTITIONER

## 2021-08-10 PROCEDURE — A9270 NON-COVERED ITEM OR SERVICE: HCPCS | Performed by: NURSE PRACTITIONER

## 2021-08-10 RX ADMIN — DOCUSATE SODIUM 50 MG AND SENNOSIDES 8.6 MG 2 TABLET: 8.6; 5 TABLET, FILM COATED ORAL at 10:15

## 2021-08-10 RX ADMIN — THERA TABS 1 TABLET: TAB at 10:15

## 2021-08-10 RX ADMIN — Medication 100 MG: at 10:15

## 2021-08-10 ASSESSMENT — PAIN DESCRIPTION - PAIN TYPE: TYPE: ACUTE PAIN

## 2021-08-10 NOTE — PROGRESS NOTES
Pt is A&O X3, disoriented to situation. Pt is able to ambulate by self, semi steady gait.  Bed alarm on, hourly rounds in place.  Pt denies having pain at present time.     1 RN Skin Assessment completed.   Patient's risk of skin breakdown: Low     Devices in place and skin assessed under:   []?? Pulse Ox  []?? PIV []?? Central Line []?? SCDs []??Purewick  []?? Olsen  []??Condom Cath   []?? BMS        []??  Cortrak   []??  Oxymask   []?? Bipap    []?? Nasal Canula   [x]?? N/A   []?? Other(specify):      Right Ear  [x]?? WDL                or           []?? Skin issue present (please provide assessment/description below)     Left Ear  [x]?? WDL                or           []?? Skin issue present (please provide assessment/description below)     Right Elbow:  [x]?? WDL               or           []?? Skin issue present (please provide assessment/description below)     Left Elbow:   [x]?? WDL                or           []?? Skin issue present (please provide assessment/description below)     Coccyx/Buttocks:  [x]?? WDL                or           []?? Skin issue present (please provide assessment/description below)     Right Heel/Foot/Toes:  [x]?? WDL                or           []?? Skin issue present (please provide assessment/description below)     Left Heel/Foot/Toes:   [x]?? WDL              or           []?? Skin issue present (please provide assessment/description below)        **Describe any other skin issues in areas not already listed from above list:   []?? N/A     Interventions In Place: Pressure Redistribution Mattress     **If any new or digressing skin issues are found, fill out the selection below.     Possible Skin Injury No  Pictures Uploaded Into Epic: N/A  Wound Consult Placed: N/A  RN Wound Prevention Protocol Ordered: No    What new preventative interventions did you add? N/A

## 2021-08-10 NOTE — CARE PLAN
The patient is Watcher - Medium risk of patient condition declining or worsening    Shift Goals  Clinical Goals: Remain fall free  Patient Goals:  Family Goals: N/A    Progress made toward(s) clinical / shift goals:  Pt did not experience any falls during the shift.  Pt ambulated with steady gait.    Patient is not progressing towards the following goals:

## 2021-08-10 NOTE — PROGRESS NOTES
Pt resting in bed and denied any pain.  Pt on RA no SOB noted.  Pt compliant with AM medication regimen.  Pt educated on use of call light and safety precautions.  All needs met at this time, pt in room close to nursing station.       1 RN Skin Assessment completed.   Patient's risk of skin breakdown: Low    Devices in place and skin assessed under:   [] Pulse Ox  [] PIV [] Central Line [] SCDs []Purewick  [] Olsen  []Condom Cath   [] BMS        []  Cortrak   []  Oxymask   [] Bipap    [] Nasal Canula   [x] N/A   [] Other(specify):     Right Ear  [x] WDL                or           [] Skin issue present (please provide assessment/description below)    Left Ear  [x] WDL                or           [] Skin issue present (please provide assessment/description below)    Right Elbow:  [x] WDL               or           [] Skin issue present (please provide assessment/description below)    Left Elbow:   [x] WDL                or           [] Skin issue present (please provide assessment/description below)    Coccyx/Buttocks:  [x] WDL                or           [] Skin issue present (please provide assessment/description below)    Right Heel/Foot/Toes:  [x] WDL                or           [] Skin issue present (please provide assessment/description below)    Left Heel/Foot/Toes:   [x] WDL              or           [] Skin issue present (please provide assessment/description below)      **Describe any other skin issues in areas not already listed from above list:   [x] N/A    Interventions In Place: Pillows and Pressure Redistribution Mattress    **If any new or digressing skin issues are found, fill out the selection below.    Possible Skin Injury No  Pictures Uploaded Into Epic: N/A  Wound Consult Placed: N/A  RN Wound Prevention Protocol Ordered: No    What new preventative interventions did you add? N/A

## 2021-08-10 NOTE — DISCHARGE PLANNING
Anticipated Discharge Disposition: [facility] v. SNF    Action: Patient discussed with Dr. Thakur and chart reviewed.  Patient is currently pending guardianship.  No court date at this time.    Barriers to Discharge: pending guardianship, medicaid, cost of care    Plan: RN CM to continue to follow up on guardianship process.

## 2021-08-11 PROCEDURE — 700102 HCHG RX REV CODE 250 W/ 637 OVERRIDE(OP): Performed by: STUDENT IN AN ORGANIZED HEALTH CARE EDUCATION/TRAINING PROGRAM

## 2021-08-11 PROCEDURE — 770006 HCHG ROOM/CARE - MED/SURG/GYN SEMI*

## 2021-08-11 PROCEDURE — 700102 HCHG RX REV CODE 250 W/ 637 OVERRIDE(OP): Performed by: NURSE PRACTITIONER

## 2021-08-11 PROCEDURE — 99231 SBSQ HOSP IP/OBS SF/LOW 25: CPT | Performed by: HOSPITALIST

## 2021-08-11 PROCEDURE — A9270 NON-COVERED ITEM OR SERVICE: HCPCS | Performed by: NURSE PRACTITIONER

## 2021-08-11 PROCEDURE — A9270 NON-COVERED ITEM OR SERVICE: HCPCS | Performed by: STUDENT IN AN ORGANIZED HEALTH CARE EDUCATION/TRAINING PROGRAM

## 2021-08-11 RX ADMIN — THERA TABS 1 TABLET: TAB at 08:40

## 2021-08-11 RX ADMIN — Medication 100 MG: at 08:40

## 2021-08-11 RX ADMIN — DOCUSATE SODIUM 50 MG AND SENNOSIDES 8.6 MG 2 TABLET: 8.6; 5 TABLET, FILM COATED ORAL at 08:40

## 2021-08-11 ASSESSMENT — ENCOUNTER SYMPTOMS
NERVOUS/ANXIOUS: 0
NAUSEA: 0
FEVER: 0
HEADACHES: 0
HEARTBURN: 1

## 2021-08-11 NOTE — PROGRESS NOTES
"Hospital Medicine Daily Progress Note    Date of Service  8/11/2021    Chief Complaint  Found down and had multiple witnessed falls.    Hospital Course  Per prior note: \"Mr. Beard is a 66-year-old male with no known PMHx who presented to the hospital on 6/4/2021 after he was found down on the sidewalk after multiple witnessed falls.  Patient was confused at the time of admission.  He had been seen earlier in the day for syncope while crossing the street however at that time left AGAINST MEDICAL ADVICE.  On admission he was tachycardic with a heart rate of 114, anion gap of 23 and a lactic acid of 2.6.  He had a CT scan of the head, which revealed no acute intracranial abnormalities.  Chest x-ray completed, which showed no acute cardiopulmonary process.  Patient was admitted for further evaluation of altered mental status and syncope. Patient throughout this stay has been confused an lack of capacity was established.   He underwent MRI brain with and without contrast on Gavi 10, 2021, no acute abnormalities noted. Guardianship being pursued and patient will need placement.\"       Interval Problem Update  8/11: Awake, pleasant and talking in full sentences. Initially didn't remember he was in [place] nor lived here.  Told me he doesn't believe he has been falling but did admit to having bruises on his knees, \"but no other injuries.\" Then stated he got this from [employer] [facility].      I have personally seen and examined the patient at bedside. I discussed the plan of care with patient, bedside RN and .    Consultants/Specialty  none    Code Status  Full Code    Disposition  Patient is medically cleared.   Anticipate discharge to to skilled nursing facility.  I have placed the appropriate orders for post-discharge needs.    Review of Systems  Review of Systems   Constitutional: Negative for fever.   Cardiovascular: Negative for chest pain.   Gastrointestinal: Positive for heartburn. Negative for nausea. "   Musculoskeletal: Positive for joint pain.   Neurological: Negative for headaches.   Psychiatric/Behavioral: The patient is not nervous/anxious.         Physical Exam  Temp:  [36.4 °C (97.5 °F)-37.1 °C (98.8 °F)] 37.1 °C (98.8 °F)  Pulse:  [] 65  Resp:  [18] 18  BP: (100-109)/(60-71) 109/71  SpO2:  [96 %-99 %] 99 %    Physical Exam  HENT:      Head: Normocephalic and atraumatic.      Nose: Nose normal.      Mouth/Throat:      Mouth: Mucous membranes are moist.   Eyes:      Conjunctiva/sclera: Conjunctivae normal.   Cardiovascular:      Rate and Rhythm: Normal rate and regular rhythm.   Pulmonary:      Effort: Pulmonary effort is normal.      Breath sounds: Normal breath sounds. No stridor.   Abdominal:      General: There is no distension.      Palpations: Abdomen is soft.      Tenderness: There is no abdominal tenderness.   Musculoskeletal:      Cervical back: Neck supple. No tenderness.      Right lower leg: No edema.      Left lower leg: No edema.   Skin:     General: Skin is warm.   Neurological:      Mental Status: He is alert. He is disoriented.   Psychiatric:         Speech: Speech normal.         Cognition and Memory: Cognition is impaired.         Fluids    Intake/Output Summary (Last 24 hours) at 8/11/2021 1531  Last data filed at 8/11/2021 0930  Gross per 24 hour   Intake 610 ml   Output --   Net 610 ml       Laboratory                        Imaging  MR-BRAIN-WITH & W/O   Final Result         No acute intracranial process.      Nonspecific T2 hyperintensities in the deep white matter related to chronic microvascular ischemia.      BH-IFSBVIX-1 VIEW   Final Result      1.  No metallic foreign bodies detected.   2.  Right nephrolithiasis.      EC-ECHOCARDIOGRAM LTD W/ CONT   Final Result      CT-HEAD W/O   Final Result      1.  Cerebral atrophy and chronic microvascular ischemic type changes.   2.  No acute intracranial abnormality.      DX-CHEST-PORTABLE (1 VIEW)   Final Result      No acute  cardiopulmonary abnormality.           Assessment/Plan  * Cognitive impairment- (present on admission)  Assessment & Plan  He was evaluated by neurology. CT head and MRI brain negative. TSH, HIV, RPR, B12 normal.  Pending guardianship   His condition is consistent with dementia with poor insight      Onychomycosis- (present on admission)  Assessment & Plan  Wound care consult for trimming toenails    Anemia  Assessment & Plan  Hgb remains mildly low. Currently hgb 12.6, MCV elevated. Iron panel normal. B12 normal, folate normal on 6/11. Patient asymptomatic.   monitor    Marijuana use  Assessment & Plan  Presented with positive drugs screen for cannabinoid    Dehydration- (present on admission)  Assessment & Plan  Resolved    Syncope- (present on admission)  Assessment & Plan  Had initially presented to the ED for syncope and left AMA; subsequently found down on sidewalk after multiple witnessed falls. CT head and brain MRI negative. Echo unremarkable. No significant arrhythmias found on tele   Fall precautions   PT and OT have seen patient         VTE prophylaxis: ambulatory    I have performed a physical exam and reviewed and updated ROS and Plan today (8/11/2021). In review of yesterday's note (8/10/2021), there are no changes except as documented above.

## 2021-08-11 NOTE — DISCHARGE PLANNING
Anticipated Discharge Disposition: To be determined; [facility] vs SNF.     Action: LSW received call from Ana Laura from [employer] that patient has not paid rent in 3 months and will begin process of eviction. They can hold on to his stuff for 30 days, but then throw away belongings following 30 days.     Barriers to Discharge: Pending guardianship; medicaid; cost of care     Plan: CM to continue to follow for discharge needs.    Update: LSW called patient's emergency contact, Edouard Dunlap (#[phone]) to see if he might be able to assist patient with eviction. No answer, left message.

## 2021-08-11 NOTE — CARE PLAN
The patient is Stable - Low risk of patient condition declining or worsening    Shift Goals  Clinical Goals: remain free from falls  Patient Goals: Rest  Family Goals: N/A    Progress made toward(s) clinical / shift goals:  fall precautions in place, bed desk. Patient with steady gait.     Patient is not progressing towards the following goals:

## 2021-08-11 NOTE — PROGRESS NOTES
Pt resting in bed and denied any pain.  Pt on RA no SOB noted.  Pt compliant with AM medication regimen.  SB assist while pt ambulated to the bathroom. Pt educated on use of call light and safety precautions.  All needs met at this time, pt in room close to nursing station.         1 RN Skin Assessment completed.   Patient's risk of skin breakdown: Low     Devices in place and skin assessed under:   []? Pulse Ox  []? PIV []? Central Line []? SCDs []?Purewick  []? Oslen  []?Condom Cath   []? BMS        []?  Cortrak   []?  Oxymask   []? Bipap    []? Nasal Canula   [x]? N/A   []? Other(specify):      Right Ear  [x]? WDL                or           []? Skin issue present (please provide assessment/description below)     Left Ear  [x]? WDL                or           []? Skin issue present (please provide assessment/description below)     Right Elbow:  [x]? WDL               or           []? Skin issue present (please provide assessment/description below)     Left Elbow:   [x]? WDL                or           []? Skin issue present (please provide assessment/description below)     Coccyx/Buttocks:  [x]? WDL                or           []? Skin issue present (please provide assessment/description below)     Right Heel/Foot/Toes:  [x]? WDL                or           []? Skin issue present (please provide assessment/description below)     Left Heel/Foot/Toes:   [x]? WDL              or           []? Skin issue present (please provide assessment/description below)        **Describe any other skin issues in areas not already listed from above list:   [x]? N/A     Interventions In Place: Pillows and Pressure Redistribution Mattress     **If any new or digressing skin issues are found, fill out the selection below.     Possible Skin Injury No  Pictures Uploaded Into Epic: N/A  Wound Consult Placed: N/A  RN Wound Prevention Protocol Ordered: No    What new preventative interventions did you add? N/A

## 2021-08-11 NOTE — PROGRESS NOTES
Received report and assumed care at shift change. A&O x 3, disoriented to situation, non compliant with use of call light for assistance. No complain of pain or distress. Fall precautions in place, bed locked and in lowest position. Needs attended to.            1 RN Skin Assessment completed.   Patient's risk of skin breakdown: Low     Devices in place and skin assessed under:   []??? Pulse Ox  []??? PIV []??? Central Line []??? SCDs []???Purewick  []??? Olsen  []???Condom Cath   []??? BMS        []???  Cortrak   []???  Oxymask   []??? Bipap    []??? Nasal Canula   [x]??? N/A   []??? Other(specify):      Right Ear  [x]??? WDL                or           []??? Skin issue present (please provide assessment/description below)     Left Ear  [x]??? WDL                or           []??? Skin issue present (please provide assessment/description below)     Right Elbow:  [x]??? WDL               or           []??? Skin issue present (please provide assessment/description below)     Left Elbow:   [x]??? WDL                or           []??? Skin issue present (please provide assessment/description below)     Coccyx/Buttocks:  [x]??? WDL                or           []??? Skin issue present (please provide assessment/description below)     Right Heel/Foot/Toes:  [x]??? WDL                or           []??? Skin issue present (please provide assessment/description below)     Left Heel/Foot/Toes:   [x]??? WDL              or           []??? Skin issue present (please provide assessment/description below)        **Describe any other skin issues in areas not already listed from above list:   []??? N/A     Interventions In Place: Pressure Redistribution Mattress     **If any new or digressing skin issues are found, fill out the selection below.     Possible Skin Injury No  Pictures Uploaded Into Epic: N/A  Wound Consult Placed: N/A  RN Wound Prevention Protocol Ordered: No    What new preventative interventions did you add? N/A

## 2021-08-12 PROCEDURE — 770006 HCHG ROOM/CARE - MED/SURG/GYN SEMI*

## 2021-08-12 PROCEDURE — 700102 HCHG RX REV CODE 250 W/ 637 OVERRIDE(OP): Performed by: NURSE PRACTITIONER

## 2021-08-12 PROCEDURE — A9270 NON-COVERED ITEM OR SERVICE: HCPCS | Performed by: NURSE PRACTITIONER

## 2021-08-12 RX ADMIN — Medication 100 MG: at 08:03

## 2021-08-12 RX ADMIN — THERA TABS 1 TABLET: TAB at 08:03

## 2021-08-12 ASSESSMENT — PAIN DESCRIPTION - PAIN TYPE: TYPE: ACUTE PAIN

## 2021-08-12 NOTE — CARE PLAN
Problem: Fall Risk  Goal: Patient will remain free from falls  Outcome: Progressing   The patient is Stable - Low risk of patient condition declining or worsening    Shift Goals  Clinical Goals: pt will remain free from falls  Patient Goals: Rest  Family Goals: N/A    Progress made toward(s) clinical / shift goals:  fall precautions in place, patient on desk bed.     Patient is not progressing towards the following goals:

## 2021-08-12 NOTE — PROGRESS NOTES
Received report and assumed care at shift change. A&O x 1, patient is restless this shift, continue to refuse meals and medications. Fall precautions in place, bed locked and in lowest position. Needs attended to. No complains of pain or distress.      1 RN Skin Assessment completed.   Patient's risk of skin breakdown: Low     Devices in place and skin assessed under:   []???? Pulse Ox  []???? PIV []???? Central Line []???? SCDs []????Purewick  []???? Olsen  []????Condom Cath   []???? BMS        []????  Cortrak   []????  Oxymask   []???? Bipap    []???? Nasal Canula   [x]???? N/A   []???? Other(specify):      Right Ear  [x]???? WDL                or           []???? Skin issue present (please provide assessment/description below)     Left Ear  [x]???? WDL                or           []???? Skin issue present (please provide assessment/description below)     Right Elbow:  [x]???? WDL               or           []???? Skin issue present (please provide assessment/description below)     Left Elbow:   [x]???? WDL                or           []???? Skin issue present (please provide assessment/description below)     Coccyx/Buttocks:  [x]???? WDL                or           []???? Skin issue present (please provide assessment/description below)     Right Heel/Foot/Toes:  [x]???? WDL                or           []???? Skin issue present (please provide assessment/description below)     Left Heel/Foot/Toes:   [x]???? WDL              or           []???? Skin issue present (please provide assessment/description below)        **Describe any other skin issues in areas not already listed from above list:   []???? N/A     Interventions In Place: Pressure Redistribution Mattress     **If any new or digressing skin issues are found, fill out the selection below.     Possible Skin Injury No  Pictures Uploaded Into Epic: N/A  Wound Consult Placed: N/A  RN Wound Prevention Protocol Ordered: No    What new preventative interventions  did you add? N/A

## 2021-08-12 NOTE — PROGRESS NOTES
Pt resting in bed and denied any pain.  Pt on RA no SOB noted.  Pt refused AM senna.  Pt educated on use of call light and safety precautions.  All needs met at this time, pt in room close to nursing station.         1 RN Skin Assessment completed.   Patient's risk of skin breakdown: Low     Devices in place and skin assessed under:   []?? Pulse Ox  []?? PIV []?? Central Line []?? SCDs []??Purewick  []?? Olsen  []??Condom Cath   []?? BMS        []??  Cortrak   []??  Oxymask   []?? Bipap    []?? Nasal Canula   [x]?? N/A   []?? Other(specify):      Right Ear  [x]?? WDL                or           []?? Skin issue present (please provide assessment/description below)     Left Ear  [x]?? WDL                or           []?? Skin issue present (please provide assessment/description below)     Right Elbow:  [x]?? WDL               or           []?? Skin issue present (please provide assessment/description below)     Left Elbow:   [x]?? WDL                or           []?? Skin issue present (please provide assessment/description below)     Coccyx/Buttocks:  [x]?? WDL                or           []?? Skin issue present (please provide assessment/description below)     Right Heel/Foot/Toes:  [x]?? WDL                or           []?? Skin issue present (please provide assessment/description below)     Left Heel/Foot/Toes:   [x]?? WDL              or           []?? Skin issue present (please provide assessment/description below)        **Describe any other skin issues in areas not already listed from above list:   [x]?? N/A     Interventions In Place: Pillows and Pressure Redistribution Mattress     **If any new or digressing skin issues are found, fill out the selection below.     Possible Skin Injury No  Pictures Uploaded Into Epic: N/A  Wound Consult Placed: N/A  RN Wound Prevention Protocol Ordered: No    What new preventative interventions did you add? N/A

## 2021-08-13 PROCEDURE — A9270 NON-COVERED ITEM OR SERVICE: HCPCS | Performed by: NURSE PRACTITIONER

## 2021-08-13 PROCEDURE — 700102 HCHG RX REV CODE 250 W/ 637 OVERRIDE(OP): Performed by: NURSE PRACTITIONER

## 2021-08-13 PROCEDURE — 770006 HCHG ROOM/CARE - MED/SURG/GYN SEMI*

## 2021-08-13 RX ADMIN — THERA TABS 1 TABLET: TAB at 08:03

## 2021-08-13 RX ADMIN — Medication 100 MG: at 08:03

## 2021-08-13 ASSESSMENT — PAIN DESCRIPTION - PAIN TYPE
TYPE: ACUTE PAIN
TYPE: ACUTE PAIN

## 2021-08-13 NOTE — CARE PLAN
The patient is Stable - Low risk of patient condition declining or worsening    Shift Goals  Clinical Goals: pt will remain free from falls  Patient Goals: Rest  Family Goals: N/A    Progress made toward(s) clinical / shift goals:  patient ambulates with steady gait. Fall precautions in place.     Patient is not progressing towards the following goals:

## 2021-08-13 NOTE — DISCHARGE PLANNING
Anticipated Discharge Disposition: Group home.     Action: Guardianship remains pending and court date has not been set.     Barriers to Discharge: Pending guardianship; medicaid; cost of care.     Plan: CM to continue to follow for discharge needs.

## 2021-08-13 NOTE — PROGRESS NOTES
Received report and assumed care at shift change. A&O x 1, alert only to self.  Non compliant with using call light for assistance. Fall precautions in place, bed locked and in lowest position. No complain of pain or distress. Needs attended to.       1 RN Skin Assessment completed.   Patient's risk of skin breakdown: Low    Devices in place and skin assessed under:   [] Pulse Ox  [] PIV [] Central Line [] SCDs []Purewick  [] Olsen  []Condom Cath   [] BMS        []  Cortrak   []  Oxymask   [] Bipap    [] Nasal Canula   [x] N/A   [] Other(specify):     Right Ear  [x] WDL                or           [] Skin issue present (please provide assessment/description below)    Left Ear  [x] WDL                or           [] Skin issue present (please provide assessment/description below)    Right Elbow:  [x] WDL               or           [] Skin issue present (please provide assessment/description below)    Left Elbow:   [x] WDL                or           [] Skin issue present (please provide assessment/description below)    Coccyx/Buttocks:  [x] WDL                or           [] Skin issue present (please provide assessment/description below)    Right Heel/Foot/Toes:  [x] WDL                or           [] Skin issue present (please provide assessment/description below)    Left Heel/Foot/Toes:   [x] WDL              or           [] Skin issue present (please provide assessment/description below)      **Describe any other skin issues in areas not already listed from above list:   [x] N/A    Interventions In Place: Pressure Redistribution Mattress    **If any new or digressing skin issues are found, fill out the selection below.    Possible Skin Injury No  Pictures Uploaded Into Epic: N/A  Wound Consult Placed: N/A  RN Wound Prevention Protocol Ordered: No    What new preventative interventions did you add? N/A

## 2021-08-13 NOTE — PROGRESS NOTES
Received bedside report from night shift RN.   Assumed care of patient at change of shift.   Assessment complete and POC discussed.   A&Ox2 (disoriented to situation and time), VSS, on RA.   Denies pain, no apparent signs of distress or discomfort.   Patient took medications with water, refused stool softener.  Bed is in lowest/locked position.   Call light and belongings are within reach.   No further needs at this time.      1 RN Skin Assessment completed.   Patient's risk of skin breakdown: Low    Devices in place and skin assessed under:   [] Pulse Ox  [] PIV [] Central Line [] SCDs []Purewick  [] Olsen  []Condom Cath   [] BMS        []  Cortrak   []  Oxymask   [] Bipap    [] Nasal Canula   [x] N/A   [] Other(specify):     Right Ear  [x] WDL                or           [] Skin issue present (please provide assessment/description below)    Left Ear  [x] WDL                or           [] Skin issue present (please provide assessment/description below)    Right Elbow:  [x] WDL               or           [] Skin issue present (please provide assessment/description below)    Left Elbow:   [x] WDL                or           [] Skin issue present (please provide assessment/description below)    Coccyx/Buttocks:  [x] WDL                or           [] Skin issue present (please provide assessment/description below)    Right Heel/Foot/Toes:  [x] WDL                or           [] Skin issue present (please provide assessment/description below)    Left Heel/Foot/Toes:   [x] WDL              or           [] Skin issue present (please provide assessment/description below)      **Describe any other skin issues in areas not already listed from above list:   [x] N/A    Interventions In Place: Pillows and Pressure Redistribution Mattress

## 2021-08-14 PROCEDURE — 700102 HCHG RX REV CODE 250 W/ 637 OVERRIDE(OP): Performed by: NURSE PRACTITIONER

## 2021-08-14 PROCEDURE — 99231 SBSQ HOSP IP/OBS SF/LOW 25: CPT | Performed by: HOSPITALIST

## 2021-08-14 PROCEDURE — 770006 HCHG ROOM/CARE - MED/SURG/GYN SEMI*

## 2021-08-14 PROCEDURE — A9270 NON-COVERED ITEM OR SERVICE: HCPCS | Performed by: NURSE PRACTITIONER

## 2021-08-14 RX ADMIN — THERA TABS 1 TABLET: TAB at 09:43

## 2021-08-14 RX ADMIN — Medication 100 MG: at 09:43

## 2021-08-14 ASSESSMENT — ENCOUNTER SYMPTOMS
FEVER: 0
SPEECH CHANGE: 0
NAUSEA: 0
HEADACHES: 0
ABDOMINAL PAIN: 0
NERVOUS/ANXIOUS: 0

## 2021-08-14 ASSESSMENT — PAIN DESCRIPTION - PAIN TYPE: TYPE: ACUTE PAIN

## 2021-08-14 NOTE — PROGRESS NOTES
"Received bedside report from night shift RN.   Assumed care of patient at change of shift.   Assessment complete and POC discussed.   A&Ox2 (disoriented to situation and time)  When asking patient if he knew where he was,   he states \"in a hospital of some sort I guess.\"  Denies pain, no apparent signs of distress or discomfort.   Patient took medications with water, refused stool softener.  Patient does not remember when his last BM was,  but states \"it doesn't seem like it was that long ago.\"  Abdomen is not distended, patient denies abdominal discomfort.  Bed is in lowest/locked position.   Call light and belongings are within reach.   No further needs at this time.    1 RN Skin Assessment completed.   Patient's risk of skin breakdown: Low     Devices in place and skin assessed under:   []? Pulse Ox  []? PIV []? Central Line []? SCDs []?Purewick  []? Olsen  []?Condom Cath   []? BMS        []?  Cortrak   []?  Oxymask   []? Bipap    []? Nasal Canula   [x]? N/A   []? Other(specify):      Right Ear  [x]? WDL                or           []? Skin issue present (please provide assessment/description below)     Left Ear  [x]? WDL                or           []? Skin issue present (please provide assessment/description below)     Right Elbow:  [x]? WDL               or           []? Skin issue present (please provide assessment/description below)     Left Elbow:   [x]? WDL                or           []? Skin issue present (please provide assessment/description below)     Coccyx/Buttocks:  [x]? WDL                or           []? Skin issue present (please provide assessment/description below)     Right Heel/Foot/Toes:  [x]? WDL                or           []? Skin issue present (please provide assessment/description below)     Left Heel/Foot/Toes:   [x]? WDL              or           []? Skin issue present (please provide assessment/description below)        **Describe any other skin issues in areas not already listed " from above list:   [x]? N/A     Interventions In Place: Pillows and Pressure Redistribution Mattress

## 2021-08-14 NOTE — CARE PLAN
The patient is Stable - Low risk of patient condition declining or worsening    Shift Goals  Clinical Goals: pt will remain free from falls  Patient Goals: Rest  Family Goals: N/A    Progress made toward(s) clinical / shift goals:  fall precautions in place. Pt in bed desk.     Patient is not progressing towards the following goals:

## 2021-08-14 NOTE — PROGRESS NOTES
Received report and assumed care at shift change. Patient alert only to self , non compliant with cares. Fall precautions in place, bed locked and in lowest position. Needs attended to. No complains of pain    1 RN Skin Assessment completed.   Patient's risk of skin breakdown: Low     Devices in place and skin assessed under:   []? Pulse Ox  []? PIV []? Central Line []? SCDs []?Purewick  []? Olsen  []?Condom Cath   []? BMS        []?  Cortrak   []?  Oxymask   []? Bipap    []? Nasal Canula   [x]? N/A   []? Other(specify):      Right Ear  [x]? WDL                or           []? Skin issue present (please provide assessment/description below)     Left Ear  [x]? WDL                or           []? Skin issue present (please provide assessment/description below)     Right Elbow:  [x]? WDL               or           []? Skin issue present (please provide assessment/description below)     Left Elbow:   [x]? WDL                or           []? Skin issue present (please provide assessment/description below)     Coccyx/Buttocks:  [x]? WDL                or           []? Skin issue present (please provide assessment/description below)     Right Heel/Foot/Toes:  [x]? WDL                or           []? Skin issue present (please provide assessment/description below)     Left Heel/Foot/Toes:   [x]? WDL              or           []? Skin issue present (please provide assessment/description below)        **Describe any other skin issues in areas not already listed from above list:   [x]? N/A     Interventions In Place: Pressure Redistribution Mattress     **If any new or digressing skin issues are found, fill out the selection below.     Possible Skin Injury No  Pictures Uploaded Into Epic: N/A  Wound Consult Placed: N/A  RN Wound Prevention Protocol Ordered: No    What new preventative interventions did you add? N/A

## 2021-08-14 NOTE — PROGRESS NOTES
"Hospital Medicine Daily Progress Note    Date of Service  8/14/2021    Chief Complaint  Found down and had multiple witnessed falls.    Hospital Course  Per prior note: \"Mr. Beard is a 66-year-old male with no known PMHx who presented to the hospital on 6/4/2021 after he was found down on the sidewalk after multiple witnessed falls.  Patient was confused at the time of admission.  He had been seen earlier in the day for syncope while crossing the street however at that time left AGAINST MEDICAL ADVICE.  On admission he was tachycardic with a heart rate of 114, anion gap of 23 and a lactic acid of 2.6.  He had a CT scan of the head, which revealed no acute intracranial abnormalities.  Chest x-ray completed, which showed no acute cardiopulmonary process.  Patient was admitted for further evaluation of altered mental status and syncope. Patient throughout this stay has been confused an lack of capacity was established.   He underwent MRI brain with and without contrast on Gavi 10, 2021, no acute abnormalities noted. Guardianship being pursued and patient will need placement.\"       Interval Problem Update  8/11: Awake, pleasant and talking in full sentences. Initially didn't remember he was in [place] nor lived here.  Told me he doesn't believe he has been falling but did admit to having bruises on his knees, \"but no other injuries.\" Then stated he got this from [employer].      8/14: Poor insight on his care.  Asked why someone checks on him daily.  He has no new concerns.      I have personally seen and examined the patient at bedside. I discussed the plan of care with patient, bedside RN and .    Consultants/Specialty  none    Code Status  Full Code    Disposition  Patient is medically cleared.   Anticipate discharge to to skilled nursing facility. Pending guardianship, medicaid and payor source per CM note.  I have placed the appropriate orders for post-discharge needs.    Review of Systems  Review " of Systems   Constitutional: Negative for fever.   HENT: Negative for congestion.    Cardiovascular: Negative for leg swelling.   Gastrointestinal: Negative for abdominal pain and nausea.   Musculoskeletal: Negative for joint pain.   Neurological: Negative for speech change and headaches.   Psychiatric/Behavioral: The patient is not nervous/anxious.         Physical Exam  Temp:  [36.3 °C (97.4 °F)-37 °C (98.6 °F)] 36.3 °C (97.4 °F)  Pulse:  [60-87] 60  Resp:  [16-20] 17  BP: (117-119)/(68-77) 119/77  SpO2:  [94 %-98 %] 94 %    Physical Exam  HENT:      Head: Normocephalic and atraumatic.      Nose: Nose normal.      Mouth/Throat:      Mouth: Mucous membranes are moist.   Eyes:      General:         Right eye: No discharge.         Left eye: No discharge.      Extraocular Movements: Extraocular movements intact.      Conjunctiva/sclera: Conjunctivae normal.   Cardiovascular:      Rate and Rhythm: Normal rate and regular rhythm.   Pulmonary:      Effort: Pulmonary effort is normal.      Breath sounds: Normal breath sounds. No stridor.   Abdominal:      General: There is no distension.      Palpations: Abdomen is soft.      Tenderness: There is no abdominal tenderness.   Musculoskeletal:      Cervical back: Neck supple. No tenderness.      Right lower leg: No edema.      Left lower leg: No edema.   Skin:     General: Skin is warm.   Neurological:      General: No focal deficit present.      Mental Status: He is alert. He is disoriented.   Psychiatric:         Mood and Affect: Mood normal.         Speech: Speech normal.         Cognition and Memory: Cognition is impaired.         Fluids    Intake/Output Summary (Last 24 hours) at 8/14/2021 0909  Last data filed at 8/13/2021 1932  Gross per 24 hour   Intake 720 ml   Output --   Net 720 ml       Laboratory                        Imaging  MR-BRAIN-WITH & W/O   Final Result         No acute intracranial process.      Nonspecific T2 hyperintensities in the deep white matter  related to chronic microvascular ischemia.      [id number] VIEW   Final Result      1.  No metallic foreign bodies detected.   2.  Right nephrolithiasis.      EC-ECHOCARDIOGRAM LTD W/ CONT   Final Result      CT-HEAD W/O   Final Result      1.  Cerebral atrophy and chronic microvascular ischemic type changes.   2.  No acute intracranial abnormality.      DX-CHEST-PORTABLE (1 VIEW)   Final Result      No acute cardiopulmonary abnormality.           Assessment/Plan  * Cognitive impairment- (present on admission)  Assessment & Plan  He was evaluated by neurology. CT head and MRI brain negative. TSH, HIV, RPR, B12 normal.  Pending guardianship   His condition is consistent with dementia with poor insight      Onychomycosis- (present on admission)  Assessment & Plan  Wound care consult for trimming toenails    Anemia  Assessment & Plan  Hgb remains mildly low. Currently hgb 12.6, MCV elevated. Iron panel normal. B12 normal, folate normal on 6/11. Patient asymptomatic.   monitor    Marijuana use  Assessment & Plan  Presented with positive drugs screen for cannabinoid    Dehydration- (present on admission)  Assessment & Plan  Resolved    Syncope- (present on admission)  Assessment & Plan  Had initially presented to the ED for syncope and left AMA; subsequently found down on sidewalk after multiple witnessed falls. CT head and brain MRI negative. Echo unremarkable. No significant arrhythmias found on tele   Fall precautions   PT and OT have seen patient         VTE prophylaxis: ambulatory    I have performed a physical exam and reviewed and updated ROS and Plan today (8/14/2021). In review of yesterday's note (8/13/2021), there are no changes except as documented above.

## 2021-08-15 PROCEDURE — 770006 HCHG ROOM/CARE - MED/SURG/GYN SEMI*

## 2021-08-15 PROCEDURE — A9270 NON-COVERED ITEM OR SERVICE: HCPCS | Performed by: NURSE PRACTITIONER

## 2021-08-15 PROCEDURE — 700102 HCHG RX REV CODE 250 W/ 637 OVERRIDE(OP): Performed by: NURSE PRACTITIONER

## 2021-08-15 RX ADMIN — THERA TABS 1 TABLET: TAB at 09:03

## 2021-08-15 RX ADMIN — Medication 100 MG: at 09:03

## 2021-08-15 ASSESSMENT — PAIN DESCRIPTION - PAIN TYPE: TYPE: ACUTE PAIN

## 2021-08-15 NOTE — PROGRESS NOTES
Pt is resting in bed, denied pain. A&Ox1, reoriented pt. Denied further needs. Bed rails up. Reinforced use of call light for assistance. Bed alarm on and room by nursing station.        1 RN Skin Assessment completed.   Patient's risk of skin breakdown: Low    Devices in place and skin assessed under:   [] Pulse Ox  [] PIV [] Central Line [] SCDs []Purewick  [] Olsen  []Condom Cath   [] BMS        []  Cortrak   []  Oxymask   [] Bipap    [] Nasal Canula   [x] N/A   [] Other(specify):     Right Ear  [x] WDL                or           [] Skin issue present (please provide assessment/description below)    Left Ear  [x] WDL                or           [] Skin issue present (please provide assessment/description below)    Right Elbow:  [x] WDL               or           [] Skin issue present (please provide assessment/description below)    Left Elbow:   [x] WDL                or           [] Skin issue present (please provide assessment/description below)    Coccyx/Buttocks:  [x] WDL                or           [] Skin issue present (please provide assessment/description below)    Right Heel/Foot/Toes:  [x] WDL                or           [] Skin issue present (please provide assessment/description below)    Left Heel/Foot/Toes:   [x] WDL              or           [] Skin issue present (please provide assessment/description below)      **Describe any other skin issues in areas not already listed from above list:   [x] N/A    Interventions In Place: Pillows, redistribution mattress, waffle overlay    **If any new or digressing skin issues are found, fill out the selection below.    Possible Skin Injury No  Pictures Uploaded Into Epic: N/A  Wound Consult Placed: N/A  RN Wound Prevention Protocol Ordered: No    What new preventative interventions did you add? N/A

## 2021-08-15 NOTE — PROGRESS NOTES
"Received bedside report from night shift RN.   Assumed care of patient at change of shift.   Assessment complete and POC discussed.   A&Ox1 at time of assessment. VSS, on RA.  Denies pain, no apparent signs of distress or discomfort.   Patient took medications with water, refused stool softener.  Patient does not remember when his last BM was, but  believes it was \"not yesterday, but the day before\".  Abdomen is not distended, patient denies abdominal discomfort.  Bed is in lowest/locked position.   Call light and belongings are within reach.   No further needs at this time    1 RN Skin Assessment completed.   Patient's risk of skin breakdown: Low     Devices in place and skin assessed under:   []?? Pulse Ox  []?? PIV []?? Central Line []?? SCDs []??Purewick  []?? Olsen  []??Condom Cath   []?? BMS        []??  Cortrak   []??  Oxymask   []?? Bipap    []?? Nasal Canula   [x]?? N/A   []?? Other(specify):      Right Ear  [x]?? WDL                or           []?? Skin issue present (please provide assessment/description below)     Left Ear  [x]?? WDL                or           []?? Skin issue present (please provide assessment/description below)     Right Elbow:  [x]?? WDL               or           []?? Skin issue present (please provide assessment/description below)     Left Elbow:   [x]?? WDL                or           []?? Skin issue present (please provide assessment/description below)     Coccyx/Buttocks:  [x]?? WDL                or           []?? Skin issue present (please provide assessment/description below)     Right Heel/Foot/Toes:  [x]?? WDL                or           []?? Skin issue present (please provide assessment/description below)     Left Heel/Foot/Toes:   [x]?? WDL              or           []?? Skin issue present (please provide assessment/description below)        **Describe any other skin issues in areas not already listed from above list:   [x]?? N/A     Interventions In Place: Pillows and " Pressure Redistribution Mattress

## 2021-08-15 NOTE — CARE PLAN
The patient is Stable - Low risk of patient condition declining or worsening    Shift Goals  Clinical Goals: Pt will remain free from falls  Patient Goals:   Family Goals: N/A    Progress made toward(s) clinical / shift goals: Bed alarm on, room close to nursing station, treaded socks on, bed rails up, call light and belongings within reach, fall education provided    Patient is not progressing towards the following goals:

## 2021-08-16 PROCEDURE — 700102 HCHG RX REV CODE 250 W/ 637 OVERRIDE(OP): Performed by: NURSE PRACTITIONER

## 2021-08-16 PROCEDURE — A9270 NON-COVERED ITEM OR SERVICE: HCPCS | Performed by: NURSE PRACTITIONER

## 2021-08-16 PROCEDURE — 770006 HCHG ROOM/CARE - MED/SURG/GYN SEMI*

## 2021-08-16 RX ADMIN — THERA TABS 1 TABLET: TAB at 09:07

## 2021-08-16 RX ADMIN — Medication 100 MG: at 09:07

## 2021-08-16 ASSESSMENT — PAIN DESCRIPTION - PAIN TYPE: TYPE: ACUTE PAIN

## 2021-08-16 NOTE — PROGRESS NOTES
Pt alert/oriented to self, no complaint of pain/discomfort. Pt impulsive and does not use call light for assistance when getting up from bed. Call light within reach, personal belongings available, bed in lowest position, treaded socks on, and bed alarm on. Room near RN station. Hourly rounding in place.        1 RN Skin Assessment completed.   Patient's risk of skin breakdown: Low    Devices in place and skin assessed under:   [] Pulse Ox  [] PIV [] Central Line [] SCDs []Purewick  [] Olsen  []Condom Cath   [] BMS        []  Cortrak   []  Oxymask   [] Bipap    [] Nasal Canula   [x] N/A   [] Other(specify):     Right Ear  [x] WDL                or           [] Skin issue present (please provide assessment/description below)    Left Ear  [x] WDL                or           [] Skin issue present (please provide assessment/description below)    Right Elbow:  [x] WDL               or           [] Skin issue present (please provide assessment/description below)    Left Elbow:   [x] WDL                or           [] Skin issue present (please provide assessment/description below)    Coccyx/Buttocks:  [x] WDL                or           [] Skin issue present (please provide assessment/description below)    Right Heel/Foot/Toes:  [x] WDL                or           [] Skin issue present (please provide assessment/description below)    Left Heel/Foot/Toes:   [x] WDL              or           [] Skin issue present (please provide assessment/description below)      **Describe any other skin issues in areas not already listed from above list: BLE scabs    [] N/A    Interventions In Place: Waffle Overlay, Pillows and Pressure Redistribution Mattress

## 2021-08-16 NOTE — CARE PLAN
The patient is Stable - Low risk of patient condition declining or worsening    Shift Goals  Clinical Goals: pt will remain safe and free from falls throughout shift  Patient Goals:     Progress made toward(s) clinical / shift goals:  Safety precautions in place. Bed in low position, treaded socks on, personal possessions in reach, call light in reach and pt not using it to call for assistance. Bed alarm on and hourly rounding in place. Wander guard on left ankle.    Patient is not progressing towards the following goals:

## 2021-08-16 NOTE — PROGRESS NOTES
Received report from NOC RN and assumed care of pt. Pt is A&Ox2, disoriented to time and place. Pt is resting in bed on room air. Pt reports no pain or discomfort. POC discussed with pt; all questions answered. Pt ambulating up to the bathroom with standby assist. Pt does not call for assistance out of bed. No other needs at this time. Bed locked in lowest position, call light within reach, pt in desk bed close to nurse's station, bed alarm on.

## 2021-08-16 NOTE — PROGRESS NOTES
1 RN Skin Assessment completed.   Patient's risk of skin breakdown: Low    Devices in place and skin assessed under:   [] Pulse Ox  [] PIV [] Central Line [] SCDs []Purewick  [] Olsen  []Condom Cath   [] BMS        []  Cortrak   []  Oxymask   [] Bipap    [] Nasal Canula   [x] N/A   [] Other(specify):     Right Ear  [x] WDL                or           [] Skin issue present (please provide assessment/description below)    Left Ear  [x] WDL                or           [] Skin issue present (please provide assessment/description below)    Right Elbow:  [x] WDL               or           [] Skin issue present (please provide assessment/description below)    Left Elbow:   [x] WDL                or           [] Skin issue present (please provide assessment/description below)    Coccyx/Buttocks:  [x] WDL                or           [] Skin issue present (please provide assessment/description below)    Right Heel/Foot/Toes:  [x] WDL                or           [] Skin issue present (please provide assessment/description below)    Left Heel/Foot/Toes:   [x] WDL               or           [] Skin issue present (please provide assessment/description below)      **Describe any other skin issues in areas not already listed from above list:   [x] N/A    Interventions In Place: Pillows and Pressure Redistribution Mattress    **If any new or digressing skin issues are found, fill out the selection below.    Possible Skin Injury No  Pictures Uploaded Into Epic: N/A  Wound Consult Placed: N/A  RN Wound Prevention Protocol Ordered: No    What new preventative interventions did you add? N/A

## 2021-08-17 PROBLEM — E86.0 DEHYDRATION: Status: RESOLVED | Noted: 2021-06-04 | Resolved: 2021-08-17

## 2021-08-17 PROCEDURE — 770006 HCHG ROOM/CARE - MED/SURG/GYN SEMI*

## 2021-08-17 PROCEDURE — 700102 HCHG RX REV CODE 250 W/ 637 OVERRIDE(OP): Performed by: NURSE PRACTITIONER

## 2021-08-17 PROCEDURE — 99231 SBSQ HOSP IP/OBS SF/LOW 25: CPT | Performed by: HOSPITALIST

## 2021-08-17 PROCEDURE — A9270 NON-COVERED ITEM OR SERVICE: HCPCS | Performed by: NURSE PRACTITIONER

## 2021-08-17 RX ADMIN — Medication 100 MG: at 10:24

## 2021-08-17 RX ADMIN — THERA TABS 1 TABLET: TAB at 10:25

## 2021-08-17 ASSESSMENT — PAIN DESCRIPTION - PAIN TYPE: TYPE: ACUTE PAIN

## 2021-08-17 NOTE — DISCHARGE PLANNING
Anticipated Discharge Disposition: TBD    Action: Patient is orientated to self, spends time with other residents at the nursing station.  One person assist, no behaviors documented    Barriers to Discharge: guardianship/medicaid/placement    Plan: continue to monitor for discharge barriers    Guardianship packet submitted to Anjelica Sullivan on 7/19/21  Email to Anjelica Sullivan for an update

## 2021-08-17 NOTE — CARE PLAN
The patient is Watcher - Medium risk of patient condition declining or worsening    Shift Goals  Clinical Goals: pt will be safe and remain on unit throughout shift  Patient Goals:   Progress made toward(s) clinical / shift goals:  room near RN station, wander guard in use    Patient is not progressing towards the following goals:

## 2021-08-17 NOTE — PROGRESS NOTES
Pt confuse, alert/oriented to self. Pt sat at RN station before going to bed. No behavior issues noted. No c/o pain/discomfort. Call light within reach, personal belongings available, bed in lowest position, treaded socks on, and bed alarm on. Hourly rounding in place.        1 RN Skin Assessment completed.   Patient's risk of skin breakdown: Low     Devices in place and skin assessed under:   []? Pulse Ox  []? PIV []? Central Line []? SCDs []?Purewick  []? Olsen  []?Condom Cath   []? BMS        []?  Cortrak   []?  Oxymask   []? Bipap    []? Nasal Canula   []? N/A   [x]? Other(specify): wander guard     Right Ear  [x]? WDL                or           []? Skin issue present (please provide assessment/description below)     Left Ear  [x]? WDL                or           []? Skin issue present (please provide assessment/description below)     Right Elbow:  [x]? WDL               or           []? Skin issue present (please provide assessment/description below)     Left Elbow:   [x]? WDL                or           []? Skin issue present (please provide assessment/description below)     Coccyx/Buttocks:  [x]? WDL                or           []? Skin issue present (please provide assessment/description below)     Right Heel/Foot/Toes:  [x]? WDL                or           []? Skin issue present (please provide assessment/description below)     Left Heel/Foot/Toes:   [x]? WDL              or           []? Skin issue present (please provide assessment/description below)        **Describe any other skin issues in areas not already listed from above list: BLE scabs     []? N/A     Interventions In Place: Waffle Overlay, Pillows and Pressure Redistribution Mattress

## 2021-08-17 NOTE — PROGRESS NOTES
1 RN Skin Assessment completed.   Patient's risk of skin breakdown: Low    Devices in place and skin assessed under:   [] Pulse Ox  [] PIV [] Central Line [] SCDs []Purewick  [] Olsen  []Condom Cath   [] BMS        []  Cortrak   []  Oxymask   [] Bipap    [] Nasal Canula   [] N/A   [] Other(specify):     Right Ear  [x] WDL                or           [] Skin issue present (please provide assessment/description below)    Left Ear  [x] WDL                or           [] Skin issue present (please provide assessment/description below)    Right Elbow:  [x] WDL               or           [] Skin issue present (please provide assessment/description below)    Left Elbow:   [x] WDL                or           [] Skin issue present (please provide assessment/description below)    Coccyx/Buttocks:  [x] WDL                or           [] Skin issue present (please provide assessment/description below)    Right Heel/Foot/Toes:  [x] WDL                or           [] Skin issue present (please provide assessment/description below)    Left Heel/Foot/Toes:   [x] WDL              or           [] Skin issue present (please provide assessment/description below)      **Describe any other skin issues in areas not already listed from above list:   [] N/A scattered scabs on bilateral legs    Interventions In Place: Chair Waffle, waffle overlay, pillows, pressure redistribution mattress    **If any new or digressing skin issues are found, fill out the selection below.

## 2021-08-17 NOTE — PROGRESS NOTES
"Hospital Medicine Daily Progress Note    Date of Service  8/17/2021    Chief Complaint  Found down and had multiple witnessed falls.    Hospital Course  Per prior note: \"Mr. Beard is a 66-year-old male with no known PMHx who presented to the hospital on 6/4/2021 after he was found down on the sidewalk after multiple witnessed falls.  Patient was confused at the time of admission.  He had been seen earlier in the day for syncope while crossing the street however at that time left AGAINST MEDICAL ADVICE.  On admission he was tachycardic with a heart rate of 114, anion gap of 23 and a lactic acid of 2.6.  He had a CT scan of the head, which revealed no acute intracranial abnormalities.  Chest x-ray completed, which showed no acute cardiopulmonary process.  Patient was admitted for further evaluation of altered mental status and syncope. Patient throughout this stay has been confused an lack of capacity was established.   He underwent MRI brain with and without contrast on Gavi 10, 2021, no acute abnormalities noted. Guardianship being pursued and patient will need placement.\"       Interval Problem Update    Patient is alert oriented to self and place  Concerned about his onychomycosis  Denies pain  Denies dyspnea  He is afebrile  Tolerating diet    I have personally seen and examined the patient at bedside. I discussed the plan of care with patient, bedside RN and .    Consultants/Specialty  Neurology  Psychiatry    Code Status  Full Code    Disposition  Patient is medically cleared.   Anticipate discharge to to skilled nursing facility. Pending guardianship, medicaid and payor source per CM note.  I have placed the appropriate orders for post-discharge needs.    Review of Systems  Review of Systems   Unable to perform ROS: Dementia        Physical Exam  Temp:  [36.3 °C (97.4 °F)-36.6 °C (97.8 °F)] 36.3 °C (97.4 °F)  Pulse:  [89-95] 95  Resp:  [18] 18  BP: ()/(63-73) 134/73  SpO2:  [94 %-96 %] 94 " %    Physical Exam  Vitals and nursing note reviewed.   Constitutional:       Appearance: He is well-developed. He is not diaphoretic.   HENT:      Head: Normocephalic and atraumatic.      Mouth/Throat:      Pharynx: No oropharyngeal exudate.   Eyes:      General: No scleral icterus.        Right eye: No discharge.         Left eye: No discharge.      Conjunctiva/sclera: Conjunctivae normal.      Pupils: Pupils are equal, round, and reactive to light.   Neck:      Vascular: No JVD.      Trachea: No tracheal deviation.   Cardiovascular:      Rate and Rhythm: Normal rate and regular rhythm.      Heart sounds: No murmur heard.   No friction rub. No gallop.    Pulmonary:      Effort: Pulmonary effort is normal. No respiratory distress.      Breath sounds: Normal breath sounds. No stridor. No wheezing.   Chest:      Chest wall: No tenderness.   Abdominal:      General: Bowel sounds are normal. There is no distension.      Palpations: Abdomen is soft.      Tenderness: There is no abdominal tenderness. There is no rebound.   Musculoskeletal:         General: No tenderness.      Cervical back: Neck supple.   Skin:     General: Skin is warm and dry.      Nails: There is no clubbing.      Comments: Thick dystrophic toenails   Neurological:      Mental Status: He is alert.      Cranial Nerves: No cranial nerve deficit.      Motor: No abnormal muscle tone.      Comments: Oriented x2   Psychiatric:         Behavior: Behavior normal.         Fluids    Intake/Output Summary (Last 24 hours) at 8/17/2021 0736  Last data filed at 8/16/2021 2000  Gross per 24 hour   Intake 860 ml   Output --   Net 860 ml       Laboratory                        Imaging  MR-BRAIN-WITH & W/O   Final Result         No acute intracranial process.      Nonspecific T2 hyperintensities in the deep white matter related to chronic microvascular ischemia.      [id number] VIEW   Final Result      1.  No metallic foreign bodies detected.   2.  Right  nephrolithiasis.      EC-ECHOCARDIOGRAM LTD W/ CONT   Final Result      CT-HEAD W/O   Final Result      1.  Cerebral atrophy and chronic microvascular ischemic type changes.   2.  No acute intracranial abnormality.      DX-CHEST-PORTABLE (1 VIEW)   Final Result      No acute cardiopulmonary abnormality.           Assessment/Plan  * Cognitive impairment- (present on admission)  Assessment & Plan  He was evaluated by neurology. CT head and MRI brain negative. TSH, HIV, RPR, B12 normal.    Patient has likely underlying dementia  Reviewed neurology and psychiatry notes  Guardianship petition submitted    Onychomycosis- (present on admission)  Assessment & Plan  Discussed with nursing staff regarding trimming toenails  No clinical signs of cellulitis    Anemia  Assessment & Plan  Monitor CBC periodically  No clinical signs of active bleeding  Basic work-up negative  Will need further work-up as outpatient if anemia persistent    Marijuana use  Assessment & Plan  Presented with positive drugs screen for cannabinoid    Syncope- (present on admission)  Assessment & Plan  Had initially presented to the ED for syncope and left AMA; subsequently found down on sidewalk after multiple witnessed falls. CT head and brain MRI negative. Echo unremarkable. No significant arrhythmias found on tele     Monitor clinically         VTE prophylaxis: ambulatory

## 2021-08-17 NOTE — PROGRESS NOTES
Bedside report received at change of shift. Pt is A&Ox1, reports no pain at this time. Pt sat at edge of bed for breakfast this AM. Safety precautions in place. All pt needs met at this time.

## 2021-08-18 PROCEDURE — 770006 HCHG ROOM/CARE - MED/SURG/GYN SEMI*

## 2021-08-18 PROCEDURE — 700102 HCHG RX REV CODE 250 W/ 637 OVERRIDE(OP): Performed by: NURSE PRACTITIONER

## 2021-08-18 PROCEDURE — A9270 NON-COVERED ITEM OR SERVICE: HCPCS | Performed by: HOSPITALIST

## 2021-08-18 PROCEDURE — 700102 HCHG RX REV CODE 250 W/ 637 OVERRIDE(OP): Performed by: HOSPITALIST

## 2021-08-18 PROCEDURE — A9270 NON-COVERED ITEM OR SERVICE: HCPCS | Performed by: NURSE PRACTITIONER

## 2021-08-18 RX ORDER — MICONAZOLE NITRATE 20 MG/G
CREAM TOPICAL 2 TIMES DAILY
Status: DISPENSED | OUTPATIENT
Start: 2021-08-18 | End: 2021-08-25

## 2021-08-18 RX ADMIN — Medication 100 MG: at 07:12

## 2021-08-18 RX ADMIN — MICONAZOLE NITRATE: 20 CREAM TOPICAL at 17:52

## 2021-08-18 RX ADMIN — THERA TABS 1 TABLET: TAB at 07:12

## 2021-08-18 NOTE — PROGRESS NOTES
Pleasantly confused, bed alarm in place but will disconnect it when it alarms despite education. Wander guard in place. Cont plan of care, call light within reach

## 2021-08-18 NOTE — PROGRESS NOTES
1 RN Skin Assessment completed.   Patient's risk of skin breakdown: Low    Devices in place and skin assessed under:   [] Pulse Ox  [] PIV [] Central Line [] SCDs []Purewick  [] Olsen  []Condom Cath   [] BMS        []  Cortrak   []  Oxymask   [] Bipap    [] Nasal Canula   [] N/A   [x] Other(specify): wander guard  Right Ear  [x] WDL                or           [] Skin issue present (please provide assessment/description below)    Left Ear  [x] WDL                or           [] Skin issue present (please provide assessment/description below)    Right Elbow:  [x] WDL               or           [] Skin issue present (please provide assessment/description below) red/pink scar  Left Elbow:   [x] WDL                or           [] Skin issue present (please provide assessment/description below)    Coccyx/Buttocks:  [x] WDL                or           [] Skin issue present (please provide assessment/description below)    Right Heel/Foot/Toes:  [] WDL                or           [x] Skin issue present (please provide assessment/description below)  Right 2nd toe with nonblanching redness; picture taken    Left Heel/Foot/Toes:   [x] WDL              or           [] Skin issue present (please provide assessment/description below)  Dryness/flaky feet with over grown toenails      **Describe any other skin issues in areas not already listed from above list:   [] N/A    Interventions In Place: Waffle Overlay, self turns

## 2021-08-18 NOTE — WOUND TEAM
Wound team consulted for Right 2nd toe dorsal and trim nail.  At this time, wound team is unable to perform nail care, bedside RN informed of this.  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 Miconazole cream ordered for fungal feet.  Patient's 2nd dorsal toe has a discoloration, purple with blanching spot.  Patient does not wear shoes, and does not have any pressure to the area, unknown etiology of blanching discoloration at this time.  Wound team signing off, please re-consult if needed.

## 2021-08-18 NOTE — CARE PLAN
The patient is Stable - Low risk of patient condition declining or worsening    Shift Goals  Clinical Goals: will not wonder off the unit      Progress made toward(s) clinical / shift goals: wander guard in place to ankle, bed alarm in place

## 2021-08-18 NOTE — PROGRESS NOTES
Bedside report received at change of shift. Pt is A&Ox1, reports no pain a this time. Pt sat at the nurse's station this AM. Ambulated with steady gait. Safety precautions in place. All pt needs met at this time.

## 2021-08-18 NOTE — PROGRESS NOTES
1 RN Skin Assessment completed.   Patient's risk of skin breakdown: Low     Devices in place and skin assessed under:   []? Pulse Ox  []? PIV []? Central Line []? SCDs []?Purewick  []? Olsen  []?Condom Cath   []? BMS        []?  Cortrak   []?  Oxymask   []? Bipap    []? Nasal Canula   []? N/A   [x]? Other(specify):  wanderguard     Right Ear  [x]? WDL                or           []? Skin issue present (please provide assessment/description below)     Left Ear  [x]? WDL                or           []? Skin issue present (please provide assessment/description below)     Right Elbow:  [x]? WDL               or           []? Skin issue present (please provide assessment/description below)     Left Elbow:   [x]? WDL                or           []? Skin issue present (please provide assessment/description below)     Coccyx/Buttocks:  [x]? WDL                or           []? Skin issue present (please provide assessment/description below)     Right Heel/Foot/Toes:  [x]? WDL                or           []? Skin issue present (please provide assessment/description below)     Left Heel/Foot/Toes:   [x]? WDL              or           []? Skin issue present (please provide assessment/description below)        **Describe any other skin issues in areas not already listed from above list:   []? N/A scattered scabs on bilateral legs     Interventions In Place: Chair Waffle, waffle overlay, pillows, pressure redistribution mattress

## 2021-08-19 PROCEDURE — 700102 HCHG RX REV CODE 250 W/ 637 OVERRIDE(OP): Performed by: NURSE PRACTITIONER

## 2021-08-19 PROCEDURE — A9270 NON-COVERED ITEM OR SERVICE: HCPCS | Performed by: NURSE PRACTITIONER

## 2021-08-19 PROCEDURE — 770006 HCHG ROOM/CARE - MED/SURG/GYN SEMI*

## 2021-08-19 RX ADMIN — Medication 100 MG: at 07:41

## 2021-08-19 RX ADMIN — THERA TABS 1 TABLET: TAB at 07:43

## 2021-08-19 RX ADMIN — MICONAZOLE NITRATE: 20 CREAM TOPICAL at 21:22

## 2021-08-19 RX ADMIN — MICONAZOLE NITRATE: 20 CREAM TOPICAL at 07:41

## 2021-08-19 ASSESSMENT — PAIN DESCRIPTION - PAIN TYPE: TYPE: ACUTE PAIN

## 2021-08-19 NOTE — PROGRESS NOTES
Talking and conversing with his room mate in his room. Disconnects the bed alarm when alarming; near the nurses station

## 2021-08-19 NOTE — PROGRESS NOTES
1 RN Skin Assessment completed.   Patient's risk of skin breakdown: Low     Devices in place and skin assessed under:   []?? Pulse Ox  []?? PIV []?? Central Line []?? SCDs []??Purewick  []?? Olsen  []??Condom Cath   []?? BMS        []??  Cortrak   []??  Oxymask   []?? Bipap    []?? Nasal Canula   []?? N/A   [x]?? Other(specify):  wanderguard     Right Ear  [x]?? WDL                or           []?? Skin issue present (please provide assessment/description below)     Left Ear  [x]?? WDL                or           []?? Skin issue present (please provide assessment/description below)     Right Elbow:  [x]?? WDL               or           []?? Skin issue present (please provide assessment/description below)     Left Elbow:   [x]?? WDL                or           []?? Skin issue present (please provide assessment/description below)     Coccyx/Buttocks:  [x]?? WDL                or           []?? Skin issue present (please provide assessment/description below)     Right Heel/Foot/Toes:  [x]?? WDL                or           []?? Skin issue present (please provide assessment/description below)     Left Heel/Foot/Toes:   [x]?? WDL              or           []?? Skin issue present (please provide assessment/description below)        **Describe any other skin issues in areas not already listed from above list:   []?? N/A scattered scabs on bilateral legs     Interventions In Place: Chair Waffle, waffle overlay, pillows, pressure redistribution mattress

## 2021-08-19 NOTE — CARE PLAN
The patient is Stable - Low risk of patient condition declining or worsening    Shift Goals  Clinical Goals: will not wander off unit      Progress made toward(s) clinical / shift goals:  wander guard in place, near the nurses station and bed alarm in place

## 2021-08-19 NOTE — PROGRESS NOTES
Bedside report received at change of shift. Pt is A&Ox1, reports no pain. Pt ambulates with steady gait, has been walking to the nurse's station and back this AM. Safety precautions in place. All pt needs met at this time.

## 2021-08-20 PROCEDURE — A9270 NON-COVERED ITEM OR SERVICE: HCPCS | Performed by: STUDENT IN AN ORGANIZED HEALTH CARE EDUCATION/TRAINING PROGRAM

## 2021-08-20 PROCEDURE — A9270 NON-COVERED ITEM OR SERVICE: HCPCS | Performed by: NURSE PRACTITIONER

## 2021-08-20 PROCEDURE — 700102 HCHG RX REV CODE 250 W/ 637 OVERRIDE(OP): Performed by: STUDENT IN AN ORGANIZED HEALTH CARE EDUCATION/TRAINING PROGRAM

## 2021-08-20 PROCEDURE — 700102 HCHG RX REV CODE 250 W/ 637 OVERRIDE(OP): Performed by: NURSE PRACTITIONER

## 2021-08-20 PROCEDURE — 770006 HCHG ROOM/CARE - MED/SURG/GYN SEMI*

## 2021-08-20 RX ADMIN — Medication 100 MG: at 08:37

## 2021-08-20 RX ADMIN — THERA TABS 1 TABLET: TAB at 08:37

## 2021-08-20 RX ADMIN — DOCUSATE SODIUM 50 MG AND SENNOSIDES 8.6 MG 2 TABLET: 8.6; 5 TABLET, FILM COATED ORAL at 08:37

## 2021-08-20 NOTE — CARE PLAN
The patient is Stable - Low risk of patient condition declining or worsening    Shift Goals  Clinical Goals: pt remain fall free  Patient Goals: Rest  Family Goals: N/A    Progress made toward(s) clinical / shift goals:  pt has bed alarm on, bed in low and locked position, treaded socks on and bed near nurse station.     Patient is not progressing towards the following goals:

## 2021-08-20 NOTE — PROGRESS NOTES
Pt alert and oriented to self, on room air. No preport of pain. Pt ambulates full weight bearing with steady gait. Fall precautions in place. All needs attended.    Devices in place and skin assessed under:   []??? Pulse Ox  []??? PIV []??? Central Line []??? SCDs []???Purewick  []??? Olsen  []???Condom Cath   []??? BMS        []???  Cortrak   []???  Oxymask   []??? Bipap    []??? Nasal Canula   []??? N/A   [x]??? Other(specify):  wanderguard     Right Ear  [x]??? WDL                or           []??? Skin issue present (please provide assessment/description below)     Left Ear  [x]??? WDL                or           []??? Skin issue present (please provide assessment/description below)     Right Elbow:  [x]??? WDL               or           []??? Skin issue present (please provide assessment/description below)     Left Elbow:   [x]??? WDL                or           []??? Skin issue present (please provide assessment/description below)     Coccyx/Buttocks:  [x]??? WDL                or           []??? Skin issue present (please provide assessment/description below)     Right Heel/Foot/Toes:  []??? WDL                or           [x]??? Skin issue present (please provide assessment/description below) flaky skin/dryness     Left Heel/Foot/Toes:   []??? WDL              or           [x]??? Skin issue present (please provide assessment/description below)  Flaky skin/dryness        **Describe any other skin issues in areas not already listed from above list:   []??? N/A  scabs on bilateral legs     Interventions In Place: waffle overlay, pillows, pressure redistribution mattress, pt ambulates

## 2021-08-21 PROCEDURE — 770006 HCHG ROOM/CARE - MED/SURG/GYN SEMI*

## 2021-08-21 PROCEDURE — 99231 SBSQ HOSP IP/OBS SF/LOW 25: CPT | Performed by: HOSPITALIST

## 2021-08-21 ASSESSMENT — FIBROSIS 4 INDEX: FIB4 SCORE: 1.721378477236588914

## 2021-08-21 NOTE — PROGRESS NOTES
Received bedside report and accepted care of patient.    Pt is currently A/ox1, room air with no visible or stated sign of distress, discomfort, or SOB. Pt is currently standby assist to the bathroom and nurses station.    Patient currently resting in bed in no visible or stated signs of distress. Bed alarm in place, controls on and bed in locked and lowest position. Call light and personal possessions within reach. Patient educated about use of call light and verbalized understanding    Refused morning medications, refused skin assessment. RN educated importance, continued to refuse.

## 2021-08-21 NOTE — PROGRESS NOTES
Pt is A&Ox1, oriented to self only. Pt denies any pain at this time. Bed alarm on for safety. Wanderguard on left ankle. Refused assessment and evening medications.           1 RN Skin Assessment completed.   Patient's risk of skin breakdown: Low    Devices in place and skin assessed under:   [] Pulse Ox  [] PIV [] Central Line [] SCDs []Purewick  [] Olsen  []Condom Cath   [] BMS        []  Cortrak   []  Oxymask   [] Bipap    [] Nasal Canula   [] N/A   [x] Other(specify): Wanderguard    Right Ear  [] WDL                or           [x] Skin issue present (please provide assessment/description below)  -Pt refused assessment.     Left Ear  [] WDL                or           [x] Skin issue present (please provide assessment/description below)  -Pt refused assessment.    Right Elbow:  [] WDL               or           [x] Skin issue present (please provide assessment/description below)  -Pt refused assessment.    Left Elbow:   [] WDL                or           [x] Skin issue present (please provide assessment/description below)  -Pt refused assessment.    Coccyx/Buttocks:  [] WDL                or           [x] Skin issue present (please provide assessment/description below)  -Pt refused assessment.    Right Heel/Foot/Toes:  [] WDL                or           [x] Skin issue present (please provide assessment/description below)  -Pt refused assessment.    Left Heel/Foot/Toes:   [] WDL              or           [x] Skin issue present (please provide assessment/description below)  -Pt refused assessment.    **Describe any other skin issues in areas not already listed from above list:   [] N/A  -Pt refused assessment.    Interventions In Place: Waffle Overlay, Pillows and Pressure Redistribution Mattress

## 2021-08-21 NOTE — CARE PLAN
The patient is Stable - Low risk of patient condition declining or worsening    Shift Goals  Clinical Goals: pt will be free from falls.   Patient Goals: Rest  Family Goals: N/A    Progress made toward(s) clinical / shift goals: Fall precautions in place. Pt up close to nurses station. Bedrails up. Bed in lowest position and locked.  Call light and phone within reach. Bed alarm on for safety.     Patient is not progressing towards the following goals:

## 2021-08-22 PROCEDURE — A9270 NON-COVERED ITEM OR SERVICE: HCPCS | Performed by: NURSE PRACTITIONER

## 2021-08-22 PROCEDURE — 770006 HCHG ROOM/CARE - MED/SURG/GYN SEMI*

## 2021-08-22 PROCEDURE — 700102 HCHG RX REV CODE 250 W/ 637 OVERRIDE(OP): Performed by: NURSE PRACTITIONER

## 2021-08-22 RX ADMIN — Medication 100 MG: at 07:32

## 2021-08-22 RX ADMIN — THERA TABS 1 TABLET: TAB at 07:31

## 2021-08-22 ASSESSMENT — PAIN DESCRIPTION - PAIN TYPE: TYPE: ACUTE PAIN

## 2021-08-22 NOTE — PROGRESS NOTES
Pt is A&Ox1, oriented to self only. Pt denies any pain at this time. Bed alarm on for safety. Wanderguard on left ankle. In bed early. Refused assessment and evening medications.           1 RN Skin Assessment completed.   Patient's risk of skin breakdown: Low    Devices in place and skin assessed under:   [] Pulse Ox  [] PIV [] Central Line [] SCDs []Purewick  [] Olsen  []Condom Cath   [] BMS        []  Cortrak   []  Oxymask   [] Bipap    [] Nasal Canula   [] N/A   [x] Other(specify): Wanderguard     Right Ear  [] WDL                or           [] Skin issue present (please provide assessment/description below)  -Pt refused assessment.     Left Ear  [] WDL                or           [] Skin issue present (please provide assessment/description below)  -Pt refused assessment.      Right Elbow:  [] WDL               or           [] Skin issue present (please provide assessment/description below)  -Pt refused assessment.     Left Elbow:   [] WDL                or           [] Skin issue present (please provide assessment/description below)  -Pt refused assessment.      Coccyx/Buttocks:  [] WDL                or           [] Skin issue present (please provide assessment/description below)  -Pt refused assessment.     Right Heel/Foot/Toes:  [] WDL                or           [] Skin issue present (please provide assessment/description below)   -Pt refused assessment.     Left Heel/Foot/Toes:   [] WDL              or           [] Skin issue present (please provide assessment/description below)  -Pt refused assessment.     **Describe any other skin issues in areas not already listed from above list:   [] N/A    Interventions In Place: Waffle Overlay, Pillows and Pressure Redistribution Mattress    **If any new or digressing skin issues are found, fill out the selection below.    Possible Skin Injury No  Pictures Uploaded Into Epic: N/A  Wound Consult Placed: N/A  RN Wound Prevention Protocol Ordered: No    What new  preventative interventions did you add? N/A

## 2021-08-22 NOTE — PROGRESS NOTES
AAOx1, self. Currently sitting out at [facility], [facility]. C/o 0/10 pain, declining intervention at this time. Medications taken w/o difficulty. -N/V. -N/T. Denies new onset of chest pain/SOB. +BS in all 4 quadrants, last BM yesterday per pt. Standby assist. Wanderguard in place to L ankle. POC discussed, denies further needs at this time. Fall precautions in place. Bed alarm on, call light within reach & hourly rounding in place.      1 RN Skin Assessment completed.   Patient's risk of skin breakdown: Low     Devices in place and skin assessed under:   []??? Pulse Ox  []??? PIV []??? Central Line []??? SCDs []???Purewick  []??? Olsen  []???Condom Cath   []??? BMS        []???  Cortrak   []???  Oxymask   []??? Bipap    []??? Nasal Canula   []??? N/A   [x]??? Other(specify): Wanderguard in place to L ankle.     Right Ear  [x]??? WDL                or           []??? Skin issue present (please provide assessment/description below)     Left Ear  [x]??? WDL                or           []??? Skin issue present (please provide assessment/description below)     Right Elbow:  [x]??? WDL               or           []??? Skin issue present (please provide assessment/description below)     Left Elbow:   [x]??? WDL                or           []??? Skin issue present (please provide assessment/description below)     Coccyx/Buttocks:  [x]??? WDL                or           []??? Skin issue present (please provide assessment/description below)     Right Heel/Foot/Toes:  [x]??? WDL                or           []??? Skin issue present (please provide assessment/description below)     Left Heel/Foot/Toes:   [x]??? WDL              or           []??? Skin issue present (please provide assessment/description below)        **Describe any other skin issues in areas not already listed from above list: Scabbing to bilateral knees  []??? N/A     Interventions In Place: Pillows and Pressure Redistribution Mattress     **If any new or  digressing skin issues are found, fill out the selection below.     Possible Skin Injury No  Pictures Uploaded Into Epic: N/A  Wound Consult Placed: N/A  RN Wound Prevention Protocol Ordered: No    What new preventative interventions did you add? N/A

## 2021-08-23 PROCEDURE — 700102 HCHG RX REV CODE 250 W/ 637 OVERRIDE(OP): Performed by: NURSE PRACTITIONER

## 2021-08-23 PROCEDURE — 770006 HCHG ROOM/CARE - MED/SURG/GYN SEMI*

## 2021-08-23 PROCEDURE — A9270 NON-COVERED ITEM OR SERVICE: HCPCS | Performed by: NURSE PRACTITIONER

## 2021-08-23 RX ADMIN — Medication 100 MG: at 07:57

## 2021-08-23 RX ADMIN — THERA TABS 1 TABLET: TAB at 07:57

## 2021-08-23 NOTE — CARE PLAN
Problem: Skin Integrity  Goal: Skin integrity is maintained or improved  Outcome: Progressing     Problem: Fall Risk  Goal: Patient will remain free from falls  Outcome: Progressing     Problem: Knowledge Deficit - Standard  Goal: Patient and family/care givers will demonstrate understanding of plan of care, disease process/condition, diagnostic tests and medications  Outcome: Progressing     Problem: Nutrition  Goal: Patient's nutritional and fluid intake will be adequate or improve  Outcome: Progressing   The patient is Stable - Low risk of patient condition declining or worsening    Shift Goals  Clinical Goals: pt remain fall free  Patient Goals: Rest  Family Goals: N/A    Progress made toward(s) clinical / shift goals:      Patient is not progressing towards the following goals:

## 2021-08-23 NOTE — PROGRESS NOTES
1 RN Skin Assessment completed.   Patient's risk of skin breakdown: Low     Devices in place and skin assessed under:   []? Pulse Ox  []? PIV []? Central Line []? SCDs []?Purewick  []? Olsen  []?Condom Cath   []? BMS        []?  Cortrak   []?  Oxymask   []? Bipap    []? Nasal Canula   []? N/A   [x]? Other(specify): Wanderguard       Right Ear  []? WDL                or           []? Skin issue present (please provide assessment/description below)  -Pt refused assessment     Left Ear  []? WDL                or           []? Skin issue present (please provide assessment/description below)  -Pt refused assessment      Right Elbow:  []? WDL               or           []? Skin issue present (please provide assessment/description below)  -Pt refused assessment     Left Elbow:   []? WDL                or           []? Skin issue present (please provide assessment/description below)  -Pt refused assessment     Coccyx/Buttocks:  []? WDL                or           []? Skin issue present (please provide assessment/description below)  -Pt refused assessment     Right Heel/Foot/Toes:  []? WDL                or           []? Skin issue present (please provide assessment/description below)  -Pt refused assessment     Left Heel/Foot/Toes:   []? WDL              or           []? Skin issue present (please provide assessment/description below)  -Pt refused assessment     **Describe any other skin issues in areas not already listed from above list:   []? N/A     Interventions In Place: Pillows and Pressure Redistribution Mattress     **If any new or digressing skin issues are found, fill out the selection below.     Possible Skin Injury No  Pictures Uploaded Into Epic: N/A  Wound Consult Placed: N/A  RN Wound Prevention Protocol Ordered: No    What new preventative interventions did you add? N/A

## 2021-08-23 NOTE — PROGRESS NOTES
Pt is A&Ox1, oriented to self only. Pt denies any pain at this time. Bed alarm on for safety. Wanderguard on left ankle. Refused assessment and evening medications. All needs met at this time.         1 RN Skin Assessment completed.   Patient's risk of skin breakdown: Low    Devices in place and skin assessed under:   [] Pulse Ox  [] PIV [] Central Line [] SCDs []Purewick  [] Olsen  []Condom Cath   [] BMS        []  Cortrak   []  Oxymask   [] Bipap    [] Nasal Canula   [] N/A   [x] Other(specify): Wanderguard      Right Ear  [] WDL                or           [] Skin issue present (please provide assessment/description below)  -Pt refused assessment    Left Ear  [] WDL                or           [] Skin issue present (please provide assessment/description below)  -Pt refused assessment     Right Elbow:  [] WDL               or           [] Skin issue present (please provide assessment/description below)  -Pt refused assessment    Left Elbow:   [] WDL                or           [] Skin issue present (please provide assessment/description below)  -Pt refused assessment    Coccyx/Buttocks:  [] WDL                or           [] Skin issue present (please provide assessment/description below)  -Pt refused assessment    Right Heel/Foot/Toes:  [] WDL                or           [] Skin issue present (please provide assessment/description below)  -Pt refused assessment    Left Heel/Foot/Toes:   [] WDL              or           [] Skin issue present (please provide assessment/description below)  -Pt refused assessment    **Describe any other skin issues in areas not already listed from above list:   [] N/A    Interventions In Place: Pillows and Pressure Redistribution Mattress    **If any new or digressing skin issues are found, fill out the selection below.    Possible Skin Injury No  Pictures Uploaded Into Epic: N/A  Wound Consult Placed: N/A  RN Wound Prevention Protocol Ordered: No    What new preventative  interventions did you add? N/A

## 2021-08-23 NOTE — CARE PLAN
The patient is Stable - Low risk of patient condition declining or worsening    Shift Goals  Clinical Goals: pt remain fall free  Patient Goals: Rest  Family Goals: N/A    Progress made toward(s) clinical / shift goals: Fall precautions in place. Pt up close to nurses station. Bedrails up. Bed in lowest position and locked.  Call light and phone within reach. Bed alarm on for safety.    Patient is not progressing towards the following goals:

## 2021-08-24 PROCEDURE — A9270 NON-COVERED ITEM OR SERVICE: HCPCS | Performed by: NURSE PRACTITIONER

## 2021-08-24 PROCEDURE — 700102 HCHG RX REV CODE 250 W/ 637 OVERRIDE(OP): Performed by: NURSE PRACTITIONER

## 2021-08-24 PROCEDURE — 770006 HCHG ROOM/CARE - MED/SURG/GYN SEMI*

## 2021-08-24 PROCEDURE — 99231 SBSQ HOSP IP/OBS SF/LOW 25: CPT | Performed by: HOSPITALIST

## 2021-08-24 RX ADMIN — THERA TABS 1 TABLET: TAB at 09:31

## 2021-08-24 RX ADMIN — Medication 100 MG: at 09:31

## 2021-08-24 NOTE — CARE PLAN
Problem: Skin Integrity  Goal: Skin integrity is maintained or improved  Outcome: Progressing     Problem: Fall Risk  Goal: Patient will remain free from falls  Outcome: Progressing     Problem: Nutrition  Goal: Patient's nutritional and fluid intake will be adequate or improve  Outcome: Progressing     Problem: Mobility  Goal: Patient's capacity to carry out activities will improve  Outcome: Progressing   The patient is Stable - Low risk of patient condition declining or worsening    Shift Goals  Clinical Goals: pt remain fall free  Patient Goals: Rest  Family Goals: N/A    Progress made toward(s) clinical / shift goals:      Patient is not progressing towards the following goals:

## 2021-08-24 NOTE — PROGRESS NOTES
[facility] Medicine Daily Progress Note    Date of Service  8/24/2021    Chief Complaint  Lane Beard is a 66 y.o. male admitted 6/4/2021 with altered mentation    Hospital Course  Mr. Beard is a 66-year-old male with no known PMHx who presented to the hospital on 6/4/2021 after he was found down on the sidewalk after multiple witnessed falls.  Patient was confused at the time of admission.  He had been seen earlier in the day for syncope while crossing the street however at that time left AMA.  On admission he was tachycardic with a heart rate of 114, anion gap of 23 and a lactic acid of 2.6.  He had a CT scan of the head, which revealed no acute intracranial abnormalities.  Chest x-ray completed, which showed no acute cardiopulmonary process.  Patient was admitted for further evaluation of altered mental status and syncope.  Subsequent work up neg including MRI, Neuro consult and lab eval.  Pt is thought to have dementia and is awaiting [facility]    Interval Problem Update  Pt up and ambulating in the unit.  Has no complaints on my exam    I have personally seen and examined the patient at bedside. I discussed the plan of care with patient, bedside RN and .    Consultants/Specialty      Code Status  Full Code    Disposition  Patient is medically cleared.   Anticipate discharge to working on Guardianship.  I have placed the appropriate orders for post-discharge needs.    Review of Systems  Review of Systems   Unable to perform ROS: Dementia        Physical Exam  Temp:  [36.5 °C (97.7 °F)-36.6 °C (97.8 °F)] 36.6 °C (97.8 °F)  Pulse:  [76-84] 84  Resp:  [16-19] 16  BP: (117-120)/(69-77) 118/77  SpO2:  [97 %-99 %] 97 %    Physical Exam  Vitals reviewed.   Constitutional:       General: He is not in acute distress.     Appearance: He is well-developed. He is not diaphoretic.   HENT:      Head: Normocephalic and atraumatic.   Eyes:      Conjunctiva/sclera: Conjunctivae normal.   Neck:       Vascular: No JVD.   Cardiovascular:      Rate and Rhythm: Normal rate.      Heart sounds: No murmur heard.   No gallop.    Pulmonary:      Effort: Pulmonary effort is normal. No respiratory distress.      Breath sounds: No stridor. No wheezing or rales.   Abdominal:      Palpations: Abdomen is soft.      Tenderness: There is no abdominal tenderness. There is no guarding or rebound.   Musculoskeletal:         General: No tenderness.      Right lower leg: No edema.      Left lower leg: No edema.   Skin:     General: Skin is warm and dry.      Findings: No rash.   Neurological:      Mental Status: He is alert and oriented to person, place, and time. Mental status is at baseline.   Psychiatric:         Mood and Affect: Mood normal.         Fluids    Intake/Output Summary (Last 24 hours) at 8/24/2021 0619  Last data filed at 8/23/2021 2000  Gross per 24 hour   Intake 780 ml   Output --   Net 780 ml       Laboratory                        Imaging  MR-BRAIN-WITH & W/O   Final Result         No acute intracranial process.      Nonspecific T2 hyperintensities in the deep white matter related to chronic microvascular ischemia.      IV-SFMOICO-0 VIEW   Final Result      1.  No metallic foreign bodies detected.   2.  Right nephrolithiasis.      EC-ECHOCARDIOGRAM LTD W/ CONT   Final Result      CT-HEAD W/O   Final Result      1.  Cerebral atrophy and chronic microvascular ischemic type changes.   2.  No acute intracranial abnormality.      DX-CHEST-PORTABLE (1 VIEW)   Final Result      No acute cardiopulmonary abnormality.           Assessment/Plan  * Cognitive impairment- (present on admission)  Assessment & Plan  He was evaluated by neurology. CT head and MRI brain negative. TSH, HIV, RPR, B12 normal.    Likely underlying dementia  Awaiting guardianship hearing    Onychomycosis- (present on admission)  Assessment & Plan  Previously discussed with nursing staff regarding trimming toenails awaiting available wound care nurse for  toenail care  Continue topical miconazole        Anemia  Assessment & Plan  Monitor CBC periodically  No clinical signs of active bleeding  Basic work-up negative  Will need further work-up as outpatient if anemia persistent    Marijuana use  Assessment & Plan  Presented with positive drugs screen for cannabinoid    Syncope- (present on admission)  Assessment & Plan  Had initially presented to the ED for syncope and left AMA; subsequently found down on sidewalk after multiple witnessed falls. CT head and brain MRI negative. Echo unremarkable. No significant arrhythmias found on tele     No recurrence of syncope monitor clinically         VTE prophylaxis: pharmacologic prophylaxis contraindicated due to ambulatory    I have performed a physical exam and reviewed and updated ROS and Plan today (8/24/2021). In review of yesterday's note (8/23/2021), there are no changes except as documented above.

## 2021-08-24 NOTE — PROGRESS NOTES
1 RN Skin Assessment completed.   Patient's risk of skin breakdown: Low     Devices in place and skin assessed under:   []? Pulse Ox  []? PIV []? Central Line []? SCDs []?Purewick  []? Olsen  []?Condom Cath   []? BMS        []?  Cortrak   []?  Oxymask   []? Bipap    []? Nasal Canula   []? N/A   [x]? Other(specify): Wanderguard      Right Ear  []? WDL                or           []? Skin issue present (please provide assessment/description below)  Pt refused assessment     Left Ear  []? WDL                or           []? Skin issue present (please provide assessment/description below)   Pt refused assessment     Right Elbow:  []? WDL               or           []? Skin issue present (please provide assessment/description below)  Pt refused assessment     Left Elbow:   []? WDL                or           []? Skin issue present (please provide assessment/description below)  Pt refused assessment     Coccyx/Buttocks:  []? WDL                or           []? Skin issue present (please provide assessment/description below)  Pt refused assessment     Right Heel/Foot/Toes:  []? WDL                or           []? Skin issue present (please provide assessment/description below)  Pt refused assessment     Left Heel/Foot/Toes:   []? WDL              or           []? Skin issue present (please provide assessment/description below)  Pt refused assessment     **Describe any other skin issues in areas not already listed from above list:   []? N/A     Interventions In Place: Pillows and Pressure Redistribution Mattress     **If any new or digressing skin issues are found, fill out the selection below.     Possible Skin Injury No  Pictures Uploaded Into Epic: N/A  Wound Consult Placed: N/A  RN Wound Prevention Protocol Ordered: No    What new preventative interventions did you add? N/A

## 2021-08-24 NOTE — PROGRESS NOTES
Pt is A&Ox1, oriented to self only. Pt denies any pain at this time. Bed alarm on for safety. Wanderguard on left ankle. Refused assessment and evening medications. All needs met at this time.           1 RN Skin Assessment completed.   Patient's risk of skin breakdown: Low    Devices in place and skin assessed under:   [] Pulse Ox  [] PIV [] Central Line [] SCDs []Purewick  [] Olsen  []Condom Cath   [] BMS        []  Cortrak   []  Oxymask   [] Bipap    [] Nasal Canula   [] N/A   [x] Other(specify): Wanderguard     Right Ear  [] WDL                or           [] Skin issue present (please provide assessment/description below)  Pt refused assessment     Left Ear  [] WDL                or           [] Skin issue present (please provide assessment/description below)   Pt refused assessment    Right Elbow:  [] WDL               or           [] Skin issue present (please provide assessment/description below)  Pt refused assessment    Left Elbow:   [] WDL                or           [] Skin issue present (please provide assessment/description below)  Pt refused assessment    Coccyx/Buttocks:  [] WDL                or           [] Skin issue present (please provide assessment/description below)  Pt refused assessment    Right Heel/Foot/Toes:  [] WDL                or           [] Skin issue present (please provide assessment/description below)  Pt refused assessment    Left Heel/Foot/Toes:   [] WDL              or           [] Skin issue present (please provide assessment/description below)  Pt refused assessment    **Describe any other skin issues in areas not already listed from above list:   [] N/A    Interventions In Place: Pillows and Pressure Redistribution Mattress    **If any new or digressing skin issues are found, fill out the selection below.    Possible Skin Injury No  Pictures Uploaded Into Epic: N/A  Wound Consult Placed: N/A  RN Wound Prevention Protocol Ordered: No    What new preventative interventions  did you add? N/A

## 2021-08-25 LAB
ALBUMIN SERPL BCP-MCNC: 3.9 G/DL (ref 3.2–4.9)
ALBUMIN/GLOB SERPL: 2 G/DL
ALP SERPL-CCNC: 73 U/L (ref 30–99)
ALT SERPL-CCNC: 21 U/L (ref 2–50)
ANION GAP SERPL CALC-SCNC: 11 MMOL/L (ref 7–16)
AST SERPL-CCNC: 17 U/L (ref 12–45)
BILIRUB SERPL-MCNC: 0.2 MG/DL (ref 0.1–1.5)
BUN SERPL-MCNC: 18 MG/DL (ref 8–22)
CALCIUM SERPL-MCNC: 9.3 MG/DL (ref 8.5–10.5)
CHLORIDE SERPL-SCNC: 107 MMOL/L (ref 96–112)
CO2 SERPL-SCNC: 24 MMOL/L (ref 20–33)
CREAT SERPL-MCNC: 1.12 MG/DL (ref 0.5–1.4)
GLOBULIN SER CALC-MCNC: 2 G/DL (ref 1.9–3.5)
GLUCOSE SERPL-MCNC: 114 MG/DL (ref 65–99)
POTASSIUM SERPL-SCNC: 4.4 MMOL/L (ref 3.6–5.5)
PROT SERPL-MCNC: 5.9 G/DL (ref 6–8.2)
SODIUM SERPL-SCNC: 142 MMOL/L (ref 135–145)

## 2021-08-25 PROCEDURE — A9270 NON-COVERED ITEM OR SERVICE: HCPCS | Performed by: NURSE PRACTITIONER

## 2021-08-25 PROCEDURE — 80053 COMPREHEN METABOLIC PANEL: CPT

## 2021-08-25 PROCEDURE — 99232 SBSQ HOSP IP/OBS MODERATE 35: CPT | Performed by: HOSPITALIST

## 2021-08-25 PROCEDURE — 770006 HCHG ROOM/CARE - MED/SURG/GYN SEMI*

## 2021-08-25 PROCEDURE — 36415 COLL VENOUS BLD VENIPUNCTURE: CPT

## 2021-08-25 PROCEDURE — 700102 HCHG RX REV CODE 250 W/ 637 OVERRIDE(OP): Performed by: NURSE PRACTITIONER

## 2021-08-25 RX ADMIN — THERA TABS 1 TABLET: TAB at 07:45

## 2021-08-25 RX ADMIN — Medication 100 MG: at 07:45

## 2021-08-25 ASSESSMENT — PAIN DESCRIPTION - PAIN TYPE: TYPE: ACUTE PAIN

## 2021-08-25 NOTE — PROGRESS NOTES
1 RN Skin Assessment completed.   Patient's risk of skin breakdown: Low     Devices in place and skin assessed under:   []?? Pulse Ox  []?? PIV []?? Central Line []?? SCDs []??Purewick  []?? Olsen  []??Condom Cath   []?? BMS        []??  Cortrak   []??  Oxymask   []?? Bipap    []?? Nasal Canula   []?? N/A   [x]?? Other(specify): Wanderguard      Right Ear  []?? WDL                or           []?? Skin issue present (please provide assessment/description below)  Pt refused assessment     Left Ear  []?? WDL                or           []?? Skin issue present (please provide assessment/description below)   Pt refused assessment     Right Elbow:  []?? WDL               or           []?? Skin issue present (please provide assessment/description below)  Pt refused assessment     Left Elbow:   []?? WDL                or           []?? Skin issue present (please provide assessment/description below)  Pt refused assessment     Coccyx/Buttocks:  []?? WDL                or           []?? Skin issue present (please provide assessment/description below)  Pt refused assessment     Right Heel/Foot/Toes:  []?? WDL                or           []?? Skin issue present (please provide assessment/description below)  Pt refused assessment     Left Heel/Foot/Toes:   []?? WDL              or           []?? Skin issue present (please provide assessment/description below)  Pt refused assessment     **Describe any other skin issues in areas not already listed from above list:   []?? N/A     Interventions In Place: Pillows and Pressure Redistribution Mattress     **If any new or digressing skin issues are found, fill out the selection below.     Possible Skin Injury No  Pictures Uploaded Into Epic: N/A  Wound Consult Placed: N/A  RN Wound Prevention Protocol Ordered: No    What new preventative interventions did you add? N/A

## 2021-08-25 NOTE — PROGRESS NOTES
[facility] Medicine Daily Progress Note    Date of Service  8/25/2021    Chief Complaint  Lane Beard is a 66 y.o. male admitted 6/4/2021 with altered mentation    Hospital Course  Mr. Beard is a 66-year-old male with no known PMHx who presented to the hospital on 6/4/2021 after he was found down on the sidewalk after multiple witnessed falls.  Patient was confused at the time of admission.  He had been seen earlier in the day for syncope while crossing the street however at that time left AMA.  On admission he was tachycardic with a heart rate of 114, anion gap of 23 and a lactic acid of 2.6.  He had a CT scan of the head, which revealed no acute intracranial abnormalities.  Chest x-ray completed, which showed no acute cardiopulmonary process.  Patient was admitted for further evaluation of altered mental status and syncope.  Subsequent work up neg including MRI, Neuro consult and lab eval.  Pt is thought to have dementia and is awaiting [facility]    Interval Problem Update  Pt up and ambulating in the unit.   Only concern is his thickened and flaking toe nails  ROS somewhat limited by baseline mental status    I have personally seen and examined the patient at bedside. I discussed the plan of care with patient, bedside RN and .    Consultants/Specialty      Code Status  Full Code    Disposition  Patient is medically cleared.   Anticipate discharge to working on Guardianship.  I have placed the appropriate orders for post-discharge needs.    Review of Systems  Review of Systems   Unable to perform ROS: Dementia        Physical Exam  Temp:  [36.1 °C (97 °F)-37.4 °C (99.3 °F)] 36.2 °C (97.1 °F)  Pulse:  [84-98] 84  Resp:  [18] 18  BP: (116-131)/(74-83) 116/83  SpO2:  [98 %-99 %] 99 %    Physical Exam  Vitals reviewed.   Constitutional:       General: He is not in acute distress.     Appearance: He is well-developed. He is not diaphoretic.   HENT:      Head: Normocephalic and atraumatic.   Eyes:       Conjunctiva/sclera: Conjunctivae normal.   Neck:      Vascular: No JVD.   Cardiovascular:      Rate and Rhythm: Normal rate.      Heart sounds: No murmur heard.   No gallop.    Pulmonary:      Effort: Pulmonary effort is normal. No respiratory distress.      Breath sounds: No stridor. No wheezing or rales.   Abdominal:      Palpations: Abdomen is soft.      Tenderness: There is no abdominal tenderness. There is no guarding or rebound.   Musculoskeletal:         General: No tenderness.      Right lower leg: No edema.      Left lower leg: No edema.      Comments: All 10 toe nails are hypertrophied, flaking   Skin:     General: Skin is warm and dry.      Findings: No rash.   Neurological:      Mental Status: He is alert and oriented to person, place, and time. Mental status is at baseline.   Psychiatric:         Mood and Affect: Mood normal.         Fluids    Intake/Output Summary (Last 24 hours) at 8/25/2021 1444  Last data filed at 8/25/2021 0930  Gross per 24 hour   Intake 240 ml   Output --   Net 240 ml       Laboratory                        Imaging  MR-BRAIN-WITH & W/O   Final Result         No acute intracranial process.      Nonspecific T2 hyperintensities in the deep white matter related to chronic microvascular ischemia.      [id number] VIEW   Final Result      1.  No metallic foreign bodies detected.   2.  Right nephrolithiasis.      EC-ECHOCARDIOGRAM LTD W/ CONT   Final Result      CT-HEAD W/O   Final Result      1.  Cerebral atrophy and chronic microvascular ischemic type changes.   2.  No acute intracranial abnormality.      DX-CHEST-PORTABLE (1 VIEW)   Final Result      No acute cardiopulmonary abnormality.           Assessment/Plan  * Cognitive impairment- (present on admission)  Assessment & Plan  He was evaluated by neurology. CT head and MRI brain negative. TSH, HIV, RPR, B12 normal.    Likely underlying dementia  Awaiting guardianship hearing    Onychomycosis- (present on  admission)  Assessment & Plan  Previously discussed with nursing staff regarding trimming toenails awaiting available wound care nurse for toenail care  reonsulted today  Given that it involves all 10 digits will start systemic therapy if LFTs WNL        Anemia  Assessment & Plan  Monitor CBC periodically  No clinical signs of active bleeding  Basic work-up negative  Will need further work-up as outpatient if anemia persistent    Marijuana use  Assessment & Plan  Presented with positive drugs screen for cannabinoid    Syncope- (present on admission)  Assessment & Plan  Had initially presented to the ED for syncope and left AMA; subsequently found down on sidewalk after multiple witnessed falls. CT head and brain MRI negative. Echo unremarkable. No significant arrhythmias found on tele     No recurrence of syncope monitor clinically         VTE prophylaxis: pharmacologic prophylaxis contraindicated due to ambulatory    I have performed a physical exam and reviewed and updated ROS and Plan today (8/25/2021). In review of yesterday's note (8/24/2021), there are no changes except as documented above.

## 2021-08-25 NOTE — CARE PLAN
Problem: Skin Integrity  Goal: Skin integrity is maintained or improved  Outcome: Progressing     Problem: Fall Risk  Goal: Patient will remain free from falls  Outcome: Progressing     Problem: Communication  Goal: The ability to communicate needs accurately and effectively will improve  Outcome: Progressing     Problem: Nutrition  Goal: Patient's nutritional and fluid intake will be adequate or improve  Outcome: Progressing   The patient is Stable - Low risk of patient condition declining or worsening    Shift Goals  Clinical Goals: pt remain fall free  Patient Goals: Rest  Family Goals: N/A    Progress made toward(s) clinical / shift goals:      Patient is not progressing towards the following goals:

## 2021-08-25 NOTE — PROGRESS NOTES
Pt upset tonight, stating that the MD was going to be coming up to amputate his foot.Tried to assure patient that this would not be happening. Pt was unwilling to participate in full assessment tonight. Skin check refused.

## 2021-08-26 PROCEDURE — A9270 NON-COVERED ITEM OR SERVICE: HCPCS | Performed by: STUDENT IN AN ORGANIZED HEALTH CARE EDUCATION/TRAINING PROGRAM

## 2021-08-26 PROCEDURE — 99232 SBSQ HOSP IP/OBS MODERATE 35: CPT | Performed by: HOSPITALIST

## 2021-08-26 PROCEDURE — 700102 HCHG RX REV CODE 250 W/ 637 OVERRIDE(OP): Performed by: NURSE PRACTITIONER

## 2021-08-26 PROCEDURE — A9270 NON-COVERED ITEM OR SERVICE: HCPCS | Performed by: NURSE PRACTITIONER

## 2021-08-26 PROCEDURE — 700102 HCHG RX REV CODE 250 W/ 637 OVERRIDE(OP): Performed by: STUDENT IN AN ORGANIZED HEALTH CARE EDUCATION/TRAINING PROGRAM

## 2021-08-26 PROCEDURE — 700102 HCHG RX REV CODE 250 W/ 637 OVERRIDE(OP): Performed by: HOSPITALIST

## 2021-08-26 PROCEDURE — 770006 HCHG ROOM/CARE - MED/SURG/GYN SEMI*

## 2021-08-26 PROCEDURE — A9270 NON-COVERED ITEM OR SERVICE: HCPCS | Performed by: HOSPITALIST

## 2021-08-26 RX ORDER — TERBINAFINE HYDROCHLORIDE 250 MG/1
250 TABLET ORAL DAILY
Status: DISCONTINUED | OUTPATIENT
Start: 2021-08-26 | End: 2021-10-18 | Stop reason: HOSPADM

## 2021-08-26 RX ADMIN — TERBINAFINE 250 MG: 250 TABLET ORAL at 09:22

## 2021-08-26 RX ADMIN — DOCUSATE SODIUM 50 MG AND SENNOSIDES 8.6 MG 2 TABLET: 8.6; 5 TABLET, FILM COATED ORAL at 07:53

## 2021-08-26 RX ADMIN — POLYETHYLENE GLYCOL 3350 1 PACKET: 17 POWDER, FOR SOLUTION ORAL at 06:08

## 2021-08-26 RX ADMIN — THERA TABS 1 TABLET: TAB at 07:53

## 2021-08-26 RX ADMIN — Medication 100 MG: at 07:54

## 2021-08-26 ASSESSMENT — ENCOUNTER SYMPTOMS
HEADACHES: 0
DIZZINESS: 0
ABDOMINAL PAIN: 0
SHORTNESS OF BREATH: 0
BACK PAIN: 0
BLURRED VISION: 0
NAUSEA: 0
FEVER: 0
CHILLS: 0
DIARRHEA: 0
PALPITATIONS: 0
LOSS OF CONSCIOUSNESS: 0
VOMITING: 0
DOUBLE VISION: 0
COUGH: 0

## 2021-08-26 NOTE — PROGRESS NOTES
1 RN Skin Assessment completed.   Patient's risk of skin breakdown: Low    Devices in place and skin assessed under:   [] Pulse Ox  [] PIV [] Central Line [] SCDs []Purewick  [] Olsen  []Condom Cath   [] BMS        []  Cortrak   []  Oxymask   [] Bipap    [] Nasal Canula   [] N/A   [x] Other(specify): Wanderguard    Right Ear  [x] WDL                or           [] Skin issue present (please provide assessment/description below)    Left Ear  [x] WDL                or           [] Skin issue present (please provide assessment/description below)    Right Elbow:  [x] WDL               or           [] Skin issue present (please provide assessment/description below)    Left Elbow:   [x] WDL                or           [] Skin issue present (please provide assessment/description below)    Coccyx/Buttocks:  [] WDL                or           [x] Skin issue present (please provide assessment/description below) Refused assessment    Right Heel/Foot/Toes:  [] WDL                or           [x] Skin issue present (please provide assessment/description below) Dry, flaky, overgrown toenails    Left Heel/Foot/Toes:   [] WDL              or           [x] Skin issue present (please provide assessment/description below) Dry, flaky, overgrown toenails      **Describe any other skin issues in areas not already listed from above list:   [x] N/A    Interventions In Place: Pillows and Pressure Redistribution Mattress    **If any new or digressing skin issues are found, fill out the selection below.    Possible Skin Injury No  Pictures Uploaded Into Epic: N/A  Wound Consult Placed: N/A  RN Wound Prevention Protocol Ordered: No    What new preventative interventions did you add? N/A

## 2021-08-26 NOTE — PROGRESS NOTES
[facility] Medicine Daily Progress Note    Date of Service  8/26/2021    Chief Complaint  Lane Beard is a 66 y.o. male admitted 6/4/2021 with altered mentation    Hospital Course  Mr. Beard is a 66-year-old male with no known PMHx who presented to the hospital on 6/4/2021 after he was found down on the sidewalk after multiple witnessed falls.  Patient was confused at the time of admission.  He had been seen earlier in the day for syncope while crossing the street however at that time left AMA.  On admission he was tachycardic with a heart rate of 114, anion gap of 23 and a lactic acid of 2.6.  He had a CT scan of the head, which revealed no acute intracranial abnormalities.  Chest x-ray completed, which showed no acute cardiopulmonary process.  Patient was admitted for further evaluation of altered mental status and syncope.  Subsequent work up neg including MRI, Neuro consult and lab eval.  Pt is thought to have dementia and is awaiting [facility]    Interval Problem Update  Pt up and ambulating the unit, socializing with staff and other pts  Only concern again today is his toe nails  ROS somewhat limited by baseline mental status    I have personally seen and examined the patient at bedside. I discussed the plan of care with patient, bedside RN and .    Consultants/Specialty      Code Status  Full Code    Disposition  Patient is medically cleared.   Anticipate discharge to working on Guardianship.  I have placed the appropriate orders for post-discharge needs.    Review of Systems  Review of Systems   Unable to perform ROS: Dementia   Constitutional: Negative for chills and fever.   Eyes: Negative for blurred vision and double vision.   Respiratory: Negative for cough and shortness of breath.    Cardiovascular: Negative for chest pain, palpitations and leg swelling.   Gastrointestinal: Negative for abdominal pain, diarrhea, nausea and vomiting.   Genitourinary: Negative for dysuria and  urgency.   Musculoskeletal: Negative for back pain.   Skin: Negative for rash.        Toe nail hypertrophy   Neurological: Negative for dizziness, loss of consciousness and headaches.        Physical Exam  Temp:  [36 °C (96.8 °F)-36.7 °C (98 °F)] 36 °C (96.8 °F)  Pulse:  [65-96] 96  Resp:  [18] 18  BP: ()/(61-80) 93/61  SpO2:  [95 %-100 %] 95 %    Physical Exam  Vitals reviewed.   Constitutional:       General: He is not in acute distress.     Appearance: He is well-developed. He is not diaphoretic.   HENT:      Head: Normocephalic and atraumatic.   Eyes:      Conjunctiva/sclera: Conjunctivae normal.   Neck:      Vascular: No JVD.   Cardiovascular:      Rate and Rhythm: Normal rate.      Heart sounds: No murmur heard.   No gallop.    Pulmonary:      Effort: Pulmonary effort is normal. No respiratory distress.      Breath sounds: No stridor. No wheezing or rales.   Abdominal:      Palpations: Abdomen is soft.      Tenderness: There is no abdominal tenderness. There is no guarding or rebound.   Musculoskeletal:         General: No tenderness.      Right lower leg: No edema.      Left lower leg: No edema.      Comments: All 10 toe nails are hypertrophied, flaking   Skin:     General: Skin is warm and dry.      Findings: No rash.   Neurological:      Mental Status: He is alert and oriented to person, place, and time. Mental status is at baseline.   Psychiatric:         Mood and Affect: Mood normal.         Fluids    Intake/Output Summary (Last 24 hours) at 8/26/2021 0805  Last data filed at 8/25/2021 0930  Gross per 24 hour   Intake 240 ml   Output --   Net 240 ml       Laboratory      Recent Labs     08/25/21  1704   SODIUM 142   POTASSIUM 4.4   CHLORIDE 107   CO2 24   GLUCOSE 114*   BUN 18   CREATININE 1.12   CALCIUM 9.3                   Imaging  MR-BRAIN-WITH & W/O   Final Result         No acute intracranial process.      Nonspecific T2 hyperintensities in the deep white matter related to chronic  microvascular ischemia.      [id number] VIEW   Final Result      1.  No metallic foreign bodies detected.   2.  Right nephrolithiasis.      EC-ECHOCARDIOGRAM LTD W/ CONT   Final Result      CT-HEAD W/O   Final Result      1.  Cerebral atrophy and chronic microvascular ischemic type changes.   2.  No acute intracranial abnormality.      DX-CHEST-PORTABLE (1 VIEW)   Final Result      No acute cardiopulmonary abnormality.           Assessment/Plan  * Cognitive impairment- (present on admission)  Assessment & Plan  He was evaluated by neurology. CT head and MRI brain negative. TSH, HIV, RPR, B12 normal.    Likely underlying dementia  Awaiting guardianship hearing    Onychomycosis- (present on admission)  Assessment & Plan  Previously discussed with nursing staff regarding trimming toenails awaiting available wound care nurse for toenail care  reonsulted today  Given that it involves all 10 digits will start systemic therapy for planned 12 wks  Repeat LFTs 8/25 WNL  Start oral Terbenafine 250mg po Qday  Will need repeat LFTs in 6wks (end of September)          Anemia  Assessment & Plan  Monitor CBC periodically  No clinical signs of active bleeding  Basic work-up negative  Will need further work-up as outpatient if anemia persistent    Marijuana use  Assessment & Plan  Presented with positive drugs screen for cannabinoid    Syncope- (present on admission)  Assessment & Plan  Had initially presented to the ED for syncope and left AMA; subsequently found down on sidewalk after multiple witnessed falls. CT head and brain MRI negative. Echo unremarkable. No significant arrhythmias found on tele     No recurrence of syncope monitor clinically       I certify that the patient requires continued medically necessary hospital services for the treatment of cognitive impairment and will remain in the hospital for unknown number of days.  Discharge plan is anticipated to be group home but is currently pending assignement of a  court appointed guardian.    VTE prophylaxis: pharmacologic prophylaxis contraindicated due to ambulatory    I have performed a physical exam and reviewed and updated ROS and Plan today (8/26/2021). In review of yesterday's note (8/25/2021), there are no changes except as documented above.

## 2021-08-26 NOTE — CARE PLAN
The patient is Stable - Low risk of patient condition declining or worsening    Shift Goals  Clinical Goals: Safety  Patient Goals: Rest  Family Goals: N/A    Progress made toward(s) clinical / shift goals:    Problem: Skin Integrity  Goal: Skin integrity is maintained or improved  Outcome: Progressing     Problem: Fall Risk  Goal: Patient will remain free from falls  Outcome: Progressing     Problem: Communication  Goal: The ability to communicate needs accurately and effectively will improve  Outcome: Progressing       Patient is not progressing towards the following goals:      Problem: Knowledge Deficit - Standard  Goal: Patient and family/care givers will demonstrate understanding of plan of care, disease process/condition, diagnostic tests and medications  Outcome: Not Progressing

## 2021-08-26 NOTE — CARE PLAN
Problem: Communication  Goal: The ability to communicate needs accurately and effectively will improve  Outcome: Progressing     Problem: Nutrition  Goal: Patient's nutritional and fluid intake will be adequate or improve  Outcome: Progressing     Problem: Mobility  Goal: Patient's capacity to carry out activities will improve  Outcome: Progressing   The patient is Stable - Low risk of patient condition declining or worsening    Shift Goals  Clinical Goals: Safety  Patient Goals: Rest  Family Goals: N/A    Progress made toward(s) clinical / shift goals:      Patient is not progressing towards the following goals:

## 2021-08-27 PROCEDURE — 770006 HCHG ROOM/CARE - MED/SURG/GYN SEMI*

## 2021-08-27 PROCEDURE — 700102 HCHG RX REV CODE 250 W/ 637 OVERRIDE(OP): Performed by: NURSE PRACTITIONER

## 2021-08-27 PROCEDURE — 700102 HCHG RX REV CODE 250 W/ 637 OVERRIDE(OP): Performed by: STUDENT IN AN ORGANIZED HEALTH CARE EDUCATION/TRAINING PROGRAM

## 2021-08-27 PROCEDURE — A9270 NON-COVERED ITEM OR SERVICE: HCPCS | Performed by: HOSPITALIST

## 2021-08-27 PROCEDURE — A9270 NON-COVERED ITEM OR SERVICE: HCPCS | Performed by: STUDENT IN AN ORGANIZED HEALTH CARE EDUCATION/TRAINING PROGRAM

## 2021-08-27 PROCEDURE — A9270 NON-COVERED ITEM OR SERVICE: HCPCS | Performed by: NURSE PRACTITIONER

## 2021-08-27 PROCEDURE — 700102 HCHG RX REV CODE 250 W/ 637 OVERRIDE(OP): Performed by: HOSPITALIST

## 2021-08-27 PROCEDURE — 99231 SBSQ HOSP IP/OBS SF/LOW 25: CPT | Performed by: HOSPITALIST

## 2021-08-27 RX ADMIN — THERA TABS 1 TABLET: TAB at 08:39

## 2021-08-27 RX ADMIN — DOCUSATE SODIUM 50 MG AND SENNOSIDES 8.6 MG 2 TABLET: 8.6; 5 TABLET, FILM COATED ORAL at 08:39

## 2021-08-27 RX ADMIN — TERBINAFINE 250 MG: 250 TABLET ORAL at 08:39

## 2021-08-27 RX ADMIN — Medication 100 MG: at 08:39

## 2021-08-27 RX ADMIN — POLYETHYLENE GLYCOL 3350 1 PACKET: 17 POWDER, FOR SOLUTION ORAL at 11:19

## 2021-08-27 ASSESSMENT — ENCOUNTER SYMPTOMS
FEVER: 0
DIARRHEA: 0
ABDOMINAL PAIN: 0
PALPITATIONS: 0
VOMITING: 0
BACK PAIN: 0
LOSS OF CONSCIOUSNESS: 0
NAUSEA: 0
COUGH: 0
CHILLS: 0
SHORTNESS OF BREATH: 0
DIZZINESS: 0
HEADACHES: 0

## 2021-08-27 NOTE — CARE PLAN
The patient is Stable - Low risk of patient condition declining or worsening    Shift Goals  Clinical Goals: Safety  Patient Goals: Pt will not have fall or injury  Family Goals: N/A    Progress made toward(s) clinical / shift goals:      Patient is not progressing towards the following goals:

## 2021-08-27 NOTE — PROGRESS NOTES
Bedside report received at change of shift. Pt reports no pain at this time. Is currently sitting in a chair at the nurse's station. Ambulates with steady gait. Safety precautions in place. All pt needs met at this time.

## 2021-08-27 NOTE — PROGRESS NOTES
[facility] Medicine Daily Progress Note    Date of Service  8/27/2021    Chief Complaint  Lane Beard is a 66 y.o. male admitted 6/4/2021 with altered mentation    Hospital Course  Mr. Beard is a 66-year-old male with no known PMHx who presented to the hospital on 6/4/2021 after he was found down on the sidewalk after multiple witnessed falls.  Patient was confused at the time of admission.  He had been seen earlier in the day for syncope while crossing the street however at that time left AMA.  On admission he was tachycardic with a heart rate of 114, anion gap of 23 and a lactic acid of 2.6.  He had a CT scan of the head, which revealed no acute intracranial abnormalities.  Chest x-ray completed, which showed no acute cardiopulmonary process.  Patient was admitted for further evaluation of altered mental status and syncope.  Subsequent work up neg including MRI, Neuro consult and lab eval.  Pt is thought to have dementia and is awaiting [facility]    Interval Problem Update  Pt up and ambulating the unit, socializing with staff and other pts as is his normal in the mornings.  No concerns on my exam  ROS somewhat limited by baseline mental status    I have personally seen and examined the patient at bedside. I discussed the plan of care with patient, bedside RN and .    Consultants/Specialty      Code Status  Full Code    Disposition  Patient is medically cleared.   Anticipate discharge to working on Guardianship.  I have placed the appropriate orders for post-discharge needs.    Review of Systems  Review of Systems   Unable to perform ROS: Dementia   Constitutional: Negative for chills and fever.   Respiratory: Negative for cough and shortness of breath.    Cardiovascular: Negative for chest pain, palpitations and leg swelling.   Gastrointestinal: Negative for abdominal pain, diarrhea, nausea and vomiting.   Genitourinary: Negative for dysuria and urgency.   Musculoskeletal: Negative for back  pain.   Skin:        Toe nail hypertrophy   Neurological: Negative for dizziness, loss of consciousness and headaches.        Physical Exam  Temp:  [35.9 °C (96.7 °F)-36.9 °C (98.5 °F)] 35.9 °C (96.7 °F)  Pulse:  [79-93] 80  Resp:  [17-19] 19  BP: (119-122)/(71-79) 120/71  SpO2:  [92 %-99 %] 98 %    Physical Exam  Vitals reviewed.   Constitutional:       General: He is not in acute distress.     Appearance: He is well-developed. He is not diaphoretic.   HENT:      Head: Normocephalic and atraumatic.   Eyes:      Conjunctiva/sclera: Conjunctivae normal.   Neck:      Vascular: No JVD.   Cardiovascular:      Rate and Rhythm: Normal rate.      Heart sounds: No murmur heard.   No gallop.    Pulmonary:      Effort: Pulmonary effort is normal. No respiratory distress.      Breath sounds: No stridor. No wheezing or rales.   Abdominal:      Palpations: Abdomen is soft.   Musculoskeletal:         General: No tenderness.      Right lower leg: No edema.      Left lower leg: No edema.      Comments: All 10 toe nails are hypertrophied, flaking   Skin:     General: Skin is warm and dry.      Findings: No rash.   Neurological:      Mental Status: He is alert and oriented to person, place, and time. Mental status is at baseline.      Gait: Gait normal.   Psychiatric:         Mood and Affect: Mood normal.         Fluids    Intake/Output Summary (Last 24 hours) at 8/27/2021 1210  Last data filed at 8/26/2021 2015  Gross per 24 hour   Intake 970 ml   Output --   Net 970 ml       Laboratory      Recent Labs     08/25/21  1704   SODIUM 142   POTASSIUM 4.4   CHLORIDE 107   CO2 24   GLUCOSE 114*   BUN 18   CREATININE 1.12   CALCIUM 9.3                   Imaging  MR-BRAIN-WITH & W/O   Final Result         No acute intracranial process.      Nonspecific T2 hyperintensities in the deep white matter related to chronic microvascular ischemia.      [id number] VIEW   Final Result      1.  No metallic foreign bodies detected.   2.  Right  nephrolithiasis.      EC-ECHOCARDIOGRAM LTD W/ CONT   Final Result      CT-HEAD W/O   Final Result      1.  Cerebral atrophy and chronic microvascular ischemic type changes.   2.  No acute intracranial abnormality.      DX-CHEST-PORTABLE (1 VIEW)   Final Result      No acute cardiopulmonary abnormality.           Assessment/Plan  * Cognitive impairment- (present on admission)  Assessment & Plan  He was evaluated by neurology. CT head and MRI brain negative. TSH, HIV, RPR, B12 normal.    Likely underlying dementia  Awaiting guardianship hearing    Onychomycosis- (present on admission)  Assessment & Plan  Previously discussed with nursing staff regarding trimming toenails awaiting available wound care nurse for toenail care  reonsulted today  Given that it involves all 10 digits will start systemic therapy for planned 12 wks  Repeat LFTs 8/25 WNL  Started oral Terbenafine 250mg po Qday with stop date set after 12wks of therapy  Will need repeat LFTs in 6wks (end of September)  LPS service consulted and will be by to trim pt's toe nails next week          Anemia  Assessment & Plan  Monitor CBC periodically  No clinical signs of active bleeding  Basic work-up negative  Will need further work-up as outpatient if anemia persistent    Marijuana use  Assessment & Plan  Presented with positive drugs screen for cannabinoid    Syncope- (present on admission)  Assessment & Plan  Had initially presented to the ED for syncope and left AMA; subsequently found down on sidewalk after multiple witnessed falls. CT head and brain MRI negative. Echo unremarkable. No significant arrhythmias found on tele     No recurrence of syncope monitor clinically       I certify that the patient requires continued medically necessary hospital services for the treatment of cognitive impairment and will remain in the hospital for unknown number of days.  Discharge plan is anticipated to be group home but is currently pending assignement of a court  appointed guardian.    VTE prophylaxis: pharmacologic prophylaxis contraindicated due to ambulatory    I have performed a physical exam and reviewed and updated ROS and Plan today (8/27/2021). In review of yesterday's note (8/26/2021), there are no changes except as documented above.

## 2021-08-27 NOTE — PROGRESS NOTES
1 RN Skin Assessment completed.   Patient's risk of skin breakdown: Low     Devices in place and skin assessed under:   []? Pulse Ox  []? PIV []? Central Line []? SCDs []?Purewick  []? Olsen  []?Condom Cath   []? BMS        []?  Cortrak   []?  Oxymask   []? Bipap    []? Nasal Canula   []? N/A   [x]? Other(specify): Wanderguard     Right Ear  [x]? WDL                or           []? Skin issue present (please provide assessment/description below)     Left Ear  [x]? WDL                or           []? Skin issue present (please provide assessment/description below)     Right Elbow:  [x]? WDL               or           []? Skin issue present (please provide assessment/description below)     Left Elbow:   [x]? WDL                or           []? Skin issue present (please provide assessment/description below)     Coccyx/Buttocks:  []? WDL                or           [x]? Skin issue present (please provide assessment/description below)   Refused assessment     Right Heel/Foot/Toes:  []? WDL                or           [x]? Skin issue present (please provide assessment/description below)   Fragile, dry, flaky and excoriated     Left Heel/Foot/Toes:   []? WDL              or           [x]? Skin issue present (please provide assessment/description below) Dry, flaky, overgrown toenails        **Describe any other skin issues in areas not already listed from above list:   [x]? N/A     Interventions In Place: Pillows and Pressure Redistribution Mattress     **If any new or digressing skin issues are found, fill out the selection below.     Possible Skin Injury No  Pictures Uploaded Into Epic: N/A  Wound Consult Placed: N/A  RN Wound Prevention Protocol Ordered: No    What new preventative interventions did you add? N/A

## 2021-08-28 PROCEDURE — A9270 NON-COVERED ITEM OR SERVICE: HCPCS | Performed by: NURSE PRACTITIONER

## 2021-08-28 PROCEDURE — 700102 HCHG RX REV CODE 250 W/ 637 OVERRIDE(OP): Performed by: HOSPITALIST

## 2021-08-28 PROCEDURE — A9270 NON-COVERED ITEM OR SERVICE: HCPCS | Performed by: HOSPITALIST

## 2021-08-28 PROCEDURE — 700102 HCHG RX REV CODE 250 W/ 637 OVERRIDE(OP): Performed by: NURSE PRACTITIONER

## 2021-08-28 PROCEDURE — 770006 HCHG ROOM/CARE - MED/SURG/GYN SEMI*

## 2021-08-28 RX ADMIN — TERBINAFINE 250 MG: 250 TABLET ORAL at 09:02

## 2021-08-28 RX ADMIN — Medication 100 MG: at 09:02

## 2021-08-28 RX ADMIN — THERA TABS 1 TABLET: TAB at 09:02

## 2021-08-28 ASSESSMENT — FIBROSIS 4 INDEX: FIB4 SCORE: 1.14

## 2021-08-28 NOTE — PROGRESS NOTES
1 RN Skin Assessment completed.   Patient's risk of skin breakdown: Low     Devices in place and skin assessed under:   []? Pulse Ox  []? PIV []? Central Line []? SCDs []?Purewick  []? Olsen  []?Condom Cath   []? BMS        []?  Cortrak   []?  Oxymask   []? Bipap    []? Nasal Canula   []? N/A   [x]? Other(specify): wanderguard L ankle     Right Ear  [x]? WDL                or           []? Skin issue present (please provide assessment/description below)     Left Ear  [x]? WDL                or           []? Skin issue present (please provide assessment/description below)     Right Elbow:  [x]? WDL               or           []? Skin issue present (please provide assessment/description below)     Left Elbow:   [x]? WDL                or           []? Skin issue present (please provide assessment/description below)     Coccyx/Buttocks:  [x]? WDL                or           []? Skin issue present (please provide assessment/description below)     Right Heel/Foot/Toes:  [x]? WDL                or           []? Skin issue present (please provide assessment/description below)     Left Heel/Foot/Toes:   [x]? WDL              or           []? Skin issue present (please provide assessment/description below)        **Describe any other skin issues in areas not already listed from above list:   []? N/A scabs on knees, overgrown fungus on toenails     Interventions In Place: Pillows and Pressure Redistribution Mattress     **If any new or digressing skin issues are found, fill out the selection below.

## 2021-08-28 NOTE — CARE PLAN
The patient is Watcher - Medium risk of patient condition declining or worsening    Shift Goals  Clinical Goals: remain free from fall  Patient Goals:   Family Goals:     Progress made toward(s) clinical / shift goals:  Pt doesn't call for assist.  Steady gait when up but has hx of fall.  Pt is in desk bed and continues to have bed alarm.    Problem: Fall Risk  Goal: Patient will remain free from falls  Outcome: Progressing       Patient is not progressing towards the following goals:

## 2021-08-28 NOTE — PROGRESS NOTES
Bedside report received at change of shift. Pt is A&Ox1, reports no pain. Pt ambulated to the bathroom with steady gait. Safety precautions in place. All pt needs met at this time.

## 2021-08-28 NOTE — PROGRESS NOTES
Oriented to self.  Was ambulating in hallway independently earlier.  Denied any pain.        1 RN Skin Assessment completed.   Patient's risk of skin breakdown: Low    Devices in place and skin assessed under:   [] Pulse Ox  [] PIV [] Central Line [] SCDs []Purewick  [] Olsen  []Condom Cath   [] BMS        []  Cortrak   []  Oxymask   [] Bipap    [] Nasal Canula   [] N/A   [x] Other(specify): WG    Right Ear  [x] WDL                or           [] Skin issue present (please provide assessment/description below)    Left Ear  [x] WDL                or           [] Skin issue present (please provide assessment/description below)    Right Elbow:  [x] WDL               or           [] Skin issue present (please provide assessment/description below)    Left Elbow:   [x] WDL                or           [] Skin issue present (please provide assessment/description below)    Coccyx/Buttocks:  Refused assessment  [] WDL                or           [] Skin issue present (please provide assessment/description below)    Right Heel/Foot/Toes:  Refused assessment  [] WDL                or           [] Skin issue present (please provide assessment/description below)    Left Heel/Foot/Toes:  Refused assessment  [] WDL              or           [] Skin issue present (please provide assessment/description below)      **Describe any other skin issues in areas not already listed from above list:   [] N/A    Interventions In Place: Waffle Overlay

## 2021-08-29 PROCEDURE — 770006 HCHG ROOM/CARE - MED/SURG/GYN SEMI*

## 2021-08-29 PROCEDURE — A9270 NON-COVERED ITEM OR SERVICE: HCPCS | Performed by: HOSPITALIST

## 2021-08-29 PROCEDURE — 700102 HCHG RX REV CODE 250 W/ 637 OVERRIDE(OP): Performed by: HOSPITALIST

## 2021-08-29 PROCEDURE — 700102 HCHG RX REV CODE 250 W/ 637 OVERRIDE(OP): Performed by: NURSE PRACTITIONER

## 2021-08-29 PROCEDURE — A9270 NON-COVERED ITEM OR SERVICE: HCPCS | Performed by: NURSE PRACTITIONER

## 2021-08-29 RX ORDER — LOPERAMIDE HYDROCHLORIDE 2 MG/1
2 CAPSULE ORAL 4 TIMES DAILY PRN
Status: DISCONTINUED | OUTPATIENT
Start: 2021-08-29 | End: 2021-10-07

## 2021-08-29 RX ADMIN — TERBINAFINE 250 MG: 250 TABLET ORAL at 08:15

## 2021-08-29 RX ADMIN — Medication 100 MG: at 08:15

## 2021-08-29 RX ADMIN — THERA TABS 1 TABLET: TAB at 08:15

## 2021-08-29 RX ADMIN — LOPERAMIDE HYDROCHLORIDE 2 MG: 2 CAPSULE ORAL at 17:01

## 2021-08-29 ASSESSMENT — FIBROSIS 4 INDEX: FIB4 SCORE: 1.14

## 2021-08-29 NOTE — CARE PLAN
The patient is Stable - Low risk of patient condition declining or worsening    Shift Goals  Clinical Goals: remain free from fall  Patient Goals:   Family Goals:     Progress made toward(s) clinical / shift goals:  Steady gait noted when up.  However, pt has hx of fall and he is confused.  Pt is in desk bed and built in bed alarm on while pt is in bed.      Problem: Fall Risk  Goal: Patient will remain free from falls  Outcome: Progressing         Patient is not progressing towards the following goals:

## 2021-08-29 NOTE — PROGRESS NOTES
Oriented to self.  Denied any pain.  Up self to go to bathroom.  Built in bed alarm on when pt is in bed.        1 RN Skin Assessment completed.   Patient's risk of skin breakdown: Low    Devices in place and skin assessed under:   [] Pulse Ox  [] PIV [] Central Line [] SCDs []Purewick  [] Olsen  []Condom Cath   [] BMS        []  Cortrak   []  Oxymask   [] Bipap    [] Nasal Canula   [] N/A   [x] Other(specify):  WG    Right Ear  [x] WDL                or           [] Skin issue present (please provide assessment/description below)    Left Ear  [x] WDL                or           [] Skin issue present (please provide assessment/description below)    Right Elbow:  [x] WDL               or           [] Skin issue present (please provide assessment/description below)    Left Elbow:   [x] WDL                or           [] Skin issue present (please provide assessment/description below)    Coccyx/Buttocks:  [x] WDL                or           [] Skin issue present (please provide assessment/description below)    Right Heel/Foot/Toes:  [] WDL                or           [x] Skin issue present (please provide assessment/description below)  Right second toe has discolored spot    Left Heel/Foot/Toes:   [x] WDL              or           [] Skin issue present (please provide assessment/description below)      **Describe any other skin issues in areas not already listed from above list:   [] N/A   Scabs on knees.  Long toenails.      Interventions In Place: Waffle Overlay

## 2021-08-29 NOTE — PROGRESS NOTES
Received bedside report and assumed pt care. Pt is orientated to self and place only. Pt was pleasant and cooperative during interaction. Wandergaurd in place on left ankle d/t wandering. Pt went up to bathroom with SBA and returned back to bed safely. Pt has hx of falls. Fall education provided and pt states understanding. Fall precautions in place. POC for today discussed with pt. Pt resting in bed with all needs met at this time. Call light within reach. Hourly rounding in place.       1 RN Skin Assessment completed.   Patient's risk of skin breakdown: Low    Devices in place and skin assessed under:   [] Pulse Ox  [] PIV [] Central Line [] SCDs []Purewick  [] Olsen  []Condom Cath   [] BMS        []  Cortrak   []  Oxymask   [] Bipap    [] Nasal Canula   [x] N/A   [] Other(specify):     Right Ear  [x] WDL                or           [] Skin issue present (please provide assessment/description below)    Left Ear  [x] WDL                or           [] Skin issue present (please provide assessment/description below)    Right Elbow:  [x] WDL               or           [] Skin issue present (please provide assessment/description below)    Left Elbow:   [x] WDL                or           [] Skin issue present (please provide assessment/description below)    Coccyx/Buttocks:  [x] WDL                or           [] Skin issue present (please provide assessment/description below)    Right Heel/Foot/Toes:  [] WDL                or           [x] Skin issue present (please provide assessment/description below)  Right second toe has discolored spot    Left Heel/Foot/Toes:   [x] WDL              or           [] Skin issue present (please provide assessment/description below)      **Describe any other skin issues in areas not already listed from above list:   [] N/A  Long toenails  Interventions In Place: Waffle Overlay, Pillows and Pressure Redistribution Mattress

## 2021-08-30 PROBLEM — R19.7 DIARRHEA: Status: ACTIVE | Noted: 2021-08-30

## 2021-08-30 LAB
C DIFF DNA SPEC QL NAA+PROBE: NEGATIVE
C DIFF TOX GENS STL QL NAA+PROBE: NEGATIVE
FLUAV RNA SPEC QL NAA+PROBE: NEGATIVE
FLUBV RNA SPEC QL NAA+PROBE: NEGATIVE
RSV RNA SPEC QL NAA+PROBE: NEGATIVE
SARS-COV-2 RNA RESP QL NAA+PROBE: NOTDETECTED
SPECIMEN SOURCE: NORMAL

## 2021-08-30 PROCEDURE — 700102 HCHG RX REV CODE 250 W/ 637 OVERRIDE(OP): Performed by: NURSE PRACTITIONER

## 2021-08-30 PROCEDURE — 87493 C DIFF AMPLIFIED PROBE: CPT

## 2021-08-30 PROCEDURE — 0241U HCHG SARS-COV-2 COVID-19 NFCT DS RESP RNA 4 TRGT MIC: CPT

## 2021-08-30 PROCEDURE — A9270 NON-COVERED ITEM OR SERVICE: HCPCS | Performed by: HOSPITALIST

## 2021-08-30 PROCEDURE — 700102 HCHG RX REV CODE 250 W/ 637 OVERRIDE(OP): Performed by: HOSPITALIST

## 2021-08-30 PROCEDURE — A9270 NON-COVERED ITEM OR SERVICE: HCPCS | Performed by: NURSE PRACTITIONER

## 2021-08-30 PROCEDURE — 99232 SBSQ HOSP IP/OBS MODERATE 35: CPT | Performed by: HOSPITALIST

## 2021-08-30 PROCEDURE — 770006 HCHG ROOM/CARE - MED/SURG/GYN SEMI*

## 2021-08-30 RX ADMIN — LOPERAMIDE HYDROCHLORIDE 2 MG: 2 CAPSULE ORAL at 08:20

## 2021-08-30 RX ADMIN — Medication 100 MG: at 08:20

## 2021-08-30 RX ADMIN — THERA TABS 1 TABLET: TAB at 08:20

## 2021-08-30 RX ADMIN — TERBINAFINE 250 MG: 250 TABLET ORAL at 05:54

## 2021-08-30 RX ADMIN — LOPERAMIDE HYDROCHLORIDE 2 MG: 2 CAPSULE ORAL at 03:21

## 2021-08-30 ASSESSMENT — ENCOUNTER SYMPTOMS
COUGH: 0
SHORTNESS OF BREATH: 0
DIZZINESS: 0
LOSS OF CONSCIOUSNESS: 0
HEADACHES: 0
NAUSEA: 0
PALPITATIONS: 0
ABDOMINAL PAIN: 0
CHILLS: 0
DIARRHEA: 1
BACK PAIN: 0
VOMITING: 0
FEVER: 0

## 2021-08-30 NOTE — PROGRESS NOTES
"[facility] Medicine Daily Progress Note    Date of Service  8/30/2021    Chief Complaint  Lane Beard is a 66 y.o. male admitted 6/4/2021 with altered mentation    Hospital Course  Mr. Beard is a 66-year-old male with no known PMHx who presented to the hospital on 6/4/2021 after he was found down on the sidewalk after multiple witnessed falls.  Patient was confused at the time of admission.  He had been seen earlier in the day for syncope while crossing the street however at that time left AMA.  On admission he was tachycardic with a heart rate of 114, anion gap of 23 and a lactic acid of 2.6.  He had a CT scan of the head, which revealed no acute intracranial abnormalities.  Chest x-ray completed, which showed no acute cardiopulmonary process.  Patient was admitted for further evaluation of altered mental status and syncope.  Subsequent work up neg including MRI, Neuro consult and lab eval.  Pt is thought to have dementia and is awaiting [facility]    Interval Problem Update  Pt states he feels poorly this am.  Started with some D overnight and \"congestion\".  Denies fever, chills, cough or SOB.  No abd pain, N or V.    ROS somewhat limited by baseline mental status    I have personally seen and examined the patient at bedside. I discussed the plan of care with patient, bedside RN and .    Consultants/Specialty      Code Status  Full Code    Disposition  Patient is medically cleared.   Anticipate discharge to working on Guardianship.  I have placed the appropriate orders for post-discharge needs.    Review of Systems  Review of Systems   Unable to perform ROS: Dementia   Constitutional: Negative for chills and fever.   HENT: Positive for congestion.    Respiratory: Negative for cough and shortness of breath.    Cardiovascular: Negative for chest pain, palpitations and leg swelling.   Gastrointestinal: Positive for diarrhea. Negative for abdominal pain, nausea and vomiting.   Genitourinary: " Negative for dysuria and urgency.   Musculoskeletal: Negative for back pain.   Skin:        Toe nail hypertrophy   Neurological: Negative for dizziness, loss of consciousness and headaches.        Physical Exam  Temp:  [36.6 °C (97.8 °F)-37.1 °C (98.7 °F)] 36.6 °C (97.8 °F)  Pulse:  [] 94  Resp:  [17-18] 17  BP: (111-135)/(81-87) 118/81  SpO2:  [92 %-100 %] 96 %    Physical Exam  Vitals reviewed.   Constitutional:       General: He is not in acute distress.     Appearance: He is well-developed. He is not diaphoretic.   HENT:      Head: Normocephalic and atraumatic.   Eyes:      Conjunctiva/sclera: Conjunctivae normal.   Neck:      Vascular: No JVD.   Cardiovascular:      Rate and Rhythm: Normal rate.      Heart sounds: No murmur heard.   No gallop.    Pulmonary:      Effort: Pulmonary effort is normal. No respiratory distress.      Breath sounds: No stridor. No wheezing or rales.   Abdominal:      Palpations: Abdomen is soft.   Musculoskeletal:         General: No tenderness.      Right lower leg: No edema.      Left lower leg: No edema.      Comments: All 10 toe nails are hypertrophied, flaking   Skin:     General: Skin is warm and dry.      Findings: No rash.   Neurological:      Mental Status: He is alert and oriented to person, place, and time. Mental status is at baseline.      Gait: Gait normal.   Psychiatric:         Mood and Affect: Mood normal.         Fluids    Intake/Output Summary (Last 24 hours) at 8/30/2021 1247  Last data filed at 8/30/2021 0340  Gross per 24 hour   Intake 1480 ml   Output --   Net 1480 ml       Laboratory                        Imaging  MR-BRAIN-WITH & W/O   Final Result         No acute intracranial process.      Nonspecific T2 hyperintensities in the deep white matter related to chronic microvascular ischemia.      [id number] VIEW   Final Result      1.  No metallic foreign bodies detected.   2.  Right nephrolithiasis.      EC-ECHOCARDIOGRAM LTD W/ CONT   Final Result       CT-HEAD W/O   Final Result      1.  Cerebral atrophy and chronic microvascular ischemic type changes.   2.  No acute intracranial abnormality.      DX-CHEST-PORTABLE (1 VIEW)   Final Result      No acute cardiopulmonary abnormality.           Assessment/Plan  * Cognitive impairment- (present on admission)  Assessment & Plan  He was evaluated by neurology. CT head and MRI brain negative. TSH, HIV, RPR, B12 normal.    Likely underlying dementia  Awaiting guardianship hearing    Diarrhea  Assessment & Plan  Pt with 24hrs of D  2 other pts on same unit with diarrhea as well  Mild URI sx's but no F/C cough, SOB   Exam benign  No significant CDiff risk factors other then being hospitalized  Checking COVID  Cont supportive care  Consider further work up if it does not improve in next 24hrs or if it worsens at all    Onychomycosis- (present on admission)  Assessment & Plan  Previously discussed with nursing staff regarding trimming toenails awaiting available wound care nurse for toenail care  reonsulted today  Given that it involves all 10 digits will start systemic therapy for planned 12 wks  Repeat LFTs 8/25 WNL  Started oral Terbenafine 250mg po Qday with stop date set after 12wks of therapy  Will need repeat LFTs in 6wks (end of September)  LPS service consulted and will be by to trim pt's toe nails next week          Anemia  Assessment & Plan  Monitor CBC periodically  No clinical signs of active bleeding  Basic work-up negative  Will need further work-up as outpatient if anemia persistent    Marijuana use  Assessment & Plan  Presented with positive drugs screen for cannabinoid    Syncope- (present on admission)  Assessment & Plan  Had initially presented to the ED for syncope and left AMA; subsequently found down on sidewalk after multiple witnessed falls. CT head and brain MRI negative. Echo unremarkable. No significant arrhythmias found on tele     No recurrence of syncope monitor clinically       I certify that  the patient requires continued medically necessary hospital services for the treatment of cognitive impairment and will remain in the hospital for unknown number of days.  Discharge plan is anticipated to be group home but is currently pending assignement of a court appointed guardian.    VTE prophylaxis: pharmacologic prophylaxis contraindicated due to ambulatory    I have performed a physical exam and reviewed and updated ROS and Plan today (8/30/2021). In review of yesterday's note (8/29/2021), there are no changes except as documented above.

## 2021-08-30 NOTE — PROGRESS NOTES
Pt is A&Ox2 to person and place, pt easily reoriented. Pt is resting in bed, no signs of labored breathing or pain. Pt on RA. Pt is steady, independent up self. No episodes of diarrhea as of this shift. Pt continues to complain of stomach upset but states he just wants to rest. Wander gaurd on L ankle, no demonstration of intention to leave unit. Call light within reach, bed low and locked, and non skid socks on at this time. Pt updated on plan of care for the shift. Pt declines any additional needs at this time.       2330: Pt up to bathroom with CNA assist, pt had sudden urge to sit. RN assisted CNA with lowering pt to floor, pt had bowl incontinent episode ( diarrhea).      Pt had multiple watery BM during shift, C. Diff rule out in place. Stool sample sent as precaution.     1 RN Skin Assessment completed.   Patient's risk of skin breakdown: Low    Devices in place and skin assessed under:   [] Pulse Ox  [] PIV [] Central Line [] SCDs []Purewick  [] Olsen  []Condom Cath   [] BMS        []  Cortrak   []  Oxymask   [] Bipap    [] Nasal Canula   [x] N/A   [] Other(specify):     Right Ear  [x] WDL                or           [] Skin issue present (please provide assessment/description below)    Left Ear  [x] WDL                or           [] Skin issue present (please provide assessment/description below)    Right Elbow:  [x] WDL               or           [] Skin issue present (please provide assessment/description below)    Left Elbow:   [x] WDL                or           [] Skin issue present (please provide assessment/description below)    Coccyx/Buttocks:  [] WDL                or           [x] Skin issue present (please provide assessment/description below)  L side hip to buttock a discoloration to skin, jagged edges, flat, possible incontinence moisture damage- barrier cream applied. Intact and blanching.     Right Heel/Foot/Toes:  [] WDL                or           [x] Skin issue present (please provide  assessment/description below)  Pt has dryness, peeling skin on R foot around toes, some discoloration     Left Heel/Foot/Toes:    [x] WDL              or           [] Skin issue present (please provide assessment/description below)      **Describe any other skin issues in areas not already listed from above list:   [x] N/A    Interventions In Place: Pillows and Pressure Redistribution Mattress, incontinence checks, pt self turns and ambulates     **If any new or digressing skin issues are found, fill out the selection below.    Possible Skin Injury No  Pictures Uploaded Into Epic: N/A  Wound Consult Placed: N/A  RN Wound Prevention Protocol Ordered: No    What new preventative interventions did you add? N/A

## 2021-08-30 NOTE — CARE PLAN
The patient is Stable - Low risk of patient condition declining or worsening    Shift Goals  Clinical Goals: monitor GI output- decrease diarrhea episodes   Patient Goals: Rest  Family Goals: N/A    Progress made toward(s) clinical / shift goals:  Monitoring GI functions and BM frequency. Assessment of GI.     Patient is not progressing towards the following goals:

## 2021-08-30 NOTE — PROGRESS NOTES
"Pt remains orientated to self and place only. Pt reportedly had multiple bms throughout the night. Stool sample to r/o cdiff was collected by night nurse. Pt continues to endorse feeling to have more episodes of diarrhea. PRN imodium was given to pt per MAR. Pt also had episode of weakness last night where he had to be assisted onto floor. Pt having running nose this morning and reporting feeling \"off\". MD Erma Lainez notified of this information and placed COVID r/o order. COVID test was collected this AM by this RN. Pt remains able to ambulate with SBA and steady gait with no noted weakness at this time. Wandergaurd remains in place on left ankle. Fall education provided and fall precautions including bed alarm in place. Pt remains cooperative with staff. POC for today discussed with pt. Pt resting in bed with all needs met at this time. Call light within reach. Hourly rounding in place.        1 RN Skin Assessment completed.   Patient's risk of skin breakdown: Low     Devices in place and skin assessed under:   []? Pulse Ox  []? PIV []? Central Line []? SCDs []?Purewick  []? Olsen  []?Condom Cath   []? BMS        []?  Cortrak   []?  Oxymask   []? Bipap    []? Nasal Canula   []? N/A   [x]? Other(specify):  Wandergaurd on left ankle     Right Ear  [x]? WDL                or           []? Skin issue present (please provide assessment/description below)     Left Ear  [x]? WDL                or           []? Skin issue present (please provide assessment/description below)     Right Elbow:  [x]? WDL               or           []? Skin issue present (please provide assessment/description below)     Left Elbow:   [x]? WDL                or           []? Skin issue present (please provide assessment/description below)     Coccyx/Buttocks:  [x]? WDL                or           []? Skin issue present (please provide assessment/description below)     Right Heel/Foot/Toes:  []? WDL                or           [x]? Skin " issue present (please provide assessment/description below)  Right foot toe discoloration     Left Heel/Foot/Toes:   [x]? WDL              or           []? Skin issue present (please provide assessment/description below)        **Describe any other skin issues in areas not already listed from above list:   []? N/A  Long toenails  Interventions In Place: Waffle Overlay, Pillows, Pressure Redistribution Mattress, and Pt turns self and ambulates frequently

## 2021-08-31 PROCEDURE — 700102 HCHG RX REV CODE 250 W/ 637 OVERRIDE(OP): Performed by: HOSPITALIST

## 2021-08-31 PROCEDURE — A9270 NON-COVERED ITEM OR SERVICE: HCPCS | Performed by: NURSE PRACTITIONER

## 2021-08-31 PROCEDURE — 700102 HCHG RX REV CODE 250 W/ 637 OVERRIDE(OP): Performed by: NURSE PRACTITIONER

## 2021-08-31 PROCEDURE — A9270 NON-COVERED ITEM OR SERVICE: HCPCS | Performed by: FAMILY MEDICINE

## 2021-08-31 PROCEDURE — 770006 HCHG ROOM/CARE - MED/SURG/GYN SEMI*

## 2021-08-31 PROCEDURE — A9270 NON-COVERED ITEM OR SERVICE: HCPCS | Performed by: HOSPITALIST

## 2021-08-31 PROCEDURE — 99232 SBSQ HOSP IP/OBS MODERATE 35: CPT | Performed by: FAMILY MEDICINE

## 2021-08-31 PROCEDURE — 700102 HCHG RX REV CODE 250 W/ 637 OVERRIDE(OP): Performed by: FAMILY MEDICINE

## 2021-08-31 RX ORDER — SIMETHICONE 80 MG
80 TABLET,CHEWABLE ORAL 3 TIMES DAILY PRN
Status: DISCONTINUED | OUTPATIENT
Start: 2021-08-31 | End: 2021-10-07

## 2021-08-31 RX ADMIN — TERBINAFINE 250 MG: 250 TABLET ORAL at 05:47

## 2021-08-31 RX ADMIN — SIMETHICONE 80 MG: 80 TABLET, CHEWABLE ORAL at 18:30

## 2021-08-31 RX ADMIN — Medication 100 MG: at 08:41

## 2021-08-31 RX ADMIN — THERA TABS 1 TABLET: TAB at 08:41

## 2021-08-31 ASSESSMENT — ENCOUNTER SYMPTOMS
FOCAL WEAKNESS: 0
SENSORY CHANGE: 0
BACK PAIN: 0
CHILLS: 0
FLANK PAIN: 0
SHORTNESS OF BREATH: 0
NAUSEA: 0
FEVER: 0
SORE THROAT: 0
BLURRED VISION: 0
HEADACHES: 0
VOMITING: 0
DIZZINESS: 0
WHEEZING: 0
WEAKNESS: 1
NECK PAIN: 0
MYALGIAS: 0
NERVOUS/ANXIOUS: 1
DIAPHORESIS: 0
DIARRHEA: 0
ABDOMINAL PAIN: 0
COUGH: 0
HEARTBURN: 0
PALPITATIONS: 0
SPEECH CHANGE: 0

## 2021-08-31 NOTE — PROGRESS NOTES
Pt is disoriented to time only, and is occasionally confused but easy to re orientate. Pt denies any pain at this time. VSS on RA. Pt reports no diarrhea episodes as of recently and states he is feeling well. Pt is upself with steady gait, and is frequently up ambulating. Wandergaurd remains in place on left ankle. Fall education provided. Pt remains cooperative with staff. POC for today discussed with pt. Pt currently sitting up at table near nurses station talking to other pts. Hourly rounding in place.         1 RN Skin Assessment completed.   Patient's risk of skin breakdown: Low     Devices in place and skin assessed under:   []?? Pulse Ox  []?? PIV []?? Central Line []?? SCDs []??Purewick  []?? Olsen  []??Condom Cath   []?? BMS        []??  Cortrak   []??  Oxymask   []?? Bipap    []?? Nasal Canula   []?? N/A   [x]?? Other(specify):  Wandergaurd on left ankle     Right Ear  [x]?? WDL                or           []?? Skin issue present (please provide assessment/description below)     Left Ear  [x]?? WDL                or           []?? Skin issue present (please provide assessment/description below)     Right Elbow:  [x]?? WDL               or           []?? Skin issue present (please provide assessment/description below)     Left Elbow:   [x]?? WDL                or           []?? Skin issue present (please provide assessment/description below)     Coccyx/Buttocks:  [x]?? WDL                or           []?? Skin issue present (please provide assessment/description below)     Right Heel/Foot/Toes:  []?? WDL                or           [x]?? Skin issue present (please provide assessment/description below)  Right foot toe discoloration     Left Heel/Foot/Toes:   [x]?? WDL              or           []?? Skin issue present (please provide assessment/description below)        **Describe any other skin issues in areas not already listed from above list:   []?? N/A  Long toenails  Interventions In Place: Waffle  Overlay, Pillows, Pressure Redistribution Mattress, and Pt turns self and ambulates frequently

## 2021-08-31 NOTE — CARE PLAN
The patient is Stable - Low risk of patient condition declining or worsening    Shift Goals  Clinical Goals: oriented and comfortable in new room environment  Patient Goals: Rest  Family Goals: N/A    Progress made toward(s) clinical / shift goals:  pt resting, no signs of distress. Minimal disruption to sleep tonight     Patient is not progressing towards the following goals:

## 2021-08-31 NOTE — PROGRESS NOTES
[facility] Medicine Daily Progress Note    Date of Service  8/31/2021    Chief Complaint  Lane Beard is a 66 y.o. male admitted 6/4/2021 with altered mentation    Hospital Course  Mr. Beard is a 66-year-old male with no known PMHx who presented to the hospital on 6/4/2021 after he was found down on the sidewalk after multiple witnessed falls.  Patient was confused at the time of admission.  He had been seen earlier in the day for syncope while crossing the street however at that time left AGAINST MEDICAL ADVICE.  On admission he was tachycardic with a heart rate of 114, anion gap of 23 and a lactic acid of 2.6.  He had a CT scan of the head, which revealed no acute intracranial abnormalities.  Chest x-ray completed, which showed no acute cardiopulmonary process.  Patient was admitted for further evaluation of altered mental status and syncope.  Subsequent work up negative including MRI, and laboratory evaluation.neurology was consulted on the case as well as psychiatry.  SLP did a cognitive evaluation which showed severe deficits.    Interval Problem Update  Patient is somewhat anxious because of possible procedure for his toenails.  Otherwise denies having any pain, nausea or vomiting, headache or dizziness or lightheadedness.    I have personally seen and examined the patient at bedside. I discussed the plan of care with patient, bedside RN and .    Consultants/Specialty      Code Status  Full Code    Disposition  Patient is medically cleared.   Anticipate discharge to Group home with Guardianship.  I have placed the appropriate orders for post-discharge needs.    Review of Systems  Review of Systems   Constitutional: Negative for chills, diaphoresis, fever and malaise/fatigue.   HENT: Negative for congestion, hearing loss and sore throat.    Eyes: Negative for blurred vision.   Respiratory: Negative for cough, shortness of breath and wheezing.    Cardiovascular: Negative for chest pain,  palpitations and leg swelling.   Gastrointestinal: Negative for abdominal pain, diarrhea, heartburn, nausea and vomiting.   Genitourinary: Negative for dysuria, flank pain and hematuria.   Musculoskeletal: Negative for back pain, joint pain, myalgias and neck pain.   Skin: Negative for rash.   Neurological: Positive for weakness. Negative for dizziness, sensory change, speech change, focal weakness and headaches.   Psychiatric/Behavioral: The patient is nervous/anxious.         Physical Exam  Temp:  [36.2 °C (97.2 °F)-36.9 °C (98.5 °F)] 36.9 °C (98.5 °F)  Pulse:  [] 93  Resp:  [17-18] 17  BP: (111-130)/(69-87) 111/69  SpO2:  [97 %-99 %] 99 %    Physical Exam  Vitals and nursing note reviewed.   Constitutional:       Appearance: He is well-developed.   HENT:      Head: Normocephalic and atraumatic.      Nose: No congestion.      Mouth/Throat:      Mouth: Mucous membranes are moist.   Eyes:      Extraocular Movements: Extraocular movements intact.      Conjunctiva/sclera: Conjunctivae normal.   Neck:      Vascular: No JVD.   Cardiovascular:      Rate and Rhythm: Normal rate and regular rhythm.   Pulmonary:      Effort: Pulmonary effort is normal.      Breath sounds: Normal breath sounds.   Abdominal:      General: There is no distension.      Tenderness: There is no abdominal tenderness. There is no guarding or rebound.   Musculoskeletal:      Cervical back: No tenderness.      Right lower leg: No edema.      Left lower leg: No edema.   Skin:     Comments: Toenails with hyperkeratosis, thickening and crumbling   Neurological:      General: No focal deficit present.      Mental Status: He is alert.      Cranial Nerves: No cranial nerve deficit.      Gait: Gait normal.      Comments: Oriented to person and place   Psychiatric:         Mood and Affect: Mood is anxious.         Cognition and Memory: Memory is impaired.         Fluids    Intake/Output Summary (Last 24 hours) at 8/31/2021 2517  Last data filed at  8/31/2021 1000  Gross per 24 hour   Intake 490 ml   Output --   Net 490 ml       Laboratory                        Imaging  MR-BRAIN-WITH & W/O   Final Result         No acute intracranial process.      Nonspecific T2 hyperintensities in the deep white matter related to chronic microvascular ischemia.      ZH-QUPICHD-0 VIEW   Final Result      1.  No metallic foreign bodies detected.   2.  Right nephrolithiasis.      EC-ECHOCARDIOGRAM LTD W/ CONT   Final Result      CT-HEAD W/O   Final Result      1.  Cerebral atrophy and chronic microvascular ischemic type changes.   2.  No acute intracranial abnormality.      DX-CHEST-PORTABLE (1 VIEW)   Final Result      No acute cardiopulmonary abnormality.           Assessment/Plan  * Cognitive impairment- (present on admission)  Assessment & Plan  Likely underlying dementia  SLP Cognitive evaluation - severe-profound deficits across almost all cognitive domains tested. 6/11/2021    Syncope- (present on admission)  Assessment & Plan  Work-up negative      Diarrhea  Assessment & Plan  COVID-19 and C. difficile negative    Onychomycosis- (present on admission)  Assessment & Plan  Terbinafine x 12 weeks  repeat LFTs and CBC on 9/26/2021  LPS service consulted           Anemia- (present on admission)  Assessment & Plan  Follow cbc    Marijuana use- (present on admission)  Assessment & Plan  UDS positive    Hypokalemia- (present on admission)  Assessment & Plan  Follow bmp      VTE prophylaxis: SCDs    I have performed a physical exam and reviewed and updated ROS and Plan today (8/31/2021). In review of yesterday's note (8/30/2021), there are no changes except as documented above.

## 2021-08-31 NOTE — CARE PLAN
The patient is Stable - Low risk of patient condition declining or worsening    Shift Goals  Clinical Goals: Pt will understand current POC  Patient Goals: Rest  Family Goals: N/A    Progress made toward(s) clinical / shift goals:  Pt educated regarding plan of care and medications. All questions answered.     Patient is not progressing towards the following goals:

## 2021-08-31 NOTE — PROGRESS NOTES
Pt is A&Ox2 to person and place, pt easily reoriented. Pt is resting in bed, no signs of labored breathing or pain. Pt on RA. Pt is steady, independent up self. Pt is feeling much better than yesterday, no episodes of diarrhea. Wander gaurd on L ankle, no demonstration of intention to leave unit. Call light within reach, bed low and locked, and non skid socks on at this time. Pt updated on plan of care for the shift and oriented to new room. Pt declines any additional needs at this time.          1 RN Skin Assessment completed.   Patient's risk of skin breakdown: Low     Devices in place and skin assessed under:   []? Pulse Ox  []? PIV []? Central Line []? SCDs []?Purewick  []? Olsen  []?Condom Cath   []? BMS        []?  Cortrak   []?  Oxymask   []? Bipap    []? Nasal Canula   [x]? N/A   []? Other(specify):      Right Ear  [x]? WDL                or           []? Skin issue present (please provide assessment/description below)     Left Ear  [x]? WDL                or           []? Skin issue present (please provide assessment/description below)     Right Elbow:  [x]? WDL               or           []? Skin issue present (please provide assessment/description below)     Left Elbow:   [x]? WDL                or           []? Skin issue present (please provide assessment/description below)     Coccyx/Buttocks:  [x]? WDL                or           []? Skin issue present (please provide assessment/description below)       Right Heel/Foot/Toes:  []? WDL                or           [x]? Skin issue present (please provide assessment/description below)  Pt has dryness, peeling skin on R foot around toes, some discoloration      Left Heel/Foot/Toes:    [x]? WDL              or           []? Skin issue present (please provide assessment/description below)        **Describe any other skin issues in areas not already listed from above list:   [x]? N/A     Interventions In Place: Pillows and Pressure Redistribution Mattress,  incontinence checks, pt self turns and ambulates      **If any new or digressing skin issues are found, fill out the selection below.     Possible Skin Injury No  Pictures Uploaded Into Epic: N/A  Wound Consult Placed: N/A  RN Wound Prevention Protocol Ordered: No    What new preventative interventions did you add? N/A

## 2021-09-01 PROCEDURE — 700102 HCHG RX REV CODE 250 W/ 637 OVERRIDE(OP): Performed by: HOSPITALIST

## 2021-09-01 PROCEDURE — 700102 HCHG RX REV CODE 250 W/ 637 OVERRIDE(OP): Performed by: NURSE PRACTITIONER

## 2021-09-01 PROCEDURE — A9270 NON-COVERED ITEM OR SERVICE: HCPCS | Performed by: NURSE PRACTITIONER

## 2021-09-01 PROCEDURE — 770006 HCHG ROOM/CARE - MED/SURG/GYN SEMI*

## 2021-09-01 PROCEDURE — A9270 NON-COVERED ITEM OR SERVICE: HCPCS | Performed by: HOSPITALIST

## 2021-09-01 RX ADMIN — THERA TABS 1 TABLET: TAB at 07:58

## 2021-09-01 RX ADMIN — TERBINAFINE 250 MG: 250 TABLET ORAL at 05:59

## 2021-09-01 RX ADMIN — Medication 100 MG: at 07:58

## 2021-09-01 RX ADMIN — LOPERAMIDE HYDROCHLORIDE 2 MG: 2 CAPSULE ORAL at 08:32

## 2021-09-01 NOTE — PROGRESS NOTES
1 RN Skin Assessment completed.   Patient's risk of skin breakdown: Low    Devices in place and skin assessed under:   [] Pulse Ox  [] PIV [] Central Line [] SCDs []Purewick  [] Olsen  []Condom Cath   [] BMS        []  Cortrak   []  Oxymask   [] Bipap    [] Nasal Canula   [x] N/A   [] Other(specify):     Right Ear  [x] WDL                or           [] Skin issue present (please provide assessment/description below)    Left Ear  [x] WDL                or           [] Skin issue present (please provide assessment/description below)    Right Elbow:  [x] WDL               or           [] Skin issue present (please provide assessment/description below)    Left Elbow:   [x] WDL                or           [] Skin issue present (please provide assessment/description below)    Coccyx/Buttocks:  [x] WDL                or           [] Skin issue present (please provide assessment/description below)    Right Heel/Foot/Toes:  [] WDL                or           [x] Skin issue present (please provide assessment/description below)overgrown toenails- fungus    Left Heel/Foot/Toes:   [] WDL              or           [x] Skin issue present (please provide assessment/description below) overgrown toenails- fungus      **Describe any other skin issues in areas not already listed from above list:   [] N/A    Interventions In Place: Waffle Overlay, Pillows and Pressure Redistribution Mattress

## 2021-09-01 NOTE — PROGRESS NOTES
Bedside report received at change of shift. Pt is A&Ox2, disoriented to time and situation. Pt had an incontinence episode of diarrhea, given imodium per MAR. Reports no pain. Safety precautions in place. All pt needs met at this time.

## 2021-09-01 NOTE — CARE PLAN
The patient is Stable - Low risk of patient condition declining or worsening    Shift Goals  Clinical Goals: Pt's diarrhea will be reduced  Patient Goals: Rest  Family Goals: N/A    Progress made toward(s) clinical / shift goals:  Pt has imodium PRN for diarrhea.     Patient is not progressing towards the following goals:

## 2021-09-01 NOTE — PROGRESS NOTES
Pt is A&Ox3, disoriented to time. Pt is resting in bed, no signs of labored breathing or pain. Pt on RA. Pt is steady, independent up self. Wander gaurd on L ankle, no demonstration of intention to leave unit. Call light within reach, bed low and locked, and non skid socks on at this time. Pt updated on plan of care for the shift. Pt declines any additional needs at this time.          1 RN Skin Assessment completed.   Patient's risk of skin breakdown: Low     Devices in place and skin assessed under:   []?? Pulse Ox  []?? PIV []?? Central Line []?? SCDs []??Purewick  []?? Olsen  []??Condom Cath   []?? BMS        []??  Cortrak   []??  Oxymask   []?? Bipap    []?? Nasal Canula   [x]?? N/A   []?? Other(specify):      Right Ear  [x]?? WDL                or           []?? Skin issue present (please provide assessment/description below)     Left Ear  [x]?? WDL                or           []?? Skin issue present (please provide assessment/description below)     Right Elbow:  [x]?? WDL               or           []?? Skin issue present (please provide assessment/description below)     Left Elbow:   [x]?? WDL                or           []?? Skin issue present (please provide assessment/description below)     Coccyx/Buttocks:  [x]?? WDL                or           []?? Skin issue present (please provide assessment/description below)        Right Heel/Foot/Toes:  []?? WDL                or           [x]?? Skin issue present (please provide assessment/description below)  Pt has dryness, peeling skin on R foot around toes, some discoloration      Left Heel/Foot/Toes:    [x]?? WDL              or           []?? Skin issue present (please provide assessment/description below)        **Describe any other skin issues in areas not already listed from above list:   []?? N/A  Toenails-long, fragile     Interventions In Place: Pillows and Pressure Redistribution Mattress, pt self turns and ambulates      **If any new or digressing  skin issues are found, fill out the selection below.     Possible Skin Injury No  Pictures Uploaded Into Epic: N/A  Wound Consult Placed: N/A  RN Wound Prevention Protocol Ordered: No    What new preventative interventions did you add? N/A

## 2021-09-02 PROCEDURE — 700102 HCHG RX REV CODE 250 W/ 637 OVERRIDE(OP): Performed by: HOSPITALIST

## 2021-09-02 PROCEDURE — A9270 NON-COVERED ITEM OR SERVICE: HCPCS | Performed by: NURSE PRACTITIONER

## 2021-09-02 PROCEDURE — 770006 HCHG ROOM/CARE - MED/SURG/GYN SEMI*

## 2021-09-02 PROCEDURE — 700102 HCHG RX REV CODE 250 W/ 637 OVERRIDE(OP): Performed by: NURSE PRACTITIONER

## 2021-09-02 PROCEDURE — A9270 NON-COVERED ITEM OR SERVICE: HCPCS | Performed by: HOSPITALIST

## 2021-09-02 RX ADMIN — Medication 100 MG: at 07:37

## 2021-09-02 RX ADMIN — TERBINAFINE 250 MG: 250 TABLET ORAL at 05:32

## 2021-09-02 RX ADMIN — THERA TABS 1 TABLET: TAB at 07:37

## 2021-09-02 NOTE — CARE PLAN
The patient is Stable - Low risk of patient condition declining or worsening    Shift Goals  Clinical Goals: Pt will not have diarrhea this shift   Patient Goals: Rest  Family Goals: N/A    Progress made toward(s) clinical / shift goals:  Pt has not had diarrhea this shift. Has prn medication if needed    Patient is not progressing towards the following goals:

## 2021-09-02 NOTE — DISCHARGE PLANNING
Anticipated Discharge Disposition: Group Home    Action: Patient is disorientated to time only, periods of confusion but easy to redirect.  Patient is cooperative with staff, no acting out issues documented since 7/25/21.  Patient is ambulatory, one person assist, takes all medications     Barriers to Discharge: guardianship/medicaid, Level of Care, 1.      Plan: continue to monitor for discharge plan.    Guardianship hearing scheduled for 10/15/21 at 9:15

## 2021-09-02 NOTE — PROGRESS NOTES
Pt is A&Ox3, disoriented to time. Pt is resting in bed, no signs of labored breathing or pain. Pt on RA. Pt is steady, independent up self. Wander gaurd on L ankle, no demonstration of intention to leave unit. Call light within reach, bed low and locked, and non skid socks on at this time. Pt updated on plan of care for the shift. Pt declines any additional needs at this time.           1 RN Skin Assessment completed.   Patient's risk of skin breakdown: Low     Devices in place and skin assessed under:   []??? Pulse Ox  []??? PIV []??? Central Line []??? SCDs []???Purewick  []??? Olsen  []???Condom Cath   []??? BMS        []???  Cortrak   []???  Oxymask   []??? Bipap    []??? Nasal Canula   [x]??? N/A   []??? Other(specify):      Right Ear  [x]??? WDL                or           []??? Skin issue present (please provide assessment/description below)     Left Ear  [x]??? WDL                or           []??? Skin issue present (please provide assessment/description below)     Right Elbow:  [x]??? WDL               or           []??? Skin issue present (please provide assessment/description below)     Left Elbow:   [x]??? WDL                or           []??? Skin issue present (please provide assessment/description below)     Coccyx/Buttocks:  [x]??? WDL                or           []??? Skin issue present (please provide assessment/description below)        Right Heel/Foot/Toes:  []??? WDL                or           [x]??? Skin issue present (please provide assessment/description below)  Pt has dryness, peeling skin on R foot around toes, some discoloration      Left Heel/Foot/Toes:    [x]??? WDL              or           []??? Skin issue present (please provide assessment/description below)        **Describe any other skin issues in areas not already listed from above list:   []??? N/A  Toenails-long, to be cut tomorrow      Interventions In Place: Pillows and Pressure Redistribution Mattress, pt self turns and  ambulates      **If any new or digressing skin issues are found, fill out the selection below.     Possible Skin Injury No  Pictures Uploaded Into Epic: N/A  Wound Consult Placed: N/A  RN Wound Prevention Protocol Ordered: No    What new preventative interventions did you add? N/A

## 2021-09-02 NOTE — PROGRESS NOTES
1 RN Skin Assessment completed.   Patient's risk of skin breakdown: Low     Devices in place and skin assessed under:   []? Pulse Ox  []? PIV []? Central Line []? SCDs []?Purewick  []? Olsen  []?Condom Cath   []? BMS        []?  Cortrak   []?  Oxymask   []? Bipap    []? Nasal Canula   [x]? N/A   []? Other(specify):      Right Ear  [x]? WDL                or           []? Skin issue present (please provide assessment/description below)     Left Ear  [x]? WDL                or           []? Skin issue present (please provide assessment/description below)     Right Elbow:  [x]? WDL               or           []? Skin issue present (please provide assessment/description below)     Left Elbow:   [x]? WDL                or           []? Skin issue present (please provide assessment/description below)     Coccyx/Buttocks:  [x]? WDL                or           []? Skin issue present (please provide assessment/description below)     Right Heel/Foot/Toes:  []? WDL                or           [x]? Skin issue present (please provide assessment/description below) overgrown toenails- fungus     Left Heel/Foot/Toes:   []? WDL              or           [x]? Skin issue present (please provide assessment/description below) overgrown toenails- fungus        **Describe any other skin issues in areas not already listed from above list:   []? N/A     Interventions In Place: Waffle Overlay, Pillows and Pressure Redistribution Mattress

## 2021-09-02 NOTE — PROGRESS NOTES
Bedside report received at change of shift. Pt is A&Ox2, disoriented to time and situation. Pt ambulated around the unit with steady gait. Safety precautions in place. All pt needs met at this time.

## 2021-09-03 LAB
ALBUMIN SERPL BCP-MCNC: 4.2 G/DL (ref 3.2–4.9)
ALBUMIN/GLOB SERPL: 1.6 G/DL
ALP SERPL-CCNC: 93 U/L (ref 30–99)
ALT SERPL-CCNC: 70 U/L (ref 2–50)
ANION GAP SERPL CALC-SCNC: 11 MMOL/L (ref 7–16)
AST SERPL-CCNC: 33 U/L (ref 12–45)
BASOPHILS # BLD AUTO: 0.6 % (ref 0–1.8)
BASOPHILS # BLD: 0.04 K/UL (ref 0–0.12)
BILIRUB SERPL-MCNC: 0.4 MG/DL (ref 0.1–1.5)
BUN SERPL-MCNC: 20 MG/DL (ref 8–22)
CALCIUM SERPL-MCNC: 9.7 MG/DL (ref 8.5–10.5)
CHLORIDE SERPL-SCNC: 107 MMOL/L (ref 96–112)
CO2 SERPL-SCNC: 22 MMOL/L (ref 20–33)
CREAT SERPL-MCNC: 1.04 MG/DL (ref 0.5–1.4)
EOSINOPHIL # BLD AUTO: 0.09 K/UL (ref 0–0.51)
EOSINOPHIL NFR BLD: 1.3 % (ref 0–6.9)
ERYTHROCYTE [DISTWIDTH] IN BLOOD BY AUTOMATED COUNT: 52.5 FL (ref 35.9–50)
GLOBULIN SER CALC-MCNC: 2.6 G/DL (ref 1.9–3.5)
GLUCOSE SERPL-MCNC: 68 MG/DL (ref 65–99)
HCT VFR BLD AUTO: 41.4 % (ref 42–52)
HGB BLD-MCNC: 13.1 G/DL (ref 14–18)
IMM GRANULOCYTES # BLD AUTO: 0.05 K/UL (ref 0–0.11)
IMM GRANULOCYTES NFR BLD AUTO: 0.7 % (ref 0–0.9)
LYMPHOCYTES # BLD AUTO: 1.3 K/UL (ref 1–4.8)
LYMPHOCYTES NFR BLD: 18.4 % (ref 22–41)
MCH RBC QN AUTO: 33.1 PG (ref 27–33)
MCHC RBC AUTO-ENTMCNC: 31.6 G/DL (ref 33.7–35.3)
MCV RBC AUTO: 104.5 FL (ref 81.4–97.8)
MONOCYTES # BLD AUTO: 0.45 K/UL (ref 0–0.85)
MONOCYTES NFR BLD AUTO: 6.4 % (ref 0–13.4)
NEUTROPHILS # BLD AUTO: 5.15 K/UL (ref 1.82–7.42)
NEUTROPHILS NFR BLD: 72.6 % (ref 44–72)
NRBC # BLD AUTO: 0 K/UL
NRBC BLD-RTO: 0 /100 WBC
PLATELET # BLD AUTO: 223 K/UL (ref 164–446)
PMV BLD AUTO: 8.5 FL (ref 9–12.9)
POTASSIUM SERPL-SCNC: 4.5 MMOL/L (ref 3.6–5.5)
PROT SERPL-MCNC: 6.8 G/DL (ref 6–8.2)
RBC # BLD AUTO: 3.96 M/UL (ref 4.7–6.1)
SODIUM SERPL-SCNC: 140 MMOL/L (ref 135–145)
WBC # BLD AUTO: 7.1 K/UL (ref 4.8–10.8)

## 2021-09-03 PROCEDURE — 700102 HCHG RX REV CODE 250 W/ 637 OVERRIDE(OP): Performed by: HOSPITALIST

## 2021-09-03 PROCEDURE — A9270 NON-COVERED ITEM OR SERVICE: HCPCS | Performed by: NURSE PRACTITIONER

## 2021-09-03 PROCEDURE — 80053 COMPREHEN METABOLIC PANEL: CPT

## 2021-09-03 PROCEDURE — 36415 COLL VENOUS BLD VENIPUNCTURE: CPT

## 2021-09-03 PROCEDURE — A9270 NON-COVERED ITEM OR SERVICE: HCPCS | Performed by: HOSPITALIST

## 2021-09-03 PROCEDURE — 85025 COMPLETE CBC W/AUTO DIFF WBC: CPT

## 2021-09-03 PROCEDURE — 770006 HCHG ROOM/CARE - MED/SURG/GYN SEMI*

## 2021-09-03 PROCEDURE — 700102 HCHG RX REV CODE 250 W/ 637 OVERRIDE(OP): Performed by: NURSE PRACTITIONER

## 2021-09-03 RX ADMIN — THERA TABS 1 TABLET: TAB at 07:09

## 2021-09-03 RX ADMIN — TERBINAFINE 250 MG: 250 TABLET ORAL at 07:09

## 2021-09-03 RX ADMIN — Medication 100 MG: at 07:09

## 2021-09-03 ASSESSMENT — PAIN DESCRIPTION - PAIN TYPE: TYPE: ACUTE PAIN

## 2021-09-03 NOTE — DISCHARGE PLANNING
Medical Social Work  SW provided court letter stating patient has been assigned an  for his guardianship hearing.   SW asked patient if he remembers SW talking to him about guardianship and the reasons why.  Patient stated he doesn't remember talking to SW let alone every seeing him before.  Patient told SW that he does not believe that a guardian will work.  SW asked patient why, patient stated that he was asked for certain things which he can not provide.  Patient would not say what things, only that he can not discuss certain things due to his security level.

## 2021-09-03 NOTE — PROGRESS NOTES
"Bedside report received at change of shift. Pt is A&Ox2, disoriented to time and situation. When asked what year it is he stated, \"I don't know, it doesn't make any difference to me.\" Is currently sitting a the nurse's station drinking coffee. Non-slip socks in place. All pt needs met at this time.    "

## 2021-09-03 NOTE — PROGRESS NOTES
1 RN Skin Assessment completed.   Patient's risk of skin breakdown: Low     Devices in place and skin assessed under:   []?? Pulse Ox  []?? PIV []?? Central Line []?? SCDs []??Purewick  []?? Olsen  []??Condom Cath   []?? BMS        []??  Cortrak   []??  Oxymask   []?? Bipap    []?? Nasal Canula   [x]?? N/A   []?? Other(specify):      Right Ear  [x]?? WDL                or           []?? Skin issue present (please provide assessment/description below)     Left Ear  [x]?? WDL                or           []?? Skin issue present (please provide assessment/description below)     Right Elbow:  [x]?? WDL               or           []?? Skin issue present (please provide assessment/description below)     Left Elbow:   [x]?? WDL                or           []?? Skin issue present (please provide assessment/description below)     Coccyx/Buttocks:  [x]?? WDL                or           []?? Skin issue present (please provide assessment/description below)     Right Heel/Foot/Toes:  []?? WDL                or           [x]?? Skin issue present (please provide assessment/description below) overgrown toenails- fungus     Left Heel/Foot/Toes:   []?? WDL              or           [x]?? Skin issue present (please provide assessment/description below) overgrown toenails- fungus        **Describe any other skin issues in areas not already listed from above list:   []?? N/A     Interventions In Place: Waffle Overlay, Pillows and Pressure Redistribution Mattress

## 2021-09-03 NOTE — CARE PLAN
The patient is Stable - Low risk of patient condition declining or worsening    Shift Goals  Clinical Goals: Pt will socialize with other pts  Patient Goals: Rest  Family Goals: N/A    Progress made toward(s) clinical / shift goals:  Pt sat at nurse's station and socialized with other patients this shift.     Patient is not progressing towards the following goals:

## 2021-09-03 NOTE — PROGRESS NOTES
Received report from HEAVEN Suazo and assumed care of pt. Pt A&OX2, disoriented to time and situation. Pt on RA.  Denies any pain or discomfort at this time.  Pt lying in bed with no signs of distress. POC discussed. Denies further needs at this time. Bed locked and in lowest position. Bed alarm on, call light within reach & hourly rounding in place  1 RN Skin Assessment completed.   Patient's risk of skin breakdown: Low     Devices in place and skin assessed under:   []?? Pulse Ox  []?? PIV []?? Central Line []?? SCDs []??Purewick  []?? Olsen  []??Condom Cath   []?? BMS        []??  Cortrak   []??  Oxymask   []?? Bipap    []?? Nasal Canula   [x]?? N/A   [x]?? Other(specify): wanderguard L ankle    Right Ear  [x]?? WDL                or           []?? Skin issue present (please provide assessment/description below)     Left Ear  [x]?? WDL                or           []?? Skin issue present (please provide assessment/description below)     Right Elbow:  [x]?? WDL               or           []?? Skin issue present (please provide assessment/description below)     Left Elbow:   [x]?? WDL                or           []?? Skin issue present (please provide assessment/description below)     Coccyx/Buttocks:  [x]?? WDL                or           []?? Skin issue present (please provide assessment/description below)     Right Heel/Foot/Toes:  []?? WDL                or           [x]?? Skin issue present (please provide assessment/description below) overgrown toenails- fungus     Left Heel/Foot/Toes:   []?? WDL              or           [x]?? Skin issue present (please provide assessment/description below) overgrown toenails- fungus        **Describe any other skin issues in areas not already listed from above list:   []?? N/A     Interventions In Place: Waffle Overlay, Pillows and Pressure Redistribution Mattress

## 2021-09-04 PROCEDURE — 700102 HCHG RX REV CODE 250 W/ 637 OVERRIDE(OP): Performed by: NURSE PRACTITIONER

## 2021-09-04 PROCEDURE — 770006 HCHG ROOM/CARE - MED/SURG/GYN SEMI*

## 2021-09-04 PROCEDURE — 99232 SBSQ HOSP IP/OBS MODERATE 35: CPT | Performed by: FAMILY MEDICINE

## 2021-09-04 PROCEDURE — 700102 HCHG RX REV CODE 250 W/ 637 OVERRIDE(OP): Performed by: HOSPITALIST

## 2021-09-04 PROCEDURE — A9270 NON-COVERED ITEM OR SERVICE: HCPCS | Performed by: HOSPITALIST

## 2021-09-04 PROCEDURE — A9270 NON-COVERED ITEM OR SERVICE: HCPCS | Performed by: NURSE PRACTITIONER

## 2021-09-04 RX ADMIN — TERBINAFINE 250 MG: 250 TABLET ORAL at 08:24

## 2021-09-04 RX ADMIN — Medication 100 MG: at 08:24

## 2021-09-04 RX ADMIN — THERA TABS 1 TABLET: TAB at 08:24

## 2021-09-04 ASSESSMENT — ENCOUNTER SYMPTOMS
HEARTBURN: 0
NERVOUS/ANXIOUS: 0
MYALGIAS: 0
SPEECH CHANGE: 0
SENSORY CHANGE: 0
FEVER: 0
VOMITING: 0
WHEEZING: 0
BACK PAIN: 0
WEAKNESS: 1
CHILLS: 0
FLANK PAIN: 0
DIZZINESS: 0
NAUSEA: 0
PALPITATIONS: 0
SHORTNESS OF BREATH: 0
COUGH: 0
DIAPHORESIS: 0
NECK PAIN: 0
BLURRED VISION: 0
SORE THROAT: 0
FOCAL WEAKNESS: 0
ABDOMINAL PAIN: 0
DIARRHEA: 0
HEADACHES: 0

## 2021-09-04 ASSESSMENT — FIBROSIS 4 INDEX: FIB4 SCORE: 1.2

## 2021-09-04 ASSESSMENT — PAIN DESCRIPTION - PAIN TYPE: TYPE: ACUTE PAIN

## 2021-09-04 NOTE — PROGRESS NOTES
Pt requested assistance with reading court letter. Read letter to pt, and that it was regardinging appointed legal services. Pt stated he would prefer the letters would be in larger print as he doesn't have reading glasses. Denied pain. Ambulates self and independently. NUNU to TERRY ankle.

## 2021-09-04 NOTE — PROGRESS NOTES
"Received bedside report from night shift RN.   Assumed care of patient at change of shift.   Assessment complete and POC discussed.   A&Ox3 (disoriented to time \"2120\")  Patient knows he is in a hospital in [place] and   knows he was brought to the hospital due to a fall  Vitals are stable, on RA.   Patient denies pain, no apparent signs of distress or discomfort.   Patient is sitting at nurses station.  Bed is in lowest/locked position.   Call light and belongings are within reach.   No further needs at this time.      1 RN Skin Assessment completed.   Patient's risk of skin breakdown: Low    Devices in place and skin assessed under:   [] Pulse Ox  [] PIV [] Central Line [] SCDs []Purewick  [] Olsen  []Condom Cath   [] BMS        []  Cortrak   []  Oxymask   [] Bipap    [] Nasal Canula   [x] N/A   [] Other(specify):     Right Ear  [x] WDL                or           [] Skin issue present (please provide assessment/description below)    Left Ear  [x] WDL                or           [] Skin issue present (please provide assessment/description below)    Right Elbow:  [x] WDL               or           [] Skin issue present (please provide assessment/description below)    Left Elbow:   [x] WDL                or           [] Skin issue present (please provide assessment/description below)    Coccyx/Buttocks:  [x] WDL                or           [] Skin issue present (please provide assessment/description below)    Right Heel/Foot/Toes:  [] WDL                or           [x] Skin issue present (please provide assessment/description below)  Dry, flaky feet    Left Heel/Foot/Toes:   [] WDL              or           [x] Skin issue present (please provide assessment/description below)  Dry, flaky feet    **Describe any other skin issues in areas not already listed from above list:   [x] N/A    Interventions In Place: Waffle Overlay, Pillows and Pressure Redistribution Mattress    "

## 2021-09-04 NOTE — PROGRESS NOTES
[facility] Daily Progress Note    Date of Service  9/4/2021    Chief Complaint  Lane Beard is a 66 y.o. male admitted 6/4/2021 with altered mentation    Hospital Course  Mr. Beard is a 66-year-old male with no known PMHx who presented to the hospital on 6/4/2021 after he was found down on the sidewalk after multiple witnessed falls.  Patient was confused at the time of admission.  He had been seen earlier in the day for syncope while crossing the street however at that time left AGAINST MEDICAL ADVICE.  On admission he was tachycardic with a heart rate of 114, anion gap of 23 and a lactic acid of 2.6.  He had a CT scan of the head, which revealed no acute intracranial abnormalities.  Chest x-ray completed, which showed no acute cardiopulmonary process.  Patient was admitted for further evaluation of altered mental status and syncope.  Subsequent work up negative including MRI, and laboratory evaluation.neurology was consulted on the case as well as psychiatry.  SLP did a cognitive evaluation which showed severe deficits.    Interval Problem Update  Still somewhat anxious for possible toenail procedure with LPS.  His appetite is good.  He denies any nausea or vomiting or diarrhea.    I have personally seen and examined the patient at bedside. I discussed the plan of care with patient and bedside RN.    Consultants/Specialty  Neurology  Psychiatry    Code Status  Full Code    Disposition  Patient is medically cleared.   Anticipate discharge to [facility]ianship.  I have placed the appropriate orders for post-discharge needs.    Review of Systems  Review of Systems   Constitutional: Negative for chills, diaphoresis, fever and malaise/fatigue.   HENT: Negative for congestion, hearing loss and sore throat.    Eyes: Negative for blurred vision.   Respiratory: Negative for cough, shortness of breath and wheezing.    Cardiovascular: Negative for chest pain, palpitations and leg swelling.   Gastrointestinal:  Negative for abdominal pain, diarrhea, heartburn, nausea and vomiting.   Genitourinary: Negative for dysuria, flank pain and hematuria.   Musculoskeletal: Negative for back pain, joint pain, myalgias and neck pain.   Skin: Negative for rash.   Neurological: Positive for weakness. Negative for dizziness, sensory change, speech change, focal weakness and headaches.   Psychiatric/Behavioral: The patient is not nervous/anxious.         Physical Exam  Temp:  [35.9 °C (96.7 °F)-36.8 °C (98.3 °F)] 36.8 °C (98.3 °F)  Pulse:  [71-85] 71  Resp:  [17-18] 17  BP: (110-124)/(61-77) 110/71  SpO2:  [99 %] 99 %    Physical Exam  Vitals and nursing note reviewed.   Constitutional:       Appearance: He is well-developed.   HENT:      Head: Normocephalic and atraumatic.      Nose: No congestion.      Mouth/Throat:      Mouth: Mucous membranes are moist.   Eyes:      Extraocular Movements: Extraocular movements intact.      Conjunctiva/sclera: Conjunctivae normal.   Neck:      Vascular: No JVD.   Cardiovascular:      Rate and Rhythm: Normal rate and regular rhythm.   Pulmonary:      Effort: Pulmonary effort is normal.      Breath sounds: Normal breath sounds.   Abdominal:      General: There is no distension.      Tenderness: There is no abdominal tenderness. There is no guarding or rebound.   Musculoskeletal:      Cervical back: No tenderness.      Right lower leg: No edema.      Left lower leg: No edema.   Skin:     Comments: Toenails with hyperkeratosis, thickening and crumbling   Neurological:      General: No focal deficit present.      Mental Status: He is alert.      Cranial Nerves: No cranial nerve deficit.      Gait: Gait normal.      Comments: Oriented to person and place   Psychiatric:         Mood and Affect: Mood is anxious.         Cognition and Memory: Cognition is impaired. Memory is impaired.         Fluids    Intake/Output Summary (Last 24 hours) at 9/4/2021 1009  Last data filed at 9/3/2021 1400  Gross per 24 hour    Intake 358 ml   Output --   Net 358 ml       Laboratory  Recent Labs     09/03/21  1016   WBC 7.1   RBC 3.96*   HEMOGLOBIN 13.1*   HEMATOCRIT 41.4*   .5*   MCH 33.1*   MCHC 31.6*   RDW 52.5*   PLATELETCT 223   MPV 8.5*     Recent Labs     09/03/21  1016   SODIUM 140   POTASSIUM 4.5   CHLORIDE 107   CO2 22   GLUCOSE 68   BUN 20   CREATININE 1.04   CALCIUM 9.7                   Imaging  MR-BRAIN-WITH & W/O   Final Result         No acute intracranial process.      Nonspecific T2 hyperintensities in the deep white matter related to chronic microvascular ischemia.      HU-OAJTAOF-3 VIEW   Final Result      1.  No metallic foreign bodies detected.   2.  Right nephrolithiasis.      EC-ECHOCARDIOGRAM LTD W/ CONT   Final Result      CT-HEAD W/O   Final Result      1.  Cerebral atrophy and chronic microvascular ischemic type changes.   2.  No acute intracranial abnormality.      DX-CHEST-PORTABLE (1 VIEW)   Final Result      No acute cardiopulmonary abnormality.           Assessment/Plan  * Cognitive impairment- (present on admission)  Assessment & Plan  Likely underlying dementia  SLP Cognitive evaluation - severe-profound deficits across almost all cognitive domains tested. 6/11/2021    Case management for discharge planning - for [facility]hip    Syncope- (present on admission)  Assessment & Plan  Work-up negative      Diarrhea  Assessment & Plan  COVID-19 and C. difficile negative  Imodium as needed    Onychomycosis- (present on admission)  Assessment & Plan  Terbinafine x 12 weeks  repeat LFTs and CBC on 9/26/2021  LPS consulted           Anemia- (present on admission)  Assessment & Plan  Follow cbc    Marijuana use- (present on admission)  Assessment & Plan  UDS positive    Hypokalemia- (present on admission)  Assessment & Plan  Follow bmp      VTE prophylaxis: SCDs    I have performed a physical exam and reviewed and updated ROS and Plan today (9/4/2021). In review of yesterday's note (9/3/2021), there are no  changes except as documented above.

## 2021-09-05 PROCEDURE — 700102 HCHG RX REV CODE 250 W/ 637 OVERRIDE(OP): Performed by: NURSE PRACTITIONER

## 2021-09-05 PROCEDURE — A9270 NON-COVERED ITEM OR SERVICE: HCPCS | Performed by: NURSE PRACTITIONER

## 2021-09-05 PROCEDURE — 770006 HCHG ROOM/CARE - MED/SURG/GYN SEMI*

## 2021-09-05 PROCEDURE — 700102 HCHG RX REV CODE 250 W/ 637 OVERRIDE(OP): Performed by: HOSPITALIST

## 2021-09-05 PROCEDURE — A9270 NON-COVERED ITEM OR SERVICE: HCPCS | Performed by: HOSPITALIST

## 2021-09-05 RX ADMIN — TERBINAFINE 250 MG: 250 TABLET ORAL at 09:18

## 2021-09-05 RX ADMIN — THERA TABS 1 TABLET: TAB at 09:18

## 2021-09-05 RX ADMIN — Medication 100 MG: at 09:18

## 2021-09-05 NOTE — CARE PLAN
Problem: Skin Integrity  Goal: Skin integrity is maintained or improved  Outcome: Progressing     Problem: Fall Risk  Goal: Patient will remain free from falls  Outcome: Progressing     Problem: Knowledge Deficit - Standard  Goal: Patient and family/care givers will demonstrate understanding of plan of care, disease process/condition, diagnostic tests and medications  Outcome: Progressing     Problem: Communication  Goal: The ability to communicate needs accurately and effectively will improve  Outcome: Progressing     Problem: Nutrition  Goal: Patient's nutritional and fluid intake will be adequate or improve  Outcome: Progressing     Problem: Pain - Standard  Goal: Alleviation of pain or a reduction in pain to the patient’s comfort goal  Outcome: Progressing       The patient is Stable - Low risk of patient condition declining or worsening    Shift Goals  Clinical Goals: Safety  Patient Goals: N/A  Family Goals: N/A    Progress made toward(s) clinical / shift goals:  Patient is upself with steady gait. Patient has remained free of injury and falls this shift.    Patient is not progressing towards the following goals:

## 2021-09-05 NOTE — PROGRESS NOTES
Received bedside report from night shift RN.   Assumed care of patient at change of shift.   Assessment complete and POC discussed.   A&Ox3 (disoriented to time)  Patient knows he is in the hospital due to a fall.  Vitals are stable, on RA.   Patient denies pain, no apparent signs of distress or discomfort.   Patient is sitting at nurses station.  Bed is in lowest/locked position.   Call light and belongings are within reach.   No further needs at this time.       1 RN Skin Assessment completed.   Patient's risk of skin breakdown: Low     Devices in place and skin assessed under:   []? Pulse Ox  []? PIV []? Central Line []? SCDs []?Purewick  []? Olsen  []?Condom Cath   []? BMS        []?  Cortrak   []?  Oxymask   []? Bipap    []? Nasal Canula   [x]? N/A   []? Other(specify):      Right Ear  [x]? WDL                or           []? Skin issue present (please provide assessment/description below)     Left Ear  [x]? WDL                or           []? Skin issue present (please provide assessment/description below)     Right Elbow:  [x]? WDL               or           []? Skin issue present (please provide assessment/description below)     Left Elbow:   [x]? WDL                or           []? Skin issue present (please provide assessment/description below)     Coccyx/Buttocks:  [x]? WDL                or           []? Skin issue present (please provide assessment/description below)     Right Heel/Foot/Toes:  []? WDL                or           [x]? Skin issue present (please provide assessment/description below)  Dry, flaky feet     Left Heel/Foot/Toes:   []? WDL              or           [x]? Skin issue present (please provide assessment/description below)  Dry, flaky feet     **Describe any other skin issues in areas not already listed from above list:   [x]? N/A     Interventions In Place: Waffle Overlay, Pillows and Pressure Redistribution Mattress

## 2021-09-05 NOTE — CARE PLAN
Problem: Skin Integrity  Goal: Skin integrity is maintained or improved  Outcome: Progressing     Problem: Fall Risk  Goal: Patient will remain free from falls  Outcome: Progressing     Problem: Knowledge Deficit - Standard  Goal: Patient and family/care givers will demonstrate understanding of plan of care, disease process/condition, diagnostic tests and medications  Outcome: Progressing     Problem: Communication  Goal: The ability to communicate needs accurately and effectively will improve  Outcome: Progressing       The patient is Stable - Low risk of patient condition declining or worsening    Shift Goals  Clinical Goals: Patient will ambulate at least 3x this shift.  Patient Goals: N/A  Family Goals: N/A    Progress made toward(s) clinical / shift goals:  Patient ambulated around unit multiple times this shift.

## 2021-09-05 NOTE — PROGRESS NOTES
Pt is resting in bed. Denied pain. A&Ox3, disoriented to time. Denied further needs, able to make needs known.      1 RN Skin Assessment completed.   Patient's risk of skin breakdown: Low    Devices in place and skin assessed under:   [] Pulse Ox  [] PIV [] Central Line [] SCDs []Purewick  [] Olsen  []Condom Cath   [] BMS        []  Cortrak   []  Oxymask   [] Bipap    [] Nasal Canula   [] N/A   [x] Other(specify): WG  Right Ear  [x] WDL                or           [] Skin issue present (please provide assessment/description below)    Left Ear  [x] WDL                or           [] Skin issue present (please provide assessment/description below)    Right Elbow:  [x] WDL               or           [] Skin issue present (please provide assessment/description below)    Left Elbow:   [x] WDL                or           [] Skin issue present (please provide assessment/description below)    Coccyx/Buttocks:  [x] WDL                or           [] Skin issue present (please provide assessment/description below)    Right Heel/Foot/Toes:  [] WDL                or           [x] Skin issue present (please provide assessment/description below)  Dryness    Left Heel/Foot/Toes:   [] WDL              or           [x] Skin issue present (please provide assessment/description below)  Dryness      **Describe any other skin issues in areas not already listed from above list:   [x] N/A    Interventions In Place: Pillows, waffle, encouraged pt to turn frequently

## 2021-09-06 PROCEDURE — 700102 HCHG RX REV CODE 250 W/ 637 OVERRIDE(OP): Performed by: HOSPITALIST

## 2021-09-06 PROCEDURE — A9270 NON-COVERED ITEM OR SERVICE: HCPCS | Performed by: NURSE PRACTITIONER

## 2021-09-06 PROCEDURE — 700102 HCHG RX REV CODE 250 W/ 637 OVERRIDE(OP): Performed by: NURSE PRACTITIONER

## 2021-09-06 PROCEDURE — A9270 NON-COVERED ITEM OR SERVICE: HCPCS | Performed by: HOSPITALIST

## 2021-09-06 PROCEDURE — 770006 HCHG ROOM/CARE - MED/SURG/GYN SEMI*

## 2021-09-06 RX ADMIN — TERBINAFINE 250 MG: 250 TABLET ORAL at 08:21

## 2021-09-06 RX ADMIN — Medication 100 MG: at 08:21

## 2021-09-06 RX ADMIN — THERA TABS 1 TABLET: TAB at 08:21

## 2021-09-06 NOTE — CARE PLAN
Problem: Skin Integrity  Goal: Skin integrity is maintained or improved  Outcome: Progressing     Problem: Fall Risk  Goal: Patient will remain free from falls  Outcome: Progressing     Problem: Knowledge Deficit - Standard  Goal: Patient and family/care givers will demonstrate understanding of plan of care, disease process/condition, diagnostic tests and medications  Outcome: Progressing     Problem: Communication  Goal: The ability to communicate needs accurately and effectively will improve  Outcome: Progressing     Problem: Nutrition  Goal: Patient's nutritional and fluid intake will be adequate or improve  Outcome: Progressing     Problem: Mobility  Goal: Patient's capacity to carry out activities will improve  Outcome: Progressing     Problem: Self Care  Goal: Patient will have the ability to perform ADLs independently or with assistance (bathe, groom, dress, toilet and feed)  Outcome: Progressing       The patient is Stable - Low risk of patient condition declining or worsening    Shift Goals  Clinical Goals: Safety  Patient Goals: N/A  Family Goals: N/A    Progress made toward(s) clinical / shift goals:  Patient remained free of falls/injury. Patient ambulates with a steady gait. Walked around unit multiple times this shift.

## 2021-09-06 NOTE — PROGRESS NOTES
Pt is A&Ox3-disoriented to time only. Pt is resting in bed, and states no pain at this time. Pt on RA. Call light & personal belongings within reach, bed in lowest position & locked. Fall precautions in place and education provided on how to use call light. Pt updated on plan of care for the shift. Pt declines any additional needs at this time.        1 RN Skin Assessment completed.   Patient's risk of skin breakdown: Low    Devices in place and skin assessed under:   [] Pulse Ox  [] PIV [] Central Line [] SCDs []Purewick  [] Olsen  []Condom Cath   [] BMS        []  Cortrak   []  Oxymask   [] Bipap    [] Nasal Canula   [x] N/A   [] Other(specify):     Right Ear  [x] WDL                or           [] Skin issue present (please provide assessment/description below)    Left Ear  [x] WDL                or           [] Skin issue present (please provide assessment/description below)    Right Elbow:  [x] WDL               or           [] Skin issue present (please provide assessment/description below)    Left Elbow:   [x] WDL                or           [] Skin issue present (please provide assessment/description below)    Coccyx/Buttocks:  [x] WDL                or           [] Skin issue present (please provide assessment/description below)    Right Heel/Foot/Toes:  [] WDL                or           [x] Skin issue present (please provide assessment/description below)  Foot is dry, flaky, and toenails are abn color- fungus     Left Heel/Foot/Toes:   [] WDL              or           [x] Skin issue present (please provide assessment/description below)  Foot is dry, flaky, and toenails are abn color- fungus     **Describe any other skin issues in areas not already listed from above list:   [x] N/A    Interventions In Place: Waffle Overlay, Pillows and Pressure Redistribution Mattress, pt ambulates often and turns self in bed     **If any new or digressing skin issues are found, fill out the selection below.    Possible  Skin Injury No  Pictures Uploaded Into Epic: N/A  Wound Consult Placed: N/A  RN Wound Prevention Protocol Ordered: No    What new preventative interventions did you add? N/A

## 2021-09-06 NOTE — PROGRESS NOTES
Pharmacy Pharmacotherapy Consult for LOS >30 days    Admit Date: 6/4/2021      Medications were reviewed for appropriateness and ongoing need.     Current Facility-Administered Medications   Medication Dose Route Frequency Provider Last Rate Last Admin   • simethicone (MYLICON) chewable tab 80 mg  80 mg Oral TID PRN Fito Sommer M.D.   80 mg at 08/31/21 1830   • loperamide (IMODIUM) capsule 2 mg  2 mg Oral 4X/DAY PRN Fito Sommer M.D.   2 mg at 09/01/21 0832   • terbinafine (LAMISIL) tablet 250 mg  250 mg Oral DAILY Fito Sommer M.D.   250 mg at 09/06/21 0821   • thiamine (Vitamin B-1) tablet 100 mg  100 mg Oral DAILY DONATO Farrar.P.R.N.   100 mg at 09/06/21 0821   • multivitamin (THERAGRAN) tablet 1 tablet  1 Tablet Oral DAILY DONATO Farrar.P.R.N.   1 Tablet at 09/06/21 0821   • acetaminophen (Tylenol) tablet 650 mg  650 mg Oral Q6HRS PRN Jojo Castillo M.D.   650 mg at 08/09/21 1619   • ondansetron (ZOFRAN ODT) dispertab 4 mg  4 mg Oral Q4HRS PRN Jojo Castillo M.D.   4 mg at 06/30/21 1431     Recommendations:  1. There are no recommendations at this time.     Jeannie Traore, PharmD, BCOP

## 2021-09-06 NOTE — PROGRESS NOTES
Received bedside report from night shift RN.   Assumed care of patient at change of shift.   Assessment complete and POC discussed.   A&Ox3 (disoriented to time)  Patient knows he is in the hospital due to a fall.  Vitals are stable, on RA.   Patient denies pain, no apparent signs of distress or discomfort.   Patient is sitting at nurses station.  Bed is in lowest/locked position.   Call light and belongings are within reach.   No further needs at this time.    1 RN Skin Assessment completed.   Patient's risk of skin breakdown: Low     Devices in place and skin assessed under:   []?? Pulse Ox  []?? PIV []?? Central Line []?? SCDs []??Purewick  []?? Olsen  []??Condom Cath   []?? BMS        []??  Cortrak   []??  Oxymask   []?? Bipap    []?? Nasal Canula   [x]?? N/A   []?? Other(specify):      Right Ear  [x]?? WDL                or           []?? Skin issue present (please provide assessment/description below)     Left Ear  [x]?? WDL                or           []?? Skin issue present (please provide assessment/description below)     Right Elbow:  [x]?? WDL               or           []?? Skin issue present (please provide assessment/description below)     Left Elbow:   [x]?? WDL                or           []?? Skin issue present (please provide assessment/description below)     Coccyx/Buttocks:  [x]?? WDL                or           []?? Skin issue present (please provide assessment/description below)     Right Heel/Foot/Toes:  []?? WDL                or           [x]?? Skin issue present (please provide assessment/description below)  Dry, flaky feet     Left Heel/Foot/Toes:   []?? WDL              or           [x]?? Skin issue present (please provide assessment/description below)  Dry, flaky feet     **Describe any other skin issues in areas not already listed from above list:   [x]?? N/A     Interventions In Place: Waffle Overlay, Pillows and Pressure Redistribution Mattress

## 2021-09-07 PROBLEM — G93.40 ENCEPHALOPATHY: Status: ACTIVE | Noted: 2021-09-07

## 2021-09-07 PROCEDURE — A9270 NON-COVERED ITEM OR SERVICE: HCPCS | Performed by: NURSE PRACTITIONER

## 2021-09-07 PROCEDURE — 99232 SBSQ HOSP IP/OBS MODERATE 35: CPT | Performed by: STUDENT IN AN ORGANIZED HEALTH CARE EDUCATION/TRAINING PROGRAM

## 2021-09-07 PROCEDURE — A9270 NON-COVERED ITEM OR SERVICE: HCPCS | Performed by: FAMILY MEDICINE

## 2021-09-07 PROCEDURE — 700102 HCHG RX REV CODE 250 W/ 637 OVERRIDE(OP): Performed by: FAMILY MEDICINE

## 2021-09-07 PROCEDURE — 770006 HCHG ROOM/CARE - MED/SURG/GYN SEMI*

## 2021-09-07 PROCEDURE — 700102 HCHG RX REV CODE 250 W/ 637 OVERRIDE(OP): Performed by: NURSE PRACTITIONER

## 2021-09-07 RX ADMIN — TERBINAFINE 250 MG: 250 TABLET ORAL at 08:54

## 2021-09-07 RX ADMIN — Medication 100 MG: at 08:54

## 2021-09-07 RX ADMIN — THERA TABS 1 TABLET: TAB at 08:54

## 2021-09-07 ASSESSMENT — ENCOUNTER SYMPTOMS
BLURRED VISION: 0
FLANK PAIN: 0
MYALGIAS: 0
DEPRESSION: 0
FEVER: 0
WEAKNESS: 1
PALPITATIONS: 0
VOMITING: 0
NAUSEA: 0
HEADACHES: 0
BRUISES/BLEEDS EASILY: 0
SHORTNESS OF BREATH: 0
COUGH: 0
DIZZINESS: 0
CHILLS: 0
DOUBLE VISION: 0
FALLS: 0
NERVOUS/ANXIOUS: 1

## 2021-09-07 NOTE — PROGRESS NOTES
Pt is A&Ox3-disoriented to time only. Pt is resting in bed, and states no pain at this time. Pt on RA. Call light & personal belongings within reach, bed in lowest position & locked.  Pt has non skid socks on. Fall precautions in place and education provided on how to use call light. Pt updated on plan of care for the shift. Pt declines any additional needs at this time.          1 RN Skin Assessment completed.   Patient's risk of skin breakdown: Low     Devices in place and skin assessed under:   []? Pulse Ox  []? PIV []? Central Line []? SCDs []?Purewick  []? Olsen  []?Condom Cath   []? BMS        []?  Cortrak   []?  Oxymask   []? Bipap    []? Nasal Canula   [x]? N/A   []? Other(specify):      Right Ear  [x]? WDL                or           []? Skin issue present (please provide assessment/description below)     Left Ear  [x]? WDL                or           []? Skin issue present (please provide assessment/description below)     Right Elbow:  [x]? WDL               or           []? Skin issue present (please provide assessment/description below)     Left Elbow:   [x]? WDL                or           []? Skin issue present (please provide assessment/description below)     Coccyx/Buttocks:  [x]? WDL                or           []? Skin issue present (please provide assessment/description below)     Right Heel/Foot/Toes:  []? WDL                or           [x]? Skin issue present (please provide assessment/description below)  Foot is dry, flaky, and toenails are abn color- fungus      Left Heel/Foot/Toes:   []? WDL              or           [x]? Skin issue present (please provide assessment/description below)  Foot is dry, flaky, and toenails are abn color- fungus      **Describe any other skin issues in areas not already listed from above list:   [x]? N/A     Interventions In Place: Waffle Overlay, Pillows and Pressure Redistribution Mattress, pt ambulates often and turns self in bed      **If any new or  digressing skin issues are found, fill out the selection below.     Possible Skin Injury No  Pictures Uploaded Into Epic: N/A  Wound Consult Placed: N/A  RN Wound Prevention Protocol Ordered: No    What new preventative interventions did you add? N/A

## 2021-09-07 NOTE — PROGRESS NOTES
Pt A&O 3. Room air. Pt out of room most of this shift. Medication given per MAR. Call bell within reach. Bed low and locked. Hourly rounding.    1 RN Skin Assessment completed.   Patient's risk of skin breakdown: Low     Devices in place and skin assessed under:   []?? Pulse Ox  []?? PIV []?? Central Line []?? SCDs []??Purewick  []?? Olsen  []??Condom Cath   []?? BMS        []??  Cortrak   []??  Oxymask   []?? Bipap    []?? Nasal Canula   [x]?? N/A   []?? Other(specify):      Right Ear  [x]?? WDL                or           []?? Skin issue present (please provide assessment/description below)     Left Ear  [x]?? WDL                or           []?? Skin issue present (please provide assessment/description below)     Right Elbow:  [x]?? WDL               or           []?? Skin issue present (please provide assessment/description below)     Left Elbow:   [x]?? WDL                or           []?? Skin issue present (please provide assessment/description below)     Coccyx/Buttocks:  [x]?? WDL                or           []?? Skin issue present (please provide assessment/description below)     Right Heel/Foot/Toes:  []?? WDL                or           [x]?? Skin issue present (please provide assessment/description below)  Foot is dry, flaky, and toenails are abn color- fungus      Left Heel/Foot/Toes:   []?? WDL              or           [x]?? Skin issue present (please provide assessment/description below)  Foot is dry, flaky, and toenails are abn color- fungus      **Describe any other skin issues in areas not already listed from above list:   [x]?? N/A     Interventions In Place: Waffle Overlay, Pillows and Pressure Redistribution Mattress, pt ambulates often and turns self in bed      **If any new or digressing skin issues are found, fill out the selection below.     Possible Skin Injury No  Pictures Uploaded Into Epic: N/A  Wound Consult Placed: N/A  RN Wound Prevention Protocol Ordered: No    What new  preventative interventions did you add? N/A

## 2021-09-07 NOTE — ASSESSMENT & PLAN NOTE
Likely toxic/metabolic etiology, overall improved and back to baseline mentation  Does have underlying dementia    10/15: Pending placement, as per case management patient might be discharging on Monday.

## 2021-09-07 NOTE — PROGRESS NOTES
[facility] Medicine Daily Progress Note    Date of Service  9/7/2021    Chief Complaint  Lane Beard is a 66 y.o. male admitted 6/4/2021 with AMS    Hospital Course  Mr. Beard is a 66-year-old male with no known PMHx who presented to the hospital on 6/4/2021 after he was found down on the sidewalk after multiple witnessed falls.  Patient was confused at the time of admission.  He had been seen earlier in the day for syncope while crossing the street however at that time left AGAINST MEDICAL ADVICE.  On admission he was tachycardic with a heart rate of 114, anion gap of 23 and a lactic acid of 2.6.  He had a CT scan of the head, which revealed no acute intracranial abnormalities.  Chest x-ray completed, which showed no acute cardiopulmonary process.  Patient was admitted for further evaluation of altered mental status and syncope.  Subsequent work up negative including MRI, and laboratory evaluation.neurology was consulted on the case as well as psychiatry.  SLP did a cognitive evaluation which showed severe deficits.    Interval Problem Update  9/7: no acute event overnight. Medically stable but unsafe for discharge. Expressed concern for fungal toe nails -- explained to patient that can be managed outpatient.    I have personally seen and examined the patient at bedside. I discussed the plan of care with patient and bedside RN.     Consultants/Specialty  Neurology  Psychiatry  LPS    Code Status  Full Code    Disposition  Patient is medically cleared.   Anticipate discharge to guardianship and likely placement.  I have placed the appropriate orders for post-discharge needs.    Review of Systems  Review of Systems   Constitutional: Negative for chills and fever.   HENT: Negative for hearing loss and tinnitus.    Eyes: Negative for blurred vision and double vision.   Respiratory: Negative for cough and shortness of breath.    Cardiovascular: Negative for chest pain and palpitations.   Gastrointestinal:  Negative for nausea and vomiting.   Genitourinary: Negative for dysuria and flank pain.   Musculoskeletal: Negative for falls and myalgias.   Skin: Negative for itching and rash.   Neurological: Positive for weakness (generalized, non-focal). Negative for dizziness and headaches.   Endo/Heme/Allergies: Negative for environmental allergies. Does not bruise/bleed easily.   Psychiatric/Behavioral: Negative for depression. The patient is nervous/anxious.    All other systems reviewed and are negative.       Physical Exam  Temp:  [36.1 °C (97 °F)-36.4 °C (97.5 °F)] 36.4 °C (97.5 °F)  Pulse:  [65-87] 87  Resp:  [16-18] 16  BP: (113-134)/(58-81) 115/79  SpO2:  [99 %-100 %] 100 %    Physical Exam  Vitals and nursing note reviewed.   Constitutional:       Appearance: Normal appearance.   HENT:      Head: Normocephalic and atraumatic.      Nose: Nose normal.      Mouth/Throat:      Mouth: Mucous membranes are moist.      Pharynx: Oropharynx is clear.   Eyes:      General: No scleral icterus.     Extraocular Movements: Extraocular movements intact.   Cardiovascular:      Rate and Rhythm: Normal rate and regular rhythm.      Pulses: Normal pulses.      Heart sounds: No friction rub.   Pulmonary:      Effort: Pulmonary effort is normal. No respiratory distress.      Breath sounds: No wheezing.   Abdominal:      General: There is no distension.      Palpations: Abdomen is soft.      Tenderness: There is no abdominal tenderness. There is no guarding.   Musculoskeletal:         General: No swelling or tenderness. Normal range of motion.      Cervical back: Neck supple. No tenderness.      Comments: Fungal toenails   Skin:     General: Skin is warm and dry.      Capillary Refill: Capillary refill takes less than 2 seconds.   Neurological:      General: No focal deficit present.      Mental Status: He is alert and oriented to person, place, and time.      Cranial Nerves: No cranial nerve deficit.   Psychiatric:      Comments: anxious          Fluids    Intake/Output Summary (Last 24 hours) at 9/7/2021 1318  Last data filed at 9/6/2021 1914  Gross per 24 hour   Intake 656 ml   Output --   Net 656 ml       Laboratory                        Imaging  MR-BRAIN-WITH & W/O   Final Result         No acute intracranial process.      Nonspecific T2 hyperintensities in the deep white matter related to chronic microvascular ischemia.      JG-[id number] VIEW   Final Result      1.  No metallic foreign bodies detected.   2.  Right nephrolithiasis.      EC-ECHOCARDIOGRAM LTD W/ CONT   Final Result      CT-HEAD W/O   Final Result      1.  Cerebral atrophy and chronic microvascular ischemic type changes.   2.  No acute intracranial abnormality.      DX-CHEST-PORTABLE (1 VIEW)   Final Result      No acute cardiopulmonary abnormality.           Assessment/Plan  * Cognitive impairment- (present on admission)  Assessment & Plan  Likely underlying dementia  SLP Cognitive evaluation - severe-profound deficits across almost all cognitive domains tested. 6/11/2021    Case management for discharge planning - for Guardianship    Encephalopathy  Assessment & Plan  Likely toxic/metabolic etiology -- overall improved  Does have underlying dementia    Diarrhea  Assessment & Plan  COVID-19 and C. difficile negative  Imodium as needed    Onychomycosis- (present on admission)  Assessment & Plan  Terbinafine x 12 weeks  repeat LFTs and CBC on 9/26/2021 -- can be followed outpatient if discharged earlier  LPS previously consulted        Anemia- (present on admission)  Assessment & Plan  Chronic, no acute bleeding    Marijuana use- (present on admission)  Assessment & Plan  UDS positive    LANDY (acute kidney injury) (HCC)- (present on admission)  Assessment & Plan  resolved    Metabolic acidosis, increased anion gap (IAG)- (present on admission)  Assessment & Plan  resolved    Hypophosphatemia- (present on admission)  Assessment & Plan  resolved    Hypokalemia- (present on  admission)  Assessment & Plan  resolved    Syncope- (present on admission)  Assessment & Plan  Workup negative on CT head, echo, MRI brain       VTE prophylaxis: SCDs/TEDs    I have performed a physical exam and reviewed and updated ROS and Plan today (9/7/2021). In review of yesterday's note (9/6/2021), there are no changes except as documented above.

## 2021-09-07 NOTE — CARE PLAN
Problem: Fall Risk  Goal: Patient will remain free from falls  Outcome: Progressing  Note: Pt remains free from falls        The patient is Stable - Low risk of patient condition declining or worsening    Shift Goals  Clinical Goals: Safety  Patient Goals: N/A  Family Goals: N/A    Progress made toward(s) clinical / shift goals:  Pt will remain comfortable this shift     Patient is not progressing towards the following goals:NA

## 2021-09-08 PROCEDURE — A9270 NON-COVERED ITEM OR SERVICE: HCPCS | Performed by: FAMILY MEDICINE

## 2021-09-08 PROCEDURE — 770006 HCHG ROOM/CARE - MED/SURG/GYN SEMI*

## 2021-09-08 PROCEDURE — 700102 HCHG RX REV CODE 250 W/ 637 OVERRIDE(OP): Performed by: FAMILY MEDICINE

## 2021-09-08 PROCEDURE — A9270 NON-COVERED ITEM OR SERVICE: HCPCS | Performed by: NURSE PRACTITIONER

## 2021-09-08 PROCEDURE — 700102 HCHG RX REV CODE 250 W/ 637 OVERRIDE(OP): Performed by: NURSE PRACTITIONER

## 2021-09-08 RX ADMIN — Medication 100 MG: at 07:00

## 2021-09-08 RX ADMIN — TERBINAFINE 250 MG: 250 TABLET ORAL at 07:00

## 2021-09-08 RX ADMIN — THERA TABS 1 TABLET: TAB at 07:00

## 2021-09-08 ASSESSMENT — PAIN DESCRIPTION - PAIN TYPE: TYPE: ACUTE PAIN

## 2021-09-08 NOTE — PROGRESS NOTES
Pt A&O 3. Room air. Pt out of room most of this shift. Medication given per MAR. Call bell within reach. Bed low and locked. Hourly rounding.     1 RN Skin Assessment completed.   Patient's risk of skin breakdown: Low     Devices in place and skin assessed under:   []??? Pulse Ox  []??? PIV []??? Central Line []??? SCDs []???Purewick  []??? Olsen  []???Condom Cath   []??? BMS        []???  Cortrak   []???  Oxymask   []??? Bipap    []??? Nasal Canula   [x]??? N/A   []??? Other(specify):      Right Ear  [x]??? WDL                or           []??? Skin issue present (please provide assessment/description below)     Left Ear  [x]??? WDL                or           []??? Skin issue present (please provide assessment/description below)     Right Elbow:  [x]??? WDL               or           []??? Skin issue present (please provide assessment/description below)     Left Elbow:   [x]??? WDL                or           []??? Skin issue present (please provide assessment/description below)     Coccyx/Buttocks:  [x]??? WDL                or           []??? Skin issue present (please provide assessment/description below)     Right Heel/Foot/Toes:  []??? WDL                or           [x]??? Skin issue present (please provide assessment/description below)  Foot is dry, flaky, and toenails are abn color- fungus      Left Heel/Foot/Toes:   []??? WDL              or           [x]??? Skin issue present (please provide assessment/description below)  Foot is dry, flaky, and toenails are abn color- fungus      **Describe any other skin issues in areas not already listed from above list:   [x]??? N/A     Interventions In Place: Waffle Overlay, Pillows and Pressure Redistribution Mattress, pt ambulates often and turns self in bed      **If any new or digressing skin issues are found, fill out the selection below.     Possible Skin Injury No  Pictures Uploaded Into Epic: N/A  Wound Consult Placed: N/A  RN Wound Prevention Protocol  Ordered: No    What new preventative interventions did you add? N/A

## 2021-09-08 NOTE — PROGRESS NOTES
Pt. Received in bed watching TV, denies any complaint of pain at the time of assessment. Pt. Educated about falls and use of call light for assistance. No other needs at this time.    1 RN Skin Assessment completed.   Patient's risk of skin breakdown: Low     Devices in place and skin assessed under:   []?? Pulse Ox  []?? PIV []?? Central Line []?? SCDs []??Purewick  []?? Olsen  []??Condom Cath   []?? BMS        []??  Cortrak   []??  Oxymask   []?? Bipap    []?? Nasal Canula   []?? N/A   [x]?? Other(specify):    WG on ankle    Right Ear  [x]?? WDL                or           []?? Skin issue present (please provide assessment/description below)     Left Ear  [x]?? WDL                or           []?? Skin issue present (please provide assessment/description below)     Right Elbow:  [x]?? WDL               or           []?? Skin issue present (please provide assessment/description below)     Left Elbow:   [x]?? WDL                or           []?? Skin issue present (please provide assessment/description below)     Coccyx/Buttocks:  [x]?? WDL                or           []?? Skin issue present (please provide assessment/description below)     Right Heel/Foot/Toes:  []?? WDL                or           [x]?? Skin issue present (please provide assessment/description below)  -dry, flaky    Left Heel/Foot/Toes:   []?? WDL              or           [x]?? Skin issue present (please provide assessment/description below)  -dry and flaky    **Describe any other skin issues in areas not already listed from above list:   [x]?? N/A     Interventions In Place: Waffle Overlay, Pillows and Pressure Redistribution Mattress, pt ambulates often and turns self in bed      **If any new or digressing skin issues are found, fill out the selection below.     Possible Skin Injury No  Pictures Uploaded Into Epic: N/A  Wound Consult Placed: N/A  RN Wound Prevention Protocol Ordered: No    What new preventative interventions did you add? N/A

## 2021-09-09 PROCEDURE — A9270 NON-COVERED ITEM OR SERVICE: HCPCS | Performed by: NURSE PRACTITIONER

## 2021-09-09 PROCEDURE — 770006 HCHG ROOM/CARE - MED/SURG/GYN SEMI*

## 2021-09-09 PROCEDURE — 700102 HCHG RX REV CODE 250 W/ 637 OVERRIDE(OP): Performed by: FAMILY MEDICINE

## 2021-09-09 PROCEDURE — 700102 HCHG RX REV CODE 250 W/ 637 OVERRIDE(OP): Performed by: NURSE PRACTITIONER

## 2021-09-09 PROCEDURE — A9270 NON-COVERED ITEM OR SERVICE: HCPCS | Performed by: FAMILY MEDICINE

## 2021-09-09 RX ADMIN — THERA TABS 1 TABLET: TAB at 07:38

## 2021-09-09 RX ADMIN — Medication 100 MG: at 07:38

## 2021-09-09 RX ADMIN — TERBINAFINE 250 MG: 250 TABLET ORAL at 07:38

## 2021-09-09 ASSESSMENT — PAIN DESCRIPTION - PAIN TYPE: TYPE: ACUTE PAIN

## 2021-09-09 NOTE — PROGRESS NOTES
Assumed care of pt at shift change. Pt is on RA with no signs of acute distress. A&Ox2. Denies any pain. All safety measures in place. Call light and personal belongings by bedside. Bed locked and in lowest position. Hourly rounding in place.       1 RN Skin Assessment completed.   Patient's risk of skin breakdown: Low     Devices in place and skin assessed under:   []? Pulse Ox  []? PIV []? Central Line []? SCDs []?Purewick  []? Olsen  []?Condom Cath   []? BMS        []?  Cortrak   []?  Oxymask   []? Bipap    []? Nasal Canula   []? N/A   [x]? Other(specify):  WG left ankle     Right Ear  [x]? WDL                or           []? Skin issue present (please provide assessment/description below)     Left Ear  [x]? WDL                or           []? Skin issue present (please provide assessment/description below)     Right Elbow:  [x]? WDL               or           []? Skin issue present (please provide assessment/description below)     Left Elbow:   [x]? WDL                or           []? Skin issue present (please provide assessment/description below)     Coccyx/Buttocks:  [x]? WDL                or           []? Skin issue present (please provide assessment/description below)     Right Heel/Foot/Toes:  []? WDL                or           [x]? Skin issue present (please provide assessment/description below)  Scars to R. Knee.   Left Heel/Foot/Toes:   []? WDL              or           [x]? Skin issue present (please provide assessment/description below)  Scabs/scars to knee     **Describe any other skin issues in areas not already listed from above list:   [x]? N/A     Interventions In Place: Pt is ambulatory and able to turn se;f in bed. Waffle Overlay, Pillows and Pressure Redistribution Matrress

## 2021-09-09 NOTE — CARE PLAN
The patient is Stable - Low risk of patient condition declining or worsening    Shift Goals  Clinical Goals: Pt safety  Patient Goals: N/A  Family Goals: N/A    Progress made toward(s) clinical / shift goals:  Pt upself and ambulatory, no fall during shift. Threaded socks on, bed in lowest position.     Patient is not progressing towards the following goals:

## 2021-09-10 PROCEDURE — 700102 HCHG RX REV CODE 250 W/ 637 OVERRIDE(OP): Performed by: NURSE PRACTITIONER

## 2021-09-10 PROCEDURE — 770006 HCHG ROOM/CARE - MED/SURG/GYN SEMI*

## 2021-09-10 PROCEDURE — 700102 HCHG RX REV CODE 250 W/ 637 OVERRIDE(OP): Performed by: FAMILY MEDICINE

## 2021-09-10 PROCEDURE — A9270 NON-COVERED ITEM OR SERVICE: HCPCS | Performed by: NURSE PRACTITIONER

## 2021-09-10 PROCEDURE — A9270 NON-COVERED ITEM OR SERVICE: HCPCS | Performed by: FAMILY MEDICINE

## 2021-09-10 RX ADMIN — THERA TABS 1 TABLET: TAB at 07:31

## 2021-09-10 RX ADMIN — Medication 100 MG: at 07:31

## 2021-09-10 RX ADMIN — TERBINAFINE 250 MG: 250 TABLET ORAL at 07:31

## 2021-09-10 NOTE — CARE PLAN
The patient is Stable - Low risk of patient condition declining or worsening    Shift Goals  Clinical Goals: Pt safety  Patient Goals: N/A  Family Goals: N/A    Progress made toward(s) clinical / shift goals:  Safety precaution in place, threaded slippers on. WG to left ankle to prevent elopement.     Patient is not progressing towards the following goals:

## 2021-09-10 NOTE — PROGRESS NOTES
Assumed care of pt at shift change. Pt is on RA with no signs of acute distress. A&Ox2. Denies any pain. Ambulates with steady gait. Morning medications administered.  All safety measures in place. Call light and personal belongings by bedside. Bed locked and in lowest position. Hourly rounding in place.         1 RN Skin Assessment completed.   Patient's risk of skin breakdown: Low     Devices in place and skin assessed under:   []?? Pulse Ox  []?? PIV []?? Central Line []?? SCDs []??Purewick  []?? Olsen  []??Condom Cath   []?? BMS        []??  Cortrak   []??  Oxymask   []?? Bipap    []?? Nasal Canula   []?? N/A   [x]?? Other(specify):  WG left ankle     Right Ear  [x]?? WDL                or           []?? Skin issue present (please provide assessment/description below)     Left Ear  [x]?? WDL                or           []?? Skin issue present (please provide assessment/description below)     Right Elbow:  [x]?? WDL               or           []?? Skin issue present (please provide assessment/description below)     Left Elbow:   [x]?? WDL                or           []?? Skin issue present (please provide assessment/description below)     Coccyx/Buttocks:  [x]?? WDL                or           []?? Skin issue present (please provide assessment/description below)     Right Heel/Foot/Toes:  []?? WDL                or           [x]?? Skin issue present (please provide assessment/description below)  Scars to R. Knee.   Left Heel/Foot/Toes:   []?? WDL              or           [x]?? Skin issue present (please provide assessment/description below)  Scabs/scars to knee     **Describe any other skin issues in areas not already listed from above list:   [x]?? N/A     Interventions In Place: Pt is ambulatory and able to turn se;f in bed. Waffle Overlay, Pillows and Pressure Redistribution Matrress

## 2021-09-10 NOTE — PROGRESS NOTES
Pt A&Ox3, on RA, denied pain/discomfort. Pt up self with steady gait. No signs of acute distress observed at this time. Bed locked in lowest position, upper rails raised, call light within reach, Hourly rounding in place.        1 RN Skin Assessment completed.   Patient's risk of skin breakdown: Low     Devices in place and skin assessed under:   []? Pulse Ox  []? PIV []? Central Line []? SCDs []?Purewick  []? Olsen  []?Condom Cath   []? BMS        []?  Cortrak   []?  Oxymask   []? Bipap    []? Nasal Canula   []? N/A   [x]? Other(specify):  WG left ankle     Right Ear  [x]? WDL                or           []? Skin issue present (please provide assessment/description below)     Left Ear  [x]? WDL                or           []? Skin issue present (please provide assessment/description below)     Right Elbow:  [x]? WDL               or           []? Skin issue present (please provide assessment/description below)     Left Elbow:   [x]? WDL                or           []? Skin issue present (please provide assessment/description below)     Coccyx/Buttocks:  [x]? WDL                or           []? Skin issue present (please provide assessment/description below)     Right Heel/Foot/Toes:  []? WDL                or           [x]? Skin issue present (please provide assessment/description below)  dry, flaky  scars on knee    Left Heel/Foot/Toes:   []? WDL              or           [x]? Skin issue present (please provide assessment/description below)  dry, flaky  Scabs/scars on knee     **Describe any other skin issues in areas not already listed from above list:   [x]? N/A     Interventions In Place: Waffle Overlay, Pillows and Pressure Redistribution Mattress

## 2021-09-11 PROCEDURE — A9270 NON-COVERED ITEM OR SERVICE: HCPCS | Performed by: NURSE PRACTITIONER

## 2021-09-11 PROCEDURE — 700102 HCHG RX REV CODE 250 W/ 637 OVERRIDE(OP): Performed by: FAMILY MEDICINE

## 2021-09-11 PROCEDURE — 770006 HCHG ROOM/CARE - MED/SURG/GYN SEMI*

## 2021-09-11 PROCEDURE — 700102 HCHG RX REV CODE 250 W/ 637 OVERRIDE(OP): Performed by: NURSE PRACTITIONER

## 2021-09-11 PROCEDURE — 99232 SBSQ HOSP IP/OBS MODERATE 35: CPT | Performed by: STUDENT IN AN ORGANIZED HEALTH CARE EDUCATION/TRAINING PROGRAM

## 2021-09-11 PROCEDURE — A9270 NON-COVERED ITEM OR SERVICE: HCPCS | Performed by: FAMILY MEDICINE

## 2021-09-11 RX ADMIN — THERA TABS 1 TABLET: TAB at 07:50

## 2021-09-11 RX ADMIN — Medication 100 MG: at 07:50

## 2021-09-11 RX ADMIN — TERBINAFINE 250 MG: 250 TABLET ORAL at 07:50

## 2021-09-11 ASSESSMENT — ENCOUNTER SYMPTOMS
DEPRESSION: 0
DIZZINESS: 0
WEAKNESS: 1
SHORTNESS OF BREATH: 0
COUGH: 0
CHILLS: 0
PALPITATIONS: 0
BRUISES/BLEEDS EASILY: 0
NAUSEA: 0
NERVOUS/ANXIOUS: 1
BLURRED VISION: 0
FLANK PAIN: 0
HEADACHES: 0
VOMITING: 0
MYALGIAS: 0
FEVER: 0
FALLS: 0
DOUBLE VISION: 0

## 2021-09-11 NOTE — CARE PLAN
The patient is Stable - Low risk of patient condition declining or worsening    Shift Goals  Clinical Goals: Pt will be free from fall thtpughput shift.   Patient Goals: N/A  Family Goals: N/A    Progress made toward(s) clinical / shift goals:  All fall precautions in place. Treaded socks on. Pt is upself and ambulates with steady gait.     Patient is not progressing towards the following goals:

## 2021-09-11 NOTE — PROGRESS NOTES
A&Ox2.  Denied any pain.  Ambulates with steady gait.        1 RN Skin Assessment completed.   Patient's risk of skin breakdown: Low    Devices in place and skin assessed under:   [] Pulse Ox  [] PIV [] Central Line [] SCDs []Purewick  [] Olsen  []Condom Cath   [] BMS        []  Cortrak   []  Oxymask   [] Bipap    [] Nasal Canula   [] N/A   [x] Other(specify):     Right Ear  [x] WDL                or           [] Skin issue present (please provide assessment/description below)    Left Ear  [x] WDL                or           [] Skin issue present (please provide assessment/description below)    Right Elbow:  [x] WDL               or           [] Skin issue present (please provide assessment/description below)    Left Elbow:   [x] WDL                or           [] Skin issue present (please provide assessment/description below)    Coccyx/Buttocks:  [x] WDL                or           [] Skin issue present (please provide assessment/description below)    Right Heel/Foot/Toes:  [] WDL                or           [x] Skin issue present (please provide assessment/description below)  Scab/scar on knee    Left Heel/Foot/Toes:   [] WDL              or           [x] Skin issue present (please provide assessment/description below)  Scab/scar on knee    **Describe any other skin issues in areas not already listed from above list:   [] N/A  Right 2nd toe has red spot that is non blanching.      Interventions In Place: Waffle Overlay and Pressure Redistribution Mattress

## 2021-09-11 NOTE — PROGRESS NOTES
[facility] Medicine Daily Progress Note    Date of Service  9/11/2021    Chief Complaint  Lane Beard is a 66 y.o. male admitted 6/4/2021 with AMS    Hospital Course  Mr. Beard is a 66-year-old male with no known PMHx who presented to the hospital on 6/4/2021 after he was found down on the sidewalk after multiple witnessed falls.  Patient was confused at the time of admission.  He had been seen earlier in the day for syncope while crossing the street however at that time left AGAINST MEDICAL ADVICE.  On admission he was tachycardic with a heart rate of 114, anion gap of 23 and a lactic acid of 2.6.  He had a CT scan of the head, which revealed no acute intracranial abnormalities.  Chest x-ray completed, which showed no acute cardiopulmonary process.  Patient was admitted for further evaluation of altered mental status and syncope.  Subsequent work up negative including MRI, and laboratory evaluation.neurology was consulted on the case as well as psychiatry.  SLP did a cognitive evaluation which showed severe deficits.    Interval Problem Update  9/7: no acute event overnight. Medically stable but unsafe for discharge. Expressed concern for fungal toe nails -- explained to patient that can be managed outpatient.    9/11: no acute event overnight.  On terbinafine for fungal toenail, LPS assistance appreciated.  Medically stable for discharge but pending guardianship.  He has a court date on 10/15/2021.    I have personally seen and examined the patient at bedside. I discussed the plan of care with patient and bedside RN.     Consultants/Specialty  Neurology  Psychiatry  LPS    Code Status  Full Code    Disposition  Patient is medically cleared.   Anticipate discharge to guardianship and likely placement.  I have placed the appropriate orders for post-discharge needs.    Review of Systems  Review of Systems   Constitutional: Negative for chills and fever.   HENT: Negative for hearing loss and tinnitus.    Eyes:  Negative for blurred vision and double vision.   Respiratory: Negative for cough and shortness of breath.    Cardiovascular: Negative for chest pain and palpitations.   Gastrointestinal: Negative for nausea and vomiting.   Genitourinary: Negative for dysuria and flank pain.   Musculoskeletal: Negative for falls and myalgias.   Skin: Negative for itching and rash.   Neurological: Positive for weakness (generalized, non-focal). Negative for dizziness and headaches.   Endo/Heme/Allergies: Negative for environmental allergies. Does not bruise/bleed easily.   Psychiatric/Behavioral: Negative for depression. The patient is nervous/anxious.    All other systems reviewed and are negative.       Physical Exam  Temp:  [36.6 °C (97.8 °F)-36.9 °C (98.5 °F)] 36.8 °C (98.3 °F)  Pulse:  [60-87] 76  Resp:  [16-18] 16  BP: (104-110)/(66-68) 104/67  SpO2:  [97 %-99 %] 98 %    Physical Exam  Vitals and nursing note reviewed.   Constitutional:       Appearance: Normal appearance.   HENT:      Head: Normocephalic and atraumatic.      Nose: Nose normal.      Mouth/Throat:      Mouth: Mucous membranes are moist.      Pharynx: Oropharynx is clear.   Eyes:      General: No scleral icterus.     Extraocular Movements: Extraocular movements intact.   Cardiovascular:      Rate and Rhythm: Normal rate and regular rhythm.      Pulses: Normal pulses.      Heart sounds: No friction rub.   Pulmonary:      Effort: Pulmonary effort is normal. No respiratory distress.      Breath sounds: No wheezing.   Abdominal:      General: There is no distension.      Palpations: Abdomen is soft.      Tenderness: There is no abdominal tenderness. There is no guarding.   Musculoskeletal:         General: No swelling or tenderness. Normal range of motion.      Cervical back: Neck supple. No tenderness.      Comments: Fungal toenails   Skin:     General: Skin is warm and dry.      Capillary Refill: Capillary refill takes less than 2 seconds.   Neurological:       General: No focal deficit present.      Mental Status: He is alert and oriented to person, place, and time.      Cranial Nerves: No cranial nerve deficit.   Psychiatric:      Comments: anxious         Fluids    Intake/Output Summary (Last 24 hours) at 9/11/2021 1155  Last data filed at 9/10/2021 1919  Gross per 24 hour   Intake 240 ml   Output --   Net 240 ml       Laboratory                        Imaging  MR-BRAIN-WITH & W/O   Final Result         No acute intracranial process.      Nonspecific T2 hyperintensities in the deep white matter related to chronic microvascular ischemia.      BQ-[id number] VIEW   Final Result      1.  No metallic foreign bodies detected.   2.  Right nephrolithiasis.      EC-ECHOCARDIOGRAM LTD W/ CONT   Final Result      CT-HEAD W/O   Final Result      1.  Cerebral atrophy and chronic microvascular ischemic type changes.   2.  No acute intracranial abnormality.      DX-CHEST-PORTABLE (1 VIEW)   Final Result      No acute cardiopulmonary abnormality.           Assessment/Plan  * Cognitive impairment- (present on admission)  Assessment & Plan  Likely underlying dementia  SLP Cognitive evaluation - severe-profound deficits across almost all cognitive domains tested. 6/11/2021    Case management for discharge planning - for Guardianship    Encephalopathy  Assessment & Plan  Likely toxic/metabolic etiology -- overall improved  Does have underlying dementia    Diarrhea  Assessment & Plan  COVID-19 and C. difficile negative  Imodium as needed    Onychomycosis- (present on admission)  Assessment & Plan  Terbinafine x 12 weeks  repeat LFTs and CBC on 9/26/2021 -- can be followed outpatient if discharged earlier  LPS previously consulted        Anemia- (present on admission)  Assessment & Plan  Chronic, no acute bleeding    Marijuana use- (present on admission)  Assessment & Plan  UDS positive    LANDY (acute kidney injury) (HCC)- (present on admission)  Assessment & Plan  resolved    Metabolic  acidosis, increased anion gap (IAG)- (present on admission)  Assessment & Plan  resolved    Hypophosphatemia- (present on admission)  Assessment & Plan  resolved    Hypokalemia- (present on admission)  Assessment & Plan  resolved    Syncope- (present on admission)  Assessment & Plan  Workup negative on CT head, echo, MRI brain       VTE prophylaxis: SCDs/TEDs    I have performed a physical exam and reviewed and updated ROS and Plan today (9/11/2021). In review of yesterday's note (9/10/2021), there are no changes except as documented above.

## 2021-09-11 NOTE — CONSULTS
Brief Limb Preservation Consult Note    LPS service consulted for toe nail trimming for severely overgrown and mycotic toe nails, which increase risk of foot wounds and ulceration. Discussed with hospitalist Dr. Sigala that LPS service will be happy to perform debridement of nails when time and schedule allow. Will continue to follow patient until able to perform nail debridement.

## 2021-09-12 PROCEDURE — 770006 HCHG ROOM/CARE - MED/SURG/GYN SEMI*

## 2021-09-12 PROCEDURE — 700102 HCHG RX REV CODE 250 W/ 637 OVERRIDE(OP): Performed by: FAMILY MEDICINE

## 2021-09-12 PROCEDURE — 700102 HCHG RX REV CODE 250 W/ 637 OVERRIDE(OP): Performed by: NURSE PRACTITIONER

## 2021-09-12 PROCEDURE — A9270 NON-COVERED ITEM OR SERVICE: HCPCS | Performed by: FAMILY MEDICINE

## 2021-09-12 PROCEDURE — A9270 NON-COVERED ITEM OR SERVICE: HCPCS | Performed by: NURSE PRACTITIONER

## 2021-09-12 RX ADMIN — TERBINAFINE 250 MG: 250 TABLET ORAL at 07:55

## 2021-09-12 RX ADMIN — THERA TABS 1 TABLET: TAB at 07:55

## 2021-09-12 RX ADMIN — Medication 100 MG: at 07:55

## 2021-09-12 ASSESSMENT — PAIN DESCRIPTION - PAIN TYPE: TYPE: ACUTE PAIN

## 2021-09-12 NOTE — PROGRESS NOTES
Pt axo x4 at time of assessment and denied any pain.  Pt on RA no s/s of SOB.  Pt compliant with AM medication regimen and denied any further needs.  Reinforced safety education.        1 RN Skin Assessment completed.   Patient's risk of skin breakdown: Low    Devices in place and skin assessed under:   [] Pulse Ox  [] PIV [] Central Line [] SCDs []Purewick  [] Olsen  []Condom Cath   [] BMS        []  Cortrak   []  Oxymask   [] Bipap    [] Nasal Canula   [] N/A   [x] Other(specify): WG L Ankle    Right Ear  [x] WDL                or           [] Skin issue present (please provide assessment/description below)    Left Ear  [x] WDL                or           [] Skin issue present (please provide assessment/description below)    Right Elbow:  [x] WDL               or           [] Skin issue present (please provide assessment/description below)    Left Elbow:   [x] WDL                or           [] Skin issue present (please provide assessment/description below)    Coccyx/Buttocks:  [x] WDL                or           [] Skin issue present (please provide assessment/description below)    Right Heel/Foot/Toes:  [] WDL                or           [x] Skin issue present (please provide assessment/description below) - flaky feet/STB red spot on R 2nd toe     Left Heel/Foot/Toes:   [] WDL              or           [x] Skin issue present (please provide assessment/description below) - flaky toes/heels      **Describe any other skin issues in areas not already listed from above list:   [x] N/A     Interventions In Place: Waffle Overlay, Pillows and Pressure Redistribution Mattress    **If any new or digressing skin issues are found, fill out the selection below.    Possible Skin Injury No  Pictures Uploaded Into Epic: N/A  Wound Consult Placed: N/A  RN Wound Prevention Protocol Ordered: No    What new preventative interventions did you add? N/A

## 2021-09-12 NOTE — PROGRESS NOTES
A&Ox2.  Denied any pain or needs.      1 RN Skin Assessment completed.   Patient's risk of skin breakdown: Low    Devices in place and skin assessed under:   [] Pulse Ox  [] PIV [] Central Line [] SCDs []Purewick  [] Olsen  []Condom Cath   [] BMS        []  Cortrak   []  Oxymask   [] Bipap    [] Nasal Canula   [] N/A   [x] Other(specify):  WG to left ankle    Right Ear  [x] WDL                or           [] Skin issue present (please provide assessment/description below)    Left Ear  [x] WDL                or           [] Skin issue present (please provide assessment/description below)    Right Elbow:  [x] WDL               or           [] Skin issue present (please provide assessment/description below)    Left Elbow:   [x] WDL                or           [] Skin issue present (please provide assessment/description below)    Coccyx/Buttocks:  [x] WDL                or           [] Skin issue present (please provide assessment/description below)    Right Heel/Foot/Toes:  [] WDL                or           [x] Skin issue present (please provide assessment/description below)  Scab/scar on knee      Left Heel/Foot/Toes:   [] WDL              or           [x] Skin issue present (please provide assessment/description below)  Scab/scar on knee      **Describe any other skin issues in areas not already listed from above list:   [] N/A  Right 2nd toe has red spot that is non blanching.      Interventions In Place: Waffle Overlay and Pressure Redistribution Mattress

## 2021-09-12 NOTE — PROGRESS NOTES
Pt has steady gait so this RN didn't turn on built in bed alarm tonight.  Found pt wandering in hallway, looking for bathroom.  Assisted pt back to room and built in bed alarm on.

## 2021-09-13 PROCEDURE — A9270 NON-COVERED ITEM OR SERVICE: HCPCS | Performed by: FAMILY MEDICINE

## 2021-09-13 PROCEDURE — 770006 HCHG ROOM/CARE - MED/SURG/GYN SEMI*

## 2021-09-13 PROCEDURE — 700102 HCHG RX REV CODE 250 W/ 637 OVERRIDE(OP): Performed by: FAMILY MEDICINE

## 2021-09-13 PROCEDURE — A9270 NON-COVERED ITEM OR SERVICE: HCPCS | Performed by: NURSE PRACTITIONER

## 2021-09-13 PROCEDURE — 700102 HCHG RX REV CODE 250 W/ 637 OVERRIDE(OP): Performed by: NURSE PRACTITIONER

## 2021-09-13 RX ADMIN — THERA TABS 1 TABLET: TAB at 08:52

## 2021-09-13 RX ADMIN — Medication 100 MG: at 08:52

## 2021-09-13 RX ADMIN — TERBINAFINE 250 MG: 250 TABLET ORAL at 08:52

## 2021-09-13 ASSESSMENT — PAIN DESCRIPTION - PAIN TYPE: TYPE: ACUTE PAIN

## 2021-09-13 NOTE — PROGRESS NOTES
Pt is A&Ox2, disoriented to time and situation. Pt is resting in bed, VSS on RA. No complaints of pain. Call light & personal belongings within reach, bed in lowest position & locked. Fall precautions and hourly rounding in place, all needs met at this time.

## 2021-09-13 NOTE — PROGRESS NOTES
Pt very eager to get his oral medication for his toe infection this a.m.. Pt was more at peace after this was given.  [place].  No concerns noted.  Continued rounding in place.

## 2021-09-13 NOTE — PROGRESS NOTES
1 RN Skin Assessment completed.   Patient's risk of skin breakdown: Low    Devices in place and skin assessed under:   [] Pulse Ox  [] PIV [] Central Line [] SCDs []Purewick  [] Olsen  []Condom Cath   [] BMS        []  Cortrak   []  Oxymask   [] Bipap    [] Nasal Canula   [x] N/A   [] Other(specify):     Right Ear  [x] WDL                or           [] Skin issue present (please provide assessment/description below)    Left Ear  [x] WDL                or           [] Skin issue present (please provide assessment/description below)    Right Elbow:  [x] WDL               or           [] Skin issue present (please provide assessment/description below)    Left Elbow:   [x] WDL                or           [] Skin issue present (please provide assessment/description below)    Coccyx/Buttocks:  [x] WDL                or           [] Skin issue present (please provide assessment/description below)    Right Heel/Foot/Toes:  [] WDL                or           [x] Skin issue present (please provide assessment/description below) right 2nd toe has fungus, red and non blanching   Feet are dry, flaky    Left Heel/Foot/Toes:   [] WDL              or           [x] Skin issue present (please provide assessment/description below) flaky dry foot      **Describe any other skin issues in areas not already listed from above list:   [] N/A    Interventions In Place: Waffle Overlay, Pillows and Pressure Redistribution Mattress    **If any new or digressing skin issues are found, fill out the selection below.

## 2021-09-14 PROCEDURE — 700102 HCHG RX REV CODE 250 W/ 637 OVERRIDE(OP): Performed by: NURSE PRACTITIONER

## 2021-09-14 PROCEDURE — A9270 NON-COVERED ITEM OR SERVICE: HCPCS | Performed by: NURSE PRACTITIONER

## 2021-09-14 PROCEDURE — 700102 HCHG RX REV CODE 250 W/ 637 OVERRIDE(OP): Performed by: FAMILY MEDICINE

## 2021-09-14 PROCEDURE — 770006 HCHG ROOM/CARE - MED/SURG/GYN SEMI*

## 2021-09-14 PROCEDURE — A9270 NON-COVERED ITEM OR SERVICE: HCPCS | Performed by: FAMILY MEDICINE

## 2021-09-14 RX ADMIN — Medication 100 MG: at 10:44

## 2021-09-14 RX ADMIN — TERBINAFINE 250 MG: 250 TABLET ORAL at 10:44

## 2021-09-14 RX ADMIN — THERA TABS 1 TABLET: TAB at 10:44

## 2021-09-14 ASSESSMENT — PAIN DESCRIPTION - PAIN TYPE
TYPE: ACUTE PAIN
TYPE: ACUTE PAIN

## 2021-09-14 NOTE — PROGRESS NOTES
Pt is A&Ox2. Pt is resting in bed, VSS on RA. Pt denies pain. Call light & personal belongings within reach, bed in lowest position & locked. Pt is up self with steady gait. Fall precautions and hourly rounding in place.

## 2021-09-14 NOTE — PROGRESS NOTES
Pt is A&Ox3, disoriented to event, had the month and year correct for time. Pt denies any pain at this time. On assessment of pt's feet, pt's toenails are extremely long and too thick to cut with normal clippers, will need to escalate to find someone to trim nails.

## 2021-09-14 NOTE — CARE PLAN
The patient is Stable - Low risk of patient condition declining or worsening    Shift Goals  Clinical Goals: pt will remain calm about his feet  Patient Goals: N/A  Family Goals: N/A    Progress made toward(s) clinical / shift goals:  Talked with pt about his foot concerns and made sure to communicate with him when providing medication.     Patient is not progressing towards the following goals:

## 2021-09-15 PROCEDURE — A9270 NON-COVERED ITEM OR SERVICE: HCPCS | Performed by: NURSE PRACTITIONER

## 2021-09-15 PROCEDURE — 770006 HCHG ROOM/CARE - MED/SURG/GYN SEMI*

## 2021-09-15 PROCEDURE — A9270 NON-COVERED ITEM OR SERVICE: HCPCS | Performed by: FAMILY MEDICINE

## 2021-09-15 PROCEDURE — 700102 HCHG RX REV CODE 250 W/ 637 OVERRIDE(OP): Performed by: NURSE PRACTITIONER

## 2021-09-15 PROCEDURE — 99231 SBSQ HOSP IP/OBS SF/LOW 25: CPT | Performed by: HOSPITALIST

## 2021-09-15 PROCEDURE — 700102 HCHG RX REV CODE 250 W/ 637 OVERRIDE(OP): Performed by: FAMILY MEDICINE

## 2021-09-15 RX ADMIN — THERA TABS 1 TABLET: TAB at 08:37

## 2021-09-15 RX ADMIN — TERBINAFINE 250 MG: 250 TABLET ORAL at 08:37

## 2021-09-15 RX ADMIN — Medication 100 MG: at 08:37

## 2021-09-15 ASSESSMENT — ENCOUNTER SYMPTOMS
VOMITING: 0
ABDOMINAL PAIN: 0
NAUSEA: 0

## 2021-09-15 ASSESSMENT — PAIN DESCRIPTION - PAIN TYPE: TYPE: ACUTE PAIN

## 2021-09-15 NOTE — PROGRESS NOTES
COVID-19 Surge in effect    Pt is A&Ox3, sitting in chair at nurse's station, disoriented to event, had the month and year correct for time. Pt denies any pain at this time.

## 2021-09-15 NOTE — DISCHARGE PLANNING
"LSW met with pt at bedside to delivery petition for guardianship documents that were delivered to [facility]. LSW made copies to add in chart prior to delivery. Pt states \"are they going to tell me I am not competent\". LSW stated that they will determine if he needs a guardian to assist in making important decisions, pt stated \"ok, thank you\".   "

## 2021-09-15 NOTE — PROGRESS NOTES
"Pt is A&Ox3, disoriented to place. Pt keeps asking \"is this a video game? I think it's broken\", pt has nothing in hands. Pt is sitting up in chair near nursing station. Pt denies pain. All needs met at this time.    COVID-19 surge in effect      "

## 2021-09-16 PROCEDURE — A9270 NON-COVERED ITEM OR SERVICE: HCPCS | Performed by: FAMILY MEDICINE

## 2021-09-16 PROCEDURE — A9270 NON-COVERED ITEM OR SERVICE: HCPCS | Performed by: NURSE PRACTITIONER

## 2021-09-16 PROCEDURE — 770006 HCHG ROOM/CARE - MED/SURG/GYN SEMI*

## 2021-09-16 PROCEDURE — 700102 HCHG RX REV CODE 250 W/ 637 OVERRIDE(OP): Performed by: FAMILY MEDICINE

## 2021-09-16 PROCEDURE — 700102 HCHG RX REV CODE 250 W/ 637 OVERRIDE(OP): Performed by: NURSE PRACTITIONER

## 2021-09-16 PROCEDURE — 11721 DEBRIDE NAIL 6 OR MORE: CPT | Performed by: NURSE PRACTITIONER

## 2021-09-16 RX ADMIN — TERBINAFINE 250 MG: 250 TABLET ORAL at 08:04

## 2021-09-16 RX ADMIN — Medication 100 MG: at 08:03

## 2021-09-16 RX ADMIN — THERA TABS 1 TABLET: TAB at 08:03

## 2021-09-16 NOTE — PROCEDURES
LIMB PRESERVATION SERVICE     HPI.  67 y/o male with no known past medical history. Denies diabetes or neuropathy. Admitted 6/4/21 with AMS. Found to have severe cognitive deficits. Guardianship pending.    LPS consulted for severely overgrown, mycotic toenails with tani horn toenails. Currently on terbinafine for 12 wks. Pt reports toenails were trimmed over a year ago. Beginning of August 2021, pt reports the Left great toenail was caught on his sock and was removed traumatically. Reported no bleeding and no pain with removal. Pt does ambulate, up self. Has occasional pain to toenails secondary to being overgrown. Patient's toenails putting him at severe risk for ulcerations.  Recommend toenail debridement and filing  with podiatric clippers and podiatric dremmel with filter.  Risks and benefits discussed including bleeding, pain, ulceration.  Patient in agreement with nail trimming and filing and is agreeable to proceed.      Exam:  +2 DP, PT pulses bilaterally  Sensation intact to light touch  Feet warm to touch  L great toenail with new nail noted        Procedure   Toes wrapped with soapy, wet washcloths and covered with plastic bags for ~15 minutes prior to debridement and filing of toenails.  Debridement with podiatric clippers and curette and filing of all ten toenails with emery board completed.  No knicks or cuts present.   Afterwards all ten toenails were filed further with podiatric dremel with filter without incident.  Patient tolerated procedure. Toenails were cleansed with alcohol followed by tea tree oil.  Moisturizer applied to both feet prior to replacing slipper-socks.          Plan  Daily moisturizer to feet, avoiding in between toes  Continue to wear closed toed, closed heel tennis shoes for support with ambulation  Continue terbinafine until completed. Follow labs.   LPS to sign off. Please reconsult if needed.           Pre debridement dorsal feet       Pre debridement plantar  view        Post debridement toenails L foot      Post debridement toenails R foot

## 2021-09-16 NOTE — PROGRESS NOTES
Covid-19 surge in effect    Received report from NOC RN and assumed care of pt. Pt is A&Ox3, disoriented to situation. Pt is resting in bed on room air. Pt reports no pain. POC discussed with pt; all questions answered. No other needs at this time. Bed locked in lowest position, call light within reach.

## 2021-09-16 NOTE — PROGRESS NOTES
[facility] Medicine Daily Progress Note    Date of Service  9/15/2021    Chief Complaint  Lane Beard is a 66 y.o. male admitted 6/4/2021 with AMS    Hospital Course  Mr. Beard is a 66-year-old male with no known PMHx who presented to the hospital on 6/4/2021 after he was found down on the sidewalk after multiple witnessed falls.  Patient was confused at the time of admission.  He had been seen earlier in the day for syncope while crossing the street however at that time left AGAINST MEDICAL ADVICE.  On admission he was tachycardic with a heart rate of 114, anion gap of 23 and a lactic acid of 2.6.  He had a CT scan of the head, which revealed no acute intracranial abnormalities.  Chest x-ray completed, which showed no acute cardiopulmonary process.  Patient was admitted for further evaluation of altered mental status and syncope.  Subsequent work up negative including MRI, and laboratory evaluation.neurology was consulted on the case as well as psychiatry.  SLP did a cognitive evaluation which showed severe deficits.    Interval Problem Update  9/15: no acute event overnight. Cooperative but disoriented, no spontaneous complaints.  Denies pain, denies shortness of breath.      I certify that the patient requires medically necessary hospital services for the treatment of altered mental status and will remain in the hospital for 30 more days.  Discharge plan is anticipated to be group home after guardian is established.    I have personally seen and examined the patient at bedside. I discussed the plan of care with patient and bedside RN.     Consultants/Specialty  Neurology  Psychiatry  LPS    Code Status  Full Code    Disposition  Patient is medically cleared.   Anticipate discharge to guardianship and likely placement.  I have placed the appropriate orders for post-discharge needs.    Review of Systems  Review of Systems   Cardiovascular: Negative for chest pain and leg swelling.   Gastrointestinal:  Negative for abdominal pain, nausea and vomiting.   Skin: Negative for itching and rash.        Physical Exam  Temp:  [36.2 °C (97.2 °F)-36.3 °C (97.4 °F)] 36.3 °C (97.3 °F)  Pulse:  [90-96] 90  Resp:  [16-18] 16  BP: (116-124)/(69-77) 121/69  SpO2:  [98 %-99 %] 99 %    Physical Exam  Vitals and nursing note reviewed.   Constitutional:       Appearance: Normal appearance.   HENT:      Head: Normocephalic and atraumatic.      Right Ear: External ear normal.      Left Ear: External ear normal.   Cardiovascular:      Rate and Rhythm: Normal rate and regular rhythm.      Pulses: Normal pulses.      Heart sounds: No friction rub.   Pulmonary:      Effort: Pulmonary effort is normal. No respiratory distress.      Breath sounds: No wheezing.   Abdominal:      General: There is no distension.      Palpations: Abdomen is soft.      Tenderness: There is no abdominal tenderness. There is no guarding.   Musculoskeletal:         General: No swelling or tenderness. Normal range of motion.      Cervical back: Neck supple. No tenderness.      Comments: Fungal toenails   Skin:     General: Skin is warm and dry.      Capillary Refill: Capillary refill takes less than 2 seconds.   Neurological:      General: No focal deficit present.      Mental Status: He is alert.      Cranial Nerves: No cranial nerve deficit.   Psychiatric:      Comments: anxious         Fluids    Intake/Output Summary (Last 24 hours) at 9/15/2021 1850  Last data filed at 9/15/2021 1400  Gross per 24 hour   Intake 712 ml   Output --   Net 712 ml       Laboratory                        Imaging  MR-BRAIN-WITH & W/O   Final Result         No acute intracranial process.      Nonspecific T2 hyperintensities in the deep white matter related to chronic microvascular ischemia.      [id number] VIEW   Final Result      1.  No metallic foreign bodies detected.   2.  Right nephrolithiasis.      EC-ECHOCARDIOGRAM LTD W/ CONT   Final Result      CT-HEAD W/O   Final Result       1.  Cerebral atrophy and chronic microvascular ischemic type changes.   2.  No acute intracranial abnormality.      DX-CHEST-PORTABLE (1 VIEW)   Final Result      No acute cardiopulmonary abnormality.           Assessment/Plan  * Cognitive impairment- (present on admission)  Assessment & Plan  Likely underlying dementia  SLP Cognitive evaluation - severe-profound deficits across almost all cognitive domains tested. 6/11/2021    Case management for discharge planning - for Guardianship    Encephalopathy  Assessment & Plan  Likely toxic/metabolic etiology -- overall improved  Does have underlying dementia    Diarrhea  Assessment & Plan  COVID-19 and C. difficile negative  Imodium as needed    Onychomycosis- (present on admission)  Assessment & Plan  Terbinafine x 12 weeks  repeat LFTs and CBC on 9/26/2021 -- can be followed outpatient if discharged earlier  LPS previously consulted        Anemia- (present on admission)  Assessment & Plan  Chronic, no acute bleeding    Marijuana use- (present on admission)  Assessment & Plan  UDS positive    LANDY (acute kidney injury) (HCC)- (present on admission)  Assessment & Plan  resolved    Metabolic acidosis, increased anion gap (IAG)- (present on admission)  Assessment & Plan  resolved    Hypophosphatemia- (present on admission)  Assessment & Plan  resolved    Hypokalemia- (present on admission)  Assessment & Plan  resolved    Syncope- (present on admission)  Assessment & Plan  Workup negative on CT head, echo, MRI brain       VTE prophylaxis: SCDs/TEDs

## 2021-09-17 PROCEDURE — 700102 HCHG RX REV CODE 250 W/ 637 OVERRIDE(OP): Performed by: NURSE PRACTITIONER

## 2021-09-17 PROCEDURE — A9270 NON-COVERED ITEM OR SERVICE: HCPCS | Performed by: NURSE PRACTITIONER

## 2021-09-17 PROCEDURE — 770006 HCHG ROOM/CARE - MED/SURG/GYN SEMI*

## 2021-09-17 PROCEDURE — 700102 HCHG RX REV CODE 250 W/ 637 OVERRIDE(OP): Performed by: FAMILY MEDICINE

## 2021-09-17 PROCEDURE — A9270 NON-COVERED ITEM OR SERVICE: HCPCS | Performed by: FAMILY MEDICINE

## 2021-09-17 RX ADMIN — Medication 100 MG: at 08:26

## 2021-09-17 RX ADMIN — TERBINAFINE 250 MG: 250 TABLET ORAL at 08:26

## 2021-09-17 RX ADMIN — THERA TABS 1 TABLET: TAB at 08:25

## 2021-09-17 NOTE — PROGRESS NOTES
COVID-19 surge in effect.    Received report from NOC RN and assumed care of pt. Pt is A&Ox3, disoriented to situation. Pt ambulating up self to the nurse's station to make needs known. Pt reports no pain, or discomfort. POC discussed with pt; all questions answered. No other needs at this time. Bed locked in lowest position, call light within reach.

## 2021-09-17 NOTE — PROGRESS NOTES
COVID-19 surge in effect:      Received report from HEAVEN Eubanks and assumed care of pt. Pt A&OX2, disoriented to time and situation. Pt on RA.  Reorientation provided on the layout of pts room and where the bathroom is.  Pt resting in bed comfortably througout this RN shift. POC discussed. Denies further needs at this time. Bed locked and in lowest position. Bed alarm on, call light within reach & hourly rounding in place

## 2021-09-18 PROCEDURE — A9270 NON-COVERED ITEM OR SERVICE: HCPCS | Performed by: NURSE PRACTITIONER

## 2021-09-18 PROCEDURE — A9270 NON-COVERED ITEM OR SERVICE: HCPCS | Performed by: FAMILY MEDICINE

## 2021-09-18 PROCEDURE — 700102 HCHG RX REV CODE 250 W/ 637 OVERRIDE(OP): Performed by: FAMILY MEDICINE

## 2021-09-18 PROCEDURE — 700102 HCHG RX REV CODE 250 W/ 637 OVERRIDE(OP): Performed by: NURSE PRACTITIONER

## 2021-09-18 PROCEDURE — 770006 HCHG ROOM/CARE - MED/SURG/GYN SEMI*

## 2021-09-18 RX ADMIN — Medication 100 MG: at 08:01

## 2021-09-18 RX ADMIN — TERBINAFINE 250 MG: 250 TABLET ORAL at 09:49

## 2021-09-18 RX ADMIN — THERA TABS 1 TABLET: TAB at 08:01

## 2021-09-18 ASSESSMENT — FIBROSIS 4 INDEX: FIB4 SCORE: 1.2

## 2021-09-18 NOTE — PROGRESS NOTES
COVID-19 surge in effect:      Received report from HEAVEN Eubanks and assumed care of pt. Pt A&OX2, disoriented to time and situation. Pt on RA. Pt resting in bed, no signs of distress present. Pt up at nurses station earlier but now in bed. .   Denies further needs at this time. Bed locked and in lowest position. Bed alarm on, call light within reach & hourly rounding in place

## 2021-09-19 PROCEDURE — A9270 NON-COVERED ITEM OR SERVICE: HCPCS | Performed by: NURSE PRACTITIONER

## 2021-09-19 PROCEDURE — A9270 NON-COVERED ITEM OR SERVICE: HCPCS | Performed by: FAMILY MEDICINE

## 2021-09-19 PROCEDURE — 700102 HCHG RX REV CODE 250 W/ 637 OVERRIDE(OP): Performed by: NURSE PRACTITIONER

## 2021-09-19 PROCEDURE — 770006 HCHG ROOM/CARE - MED/SURG/GYN SEMI*

## 2021-09-19 PROCEDURE — 700102 HCHG RX REV CODE 250 W/ 637 OVERRIDE(OP): Performed by: FAMILY MEDICINE

## 2021-09-19 PROCEDURE — 99231 SBSQ HOSP IP/OBS SF/LOW 25: CPT | Performed by: HOSPITALIST

## 2021-09-19 RX ADMIN — Medication 100 MG: at 07:54

## 2021-09-19 RX ADMIN — THERA TABS 1 TABLET: TAB at 07:54

## 2021-09-19 RX ADMIN — TERBINAFINE 250 MG: 250 TABLET ORAL at 07:54

## 2021-09-19 ASSESSMENT — ENCOUNTER SYMPTOMS
CHILLS: 0
FEVER: 0
VOMITING: 0
ABDOMINAL PAIN: 0
NAUSEA: 0

## 2021-09-19 ASSESSMENT — PAIN DESCRIPTION - PAIN TYPE: TYPE: ACUTE PAIN

## 2021-09-19 NOTE — PROGRESS NOTES
COVID-19 surge in effect    Assumed care of patient this shift. Patient is alert and oriented to self only. Able to make his needs known. Denied complaints of pain this shift when asked. Up independently in room and to bathroom.

## 2021-09-19 NOTE — PROGRESS NOTES
[facility] Medicine Daily Progress Note    Date of Service  9/19/2021    Chief Complaint  Lane Beard is a 66 y.o. male admitted 6/4/2021 with AMS    Hospital Course  Mr. Beard is a 66-year-old male with no known PMHx who presented to the hospital on 6/4/2021 after he was found down on the sidewalk after multiple witnessed falls.  Patient was confused at the time of admission.  He had been seen earlier in the day for syncope while crossing the street however at that time left AGAINST MEDICAL ADVICE.  On admission he was tachycardic with a heart rate of 114, anion gap of 23 and a lactic acid of 2.6.  He had a CT scan of the head, which revealed no acute intracranial abnormalities.  Chest x-ray completed, which showed no acute cardiopulmonary process.  Patient was admitted for further evaluation of altered mental status and syncope.  Subsequent work up negative including MRI, and laboratory evaluation.neurology was consulted on the case as well as psychiatry.  SLP did a cognitive evaluation which showed severe deficits.    Interval Problem Update  Patient does not have any medical complaints, tolerating diet, no pain, ambulatory, he asks repeatedly where he has seen me before    I have personally seen and examined the patient at bedside. I discussed the plan of care with patient and bedside RN.     Consultants/Specialty  Neurology  Psychiatry  LPS    Code Status  Full Code    Disposition  Patient is medically cleared.   Anticipate discharge to guardianship and likely placement.  I have placed the appropriate orders for post-discharge needs.    Review of Systems  Review of Systems   Constitutional: Negative for chills and fever.   Cardiovascular: Negative for chest pain and leg swelling.   Gastrointestinal: Negative for abdominal pain, nausea and vomiting.        Physical Exam  Temp:  [36.2 °C (97.2 °F)-36.7 °C (98.1 °F)] 36.4 °C (97.6 °F)  Pulse:  [80-98] 80  Resp:  [16-18] 16  BP: (114-134)/(68-84)  134/81  SpO2:  [98 %] 98 %    Physical Exam  Constitutional:       Appearance: Normal appearance.   HENT:      Right Ear: External ear normal.      Left Ear: External ear normal.   Cardiovascular:      Rate and Rhythm: Normal rate and regular rhythm.      Heart sounds: No friction rub.   Pulmonary:      Effort: Pulmonary effort is normal. No respiratory distress.      Breath sounds: No wheezing.   Abdominal:      General: There is no distension.      Palpations: Abdomen is soft.      Tenderness: There is no abdominal tenderness. There is no guarding.   Musculoskeletal:         General: No swelling or tenderness. Normal range of motion.      Cervical back: Neck supple. No tenderness.      Comments: Fungal toenails   Skin:     General: Skin is warm and dry.   Neurological:      General: No focal deficit present.      Mental Status: He is alert.      Cranial Nerves: No cranial nerve deficit.      Comments: Oriented to self, not to location or date         Fluids    Intake/Output Summary (Last 24 hours) at 9/19/2021 1252  Last data filed at 9/19/2021 1000  Gross per 24 hour   Intake 600 ml   Output --   Net 600 ml       Laboratory                        Imaging  MR-BRAIN-WITH & W/O   Final Result         No acute intracranial process.      Nonspecific T2 hyperintensities in the deep white matter related to chronic microvascular ischemia.      [id number] VIEW   Final Result      1.  No metallic foreign bodies detected.   2.  Right nephrolithiasis.      EC-ECHOCARDIOGRAM LTD W/ CONT   Final Result      CT-HEAD W/O   Final Result      1.  Cerebral atrophy and chronic microvascular ischemic type changes.   2.  No acute intracranial abnormality.      DX-CHEST-PORTABLE (1 VIEW)   Final Result      No acute cardiopulmonary abnormality.           Assessment/Plan  * Cognitive impairment- (present on admission)  Assessment & Plan  Likely underlying dementia  Guardianship hearing 10/15/2020    Encephalopathy  Assessment &  Plan  Likely toxic/metabolic etiology -- overall improved  Does have underlying dementia    Diarrhea  Assessment & Plan  Imodium as needed    Onychomycosis- (present on admission)  Assessment & Plan  Terbinafine x 12 weeks  repeat LFTs and CBC on 9/26/2021 -- can be followed outpatient if discharged earlier  LPS previously consulted        Anemia- (present on admission)  Assessment & Plan  Chronic, no acute bleeding    Marijuana use- (present on admission)  Assessment & Plan  UDS positive    LANDY (acute kidney injury) (HCC)- (present on admission)  Assessment & Plan  resolved    Metabolic acidosis, increased anion gap (IAG)- (present on admission)  Assessment & Plan  resolved    Hypophosphatemia- (present on admission)  Assessment & Plan  resolved    Hypokalemia- (present on admission)  Assessment & Plan  resolved    Syncope- (present on admission)  Assessment & Plan  Workup negative on CT head, echo, MRI brain       VTE prophylaxis: SCDs/TEDs

## 2021-09-19 NOTE — PROGRESS NOTES
COVID-19 surge in effect.     Pt is A&Ox3, pt was only able to answer for the month, not day or day of the week. Pt on RA. No complaints of pain. Pt is able to ambulate independently. Bed locked and in lowest position, call light within reach. Pt has no other needs at this time.

## 2021-09-20 PROCEDURE — 700102 HCHG RX REV CODE 250 W/ 637 OVERRIDE(OP): Performed by: FAMILY MEDICINE

## 2021-09-20 PROCEDURE — 770006 HCHG ROOM/CARE - MED/SURG/GYN SEMI*

## 2021-09-20 PROCEDURE — 700102 HCHG RX REV CODE 250 W/ 637 OVERRIDE(OP): Performed by: NURSE PRACTITIONER

## 2021-09-20 PROCEDURE — A9270 NON-COVERED ITEM OR SERVICE: HCPCS | Performed by: NURSE PRACTITIONER

## 2021-09-20 PROCEDURE — A9270 NON-COVERED ITEM OR SERVICE: HCPCS | Performed by: FAMILY MEDICINE

## 2021-09-20 RX ADMIN — TERBINAFINE 250 MG: 250 TABLET ORAL at 09:04

## 2021-09-20 RX ADMIN — THERA TABS 1 TABLET: TAB at 09:04

## 2021-09-20 RX ADMIN — Medication 100 MG: at 09:04

## 2021-09-20 NOTE — DISCHARGE PLANNING
Anticipated Discharge Disposition: Group Home    Action: Patient is not orientated to date,  A&Ox3.  Patient is ambulatory, usually cooperative with staff, takes all his medications.  Spends the majority of the day at the nursing station interacting with other patients and staff.     Barriers to Discharge: Patient is a Level 1 on the Medicaid Group Home Waiver.     Plan: continue to monitor for discharge barriers    Patient's guardianship hearing is scheduled for 10/15/21 at 915.

## 2021-09-21 PROCEDURE — A9270 NON-COVERED ITEM OR SERVICE: HCPCS | Performed by: NURSE PRACTITIONER

## 2021-09-21 PROCEDURE — 700102 HCHG RX REV CODE 250 W/ 637 OVERRIDE(OP): Performed by: FAMILY MEDICINE

## 2021-09-21 PROCEDURE — 770006 HCHG ROOM/CARE - MED/SURG/GYN SEMI*

## 2021-09-21 PROCEDURE — A9270 NON-COVERED ITEM OR SERVICE: HCPCS | Performed by: FAMILY MEDICINE

## 2021-09-21 PROCEDURE — 700102 HCHG RX REV CODE 250 W/ 637 OVERRIDE(OP): Performed by: NURSE PRACTITIONER

## 2021-09-21 RX ADMIN — THERA TABS 1 TABLET: TAB at 08:11

## 2021-09-21 RX ADMIN — Medication 100 MG: at 08:11

## 2021-09-21 RX ADMIN — TERBINAFINE 250 MG: 250 TABLET ORAL at 08:11

## 2021-09-22 PROCEDURE — 770006 HCHG ROOM/CARE - MED/SURG/GYN SEMI*

## 2021-09-22 PROCEDURE — 700102 HCHG RX REV CODE 250 W/ 637 OVERRIDE(OP): Performed by: FAMILY MEDICINE

## 2021-09-22 PROCEDURE — A9270 NON-COVERED ITEM OR SERVICE: HCPCS | Performed by: FAMILY MEDICINE

## 2021-09-22 PROCEDURE — 700102 HCHG RX REV CODE 250 W/ 637 OVERRIDE(OP): Performed by: NURSE PRACTITIONER

## 2021-09-22 PROCEDURE — A9270 NON-COVERED ITEM OR SERVICE: HCPCS | Performed by: NURSE PRACTITIONER

## 2021-09-22 RX ADMIN — Medication 100 MG: at 08:34

## 2021-09-22 RX ADMIN — THERA TABS 1 TABLET: TAB at 08:34

## 2021-09-22 RX ADMIN — TERBINAFINE 250 MG: 250 TABLET ORAL at 08:33

## 2021-09-22 ASSESSMENT — PAIN DESCRIPTION - PAIN TYPE
TYPE: ACUTE PAIN
TYPE: ACUTE PAIN

## 2021-09-23 PROCEDURE — A9270 NON-COVERED ITEM OR SERVICE: HCPCS | Performed by: NURSE PRACTITIONER

## 2021-09-23 PROCEDURE — 700102 HCHG RX REV CODE 250 W/ 637 OVERRIDE(OP): Performed by: NURSE PRACTITIONER

## 2021-09-23 PROCEDURE — 770006 HCHG ROOM/CARE - MED/SURG/GYN SEMI*

## 2021-09-23 PROCEDURE — A9270 NON-COVERED ITEM OR SERVICE: HCPCS | Performed by: FAMILY MEDICINE

## 2021-09-23 PROCEDURE — 99231 SBSQ HOSP IP/OBS SF/LOW 25: CPT | Performed by: STUDENT IN AN ORGANIZED HEALTH CARE EDUCATION/TRAINING PROGRAM

## 2021-09-23 PROCEDURE — 700102 HCHG RX REV CODE 250 W/ 637 OVERRIDE(OP): Performed by: FAMILY MEDICINE

## 2021-09-23 RX ADMIN — THERA TABS 1 TABLET: TAB at 08:03

## 2021-09-23 RX ADMIN — Medication 100 MG: at 08:03

## 2021-09-23 RX ADMIN — TERBINAFINE 250 MG: 250 TABLET ORAL at 08:03

## 2021-09-23 ASSESSMENT — ENCOUNTER SYMPTOMS
FEVER: 0
ABDOMINAL PAIN: 0
VOMITING: 0
CHILLS: 0
NAUSEA: 0

## 2021-09-23 ASSESSMENT — PAIN DESCRIPTION - PAIN TYPE: TYPE: ACUTE PAIN

## 2021-09-23 NOTE — PROGRESS NOTES
COVID-19 surge in effect    Assumed care of pt after receiving report from dayshift RN. Pt is A&Ox 4 on RA. Pt denies pain at this time. Pt is up self w/ steady gait. Bed is locked and in lowest position, call light within reach, fall precautions in place. All needs met at this time.

## 2021-09-23 NOTE — PROGRESS NOTES
Hospital Medicine Daily Progress Note    Date of Service  9/23/2021    Chief Complaint  Lane Beard is a 66 y.o. male admitted 6/4/2021 with AMS    Hospital Course  Per chart reviews and interval updates:    Mr. Beard is a 66-year-old male with no known PMHx who presented to the hospital on 6/4/2021 after he was found down on the sidewalk after multiple witnessed falls.  Patient was confused at the time of admission.  He had been seen earlier in the day for syncope while crossing the street however at that time left AGAINST MEDICAL ADVICE.  On admission he was tachycardic with a heart rate of 114, anion gap of 23 and a lactic acid of 2.6.  He had a CT scan of the head, which revealed no acute intracranial abnormalities.  Chest x-ray completed, which showed no acute cardiopulmonary process.  Patient was admitted for further evaluation of altered mental status and syncope.  Subsequent work up negative including MRI, and laboratory evaluation. Neurology was consulted on the case as well as psychiatry.  SLP did a cognitive evaluation which showed severe deficits.    DC planning has been difficult as Pt needing a guardian and an eventual placement.       Interval Problem Update  9/23  Vitals wnl and afebrile  At bedside, no acute complain from patient except concerns about anti-fungal therapy that needing 3 months (till November). He wondered how he is going get meds in hospital gown in a cold weather. I tried explaining to him about a structured care setting.     I have personally seen and examined the patient at bedside. I discussed the plan of care with patient, bedside RN, charge RN and .     Consultants/Specialty  Neurology  Psychiatry  LPS    Code Status  Full Code    Disposition  Patient is medically cleared.   Anticipate discharge to guardianship and likely placement.  I have placed the appropriate orders for post-discharge needs.    Review of Systems  Review of Systems   Constitutional:  Negative for chills and fever.   Cardiovascular: Negative for chest pain and leg swelling.   Gastrointestinal: Negative for abdominal pain, nausea and vomiting.        Physical Exam  Temp:  [36.2 °C (97.1 °F)-36.4 °C (97.5 °F)] 36.2 °C (97.1 °F)  Pulse:  [83-92] 83  Resp:  [16-18] 16  BP: (119-127)/(74-84) 119/84  SpO2:  [98 %-100 %] 98 %    Physical Exam  Constitutional:       Appearance: Normal appearance.   HENT:      Right Ear: External ear normal.      Left Ear: External ear normal.   Cardiovascular:      Rate and Rhythm: Normal rate and regular rhythm.      Heart sounds: No friction rub.   Pulmonary:      Effort: Pulmonary effort is normal. No respiratory distress.      Breath sounds: No wheezing.   Abdominal:      General: There is no distension.      Palpations: Abdomen is soft.      Tenderness: There is no abdominal tenderness. There is no guarding.   Musculoskeletal:         General: No swelling or tenderness. Normal range of motion.      Cervical back: Neck supple. No tenderness.      Comments: Fungal toenails   Skin:     General: Skin is warm and dry.   Neurological:      General: No focal deficit present.      Mental Status: He is alert.      Cranial Nerves: No cranial nerve deficit.      Comments: Oriented to self, not to location or date         Fluids    Intake/Output Summary (Last 24 hours) at 9/23/2021 1622  Last data filed at 9/23/2021 1300  Gross per 24 hour   Intake 660 ml   Output --   Net 660 ml       Laboratory                        Imaging  MR-BRAIN-WITH & W/O   Final Result         No acute intracranial process.      Nonspecific T2 hyperintensities in the deep white matter related to chronic microvascular ischemia.      [id number] VIEW   Final Result      1.  No metallic foreign bodies detected.   2.  Right nephrolithiasis.      EC-ECHOCARDIOGRAM LTD W/ CONT   Final Result      CT-HEAD W/O   Final Result      1.  Cerebral atrophy and chronic microvascular ischemic type changes.   2.  No  acute intracranial abnormality.      DX-CHEST-PORTABLE (1 VIEW)   Final Result      No acute cardiopulmonary abnormality.           Assessment/Plan  * Cognitive impairment- (present on admission)  Assessment & Plan  Likely underlying dementia  Guardianship hearing 10/15/2020    Encephalopathy  Assessment & Plan  Likely toxic/metabolic etiology -- overall improved  Does have underlying dementia    Diarrhea  Assessment & Plan  Imodium as needed    Onychomycosis- (present on admission)  Assessment & Plan  Terbinafine x 12 weeks  repeat LFTs and CBC on 9/26/2021 -- can be followed outpatient if discharged earlier  LPS previously consulted        Anemia- (present on admission)  Assessment & Plan  Chronic, no acute bleeding    Marijuana use- (present on admission)  Assessment & Plan  UDS positive    LANDY (acute kidney injury) (HCC)- (present on admission)  Assessment & Plan  resolved    Metabolic acidosis, increased anion gap (IAG)- (present on admission)  Assessment & Plan  resolved    Hypophosphatemia- (present on admission)  Assessment & Plan  resolved    Hypokalemia- (present on admission)  Assessment & Plan  resolved    Syncope- (present on admission)  Assessment & Plan  Workup negative on CT head, echo, MRI brain       VTE prophylaxis: SCDs/TEDs

## 2021-09-24 PROCEDURE — 770006 HCHG ROOM/CARE - MED/SURG/GYN SEMI*

## 2021-09-24 PROCEDURE — A9270 NON-COVERED ITEM OR SERVICE: HCPCS | Performed by: NURSE PRACTITIONER

## 2021-09-24 PROCEDURE — A9270 NON-COVERED ITEM OR SERVICE: HCPCS | Performed by: FAMILY MEDICINE

## 2021-09-24 PROCEDURE — 700102 HCHG RX REV CODE 250 W/ 637 OVERRIDE(OP): Performed by: FAMILY MEDICINE

## 2021-09-24 PROCEDURE — 700102 HCHG RX REV CODE 250 W/ 637 OVERRIDE(OP): Performed by: NURSE PRACTITIONER

## 2021-09-24 RX ADMIN — TERBINAFINE 250 MG: 250 TABLET ORAL at 08:36

## 2021-09-24 RX ADMIN — THERA TABS 1 TABLET: TAB at 08:35

## 2021-09-24 RX ADMIN — Medication 100 MG: at 08:36

## 2021-09-24 ASSESSMENT — PAIN DESCRIPTION - PAIN TYPE: TYPE: ACUTE PAIN

## 2021-09-24 NOTE — PROGRESS NOTES
COVID-19 surge in effect.    Assumed care of pt after receiving report, pt denies pain at this time. Pt resting in bed. Bed is locked and in lowest position, call light within reach, fall precautions in place. All needs met at this time.

## 2021-09-25 PROCEDURE — A9270 NON-COVERED ITEM OR SERVICE: HCPCS | Performed by: FAMILY MEDICINE

## 2021-09-25 PROCEDURE — 770006 HCHG ROOM/CARE - MED/SURG/GYN SEMI*

## 2021-09-25 PROCEDURE — 700102 HCHG RX REV CODE 250 W/ 637 OVERRIDE(OP): Performed by: FAMILY MEDICINE

## 2021-09-25 PROCEDURE — 700102 HCHG RX REV CODE 250 W/ 637 OVERRIDE(OP): Performed by: NURSE PRACTITIONER

## 2021-09-25 PROCEDURE — A9270 NON-COVERED ITEM OR SERVICE: HCPCS | Performed by: NURSE PRACTITIONER

## 2021-09-25 RX ADMIN — TERBINAFINE 250 MG: 250 TABLET ORAL at 09:56

## 2021-09-25 RX ADMIN — Medication 100 MG: at 09:56

## 2021-09-25 RX ADMIN — THERA TABS 1 TABLET: TAB at 09:56

## 2021-09-25 ASSESSMENT — PAIN DESCRIPTION - PAIN TYPE
TYPE: ACUTE PAIN
TYPE: ACUTE PAIN

## 2021-09-26 PROCEDURE — A9270 NON-COVERED ITEM OR SERVICE: HCPCS | Performed by: FAMILY MEDICINE

## 2021-09-26 PROCEDURE — 700102 HCHG RX REV CODE 250 W/ 637 OVERRIDE(OP): Performed by: NURSE PRACTITIONER

## 2021-09-26 PROCEDURE — 770006 HCHG ROOM/CARE - MED/SURG/GYN SEMI*

## 2021-09-26 PROCEDURE — 700102 HCHG RX REV CODE 250 W/ 637 OVERRIDE(OP): Performed by: FAMILY MEDICINE

## 2021-09-26 PROCEDURE — A9270 NON-COVERED ITEM OR SERVICE: HCPCS | Performed by: NURSE PRACTITIONER

## 2021-09-26 PROCEDURE — 99231 SBSQ HOSP IP/OBS SF/LOW 25: CPT | Performed by: STUDENT IN AN ORGANIZED HEALTH CARE EDUCATION/TRAINING PROGRAM

## 2021-09-26 RX ADMIN — Medication 100 MG: at 08:13

## 2021-09-26 RX ADMIN — THERA TABS 1 TABLET: TAB at 08:13

## 2021-09-26 RX ADMIN — TERBINAFINE 250 MG: 250 TABLET ORAL at 08:14

## 2021-09-26 ASSESSMENT — ENCOUNTER SYMPTOMS
NAUSEA: 0
CHILLS: 0
FEVER: 0
ABDOMINAL PAIN: 0
VOMITING: 0

## 2021-09-26 NOTE — PROGRESS NOTES
[facility] Medicine Daily Progress Note    Date of Service  9/26/2021    Chief Complaint  Lane Beard is a 66 y.o. male admitted 6/4/2021 with AMS    Hospital Course  Per chart reviews and interval updates:    Mr. Beard is a 66-year-old male with no known PMHx who presented to the hospital on 6/4/2021 after he was found down on the sidewalk after multiple witnessed falls.  Patient was confused at the time of admission.  He had been seen earlier in the day for syncope while crossing the street however at that time left AGAINST MEDICAL ADVICE.  On admission he was tachycardic with a heart rate of 114, anion gap of 23 and a lactic acid of 2.6.  He had a CT scan of the head, which revealed no acute intracranial abnormalities.  Chest x-ray completed, which showed no acute cardiopulmonary process.  Patient was admitted for further evaluation of altered mental status and syncope.  Subsequent work up negative including MRI, and laboratory evaluation. Neurology was consulted on the case as well as psychiatry.  SLP did a cognitive evaluation which showed severe deficits.    DC planning has been difficult as Pt needing a guardian and an eventual placement.       Interval Problem Update  9/23  Vitals wnl and afebrile  No acute events per nursing report.   At bedside, watching TV. No acute complain from patient.     I have personally seen and examined the patient at bedside. I discussed the plan of care with patient, bedside RN, charge RN and .     Consultants/Specialty  Neurology  Psychiatry  LPS    Code Status  Full Code    Disposition  Patient is medically cleared.   Anticipate discharge to guardianship and likely placement.  I have placed the appropriate orders for post-discharge needs.    Review of Systems  Review of Systems   Constitutional: Negative for chills and fever.   Cardiovascular: Negative for chest pain and leg swelling.   Gastrointestinal: Negative for abdominal pain, nausea and vomiting.         Physical Exam  Temp:  [36.2 °C (97.1 °F)-36.4 °C (97.6 °F)] 36.4 °C (97.6 °F)  Pulse:  [67-71] 67  Resp:  [16-17] 17  BP: (100-127)/(66-73) 115/73  SpO2:  [97 %-100 %] 97 %    Physical Exam  Constitutional:       Appearance: Normal appearance.   HENT:      Right Ear: External ear normal.      Left Ear: External ear normal.   Cardiovascular:      Rate and Rhythm: Normal rate and regular rhythm.      Heart sounds: No friction rub.   Pulmonary:      Effort: Pulmonary effort is normal. No respiratory distress.      Breath sounds: No wheezing.   Abdominal:      General: There is no distension.      Palpations: Abdomen is soft.      Tenderness: There is no abdominal tenderness. There is no guarding.   Musculoskeletal:         General: No swelling or tenderness. Normal range of motion.      Cervical back: Neck supple. No tenderness.      Comments: Fungal toenails   Skin:     General: Skin is warm and dry.   Neurological:      General: No focal deficit present.      Mental Status: He is alert.      Cranial Nerves: No cranial nerve deficit.      Comments: Oriented to self, not to location or date         Fluids    Intake/Output Summary (Last 24 hours) at 9/26/2021 1302  Last data filed at 9/26/2021 0802  Gross per 24 hour   Intake 720 ml   Output --   Net 720 ml       Laboratory                        Imaging  MR-BRAIN-WITH & W/O   Final Result         No acute intracranial process.      Nonspecific T2 hyperintensities in the deep white matter related to chronic microvascular ischemia.      [id number] VIEW   Final Result      1.  No metallic foreign bodies detected.   2.  Right nephrolithiasis.      EC-ECHOCARDIOGRAM LTD W/ CONT   Final Result      CT-HEAD W/O   Final Result      1.  Cerebral atrophy and chronic microvascular ischemic type changes.   2.  No acute intracranial abnormality.      DX-CHEST-PORTABLE (1 VIEW)   Final Result      No acute cardiopulmonary abnormality.           Assessment/Plan  * Cognitive  impairment- (present on admission)  Assessment & Plan  Likely underlying dementia  Guardianship hearing 10/15/2020    Encephalopathy  Assessment & Plan  Likely toxic/metabolic etiology -- overall improved  Does have underlying dementia    Diarrhea  Assessment & Plan  Imodium as needed    Onychomycosis- (present on admission)  Assessment & Plan  Terbinafine x 12 weeks  repeat LFTs and CBC on 9/26/2021 -- can be followed outpatient if discharged earlier  LPS previously consulted        Anemia- (present on admission)  Assessment & Plan  Chronic, no acute bleeding    Marijuana use- (present on admission)  Assessment & Plan  UDS positive    LANDY (acute kidney injury) (HCC)- (present on admission)  Assessment & Plan  resolved    Metabolic acidosis, increased anion gap (IAG)- (present on admission)  Assessment & Plan  resolved    Hypophosphatemia- (present on admission)  Assessment & Plan  resolved    Hypokalemia- (present on admission)  Assessment & Plan  resolved    Syncope- (present on admission)  Assessment & Plan  Workup negative on CT head, echo, MRI brain       VTE prophylaxis: SCDs/TEDs

## 2021-09-27 PROCEDURE — A9270 NON-COVERED ITEM OR SERVICE: HCPCS | Performed by: FAMILY MEDICINE

## 2021-09-27 PROCEDURE — 700102 HCHG RX REV CODE 250 W/ 637 OVERRIDE(OP): Performed by: FAMILY MEDICINE

## 2021-09-27 PROCEDURE — A9270 NON-COVERED ITEM OR SERVICE: HCPCS | Performed by: NURSE PRACTITIONER

## 2021-09-27 PROCEDURE — 700102 HCHG RX REV CODE 250 W/ 637 OVERRIDE(OP): Performed by: NURSE PRACTITIONER

## 2021-09-27 PROCEDURE — 770006 HCHG ROOM/CARE - MED/SURG/GYN SEMI*

## 2021-09-27 RX ADMIN — Medication 100 MG: at 08:10

## 2021-09-27 RX ADMIN — THERA TABS 1 TABLET: TAB at 08:10

## 2021-09-27 RX ADMIN — TERBINAFINE 250 MG: 250 TABLET ORAL at 08:10

## 2021-09-27 NOTE — PROGRESS NOTES
COVID-19 surge in effect    Patient alert. VSS. RA. No complaints of pain. Pt safe with needs met. No s/sx of acute distress. Guardianship hearing scheduled for 10-15-21.

## 2021-09-28 PROCEDURE — A9270 NON-COVERED ITEM OR SERVICE: HCPCS | Performed by: FAMILY MEDICINE

## 2021-09-28 PROCEDURE — 700102 HCHG RX REV CODE 250 W/ 637 OVERRIDE(OP): Performed by: NURSE PRACTITIONER

## 2021-09-28 PROCEDURE — A9270 NON-COVERED ITEM OR SERVICE: HCPCS | Performed by: NURSE PRACTITIONER

## 2021-09-28 PROCEDURE — 700102 HCHG RX REV CODE 250 W/ 637 OVERRIDE(OP): Performed by: FAMILY MEDICINE

## 2021-09-28 PROCEDURE — 770006 HCHG ROOM/CARE - MED/SURG/GYN SEMI*

## 2021-09-28 RX ADMIN — TERBINAFINE 250 MG: 250 TABLET ORAL at 08:09

## 2021-09-28 RX ADMIN — Medication 100 MG: at 08:09

## 2021-09-28 RX ADMIN — THERA TABS 1 TABLET: TAB at 08:09

## 2021-09-29 PROCEDURE — A9270 NON-COVERED ITEM OR SERVICE: HCPCS | Performed by: NURSE PRACTITIONER

## 2021-09-29 PROCEDURE — 700102 HCHG RX REV CODE 250 W/ 637 OVERRIDE(OP): Performed by: FAMILY MEDICINE

## 2021-09-29 PROCEDURE — A9270 NON-COVERED ITEM OR SERVICE: HCPCS | Performed by: FAMILY MEDICINE

## 2021-09-29 PROCEDURE — 99231 SBSQ HOSP IP/OBS SF/LOW 25: CPT | Performed by: HOSPITALIST

## 2021-09-29 PROCEDURE — 700102 HCHG RX REV CODE 250 W/ 637 OVERRIDE(OP): Performed by: NURSE PRACTITIONER

## 2021-09-29 PROCEDURE — 770006 HCHG ROOM/CARE - MED/SURG/GYN SEMI*

## 2021-09-29 RX ADMIN — THERA TABS 1 TABLET: TAB at 07:47

## 2021-09-29 RX ADMIN — TERBINAFINE 250 MG: 250 TABLET ORAL at 07:46

## 2021-09-29 RX ADMIN — Medication 100 MG: at 07:47

## 2021-09-29 ASSESSMENT — PAIN DESCRIPTION - PAIN TYPE: TYPE: ACUTE PAIN

## 2021-09-29 ASSESSMENT — ENCOUNTER SYMPTOMS
HEADACHES: 0
NAUSEA: 0
ABDOMINAL PAIN: 0
FEVER: 0
VOMITING: 0
CHILLS: 0

## 2021-09-29 NOTE — PROGRESS NOTES
Hospital Medicine Daily Progress Note    Date of Service  9/29/2021    Chief Complaint  Lane Beard is a 66 y.o. male admitted 6/4/2021 with AMS    Hospital Course  Per chart reviews and interval updates:    Mr. Beard is a 66-year-old male with no known PMHx who presented to the hospital on 6/4/2021 after he was found down on the sidewalk after multiple witnessed falls.  Patient was confused at the time of admission.  He had been seen earlier in the day for syncope while crossing the street however at that time left AGAINST MEDICAL ADVICE.  On admission he was tachycardic with a heart rate of 114, anion gap of 23 and a lactic acid of 2.6.  He had a CT scan of the head, which revealed no acute intracranial abnormalities.  Chest x-ray completed, which showed no acute cardiopulmonary process.  Patient was admitted for further evaluation of altered mental status and syncope.  Subsequent work up negative including MRI, and laboratory evaluation. Neurology was consulted on the case as well as psychiatry.  SLP did a cognitive evaluation which showed severe deficits.    DC planning has been difficult as Pt needing a guardian and an eventual placement.       Interval Problem Update  9/23 Vitals wnl and afebrile  No acute events per nursing report.   At bedside, watching TV. No acute complain from patient.    9/29 forgetful but otherwise doing well.    I have personally seen and examined the patient at bedside. I discussed the plan of care with patient, bedside RN, charge RN and .     Consultants/Specialty  Neurology  Psychiatry  LPS    Code Status  Full Code    Disposition  Patient is medically cleared.   Anticipate discharge to guardianship and likely placement.  Guardianship hearing 10/15/2021.  I have placed the appropriate orders for post-discharge needs.    Review of Systems  Review of Systems   Constitutional: Negative for chills and fever.   Cardiovascular: Negative for chest pain.    Gastrointestinal: Negative for abdominal pain, nausea and vomiting.   Neurological: Negative for headaches.        Physical Exam  Temp:  [36.2 °C (97.2 °F)-36.7 °C (98 °F)] 36.6 °C (97.8 °F)  Pulse:  [73-82] 82  Resp:  [17-18] 17  BP: (109-130)/(62-74) 110/74  SpO2:  [96 %-99 %] 97 %    Physical Exam  Constitutional:       Appearance: Normal appearance.   HENT:      Right Ear: External ear normal.      Left Ear: External ear normal.   Cardiovascular:      Rate and Rhythm: Normal rate and regular rhythm.      Heart sounds: No friction rub.   Pulmonary:      Effort: Pulmonary effort is normal. No respiratory distress.   Abdominal:      General: There is no distension.      Palpations: Abdomen is soft.      Tenderness: There is no abdominal tenderness. There is no guarding.   Musculoskeletal:         General: No swelling or tenderness. Normal range of motion.      Cervical back: Neck supple. No tenderness.      Comments: Fungal toenails   Skin:     General: Skin is warm and dry.   Neurological:      Mental Status: He is alert. He is disoriented.      Comments: Oriented to self, not to location or date   Psychiatric:         Cognition and Memory: Memory is impaired.         Fluids    Intake/Output Summary (Last 24 hours) at 9/29/2021 1317  Last data filed at 9/29/2021 0925  Gross per 24 hour   Intake 480 ml   Output --   Net 480 ml       Laboratory                        Imaging  MR-BRAIN-WITH & W/O   Final Result         No acute intracranial process.      Nonspecific T2 hyperintensities in the deep white matter related to chronic microvascular ischemia.      FY-MVEAKOM-3 VIEW   Final Result      1.  No metallic foreign bodies detected.   2.  Right nephrolithiasis.      EC-ECHOCARDIOGRAM LTD W/ CONT   Final Result      CT-HEAD W/O   Final Result      1.  Cerebral atrophy and chronic microvascular ischemic type changes.   2.  No acute intracranial abnormality.      DX-CHEST-PORTABLE (1 VIEW)   Final Result      No  acute cardiopulmonary abnormality.           Assessment/Plan  * Cognitive impairment- (present on admission)  Assessment & Plan  Likely underlying dementia  No reversible causes found  Guardianship hearing 10/15/2021    Encephalopathy  Assessment & Plan  Likely toxic/metabolic etiology -- overall improved  Does have underlying dementia    Diarrhea  Assessment & Plan  Imodium as needed    Onychomycosis- (present on admission)  Assessment & Plan  Terbinafine x 12 weeks  repeat LFTs and CBC on 9/26/2021 -- can be followed outpatient if discharged earlier  LPS previously consulted        Anemia- (present on admission)  Assessment & Plan  Chronic, no acute bleeding    Marijuana use- (present on admission)  Assessment & Plan  UDS positive    LANDY (acute kidney injury) (HCC)- (present on admission)  Assessment & Plan  resolved    Metabolic acidosis, increased anion gap (IAG)- (present on admission)  Assessment & Plan  resolved    Hypophosphatemia- (present on admission)  Assessment & Plan  resolved    Hypokalemia- (present on admission)  Assessment & Plan  resolved    Syncope- (present on admission)  Assessment & Plan  Workup negative on CT head, echo, MRI brain       VTE prophylaxis: SCDs/TEDs

## 2021-09-29 NOTE — PROGRESS NOTES
COVID-19 surge in effect.    Assumed care of pt at shift change. Pt is on RA with no signs of acute distress. Denies any pain. Ambulates around the unit, with steady gait.Call light and personal belongings by bedside. Bed locked and in lowest position. Hourly rounding in place.

## 2021-09-29 NOTE — PROGRESS NOTES
COVID-19 surge in effect    Pt alert. VSS. RA. Pleasant and cooperative with staff and other residents. No s/sx of acute distress.

## 2021-09-30 PROCEDURE — 700102 HCHG RX REV CODE 250 W/ 637 OVERRIDE(OP): Performed by: FAMILY MEDICINE

## 2021-09-30 PROCEDURE — 770006 HCHG ROOM/CARE - MED/SURG/GYN SEMI*

## 2021-09-30 PROCEDURE — A9270 NON-COVERED ITEM OR SERVICE: HCPCS | Performed by: FAMILY MEDICINE

## 2021-09-30 PROCEDURE — A9270 NON-COVERED ITEM OR SERVICE: HCPCS | Performed by: NURSE PRACTITIONER

## 2021-09-30 PROCEDURE — 700102 HCHG RX REV CODE 250 W/ 637 OVERRIDE(OP): Performed by: NURSE PRACTITIONER

## 2021-09-30 RX ADMIN — Medication 100 MG: at 07:47

## 2021-09-30 RX ADMIN — TERBINAFINE 250 MG: 250 TABLET ORAL at 07:47

## 2021-09-30 RX ADMIN — THERA TABS 1 TABLET: TAB at 07:47

## 2021-09-30 ASSESSMENT — PAIN DESCRIPTION - PAIN TYPE: TYPE: ACUTE PAIN

## 2021-09-30 NOTE — PROGRESS NOTES
COVID-19 surge in effect.     Alert, up and ambulating round the unit. Denies any pain or distress. Scheduled morning medications administered. WG to LYoan ankle. Bed locked and in lowest position. Hourly rounding in place.

## 2021-09-30 NOTE — PROGRESS NOTES
Pt is resting in bed. Denied pain. Denied further needs, able to make needs known. Call light and belongings within reach.    COVID-19 surge in effect.

## 2021-10-01 PROCEDURE — A9270 NON-COVERED ITEM OR SERVICE: HCPCS | Performed by: FAMILY MEDICINE

## 2021-10-01 PROCEDURE — A9270 NON-COVERED ITEM OR SERVICE: HCPCS | Performed by: NURSE PRACTITIONER

## 2021-10-01 PROCEDURE — 770006 HCHG ROOM/CARE - MED/SURG/GYN SEMI*

## 2021-10-01 PROCEDURE — 700102 HCHG RX REV CODE 250 W/ 637 OVERRIDE(OP): Performed by: FAMILY MEDICINE

## 2021-10-01 PROCEDURE — 700102 HCHG RX REV CODE 250 W/ 637 OVERRIDE(OP): Performed by: NURSE PRACTITIONER

## 2021-10-01 RX ADMIN — TERBINAFINE 250 MG: 250 TABLET ORAL at 07:20

## 2021-10-01 RX ADMIN — THERA TABS 1 TABLET: TAB at 07:20

## 2021-10-01 RX ADMIN — Medication 100 MG: at 07:20

## 2021-10-01 NOTE — PROGRESS NOTES
COVID-19 surge in effect.      Pt up and ambulating around the unit. Denies any pain. Scheduled medications administered. Refused breakfast tray, new tray ordered for pt. All needs met at this time. Hourly rounding in place.

## 2021-10-02 PROCEDURE — 99231 SBSQ HOSP IP/OBS SF/LOW 25: CPT | Performed by: HOSPITALIST

## 2021-10-02 PROCEDURE — A9270 NON-COVERED ITEM OR SERVICE: HCPCS | Performed by: NURSE PRACTITIONER

## 2021-10-02 PROCEDURE — 770006 HCHG ROOM/CARE - MED/SURG/GYN SEMI*

## 2021-10-02 PROCEDURE — A9270 NON-COVERED ITEM OR SERVICE: HCPCS | Performed by: FAMILY MEDICINE

## 2021-10-02 PROCEDURE — 700102 HCHG RX REV CODE 250 W/ 637 OVERRIDE(OP): Performed by: FAMILY MEDICINE

## 2021-10-02 PROCEDURE — 700102 HCHG RX REV CODE 250 W/ 637 OVERRIDE(OP): Performed by: NURSE PRACTITIONER

## 2021-10-02 RX ADMIN — Medication 100 MG: at 07:59

## 2021-10-02 RX ADMIN — THERA TABS 1 TABLET: TAB at 08:00

## 2021-10-02 RX ADMIN — TERBINAFINE 250 MG: 250 TABLET ORAL at 07:59

## 2021-10-02 ASSESSMENT — ENCOUNTER SYMPTOMS
NAUSEA: 0
HEADACHES: 0
VOMITING: 0
FEVER: 0
SHORTNESS OF BREATH: 0
ABDOMINAL PAIN: 0

## 2021-10-02 NOTE — PROGRESS NOTES
COVID-19 surge in effect    Pt. Seen in bed resting comfortably, WG noted on L ankle. Pt. Denies any complaint of pain.

## 2021-10-02 NOTE — PROGRESS NOTES
[facility] Medicine Daily Progress Note    Date of Service  10/2/2021    Chief Complaint  Lane Beard is a 66 y.o. male admitted 6/4/2021 with AMS    Hospital Course  Per chart reviews and interval updates:    Mr. Beard is a 66-year-old male with no known PMHx who presented to the hospital on 6/4/2021 after he was found down on the sidewalk after multiple witnessed falls.  Patient was confused at the time of admission.  He had been seen earlier in the day for syncope while crossing the street however at that time left AGAINST MEDICAL ADVICE.  On admission he was tachycardic with a heart rate of 114, anion gap of 23 and a lactic acid of 2.6.  He had a CT scan of the head, which revealed no acute intracranial abnormalities.  Chest x-ray completed, which showed no acute cardiopulmonary process.  Patient was admitted for further evaluation of altered mental status and syncope.  Subsequent work up negative including MRI, and laboratory evaluation. Neurology was consulted on the case as well as psychiatry.  SLP did a cognitive evaluation which showed severe deficits.    DC planning has been difficult as Pt needing a guardian and an eventual placement.       Interval Problem Update  9/23 Vitals wnl and afebrile  No acute events per nursing report.   At bedside, watching TV. No acute complain from patient.    9/29 forgetful but otherwise doing well.    10/2 Pt remembered that last night nurses came in checked on his leg (wander guard).    I have personally seen and examined the patient at bedside. I discussed the plan of care with patient, bedside RN, charge RN and .     Consultants/Specialty  Neurology  Psychiatry  LPS    Code Status  Full Code    Disposition  Patient is medically cleared.   Anticipate discharge to guardianship and likely placement.  Guardianship hearing 10/15/2021.  I have placed the appropriate orders for post-discharge needs.    Review of Systems  Review of Systems   Constitutional:  Negative for fever.   Respiratory: Negative for shortness of breath.    Cardiovascular: Negative for chest pain.   Gastrointestinal: Negative for abdominal pain, nausea and vomiting.   Neurological: Negative for headaches.        Physical Exam  Temp:  [36.4 °C (97.5 °F)-36.7 °C (98 °F)] 36.4 °C (97.5 °F)  Pulse:  [80-90] 80  Resp:  [16-20] 17  BP: (107-120)/(68-83) 113/72  SpO2:  [96 %-98 %] 96 %    Physical Exam  Constitutional:       Appearance: Normal appearance.   HENT:      Right Ear: External ear normal.      Left Ear: External ear normal.   Cardiovascular:      Rate and Rhythm: Normal rate and regular rhythm.      Heart sounds: No friction rub.   Pulmonary:      Effort: Pulmonary effort is normal. No respiratory distress.   Abdominal:      General: There is no distension.      Palpations: Abdomen is soft.      Tenderness: There is no abdominal tenderness.   Musculoskeletal:         General: No swelling or tenderness.      Cervical back: Neck supple. No tenderness.      Comments: Fungal toenails   Skin:     General: Skin is warm and dry.   Neurological:      Mental Status: He is alert. He is disoriented.      Comments: Oriented to self, not to location or date   Psychiatric:         Cognition and Memory: Memory is impaired.         Fluids    Intake/Output Summary (Last 24 hours) at 10/2/2021 1050  Last data filed at 10/2/2021 0740  Gross per 24 hour   Intake 1340 ml   Output --   Net 1340 ml       Laboratory                        Imaging  MR-BRAIN-WITH & W/O   Final Result         No acute intracranial process.      Nonspecific T2 hyperintensities in the deep white matter related to chronic microvascular ischemia.      HJ-WOQIEZC-0 VIEW   Final Result      1.  No metallic foreign bodies detected.   2.  Right nephrolithiasis.      EC-ECHOCARDIOGRAM LTD W/ CONT   Final Result      CT-HEAD W/O   Final Result      1.  Cerebral atrophy and chronic microvascular ischemic type changes.   2.  No acute intracranial  abnormality.      DX-CHEST-PORTABLE (1 VIEW)   Final Result      No acute cardiopulmonary abnormality.           Assessment/Plan  * Cognitive impairment- (present on admission)  Assessment & Plan  Likely underlying dementia  No reversible causes found  Guardianship hearing 10/15/2021    Encephalopathy  Assessment & Plan  Likely toxic/metabolic etiology -- overall improved  Does have underlying dementia    Diarrhea  Assessment & Plan  Imodium as needed    Onychomycosis- (present on admission)  Assessment & Plan  Terbinafine x 12 weeks  repeat LFTs and CBC on 9/26/2021 -- can be followed outpatient if discharged earlier  LPS previously consulted for nail care        Anemia- (present on admission)  Assessment & Plan  Chronic, no acute bleeding    Marijuana use- (present on admission)  Assessment & Plan  UDS positive    LANDY (acute kidney injury) (HCC)- (present on admission)  Assessment & Plan  resolved    Metabolic acidosis, increased anion gap (IAG)- (present on admission)  Assessment & Plan  resolved    Hypophosphatemia- (present on admission)  Assessment & Plan  resolved    Hypokalemia- (present on admission)  Assessment & Plan  resolved    Syncope- (present on admission)  Assessment & Plan  Workup negative on CT head, echo, MRI brain       VTE prophylaxis: SCDs/TEDs

## 2021-10-02 NOTE — PROGRESS NOTES
COVID-19 surge in effect.    Pt is up-self. Ambulated around the unit with steady gait. All pt needs met at this time.

## 2021-10-03 PROCEDURE — 770006 HCHG ROOM/CARE - MED/SURG/GYN SEMI*

## 2021-10-03 PROCEDURE — 700102 HCHG RX REV CODE 250 W/ 637 OVERRIDE(OP): Performed by: NURSE PRACTITIONER

## 2021-10-03 PROCEDURE — A9270 NON-COVERED ITEM OR SERVICE: HCPCS | Performed by: FAMILY MEDICINE

## 2021-10-03 PROCEDURE — A9270 NON-COVERED ITEM OR SERVICE: HCPCS | Performed by: NURSE PRACTITIONER

## 2021-10-03 PROCEDURE — 700102 HCHG RX REV CODE 250 W/ 637 OVERRIDE(OP): Performed by: FAMILY MEDICINE

## 2021-10-03 RX ADMIN — THERA TABS 1 TABLET: TAB at 07:08

## 2021-10-03 RX ADMIN — Medication 100 MG: at 07:08

## 2021-10-03 RX ADMIN — TERBINAFINE 250 MG: 250 TABLET ORAL at 07:08

## 2021-10-03 NOTE — PROGRESS NOTES
COVID-19 surge in effect.    Pt. Received in bed resting, no complaints of pain. WG noted intact and active on L ankle. No other needs at this time.

## 2021-10-04 PROCEDURE — A9270 NON-COVERED ITEM OR SERVICE: HCPCS | Performed by: FAMILY MEDICINE

## 2021-10-04 PROCEDURE — 700102 HCHG RX REV CODE 250 W/ 637 OVERRIDE(OP): Performed by: FAMILY MEDICINE

## 2021-10-04 PROCEDURE — 770006 HCHG ROOM/CARE - MED/SURG/GYN SEMI*

## 2021-10-04 PROCEDURE — 700102 HCHG RX REV CODE 250 W/ 637 OVERRIDE(OP): Performed by: NURSE PRACTITIONER

## 2021-10-04 PROCEDURE — A9270 NON-COVERED ITEM OR SERVICE: HCPCS | Performed by: NURSE PRACTITIONER

## 2021-10-04 RX ADMIN — Medication 100 MG: at 11:14

## 2021-10-04 RX ADMIN — TERBINAFINE 250 MG: 250 TABLET ORAL at 11:13

## 2021-10-04 RX ADMIN — THERA TABS 1 TABLET: TAB at 11:14

## 2021-10-04 ASSESSMENT — PAIN DESCRIPTION - PAIN TYPE: TYPE: ACUTE PAIN

## 2021-10-04 NOTE — PROGRESS NOTES
COVID 19 surge in effect.     Pt A&Ox 4. Pt has no complaints of pain, headache or nausea.   Pt is resting in bed. WG on L ankle. Bed lowest locked position. Pt independent to bathroom. No other needs at this time.     Three small red spots and scab noted on R knee. Blanching, healing from fall.

## 2021-10-04 NOTE — PROGRESS NOTES
I certify that the patient requires continued necessary hospital services severe cognitive impairment with and lack of capacity awaiting guardianship and will remain in the hospital for 30 days. Discharge plan is anticipated to be group home.

## 2021-10-05 PROCEDURE — 700102 HCHG RX REV CODE 250 W/ 637 OVERRIDE(OP): Performed by: FAMILY MEDICINE

## 2021-10-05 PROCEDURE — 770006 HCHG ROOM/CARE - MED/SURG/GYN SEMI*

## 2021-10-05 PROCEDURE — A9270 NON-COVERED ITEM OR SERVICE: HCPCS | Performed by: FAMILY MEDICINE

## 2021-10-05 PROCEDURE — A9270 NON-COVERED ITEM OR SERVICE: HCPCS | Performed by: NURSE PRACTITIONER

## 2021-10-05 PROCEDURE — 99232 SBSQ HOSP IP/OBS MODERATE 35: CPT | Performed by: STUDENT IN AN ORGANIZED HEALTH CARE EDUCATION/TRAINING PROGRAM

## 2021-10-05 PROCEDURE — 700102 HCHG RX REV CODE 250 W/ 637 OVERRIDE(OP): Performed by: NURSE PRACTITIONER

## 2021-10-05 RX ADMIN — THERA TABS 1 TABLET: TAB at 08:22

## 2021-10-05 RX ADMIN — Medication 100 MG: at 08:22

## 2021-10-05 RX ADMIN — TERBINAFINE 250 MG: 250 TABLET ORAL at 08:22

## 2021-10-05 ASSESSMENT — ENCOUNTER SYMPTOMS
SHORTNESS OF BREATH: 0
VOMITING: 0
NAUSEA: 0
HEADACHES: 0
ABDOMINAL PAIN: 0

## 2021-10-05 ASSESSMENT — PAIN DESCRIPTION - PAIN TYPE
TYPE: ACUTE PAIN
TYPE: ACUTE PAIN

## 2021-10-05 NOTE — PROGRESS NOTES
[facility] Medicine Daily Progress Note    Date of Service  10/5/2021    Chief Complaint  Lane Beard is a 66 y.o. male admitted 6/4/2021 with AMS    Hospital Course  Per chart reviews and interval updates:    Mr. Beard is a 66-year-old male with no known PMHx who presented to the hospital on 6/4/2021 after he was found down on the sidewalk after multiple witnessed falls.  Patient was confused at the time of admission.  He had been seen earlier in the day for syncope while crossing the street however at that time left AGAINST MEDICAL ADVICE.  On admission he was tachycardic with a heart rate of 114, anion gap of 23 and a lactic acid of 2.6.  He had a CT scan of the head, which revealed no acute intracranial abnormalities.  Chest x-ray completed, which showed no acute cardiopulmonary process.  Patient was admitted for further evaluation of altered mental status and syncope.  Subsequent work up negative including MRI, and laboratory evaluation. Neurology was consulted on the case as well as psychiatry.  SLP did a cognitive evaluation which showed severe deficits.    DC planning has been difficult as Pt needing a guardian and an eventual placement.       Interval Problem Update  10/5: No acute complaints, resting comfortably in bed.     I have personally seen and examined the patient at bedside. I discussed the plan of care with patient, bedside RN, charge RN and .     Consultants/Specialty  Neurology  Psychiatry  LPS    Code Status  Full Code    Disposition  Patient is medically cleared.   Anticipate discharge to guardianship and likely placement.  Guardianship hearing 10/15/2021.  I have placed the appropriate orders for post-discharge needs.    Review of Systems  Review of Systems   Respiratory: Negative for shortness of breath.    Cardiovascular: Negative for chest pain.   Gastrointestinal: Negative for abdominal pain, nausea and vomiting.   Neurological: Negative for headaches.   All other  systems reviewed and are negative.       Physical Exam  Temp:  [36.1 °C (96.9 °F)-36.3 °C (97.4 °F)] 36.1 °C (96.9 °F)  Pulse:  [82-98] 98  Resp:  [17-19] 19  BP: (112-128)/(69-87) 112/69  SpO2:  [97 %-99 %] 99 %    Physical Exam  Constitutional:       Appearance: Normal appearance.   HENT:      Head: Normocephalic.   Cardiovascular:      Rate and Rhythm: Normal rate and regular rhythm.      Heart sounds: No friction rub.   Pulmonary:      Effort: Pulmonary effort is normal. No respiratory distress.   Abdominal:      Palpations: Abdomen is soft.      Tenderness: There is no abdominal tenderness.   Musculoskeletal:         General: No swelling or tenderness.      Comments: Fungal toenails   Skin:     General: Skin is warm and dry.   Neurological:      Mental Status: He is alert. He is disoriented.      Comments: Oriented to self, not to location or date   Psychiatric:         Cognition and Memory: Memory is impaired.         Fluids    Intake/Output Summary (Last 24 hours) at 10/5/2021 1412  Last data filed at 10/5/2021 0730  Gross per 24 hour   Intake 480 ml   Output --   Net 480 ml       Laboratory                        Imaging  MR-BRAIN-WITH & W/O   Final Result         No acute intracranial process.      Nonspecific T2 hyperintensities in the deep white matter related to chronic microvascular ischemia.      [id number]-7 VIEW   Final Result      1.  No metallic foreign bodies detected.   2.  Right nephrolithiasis.      EC-ECHOCARDIOGRAM LTD W/ CONT   Final Result      CT-HEAD W/O   Final Result      1.  Cerebral atrophy and chronic microvascular ischemic type changes.   2.  No acute intracranial abnormality.      DX-CHEST-PORTABLE (1 VIEW)   Final Result      No acute cardiopulmonary abnormality.           Assessment/Plan  * Cognitive impairment- (present on admission)  Assessment & Plan  Likely underlying dementia. No reversible causes found  Guardianship hearing 10/15/2021    Encephalopathy- (present on  admission)  Assessment & Plan  Likely toxic/metabolic etiology, overall improved and back to baseline mentation  Does have underlying dementia    Diarrhea  Assessment & Plan  Imodium as needed    Onychomycosis- (present on admission)  Assessment & Plan  Terbinafine x 12 weeks  repeat LFTs and CBC on 9/26/2021 -- can be followed outpatient if discharged earlier  LPS previously consulted for nail care    Anemia- (present on admission)  Assessment & Plan  Chronic, no acute bleeding    Marijuana use- (present on admission)  Assessment & Plan  UDS positive    Hypokalemia- (present on admission)  Assessment & Plan  resolved    Syncope- (present on admission)  Assessment & Plan  Workup negative on CT head, echo, MRI brain    LANDY (acute kidney injury) (HCC)- (present on admission)  Assessment & Plan  Resolved, likely due to decreased p.o. intake    Metabolic acidosis, increased anion gap (IAG)- (present on admission)  Assessment & Plan  resolved    Hypophosphatemia- (present on admission)  Assessment & Plan  resolved       VTE prophylaxis: SCDs/TEDs

## 2021-10-05 NOTE — DISCHARGE PLANNING
Medical Social Work  PC to [facility], [phone]; ELLIE spoke to Mandy and Hemalatha about discharging patient ASAP on guardianship is established on the 15th.     ELLIE told them that [facility] is willing to pay from 3 to 4 months of cost of care, FLACO.  That they will be able to find out income, apply to be rep-payee and apply for the Medicaid Waiver.  ELLIE told that discharging on the 15th may not be possible. But, the next week after yes.  Hemalatha stated that she has started reaching out to group homes.  ELLIE stated that there are 5 patients that will be granted a guardian this month, and ELLIE would like all 5 placed no later than 10/31.  ELLIE told they will do the best they can.

## 2021-10-05 NOTE — PROGRESS NOTES
COVID-19 Surge in effect     Pt is A&Ox3. Pt denies any pain at this time. Wanderguard to left ankle.

## 2021-10-06 PROCEDURE — 770006 HCHG ROOM/CARE - MED/SURG/GYN SEMI*

## 2021-10-06 PROCEDURE — A9270 NON-COVERED ITEM OR SERVICE: HCPCS | Performed by: FAMILY MEDICINE

## 2021-10-06 PROCEDURE — 700102 HCHG RX REV CODE 250 W/ 637 OVERRIDE(OP): Performed by: NURSE PRACTITIONER

## 2021-10-06 PROCEDURE — A9270 NON-COVERED ITEM OR SERVICE: HCPCS | Performed by: NURSE PRACTITIONER

## 2021-10-06 PROCEDURE — 700102 HCHG RX REV CODE 250 W/ 637 OVERRIDE(OP): Performed by: FAMILY MEDICINE

## 2021-10-06 RX ADMIN — TERBINAFINE 250 MG: 250 TABLET ORAL at 08:49

## 2021-10-06 RX ADMIN — THERA TABS 1 TABLET: TAB at 08:49

## 2021-10-06 RX ADMIN — Medication 100 MG: at 08:49

## 2021-10-06 ASSESSMENT — PAIN DESCRIPTION - PAIN TYPE
TYPE: ACUTE PAIN
TYPE: ACUTE PAIN

## 2021-10-06 NOTE — PROGRESS NOTES
COVID-19 Surge in effect     Pt is A&Ox3, disoriented to event. Pt denies any pain at this time. Wanderguard to left ankle.

## 2021-10-06 NOTE — PROGRESS NOTES
A&Ox1, to self. Pt is resting in bed. Denied pain. Denied further needs, able to make needs known. Call light and belongings within reach.     COVID-19 surge in effect.

## 2021-10-07 PROCEDURE — 700102 HCHG RX REV CODE 250 W/ 637 OVERRIDE(OP): Performed by: NURSE PRACTITIONER

## 2021-10-07 PROCEDURE — 770006 HCHG ROOM/CARE - MED/SURG/GYN SEMI*

## 2021-10-07 PROCEDURE — A9270 NON-COVERED ITEM OR SERVICE: HCPCS | Performed by: FAMILY MEDICINE

## 2021-10-07 PROCEDURE — 700102 HCHG RX REV CODE 250 W/ 637 OVERRIDE(OP): Performed by: FAMILY MEDICINE

## 2021-10-07 PROCEDURE — 86480 TB TEST CELL IMMUN MEASURE: CPT

## 2021-10-07 PROCEDURE — 36415 COLL VENOUS BLD VENIPUNCTURE: CPT

## 2021-10-07 PROCEDURE — A9270 NON-COVERED ITEM OR SERVICE: HCPCS | Performed by: NURSE PRACTITIONER

## 2021-10-07 RX ADMIN — Medication 100 MG: at 07:33

## 2021-10-07 RX ADMIN — TERBINAFINE 250 MG: 250 TABLET ORAL at 07:33

## 2021-10-07 RX ADMIN — THERA TABS 1 TABLET: TAB at 07:33

## 2021-10-07 NOTE — DISCHARGE PLANNING
Medical Social Work  PC to Nichole, [facility]  [phone], message left to call ELLIE     PC from Nichole, stated she has talked to Penny about the patient, requested ELLIE email H/P, MAR, most recent attending statement to her at, [email or web address].  Nichole stated she will review and get back to Penny before coming out to assess the patient.      ELLIE emailed requested information

## 2021-10-08 LAB
ALBUMIN SERPL BCP-MCNC: 4 G/DL (ref 3.2–4.9)
ALBUMIN/GLOB SERPL: 1.8 G/DL
ALP SERPL-CCNC: 72 U/L (ref 30–99)
ALT SERPL-CCNC: 20 U/L (ref 2–50)
ANION GAP SERPL CALC-SCNC: 12 MMOL/L (ref 7–16)
AST SERPL-CCNC: 20 U/L (ref 12–45)
BILIRUB SERPL-MCNC: 0.3 MG/DL (ref 0.1–1.5)
BUN SERPL-MCNC: 21 MG/DL (ref 8–22)
CALCIUM SERPL-MCNC: 9.2 MG/DL (ref 8.5–10.5)
CHLORIDE SERPL-SCNC: 106 MMOL/L (ref 96–112)
CO2 SERPL-SCNC: 23 MMOL/L (ref 20–33)
CREAT SERPL-MCNC: 1.21 MG/DL (ref 0.5–1.4)
GAMMA INTERFERON BACKGROUND BLD IA-ACNC: 0.03 IU/ML
GLOBULIN SER CALC-MCNC: 2.2 G/DL (ref 1.9–3.5)
GLUCOSE SERPL-MCNC: 121 MG/DL (ref 65–99)
M TB IFN-G BLD-IMP: NEGATIVE
M TB IFN-G CD4+ BCKGRND COR BLD-ACNC: 0 IU/ML
MITOGEN IGNF BCKGRD COR BLD-ACNC: >10 IU/ML
POTASSIUM SERPL-SCNC: 4.2 MMOL/L (ref 3.6–5.5)
PROT SERPL-MCNC: 6.2 G/DL (ref 6–8.2)
QFT TB2 - NIL TBQ2: -0.01 IU/ML
SODIUM SERPL-SCNC: 141 MMOL/L (ref 135–145)

## 2021-10-08 PROCEDURE — 770006 HCHG ROOM/CARE - MED/SURG/GYN SEMI*

## 2021-10-08 PROCEDURE — 80053 COMPREHEN METABOLIC PANEL: CPT

## 2021-10-08 PROCEDURE — A9270 NON-COVERED ITEM OR SERVICE: HCPCS | Performed by: FAMILY MEDICINE

## 2021-10-08 PROCEDURE — 700102 HCHG RX REV CODE 250 W/ 637 OVERRIDE(OP): Performed by: FAMILY MEDICINE

## 2021-10-08 PROCEDURE — 36415 COLL VENOUS BLD VENIPUNCTURE: CPT

## 2021-10-08 PROCEDURE — 0031A HCHG ADM SARSCOV2 VAC AD26 .5 ML: CPT

## 2021-10-08 PROCEDURE — 700111 HCHG RX REV CODE 636 W/ 250 OVERRIDE (IP): Performed by: INTERNAL MEDICINE

## 2021-10-08 PROCEDURE — 700102 HCHG RX REV CODE 250 W/ 637 OVERRIDE(OP): Performed by: NURSE PRACTITIONER

## 2021-10-08 PROCEDURE — A9270 NON-COVERED ITEM OR SERVICE: HCPCS | Performed by: NURSE PRACTITIONER

## 2021-10-08 PROCEDURE — XW023U6 INTRODUCTION OF COVID-19 VACCINE INTO MUSCLE, PERCUTANEOUS APPROACH, NEW TECHNOLOGY GROUP 6: ICD-10-PCS | Performed by: INTERNAL MEDICINE

## 2021-10-08 PROCEDURE — 91303 HCHG RX REV CODE 636 W/ 250 OVERRIDE (IP): CPT | Performed by: INTERNAL MEDICINE

## 2021-10-08 RX ADMIN — THERA TABS 1 TABLET: TAB at 08:00

## 2021-10-08 RX ADMIN — Medication 100 MG: at 08:00

## 2021-10-08 RX ADMIN — TERBINAFINE 250 MG: 250 TABLET ORAL at 08:00

## 2021-10-08 RX ADMIN — JNJ-78436735 0.5 ML: 50000000000 SUSPENSION INTRAMUSCULAR at 14:07

## 2021-10-08 ASSESSMENT — PAIN DESCRIPTION - PAIN TYPE: TYPE: ACUTE PAIN
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
## 2021-10-08 NOTE — PROGRESS NOTES
COVID-19 surge in effect.     Received report from NOC RN and assumed care of pt. Pt is A&Ox3, disoriented to situation. Pt is resting in bed on room air. Pt reports no pain. Pt's neck assessed for bruises, some redness noted, blanching. POC discussed with pt; all questions answered. No other needs at this time. Bed locked in lowest position, call light within reach.

## 2021-10-09 PROCEDURE — 770006 HCHG ROOM/CARE - MED/SURG/GYN SEMI*

## 2021-10-09 PROCEDURE — A9270 NON-COVERED ITEM OR SERVICE: HCPCS | Performed by: NURSE PRACTITIONER

## 2021-10-09 PROCEDURE — 700102 HCHG RX REV CODE 250 W/ 637 OVERRIDE(OP): Performed by: NURSE PRACTITIONER

## 2021-10-09 PROCEDURE — A9270 NON-COVERED ITEM OR SERVICE: HCPCS | Performed by: FAMILY MEDICINE

## 2021-10-09 PROCEDURE — 99232 SBSQ HOSP IP/OBS MODERATE 35: CPT | Performed by: STUDENT IN AN ORGANIZED HEALTH CARE EDUCATION/TRAINING PROGRAM

## 2021-10-09 PROCEDURE — 700102 HCHG RX REV CODE 250 W/ 637 OVERRIDE(OP): Performed by: FAMILY MEDICINE

## 2021-10-09 RX ADMIN — TERBINAFINE 250 MG: 250 TABLET ORAL at 08:00

## 2021-10-09 RX ADMIN — Medication 100 MG: at 08:00

## 2021-10-09 RX ADMIN — THERA TABS 1 TABLET: TAB at 08:00

## 2021-10-09 ASSESSMENT — ENCOUNTER SYMPTOMS
HEADACHES: 0
VOMITING: 0
SHORTNESS OF BREATH: 0
ABDOMINAL PAIN: 0
NAUSEA: 0

## 2021-10-09 ASSESSMENT — FIBROSIS 4 INDEX: FIB4 SCORE: 1.36

## 2021-10-09 ASSESSMENT — PAIN DESCRIPTION - PAIN TYPE: TYPE: ACUTE PAIN

## 2021-10-09 NOTE — PROGRESS NOTES
Pt is resting in bed. Denied pain. Denied further needs. Pt able to make needs known.    COVID-19 surge in effect.

## 2021-10-09 NOTE — PROGRESS NOTES
COVID-19 surge in effect.    Received report from NOC RN and assumed care of pt. Pt is A&Ox3, disoriented to situation. Pt is resting in bed on room air. Pt reports no pain or discomfort. POC discussed with pt; all questions answered. No other needs at this time. Bed locked in lowest position, call light within reach.

## 2021-10-09 NOTE — PROGRESS NOTES
[facility] Medicine Daily Progress Note    Date of Service  10/9/2021    Chief Complaint  Lane Beard is a 66 y.o. male admitted 6/4/2021 with AMS    Hospital Course  Per chart reviews and interval updates:    Mr. Beard is a 66-year-old male with no known PMHx who presented to the hospital on 6/4/2021 after he was found down on the sidewalk after multiple witnessed falls.  Patient was confused at the time of admission.  He had been seen earlier in the day for syncope while crossing the street however at that time left AGAINST MEDICAL ADVICE.  On admission he was tachycardic with a heart rate of 114, anion gap of 23 and a lactic acid of 2.6.  He had a CT scan of the head, which revealed no acute intracranial abnormalities.  Chest x-ray completed, which showed no acute cardiopulmonary process.  Patient was admitted for further evaluation of altered mental status and syncope.  Subsequent work up negative including MRI, and laboratory evaluation. Neurology was consulted on the case as well as psychiatry.  SLP did a cognitive evaluation which showed severe deficits.    DC planning has been difficult as Pt needing a guardian and an eventual placement.       Interval Problem Update  10/5: No acute complaints, resting comfortably in bed.   10/8: didn't sleep well last night because he was called, states after asking for an appointment slept okay.  Will avoid sleeping aid for now.  Check patient's toenails, still has fungal infection but per patient they look and feel a lot better.    I have personally seen and examined the patient at bedside. I discussed the plan of care with patient, bedside RN, charge RN and .     Consultants/Specialty  Neurology  Psychiatry  LPS    Code Status  Full Code    Disposition  Patient is medically cleared.   Anticipate discharge to guardianship and likely placement.  Guardianship hearing 10/15/2021.  I have placed the appropriate orders for post-discharge needs.    Review of  Systems  Review of Systems   Respiratory: Negative for shortness of breath.    Cardiovascular: Negative for chest pain.   Gastrointestinal: Negative for abdominal pain, nausea and vomiting.   Neurological: Negative for headaches.   All other systems reviewed and are negative.       Physical Exam  Temp:  [36 °C (96.8 °F)-37.4 °C (99.4 °F)] 37.4 °C (99.4 °F)  Pulse:  [83-84] 84  Resp:  [16-18] 18  BP: (100-123)/(64-77) 100/64  SpO2:  [97 %-99 %] 97 %    Physical Exam  Constitutional:       Appearance: Normal appearance.   HENT:      Head: Normocephalic.   Cardiovascular:      Rate and Rhythm: Normal rate and regular rhythm.      Heart sounds: No friction rub.   Pulmonary:      Effort: Pulmonary effort is normal. No respiratory distress.   Abdominal:      Palpations: Abdomen is soft.      Tenderness: There is no abdominal tenderness.   Musculoskeletal:         General: No swelling or tenderness.      Comments: Fungal toenails   Skin:     General: Skin is warm and dry.   Neurological:      Mental Status: He is alert. He is disoriented.      Comments: Oriented to self, not to location or date   Psychiatric:         Cognition and Memory: Memory is impaired.         Fluids    Intake/Output Summary (Last 24 hours) at 10/9/2021 0825  Last data filed at 10/9/2021 0730  Gross per 24 hour   Intake 720 ml   Output --   Net 720 ml       Laboratory      Recent Labs     10/08/21  0913   SODIUM 141   POTASSIUM 4.2   CHLORIDE 106   CO2 23   GLUCOSE 121*   BUN 21   CREATININE 1.21   CALCIUM 9.2                   Imaging  MR-BRAIN-WITH & W/O   Final Result         No acute intracranial process.      Nonspecific T2 hyperintensities in the deep white matter related to chronic microvascular ischemia.      LI-USNFYRP-0 VIEW   Final Result      1.  No metallic foreign bodies detected.   2.  Right nephrolithiasis.      EC-ECHOCARDIOGRAM LTD W/ CONT   Final Result      CT-HEAD W/O   Final Result      1.  Cerebral atrophy and chronic  microvascular ischemic type changes.   2.  No acute intracranial abnormality.      DX-CHEST-PORTABLE (1 VIEW)   Final Result      No acute cardiopulmonary abnormality.           Assessment/Plan  * Cognitive impairment- (present on admission)  Assessment & Plan  Likely underlying dementia. No reversible causes found  Guardianship hearing 10/15/2021    Encephalopathy- (present on admission)  Assessment & Plan  Likely toxic/metabolic etiology, overall improved and back to baseline mentation  Does have underlying dementia    Diarrhea  Assessment & Plan  Imodium as needed    Onychomycosis- (present on admission)  Assessment & Plan  Terbinafine x 12 weeks, can be followed outpatient if discharged earlier  LPS previously consulted for nail care    Anemia- (present on admission)  Assessment & Plan  Chronic, no acute bleeding    Marijuana use- (present on admission)  Assessment & Plan  UDS positive    Hypokalemia- (present on admission)  Assessment & Plan  resolved    Syncope- (present on admission)  Assessment & Plan  Workup negative on CT head, echo, MRI brain    LANDY (acute kidney injury) (HCC)- (present on admission)  Assessment & Plan  Resolved, likely due to decreased p.o. intake    Metabolic acidosis, increased anion gap (IAG)- (present on admission)  Assessment & Plan  resolved    Hypophosphatemia- (present on admission)  Assessment & Plan  resolved       VTE prophylaxis: SCDs/TEDs

## 2021-10-10 PROCEDURE — 770006 HCHG ROOM/CARE - MED/SURG/GYN SEMI*

## 2021-10-10 PROCEDURE — A9270 NON-COVERED ITEM OR SERVICE: HCPCS | Performed by: NURSE PRACTITIONER

## 2021-10-10 PROCEDURE — 700102 HCHG RX REV CODE 250 W/ 637 OVERRIDE(OP): Performed by: NURSE PRACTITIONER

## 2021-10-10 PROCEDURE — 700102 HCHG RX REV CODE 250 W/ 637 OVERRIDE(OP): Performed by: FAMILY MEDICINE

## 2021-10-10 PROCEDURE — A9270 NON-COVERED ITEM OR SERVICE: HCPCS | Performed by: FAMILY MEDICINE

## 2021-10-10 RX ADMIN — Medication 100 MG: at 08:14

## 2021-10-10 RX ADMIN — TERBINAFINE 250 MG: 250 TABLET ORAL at 08:14

## 2021-10-10 RX ADMIN — THERA TABS 1 TABLET: TAB at 08:13

## 2021-10-10 NOTE — PROGRESS NOTES
Assumed care of patient at bedside report from NOC RN. Updated on POC. Patient currently A & O x 1; on room air; up self; without complaints of acute pain. Call light within reach. Fall precautions in place. Bed locked and in lowest position. All questions answered. No other needs indicated at this time.    COVID 19 surge in effect

## 2021-10-11 PROCEDURE — A9270 NON-COVERED ITEM OR SERVICE: HCPCS | Performed by: FAMILY MEDICINE

## 2021-10-11 PROCEDURE — A9270 NON-COVERED ITEM OR SERVICE: HCPCS | Performed by: NURSE PRACTITIONER

## 2021-10-11 PROCEDURE — 700102 HCHG RX REV CODE 250 W/ 637 OVERRIDE(OP): Performed by: FAMILY MEDICINE

## 2021-10-11 PROCEDURE — 700102 HCHG RX REV CODE 250 W/ 637 OVERRIDE(OP): Performed by: NURSE PRACTITIONER

## 2021-10-11 PROCEDURE — 770006 HCHG ROOM/CARE - MED/SURG/GYN SEMI*

## 2021-10-11 RX ADMIN — THERA TABS 1 TABLET: TAB at 09:02

## 2021-10-11 RX ADMIN — TERBINAFINE 250 MG: 250 TABLET ORAL at 09:02

## 2021-10-11 RX ADMIN — Medication 100 MG: at 09:02

## 2021-10-11 ASSESSMENT — PAIN DESCRIPTION - PAIN TYPE: TYPE: ACUTE PAIN

## 2021-10-11 NOTE — PROGRESS NOTES
COVID-19 surge in effect    Assumed care of pt after receiving report. Pt oriented to self only - reoriented pt to place, situation, and time. Pt VSS on RA. Pt is up self with steady gait, no assistance needed. Pt denies pain at this time. Fall and safety precautions in place. Call light within reach, all needs met at this time.

## 2021-10-12 PROCEDURE — 700102 HCHG RX REV CODE 250 W/ 637 OVERRIDE(OP): Performed by: FAMILY MEDICINE

## 2021-10-12 PROCEDURE — A9270 NON-COVERED ITEM OR SERVICE: HCPCS | Performed by: FAMILY MEDICINE

## 2021-10-12 PROCEDURE — 700102 HCHG RX REV CODE 250 W/ 637 OVERRIDE(OP): Performed by: NURSE PRACTITIONER

## 2021-10-12 PROCEDURE — 770006 HCHG ROOM/CARE - MED/SURG/GYN SEMI*

## 2021-10-12 PROCEDURE — A9270 NON-COVERED ITEM OR SERVICE: HCPCS | Performed by: NURSE PRACTITIONER

## 2021-10-12 RX ADMIN — Medication 100 MG: at 08:44

## 2021-10-12 RX ADMIN — TERBINAFINE 250 MG: 250 TABLET ORAL at 08:44

## 2021-10-12 RX ADMIN — THERA TABS 1 TABLET: TAB at 08:44

## 2021-10-12 ASSESSMENT — PAIN DESCRIPTION - PAIN TYPE: TYPE: ACUTE PAIN

## 2021-10-13 PROCEDURE — 700102 HCHG RX REV CODE 250 W/ 637 OVERRIDE(OP): Performed by: NURSE PRACTITIONER

## 2021-10-13 PROCEDURE — A9270 NON-COVERED ITEM OR SERVICE: HCPCS | Performed by: FAMILY MEDICINE

## 2021-10-13 PROCEDURE — 770006 HCHG ROOM/CARE - MED/SURG/GYN SEMI*

## 2021-10-13 PROCEDURE — 700102 HCHG RX REV CODE 250 W/ 637 OVERRIDE(OP): Performed by: FAMILY MEDICINE

## 2021-10-13 PROCEDURE — 99232 SBSQ HOSP IP/OBS MODERATE 35: CPT | Performed by: INTERNAL MEDICINE

## 2021-10-13 PROCEDURE — A9270 NON-COVERED ITEM OR SERVICE: HCPCS | Performed by: NURSE PRACTITIONER

## 2021-10-13 RX ADMIN — Medication 100 MG: at 09:32

## 2021-10-13 RX ADMIN — THERA TABS 1 TABLET: TAB at 09:32

## 2021-10-13 RX ADMIN — TERBINAFINE 250 MG: 250 TABLET ORAL at 09:32

## 2021-10-13 ASSESSMENT — ENCOUNTER SYMPTOMS
COUGH: 0
CHILLS: 0
SHORTNESS OF BREATH: 0
NAUSEA: 0
VOMITING: 0
HEADACHES: 0
FEVER: 0
ABDOMINAL PAIN: 0

## 2021-10-13 ASSESSMENT — PAIN DESCRIPTION - PAIN TYPE
TYPE: ACUTE PAIN
TYPE: ACUTE PAIN

## 2021-10-13 NOTE — PROGRESS NOTES
Hospital Medicine Daily Progress Note    Date of Service  10/13/2021    Chief Complaint  Lane Beard is a 66 y.o. male admitted 6/4/2021 with AMS    Hospital Course  Per chart reviews and interval updates:    Mr. Beard is a 66-year-old male with no known PMHx who presented to the hospital on 6/4/2021 after he was found down on the sidewalk after multiple witnessed falls.  Patient was confused at the time of admission.  He had been seen earlier in the day for syncope while crossing the street however at that time left AGAINST MEDICAL ADVICE.  On admission he was tachycardic with a heart rate of 114, anion gap of 23 and a lactic acid of 2.6.  He had a CT scan of the head, which revealed no acute intracranial abnormalities.  Chest x-ray completed, which showed no acute cardiopulmonary process.  Patient was admitted for further evaluation of altered mental status and syncope.  Subsequent work up negative including MRI, and laboratory evaluation. Neurology was consulted on the case as well as psychiatry.  SLP did a cognitive evaluation which showed severe deficits.    DC planning has been difficult as Pt needing a guardian and an eventual placement.       Interval Problem Update  10/5: No acute complaints, resting comfortably in bed.   10/8: didn't sleep well last night because he was called, states after asking for an appointment slept okay.  Will avoid sleeping aid for now.  Check patient's toenails, still has fungal infection but per patient they look and feel a lot better.    PATIENT SEEN BY PREVIOUS HOSPITALIST UNTIL 10/11    10/13: Patient seen at bedside this morning.  Patient lying in bed comfortably, currently not complaining of pain.  Patient seems to be oriented to self, time and place today.  Patient was started on antifungal medication for onychomycosis, patient will require outpatient CMP's to evaluate for elevation of liver function tests.  We are pending placement, as per case management patient  might be able to be discharged on Monday.  As per nursing no other overnight events reported.    I have personally seen and examined the patient at bedside. I discussed the plan of care with patient, bedside RN, charge RN and .     Consultants/Specialty  Neurology  Psychiatry  LPS    Code Status  Full Code    Disposition  Patient is medically cleared.   Anticipate discharge to guardianship and likely placement.  Guardianship hearing 10/15/2021.  I have placed the appropriate orders for post-discharge needs.    Review of Systems  Review of Systems   Constitutional: Negative for chills and fever.   HENT: Negative for ear pain.    Respiratory: Negative for cough and shortness of breath.    Cardiovascular: Negative for chest pain.   Gastrointestinal: Negative for abdominal pain, nausea and vomiting.   Genitourinary: Negative for dysuria and urgency.   Neurological: Negative for headaches.   All other systems reviewed and are negative.       Physical Exam  Temp:  [36.3 °C (97.3 °F)-36.6 °C (97.9 °F)] 36.5 °C (97.7 °F)  Pulse:  [72-86] 72  Resp:  [16-17] 17  BP: (109-140)/(73-78) 140/78  SpO2:  [97 %-99 %] 99 %    Physical Exam  Constitutional:       General: He is not in acute distress.     Appearance: Normal appearance. He is not ill-appearing.   HENT:      Head: Normocephalic.      Right Ear: External ear normal.      Left Ear: External ear normal.   Eyes:      General:         Right eye: No discharge.         Left eye: No discharge.   Cardiovascular:      Rate and Rhythm: Normal rate and regular rhythm.      Heart sounds: No friction rub.   Pulmonary:      Effort: Pulmonary effort is normal. No respiratory distress.   Abdominal:      Palpations: Abdomen is soft.      Tenderness: There is no abdominal tenderness.   Musculoskeletal:         General: No swelling or tenderness.      Comments: Fungal toenails   Skin:     General: Skin is warm and dry.   Neurological:      Mental Status: He is alert.       Comments: Patient somewhat confused, but seems to be oriented today.   Psychiatric:         Mood and Affect: Mood normal.         Behavior: Behavior normal.         Cognition and Memory: Memory is impaired.         Fluids    Intake/Output Summary (Last 24 hours) at 10/13/2021 1119  Last data filed at 10/13/2021 0800  Gross per 24 hour   Intake 960 ml   Output --   Net 960 ml       Laboratory                        Imaging  MR-BRAIN-WITH & W/O   Final Result         No acute intracranial process.      Nonspecific T2 hyperintensities in the deep white matter related to chronic microvascular ischemia.      [id number] VIEW   Final Result      1.  No metallic foreign bodies detected.   2.  Right nephrolithiasis.      EC-ECHOCARDIOGRAM LTD W/ CONT   Final Result      CT-HEAD W/O   Final Result      1.  Cerebral atrophy and chronic microvascular ischemic type changes.   2.  No acute intracranial abnormality.      DX-CHEST-PORTABLE (1 VIEW)   Final Result      No acute cardiopulmonary abnormality.           Assessment/Plan  * Cognitive impairment- (present on admission)  Assessment & Plan  Likely underlying dementia. No reversible causes found  Guardianship hearing 10/15/2021    10/13: Pending placement, as per case management patient might be discharging on Monday.    Encephalopathy- (present on admission)  Assessment & Plan  Likely toxic/metabolic etiology, overall improved and back to baseline mentation  Does have underlying dementia    10/13: Pending placement, as per case management patient might be discharging on Monday.    Onychomycosis- (present on admission)  Assessment & Plan  Terbinafine x 12 weeks, can be followed outpatient if discharged earlier  LPS previously consulted for nail care    10/13: Patient will require CMP as outpatient to check for elevation in LFTs.    Anemia- (present on admission)  Assessment & Plan  Chronic, no acute bleeding    Marijuana use- (present on admission)  Assessment & Plan  UDS  positive    LANDY (acute kidney injury) (HCC)- (present on admission)  Assessment & Plan  Resolved, likely due to decreased p.o. intake    Syncope- (present on admission)  Assessment & Plan  Workup negative on CT head, echo, MRI brain    10/13: Patient mentions he has not had symptoms.       VTE prophylaxis: SCDs/TEDs

## 2021-10-14 PROCEDURE — 770006 HCHG ROOM/CARE - MED/SURG/GYN SEMI*

## 2021-10-14 PROCEDURE — 700102 HCHG RX REV CODE 250 W/ 637 OVERRIDE(OP): Performed by: FAMILY MEDICINE

## 2021-10-14 PROCEDURE — 700102 HCHG RX REV CODE 250 W/ 637 OVERRIDE(OP): Performed by: NURSE PRACTITIONER

## 2021-10-14 PROCEDURE — A9270 NON-COVERED ITEM OR SERVICE: HCPCS | Performed by: FAMILY MEDICINE

## 2021-10-14 PROCEDURE — A9270 NON-COVERED ITEM OR SERVICE: HCPCS | Performed by: NURSE PRACTITIONER

## 2021-10-14 RX ADMIN — Medication 100 MG: at 08:15

## 2021-10-14 RX ADMIN — THERA TABS 1 TABLET: TAB at 08:15

## 2021-10-14 RX ADMIN — TERBINAFINE 250 MG: 250 TABLET ORAL at 08:15

## 2021-10-14 ASSESSMENT — PAIN DESCRIPTION - PAIN TYPE
TYPE: ACUTE PAIN
TYPE: ACUTE PAIN

## 2021-10-14 ASSESSMENT — FIBROSIS 4 INDEX: FIB4 SCORE: 1.36

## 2021-10-14 NOTE — PROGRESS NOTES
COVID-19 Surge in effect     Pt is A&Ox3, disoriented to event. Pt is up at nurse's station conversing with other pts and staff. Pt denies any pain at this time. Wanderguard to left ankle.
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
## 2021-10-15 PROCEDURE — 99232 SBSQ HOSP IP/OBS MODERATE 35: CPT | Performed by: INTERNAL MEDICINE

## 2021-10-15 PROCEDURE — A9270 NON-COVERED ITEM OR SERVICE: HCPCS | Performed by: NURSE PRACTITIONER

## 2021-10-15 PROCEDURE — 770006 HCHG ROOM/CARE - MED/SURG/GYN SEMI*

## 2021-10-15 PROCEDURE — 700102 HCHG RX REV CODE 250 W/ 637 OVERRIDE(OP): Performed by: NURSE PRACTITIONER

## 2021-10-15 PROCEDURE — A9270 NON-COVERED ITEM OR SERVICE: HCPCS | Performed by: FAMILY MEDICINE

## 2021-10-15 PROCEDURE — 700102 HCHG RX REV CODE 250 W/ 637 OVERRIDE(OP): Performed by: FAMILY MEDICINE

## 2021-10-15 RX ADMIN — TERBINAFINE 250 MG: 250 TABLET ORAL at 07:55

## 2021-10-15 RX ADMIN — THERA TABS 1 TABLET: TAB at 07:55

## 2021-10-15 RX ADMIN — Medication 100 MG: at 07:55

## 2021-10-15 ASSESSMENT — ENCOUNTER SYMPTOMS
HEADACHES: 0
ABDOMINAL PAIN: 0
FEVER: 0
VOMITING: 0
NAUSEA: 0
CHILLS: 0
SHORTNESS OF BREATH: 0
COUGH: 0

## 2021-10-15 NOTE — DISCHARGE PLANNING
Received Transport Form @ 2964  Spoke to [place] @ [facility]    Transport is scheduled for 10/18 @[id number] going to [facility].    Price $198.28

## 2021-10-15 NOTE — PROGRESS NOTES
Hospital Medicine Daily Progress Note    Date of Service  10/15/2021    Chief Complaint  Lane Beard is a 66 y.o. male admitted 6/4/2021 with AMS    Hospital Course  Per chart reviews and interval updates:    Mr. Beard is a 66-year-old male with no known PMHx who presented to the hospital on 6/4/2021 after he was found down on the sidewalk after multiple witnessed falls.  Patient was confused at the time of admission.  He had been seen earlier in the day for syncope while crossing the street however at that time left AGAINST MEDICAL ADVICE.  On admission he was tachycardic with a heart rate of 114, anion gap of 23 and a lactic acid of 2.6.  He had a CT scan of the head, which revealed no acute intracranial abnormalities.  Chest x-ray completed, which showed no acute cardiopulmonary process.  Patient was admitted for further evaluation of altered mental status and syncope.  Subsequent work up negative including MRI, and laboratory evaluation. Neurology was consulted on the case as well as psychiatry.  SLP did a cognitive evaluation which showed severe deficits.    DC planning has been difficult as Pt needing a guardian and an eventual placement.       Interval Problem Update  10/5: No acute complaints, resting comfortably in bed.   10/8: didn't sleep well last night because he was called, states after asking for an appointment slept okay.  Will avoid sleeping aid for now.  Check patient's toenails, still has fungal infection but per patient they look and feel a lot better.    PATIENT SEEN BY PREVIOUS HOSPITALIST UNTIL 10/11    10/13: Patient seen at bedside this morning.  Patient lying in bed comfortably, currently not complaining of pain.  Patient seems to be oriented to self, time and place today.  Patient was started on antifungal medication for onychomycosis, patient will require outpatient CMP's to evaluate for elevation of liver function tests.  We are pending placement, as per case management patient  might be able to be discharged on Monday.  As per nursing no other overnight events reported.    10/15: Patient seen at bedside this morning.  Patient currently having breakfast, not having any pain at this time.  As per chart review it appears the patient has received guardianship and might be discharged on Monday as per case management.  As per nursing no other overnight events were reported.    I have personally seen and examined the patient at bedside. I discussed the plan of care with patient, bedside RN, charge RN and .     Consultants/Specialty  Neurology  Psychiatry  LPS    Code Status  Full Code    Disposition  Patient is medically cleared.   Anticipate discharge to guardianship and likely placement.  Guardianship hearing 10/15/2021.  I have placed the appropriate orders for post-discharge needs.    Review of Systems  Review of Systems   Constitutional: Negative for chills and fever.   HENT: Negative for ear pain.    Respiratory: Negative for cough and shortness of breath.    Cardiovascular: Negative for chest pain.   Gastrointestinal: Negative for abdominal pain, nausea and vomiting.   Genitourinary: Negative for dysuria and urgency.   Neurological: Negative for headaches.   All other systems reviewed and are negative.       Physical Exam  Temp:  [36.1 °C (97 °F)-36.9 °C (98.5 °F)] 36.1 °C (97 °F)  Pulse:  [74-96] 96  Resp:  [17] 17  BP: (120-124)/(68-85) 124/85  SpO2:  [97 %-99 %] 99 %    Physical Exam  Constitutional:       General: He is not in acute distress.     Appearance: Normal appearance. He is not ill-appearing.   HENT:      Head: Normocephalic.      Right Ear: External ear normal.      Left Ear: External ear normal.   Eyes:      General:         Right eye: No discharge.         Left eye: No discharge.   Cardiovascular:      Rate and Rhythm: Normal rate and regular rhythm.      Heart sounds: No friction rub.   Pulmonary:      Effort: Pulmonary effort is normal. No respiratory  distress.   Abdominal:      Palpations: Abdomen is soft.      Tenderness: There is no abdominal tenderness.   Musculoskeletal:         General: No swelling or tenderness.      Comments: Fungal toenails   Skin:     General: Skin is warm and dry.   Neurological:      Mental Status: He is alert.      Comments: Patient somewhat confused, but seems to be oriented today.   Psychiatric:         Mood and Affect: Mood normal.         Behavior: Behavior normal.         Cognition and Memory: Memory is impaired.         Fluids    Intake/Output Summary (Last 24 hours) at 10/15/2021 1058  Last data filed at 10/15/2021 0900  Gross per 24 hour   Intake 150 ml   Output --   Net 150 ml       Laboratory                        Imaging  MR-BRAIN-WITH & W/O   Final Result         No acute intracranial process.      Nonspecific T2 hyperintensities in the deep white matter related to chronic microvascular ischemia.      CL-[id number]-4 VIEW   Final Result      1.  No metallic foreign bodies detected.   2.  Right nephrolithiasis.      EC-ECHOCARDIOGRAM LTD W/ CONT   Final Result      CT-HEAD W/O   Final Result      1.  Cerebral atrophy and chronic microvascular ischemic type changes.   2.  No acute intracranial abnormality.      DX-CHEST-PORTABLE (1 VIEW)   Final Result      No acute cardiopulmonary abnormality.           Assessment/Plan  * Cognitive impairment- (present on admission)  Assessment & Plan  Likely underlying dementia. No reversible causes found  Guardianship hearing 10/15/2021    10/15: Pending placement, as per case management patient might be discharging on Monday.    Encephalopathy- (present on admission)  Assessment & Plan  Likely toxic/metabolic etiology, overall improved and back to baseline mentation  Does have underlying dementia    10/15: Pending placement, as per case management patient might be discharging on Monday.    Onychomycosis- (present on admission)  Assessment & Plan  Terbinafine x 12 weeks, can be followed  outpatient if discharged earlier  LPS previously consulted for nail care    10/13: Patient will require CMP as outpatient to check for elevation in LFTs.    Anemia- (present on admission)  Assessment & Plan  Chronic, no acute bleeding    Marijuana use- (present on admission)  Assessment & Plan  UDS positive    LANDY (acute kidney injury) (HCC)- (present on admission)  Assessment & Plan  Resolved, likely due to decreased p.o. intake    Syncope- (present on admission)  Assessment & Plan  Workup negative on CT head, echo, MRI brain    10/15: Patient mentions he has not had symptoms.       VTE prophylaxis: SCDs/TEDs

## 2021-10-15 NOTE — PROGRESS NOTES
Patient is Alert and oriented x 3. Not oriented to event. Pleasant and cooperative with care. No complaint of pain. Up self. Kept safe and monitored.

## 2021-10-15 NOTE — PROGRESS NOTES
Received bedside report and accepted care of patient.    Pt is currently A/o1-2. Room air with no visible or stated sign of distress, discomfort, or SOB. Pt is not complaining of any pain at this time.    Patient currently resting in bed in no visible or stated signs of distress. Bed  in locked and lowest position. Call light and personal possessions within reach. Patient educated about use of call light and verbalized understanding.

## 2021-10-15 NOTE — DISCHARGE PLANNING
Medical Social Work  Patient was assigned a guardian through [facility]  Plan is to discharge patient on Monday, will verify with group home owner on time.

## 2021-10-15 NOTE — DISCHARGE PLANNING
Medical Social Work  SW faxed over transport communication form to [facility] for a Monday, 10/18 transport date and time of 11:00  To [facility], [address] [facility] [address] [facility]

## 2021-10-16 PROCEDURE — 700102 HCHG RX REV CODE 250 W/ 637 OVERRIDE(OP): Performed by: FAMILY MEDICINE

## 2021-10-16 PROCEDURE — 770006 HCHG ROOM/CARE - MED/SURG/GYN SEMI*

## 2021-10-16 PROCEDURE — A9270 NON-COVERED ITEM OR SERVICE: HCPCS | Performed by: FAMILY MEDICINE

## 2021-10-16 PROCEDURE — A9270 NON-COVERED ITEM OR SERVICE: HCPCS | Performed by: NURSE PRACTITIONER

## 2021-10-16 PROCEDURE — 700102 HCHG RX REV CODE 250 W/ 637 OVERRIDE(OP): Performed by: NURSE PRACTITIONER

## 2021-10-16 RX ADMIN — Medication 100 MG: at 09:00

## 2021-10-16 RX ADMIN — TERBINAFINE 250 MG: 250 TABLET ORAL at 09:00

## 2021-10-16 RX ADMIN — THERA TABS 1 TABLET: TAB at 09:00

## 2021-10-16 ASSESSMENT — FIBROSIS 4 INDEX: FIB4 SCORE: 1.36

## 2021-10-16 NOTE — CARE PLAN
The patient is Stable - Low risk of patient condition declining or worsening    Shift Goals  Clinical Goals: pt will be free from falls  Patient Goals: rest  Family Goals: N/A    Progress made toward(s) clinical / shift goals:    Problem: Psychosocial  Goal: Patient's level of anxiety will decrease  Outcome: Progressing  Goal: Patient's ability to re-evaluate and adapt role responsibilities will improve  Outcome: Progressing     Problem: Psychosocial  Goal: Patient's ability to re-evaluate and adapt role responsibilities will improve  Outcome: Progressing       Patient is not progressing towards the following goals:

## 2021-10-16 NOTE — PROGRESS NOTES
Received bedside report and accepted care of patient.    Pt is currently A/o1-2. Room air with no visible or stated sign of distress, discomfort, or SOB. Pt is not complaining of any pain at this time. Initially refused medications but then stated leaving Monday so he agreed to take medications     Patient currently resting in bed in no visible or stated signs of distress. Bed  in locked and lowest position. Call light and personal possessions within reach. Patient educated about use of call light and verbalized understanding.

## 2021-10-17 PROCEDURE — 700102 HCHG RX REV CODE 250 W/ 637 OVERRIDE(OP): Performed by: NURSE PRACTITIONER

## 2021-10-17 PROCEDURE — A9270 NON-COVERED ITEM OR SERVICE: HCPCS | Performed by: NURSE PRACTITIONER

## 2021-10-17 PROCEDURE — 770006 HCHG ROOM/CARE - MED/SURG/GYN SEMI*

## 2021-10-17 PROCEDURE — RXMED WILLOW AMBULATORY MEDICATION CHARGE: Performed by: INTERNAL MEDICINE

## 2021-10-17 PROCEDURE — A9270 NON-COVERED ITEM OR SERVICE: HCPCS | Performed by: FAMILY MEDICINE

## 2021-10-17 PROCEDURE — 700102 HCHG RX REV CODE 250 W/ 637 OVERRIDE(OP): Performed by: FAMILY MEDICINE

## 2021-10-17 RX ORDER — TERBINAFINE HYDROCHLORIDE 250 MG/1
250 TABLET ORAL DAILY
Qty: 30 TABLET | Refills: 0 | Status: SHIPPED | OUTPATIENT
Start: 2021-10-18 | End: 2021-11-23

## 2021-10-17 RX ORDER — LANOLIN ALCOHOL/MO/W.PET/CERES
100 CREAM (GRAM) TOPICAL DAILY
Qty: 30 TABLET | Refills: 0 | Status: SHIPPED | OUTPATIENT
Start: 2021-10-18 | End: 2021-10-17

## 2021-10-17 RX ORDER — LANOLIN ALCOHOL/MO/W.PET/CERES
100 CREAM (GRAM) TOPICAL DAILY
Qty: 30 TABLET | Refills: 1 | Status: SHIPPED | OUTPATIENT
Start: 2021-10-18 | End: 2021-11-05 | Stop reason: SDUPTHER

## 2021-10-17 RX ADMIN — Medication 100 MG: at 07:41

## 2021-10-17 RX ADMIN — THERA TABS 1 TABLET: TAB at 07:41

## 2021-10-17 RX ADMIN — TERBINAFINE 250 MG: 250 TABLET ORAL at 07:41

## 2021-10-17 NOTE — PROGRESS NOTES
Covid-19 surge in effect.    Oriented to person and place.  Denied any pain or needs.     current F/C/D, no acute change in back pain, no purulence from wounds; tolerated Bactrim well. No drug rash, N/V/D. Additional ulcer(s) noted? yes. Multiple smaller and generally less inflamed and necrotic wounds on the face, forearms, left thigh and elsewhere. Mr. Shamir Munoz has a past medical history of Back pain, Cancer ([facility] [address] Depression, GERD (gastroesophageal reflux disease), Hepatitis C, Intentional drug overdose ([facility] MRSA (methicillin resistant staph aureus) culture positive, Polysubstance abuse ([facility] and Wears glasses. He has a past surgical history that includes shoulder surgery (Left, 04/01/2014); Upper gastrointestinal endoscopy (01/01/2014); IR PERC ASPIRATION INTERVERT DISC (11/25/2020); and Appendectomy. His family history includes Cancer in his mother; Heart Disease in his father. Mr. Shamir Munoz reports that he has been smoking cigarettes. He has been smoking about 1.00 pack per day. He has never used smokeless tobacco. He reports current drug use. Frequency: 7.00 times per week. Drug: Marijuana. He reports that he does not drink alcohol. Tells me that he hasn't used illicit drugs (I'm not counting the marijuana) in a couple of years, but see lab results below. His current medication list consists of OLANZapine, buPROPion, busPIRone, folic acid, gabapentin, ibuprofen, melatonin, pantoprazole, and traZODone. Allergies: Patient has no known allergies. Pertinent items from the review of systems are discussed in the HPI; the remainder of the ROS was reviewed and is negative.      Objective:     Vitals:    01/06/21 0959   BP: (!) 146/84   Pulse: 101   Resp: 18   Temp: 98.4 °F (36.9 °C)   TempSrc: Oral   Weight: 146 lb 3.2 oz (66.3 kg)   Height: 5' 9\" (1.753 m)       Constitutional:  well-developed, thin, chronically ill appearing  Psychiatric:  oriented to person, place and time; affect seems a little anxious and agitated; was scratching a bit at times  Eyes:  pupils equal, round and reactive to light; sclerae anicteric, conjunctivae not pale  ENT: no thrush or oral ulcers, mucous membranes moist  Abd: soft, NT, ND, good BS  Cardiovascular:  bilateral radial pulses palpable; no upper extremity edema  Lymphatic:  no axillary or epitrochlear adenopathy, no UE cellulitis or angitis   Musculoskeletal:  no clubbing, cyanosis or petechiae; RUE and LUE with no gross effusions, joint misalignment or acute arthritis  Moraima-ulcer skin: indurated, pink, dl, warm, dry and hyperkeratotic - most hyperkeratosis / callus / induration near wrists  Ulcer(s): largely dry, superficial, a bit of necrosis, fibrin, no extension into SQ tissue or deeper, no purulence. Photos also saved in electronic chart. Today's Wound Measurements, per RN documentation:  Wound 01/06/21 #1, Right Hand, Trauma, Full Thickness, Onset 7/2020-Wound Length (cm): 2 cm  Wound 01/06/21 #2, Left Hand, Trauma, Full Thickness, Onset 7/2020-Wound Length (cm): 3.3 cm    Wound 01/06/21 #1, Right Hand, Trauma, Full Thickness, Onset 7/2020-Wound Width (cm): 1.9 cm  Wound 01/06/21 #2, Left Hand, Trauma, Full Thickness, Onset 7/2020-Wound Width (cm): 2.8 cm    Wound 01/06/21 #1, Right Hand, Trauma, Full Thickness, Onset 7/2020-Wound Depth (cm): 0.1 cm  Wound 01/06/21 #2, Left Hand, Trauma, Full Thickness, Onset 7/2020-Wound Depth (cm): 0.1 cm  ______________________________    Lab Results   Component Value Date    LABALBU 4.2 12/09/2020     Lab Results   Component Value Date    CREATININE 1.0 12/09/2020     Lab Results   Component Value Date    HGB 12.5 (L) 12/09/2020     Lab Results   Component Value Date    LABA1C 5.7 12/07/2019     Hep C RNA PCR positive in April and October. Sep 25 abscess Cx -- MRSA. Oct 8 BCx with Staph epi, but MRSA DNA detected. Oct 12 fluid Cx negative (disc space, I believe). Dec 23 arm lesion Cx -- MRSA. Multiple tox screens positive for amphetamines, benzos, opiates, PCP, as recently as December 2020. 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    Ferguson's esophagus with dysplasia K22.719    Gastroesophageal reflux disease without esophagitis K21.9    Hepatitis C virus infection without hepatic coma B19.20    IVDU (intravenous drug user) F19.90    Polysubstance abuse ([facility] [address] F19.10    Depression F32.9    Osteomyelitis of vertebra, lumbosacral region ([address] M46.27    Tobacco abuse Z72.0    Epidural abscess G06.2    Mild malnutrition ([address] E44.1    Discitis of lumbosacral region M46.47    Habitual self-excoriation F42.4    Multiple open wounds (arms, hands, left thigh, face, etc) T07. 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 Nothing grossly infected today. Significant pain, which sounds neuropathic in part - will also be a hard symptom to manage with his underlying substance and psychiatric issues. Difficult to even recommend an immediate plan for substance abuse treatment for him, when he won't or can't admit to using. Might need some debridement at some point, but I think in too much pain today, and getting the wounds more moist would help first. Factors contributing to occurrence and/or persistence of the chronic ulcer include substance abuse. Medical necessity of today's visit is shown by the above documentation. Sharp debridement is not indicated today, based upon the exam findings in the wound(s) above.      Discharge plan:     Treatment in the wound care center today, per RN documentation: Wound 01/06/21 #1, Right Hand, Trauma, Full Thickness, Onset 7/2020-Dressing/Treatment: Other (comment)(PSO,Adaptic,gauze,Dario,Spandagrip F to keep drsg in place)  Wound 01/06/21 #2, Left Hand, Trauma, Full Thickness, Onset 7/2020-Dressing/Treatment: Other (comment)(PSO,Adaptic,gauze,Dario,Spandagrip F to keep drsg in place). No additional systemic Abx needed for wounds right now. Treatment plan for spinal infection per Dr. Nichole Currie. Pain mgmt, psychiatric care and substance abuse care per his PCP. Called in some topical lidocaine for PRN use at time of dressing changes. Was going to call in some mupirocin, but was told it wasn't on his insurance company's formulary. Decided on clindamycin gel instead -- not classic Rx for this, but it should keep the wounds more moist, it's on formulary, and his MRSA has been clinda-(S) in the past.     Also recommended that he purchase some Hibiclens solution for use after showers, to try to decrease overall level of skin colonization with MRSA, perhaps decrease the odds of more wounds opening up or becoming infected. Home treatment: good handwashing before and after any dressing changes. Cleanse ulcer with saline or soap & water before dressing change. May use Vaseline (petrolatum), Aquaphor, Aveeno, CeraVe, Cetaphil, Eucerin, Lubriderm, etc for dry skin. Dressing type for home: clindamycin gel, Adaptic, gauze pad, Kilng or Kerlix, light Spandagrip to hold in place on upper extremities, once daily. Written discharge instructions given to patient. Follow up in 2 weeks, but call sooner with concerns or questions.     Electronically signed by Mauro Ramos MD on 1/10/2021 at 4:00 PM.

## 2021-10-18 ENCOUNTER — PHARMACY VISIT (OUTPATIENT)
Dept: PHARMACY | Facility: MEDICAL CENTER | Age: 66
End: 2021-10-18
Payer: COMMERCIAL

## 2021-10-18 ENCOUNTER — PATIENT OUTREACH (OUTPATIENT)
Dept: HEALTH INFORMATION MANAGEMENT | Facility: OTHER | Age: 66
End: 2021-10-18

## 2021-10-18 VITALS
HEART RATE: 88 BPM | SYSTOLIC BLOOD PRESSURE: 115 MMHG | TEMPERATURE: 98.2 F | BODY MASS INDEX: 29.44 KG/M2 | HEIGHT: 72 IN | DIASTOLIC BLOOD PRESSURE: 65 MMHG | RESPIRATION RATE: 16 BRPM | OXYGEN SATURATION: 98 % | WEIGHT: 217.37 LBS

## 2021-10-18 PROCEDURE — A9270 NON-COVERED ITEM OR SERVICE: HCPCS | Performed by: FAMILY MEDICINE

## 2021-10-18 PROCEDURE — 99239 HOSP IP/OBS DSCHRG MGMT >30: CPT | Performed by: INTERNAL MEDICINE

## 2021-10-18 PROCEDURE — 700102 HCHG RX REV CODE 250 W/ 637 OVERRIDE(OP): Performed by: NURSE PRACTITIONER

## 2021-10-18 PROCEDURE — 700102 HCHG RX REV CODE 250 W/ 637 OVERRIDE(OP): Performed by: FAMILY MEDICINE

## 2021-10-18 PROCEDURE — A9270 NON-COVERED ITEM OR SERVICE: HCPCS | Performed by: NURSE PRACTITIONER

## 2021-10-18 RX ADMIN — THERA TABS 1 TABLET: TAB at 07:35

## 2021-10-18 RX ADMIN — TERBINAFINE 250 MG: 250 TABLET ORAL at 07:35

## 2021-10-18 RX ADMIN — Medication 100 MG: at 07:35

## 2021-10-18 NOTE — DISCHARGE SUMMARY
"Discharge Summary    CHIEF COMPLAINT ON ADMISSION  Chief Complaint   Patient presents with   • ALOC       Reason for Admission  EMS     CODE STATUS  Full Code    HPI & HOSPITAL COURSE    As per chart review:  \"  66-year-old male with no known PMHx who presented to the hospital on 6/4/2021 after he was found down on the sidewalk after multiple witnessed falls.  Patient was confused at the time of admission.  He had been seen earlier in the day for syncope while crossing the street however at that time left AGAINST MEDICAL ADVICE.  On admission he was tachycardic with a heart rate of 114, anion gap of 23 and a lactic acid of 2.6.  He had a CT scan of the head, which revealed no acute intracranial abnormalities.  Chest x-ray completed, which showed no acute cardiopulmonary process.  Patient was admitted for further evaluation of altered mental status and syncope. Patient throughout this stay has been confused an lack of capacity was established.   He underwent MRI brain with and without contrast on Gavi 10, 2021, no acute abnormalities noted.     \"    The patient had difficulty discharge planning. Guardianship was sought and patient has now been accepted to a group home.    The patient was also started on terbinafine for onychomycosis for which he will require routine CMPs to evaluate liver function tests.    The patient has now been accepted to a group home, and will be discharged today.     Therefore, he is discharged in fair and stable condition to home with close outpatient follow-up.    The patient met 2-midnight criteria for an inpatient stay at the time of discharge.      FOLLOW UP ITEMS POST DISCHARGE  The patient will be discharged to a group home and will require close follow up with PCP and routine CMPs to evaluate liver function tests while on terbinafine.    DISCHARGE DIAGNOSES  Principal Problem:    Cognitive impairment POA: Yes  Active Problems:    Syncope POA: Yes    LANDY (acute kidney injury) (HCC) POA: " Yes    Marijuana use POA: Yes    Anemia POA: Yes    Onychomycosis POA: Yes    Encephalopathy POA: Yes  Resolved Problems:    Elevated lactic acid level POA: Yes    Tachycardia POA: Yes    Dehydration POA: Yes      FOLLOW UP  No future appointments.  No follow-up provider specified.    MEDICATIONS ON DISCHARGE     Medication List      START taking these medications      Instructions   multivitamin Tabs   Take 1 Tablet by mouth every day.  Dose: 1 Tablet     terbinafine 250 MG Tabs  Commonly known as: LAMISIL   Take 1 Tablet by mouth every day for 30 days.  Dose: 250 mg     thiamine 100 MG tablet  Commonly known as: THIAMINE   Take 1 Tablet by mouth every day.  Dose: 100 mg            Allergies  No Known Allergies    DIET  Orders Placed This Encounter   Procedures   • Diet Order Diet: Regular (Chobani yogurt)     Standing Status:   Standing     Number of Occurrences:   1     Order Specific Question:   Diet:     Answer:   Regular [1]     Comments:   Chobani yogurt       ACTIVITY  As tolerated.  Weight bearing as tolerated    LINES, DRAINS, AND WOUNDS  This is an automated list. Peripheral IVs will be removed prior to discharge.       Wound 08/17/21 Toe, 2nd Right (Active)   Wound Image   08/17/21 2100   Site Assessment Intact;Pink 10/11/21 2000   Periwound Assessment Dry 10/07/21 0733   Closure Open to air 09/29/21 1000   Drainage Amount None 09/13/21 0852   Treatments Moisturizing cream 09/17/21 0825   Dressing Options Open to Air 10/01/21 0800   Dressing Changed Observed 09/25/21 0825   Dressing Status Open to Air 10/07/21 0733                  MENTAL STATUS ON TRANSFER    At baseline       CONSULTATIONS  Psychiatry  Neurology  Wound Care    PROCEDURES      MR-BRAIN-WITH & W/O   Final Result         No acute intracranial process.      Nonspecific T2 hyperintensities in the deep white matter related to chronic microvascular ischemia.      LO-DBNTNLC-1 VIEW   Final Result      1.  No metallic foreign bodies detected.    2.  Right nephrolithiasis.      EC-ECHOCARDIOGRAM LTD W/ CONT   Final Result      CT-HEAD W/O   Final Result      1.  Cerebral atrophy and chronic microvascular ischemic type changes.   2.  No acute intracranial abnormality.      DX-CHEST-PORTABLE (1 VIEW)   Final Result      No acute cardiopulmonary abnormality.          LABORATORY  Lab Results   Component Value Date    SODIUM 141 10/08/2021    POTASSIUM 4.2 10/08/2021    CHLORIDE 106 10/08/2021    CO2 23 10/08/2021    GLUCOSE 121 (H) 10/08/2021    BUN 21 10/08/2021    CREATININE 1.21 10/08/2021        Lab Results   Component Value Date    WBC 7.1 09/03/2021    HEMOGLOBIN 13.1 (L) 09/03/2021    HEMATOCRIT 41.4 (L) 09/03/2021    PLATELETCT 223 09/03/2021        Total time of the discharge process exceeds 35 minutes.

## 2021-10-18 NOTE — PROGRESS NOTES
Covid-19 surge in effect.    Was sitting at communal table earlier and talking to other pts.  Pt very excited to go to [facility] tomorrow.  Denied any pain or needs.

## 2021-10-18 NOTE — PROGRESS NOTES
Pt dc'd to group home. IV's removed. Pt left unit via ambulation with medical transportation. Personal belongings with pt when leaving unit. Pt given discharge instructions prior to leaving unit including prescriptions and when to visit with physician; verbalizes understanding. Copy of discharge instructions with pt and in the chart.

## 2021-10-18 NOTE — PROGRESS NOTES
CHW called patient and RN at bedside. Patient declined scheduling a follow up appointment with a Primary Care Provider. CHW talked about the importance of proper follow up and patient still declined. CHW provided contact info for NN for patient via AVS.

## 2021-10-18 NOTE — DISCHARGE INSTRUCTIONS
Discharge Instructions    Discharged to group home by medical transportation with escort. Discharged via wheelchair, hospital escort: Yes.  Special equipment needed: Not Applicable    Be sure to schedule a follow-up appointment with your primary care doctor or any specialists as instructed.     Discharge Plan:        I understand that a diet low in cholesterol, fat, and sodium is recommended for good health. Unless I have been given specific instructions below for another diet, I accept this instruction as my diet prescription.   Other diet: regular    Special Instructions: None    · Is patient discharged on Warfarin / Coumadin?   No     Depression / Suicide Risk    As you are discharged from this [facility] facility, it is important to learn how to keep safe from harming yourself.    Recognize the warning signs:  · Abrupt changes in personality, positive or negative- including increase in energy   · Giving away possessions  · Change in eating patterns- significant weight changes-  positive or negative  · Change in sleeping patterns- unable to sleep or sleeping all the time   · Unwillingness or inability to communicate  · Depression  · Unusual sadness, discouragement and loneliness  · Talk of wanting to die  · Neglect of personal appearance   · Rebelliousness- reckless behavior  · Withdrawal from people/activities they love  · Confusion- inability to concentrate     If you or a loved one observes any of these behaviors or has concerns about self-harm, here's what you can do:  · Talk about it- your feelings and reasons for harming yourself  · Remove any means that you might use to hurt yourself (examples: pills, rope, extension cords, firearm)  · Get professional help from the community (Mental Health, Substance Abuse, psychological counseling)  · Do not be alone:Call your Safe Contact- someone whom you trust who will be there for you.  · Call your local CRISIS HOTLINE 585-4916 or 360-148-0894  · Call your local  Children's Mobile Crisis Response Team [place] (454) 605-6350 or www.[employer]  · Call the toll free National Suicide Prevention Hotlines   · National Suicide Prevention Lifeline 784-369-KUBI (0504)  · National Hope Line Network 800-SUICIDE (494-5269)

## 2021-10-18 NOTE — PROGRESS NOTES
Bedside report received at change of shift. Pt is A&Ox3-4, disoriented to event at times. Pt is scheduled to go to his group home at 1100 today. Safety precautions in place. All pt needs met at this time.     COVID-19 surge in effect.

## 2021-10-18 NOTE — DISCHARGE PLANNING
Meds-to-Beds: Discharge prescription orders listed below locked in patient drawer as patient not at bedside. HEAVEN Suazo notified. Written information regarding the dispensed prescriptions was provided to the patient including the phone number of the pharmacy to call for any questions.    Terbinafine on order and can be mailed to group home. ELLIE Mchugh notified.     Current Outpatient Medications   Medication Sig Dispense Refill   • multivitamin (THERAGRAN) Tab Take 1 Tablet by mouth every day. 30 Tablet 1   • thiamine (THIAMINE) 100 MG tablet Take 1 Tablet by mouth every day. 30 Tablet 1      eMlanie Mcelroy, PharmD

## 2021-11-05 PROBLEM — F03.90 DEMENTIA (HCC): Status: ACTIVE | Noted: 2021-11-05

## 2021-11-05 PROBLEM — Z71.89 ADVANCED DIRECTIVES, COUNSELING/DISCUSSION: Status: ACTIVE | Noted: 2021-11-05

## 2021-12-03 PROBLEM — F03.918 DEMENTIA WITH BEHAVIORAL DISTURBANCE (HCC): Status: ACTIVE | Noted: 2021-11-05

## 2022-01-01 ENCOUNTER — PATIENT MESSAGE (OUTPATIENT)
Dept: HEALTH INFORMATION MANAGEMENT | Facility: OTHER | Age: 67
End: 2022-01-01

## 2022-05-04 NOTE — PROGRESS NOTES
"Hospital Medicine Daily Progress Note    Date of Service  8/21/2021    Chief Complaint  Found down and had multiple witnessed falls.    Hospital Course  Per prior note: \"Mr. Beard is a 66-year-old male with no known PMHx who presented to the hospital on 6/4/2021 after he was found down on the sidewalk after multiple witnessed falls.  Patient was confused at the time of admission.  He had been seen earlier in the day for syncope while crossing the street however at that time left AGAINST MEDICAL ADVICE.  On admission he was tachycardic with a heart rate of 114, anion gap of 23 and a lactic acid of 2.6.  He had a CT scan of the head, which revealed no acute intracranial abnormalities.  Chest x-ray completed, which showed no acute cardiopulmonary process.  Patient was admitted for further evaluation of altered mental status and syncope. Patient throughout this stay has been confused an lack of capacity was established.   He underwent MRI brain with and without contrast on Gavi 10, 2021, no acute abnormalities noted. Guardianship being pursued and patient will need placement.\"       Interval Problem Update    Patient is alert oriented to self and place  Complains of mild discomfort onychomycosis otherwise no complaints  He is afebrile      I have personally seen and examined the patient at bedside. I discussed the plan of care with patient, bedside RN and .    Consultants/Specialty  Neurology  Psychiatry    Code Status  Full Code    Disposition  Patient is medically cleared.   Anticipate discharge to to skilled nursing facility. Pending guardianship, medicaid and payor source per  note.  I have placed the appropriate orders for post-discharge needs.    Review of Systems  Review of Systems   Unable to perform ROS: Dementia        Physical Exam  Temp:  [36.1 °C (97 °F)-37 °C (98.6 °F)] 36.9 °C (98.5 °F)  Pulse:  [76-99] 77  Resp:  [16-18] 17  BP: (119-128)/(71-75) 119/74  SpO2:  [93 %-100 %] 100 " %    Physical Exam  Vitals and nursing note reviewed.   Constitutional:       Appearance: He is well-developed. He is not diaphoretic.   HENT:      Head: Normocephalic and atraumatic.      Mouth/Throat:      Pharynx: No oropharyngeal exudate.   Eyes:      General: No scleral icterus.        Right eye: No discharge.         Left eye: No discharge.      Conjunctiva/sclera: Conjunctivae normal.      Pupils: Pupils are equal, round, and reactive to light.   Neck:      Vascular: No JVD.      Trachea: No tracheal deviation.   Cardiovascular:      Rate and Rhythm: Normal rate and regular rhythm.      Heart sounds: No murmur heard.   No friction rub. No gallop.    Pulmonary:      Effort: Pulmonary effort is normal. No respiratory distress.      Breath sounds: Normal breath sounds. No stridor. No wheezing.   Chest:      Chest wall: No tenderness.   Abdominal:      General: Bowel sounds are normal. There is no distension.      Palpations: Abdomen is soft.      Tenderness: There is no abdominal tenderness. There is no rebound.   Musculoskeletal:         General: No tenderness.      Cervical back: Neck supple.   Skin:     General: Skin is warm and dry.      Nails: There is no clubbing.      Comments: Thick dystrophic toenails no erythema   Neurological:      Mental Status: He is alert.      Cranial Nerves: No cranial nerve deficit.      Motor: No abnormal muscle tone.      Comments: Oriented x2   Psychiatric:         Behavior: Behavior normal.         Fluids    Intake/Output Summary (Last 24 hours) at 8/21/2021 1306  Last data filed at 8/21/2021 1000  Gross per 24 hour   Intake 822 ml   Output --   Net 822 ml       Laboratory                        Imaging  MR-BRAIN-WITH & W/O   Final Result         No acute intracranial process.      Nonspecific T2 hyperintensities in the deep white matter related to chronic microvascular ischemia.      [id number]-[id number] VIEW   Final Result      1.  No metallic foreign bodies detected.   2.  Right  nephrolithiasis.      EC-ECHOCARDIOGRAM LTD W/ CONT   Final Result      CT-HEAD W/O   Final Result      1.  Cerebral atrophy and chronic microvascular ischemic type changes.   2.  No acute intracranial abnormality.      DX-CHEST-PORTABLE (1 VIEW)   Final Result      No acute cardiopulmonary abnormality.           Assessment/Plan  * Cognitive impairment- (present on admission)  Assessment & Plan  He was evaluated by neurology. CT head and MRI brain negative. TSH, HIV, RPR, B12 normal.    Likely underlying dementia  Awaiting guardianship hearing    Onychomycosis- (present on admission)  Assessment & Plan  Previously discussed with nursing staff regarding trimming toenails awaiting available wound care nurse for toenail care  Continue topical miconazole        Anemia  Assessment & Plan  Monitor CBC periodically  No clinical signs of active bleeding  Basic work-up negative  Will need further work-up as outpatient if anemia persistent    Marijuana use  Assessment & Plan  Presented with positive drugs screen for cannabinoid    Syncope- (present on admission)  Assessment & Plan  Had initially presented to the ED for syncope and left AMA; subsequently found down on sidewalk after multiple witnessed falls. CT head and brain MRI negative. Echo unremarkable. No significant arrhythmias found on tele     No recurrence of syncope monitor clinically         VTE prophylaxis: ambulatory         Detail Level: Detailed Depth Of Biopsy: dermis Was A Bandage Applied: Yes Size Of Lesion In Cm: 0 Accession #: [id number] Biopsy Type: H and E Biopsy Method: double edge Personna blade Anesthesia Type: 1% lidocaine without epinephrine Anesthesia Volume In Cc: 0.8 Hemostasis: Electrocautery Wound Care: Petrolatum Dressing: bandage Destruction After The Procedure: No Type Of Destruction Used: Electrodesiccation and Curettage Curettage Text: The wound bed was treated with curettage after the biopsy was performed. Cryotherapy Text: The wound bed was treated with cryotherapy after the biopsy was performed. Electrodesiccation Text: The wound bed was treated with electrodesiccation after the biopsy was performed. Electrodesiccation And Curettage Text: The wound bed was treated with electrodesiccation and curettage after the biopsy was performed. Silver Nitrate Text: The wound bed was treated with silver nitrate after the biopsy was performed. Consent: Written consent was obtained and risks were reviewed including but not limited to scarring, infection, bleeding, scabbing, incomplete removal, nerve damage and allergy to anesthesia. Post-Care Instructions: I reviewed with the patient in detail post-care instructions. Patient is to keep the biopsy site dry overnight, and then apply vaseline ointment twice daily until healed. Patient may apply hydrogen peroxide soaks to remove any crusting. Notification Instructions: Patient will be notified of biopsy results. However, patient instructed to call the office if not contacted within 2 weeks. Billing Type: Third-Party Bill Information: Selecting Yes will display possible errors in your note based on the variables you have selected. This validation is only offered as a suggestion for you. PLEASE NOTE THAT THE VALIDATION TEXT WILL BE REMOVED WHEN YOU FINALIZE YOUR NOTE. IF YOU WANT TO FAX A PRELIMINARY NOTE YOU WILL NEED TO TOGGLE THIS TO 'NO' IF YOU DO NOT WANT IT IN YOUR FAXED NOTE.

## 2022-09-19 PROBLEM — N17.9 AKI (ACUTE KIDNEY INJURY) (HCC): Status: RESOLVED | Noted: 2021-06-04 | Resolved: 2022-01-01

## 2022-09-19 NOTE — PROGRESS NOTES
Pt is resting in bed, denied pain. A&Ox1, reoriented pt. Denied further needs. Bed rails up. Reinforced use of call light for assistance. Refused full skin check, education provided. Bed alarm on and room by nursing station.       Yes - the patient is able to be screened

## 2022-09-20 PROBLEM — R19.7 DIARRHEA: Status: RESOLVED | Noted: 2021-08-30 | Resolved: 2022-01-01

## 2022-09-20 PROBLEM — E83.39 HYPOPHOSPHATEMIA: Status: RESOLVED | Noted: 2021-06-04 | Resolved: 2022-01-01

## 2022-09-20 PROBLEM — R41.89 COGNITIVE IMPAIRMENT: Status: RESOLVED | Noted: 2021-07-27 | Resolved: 2022-01-01

## 2022-09-20 PROBLEM — R55 SYNCOPE: Status: RESOLVED | Noted: 2021-06-04 | Resolved: 2022-01-01

## 2022-09-20 PROBLEM — G93.40 ENCEPHALOPATHY: Status: RESOLVED | Noted: 2021-09-07 | Resolved: 2022-01-01

## 2022-09-20 PROBLEM — E87.6 HYPOKALEMIA: Status: RESOLVED | Noted: 2021-06-04 | Resolved: 2022-01-01

## 2022-09-20 PROBLEM — E87.29 METABOLIC ACIDOSIS, INCREASED ANION GAP (IAG): Status: RESOLVED | Noted: 2021-06-04 | Resolved: 2022-01-01

## 2022-09-20 PROBLEM — F12.90 MARIJUANA USE: Status: RESOLVED | Noted: 2021-06-23 | Resolved: 2022-01-01

## 2022-11-05 NOTE — PROGRESS NOTES
Pt A&Ox2, on RA, denied pain/discomfort. Pt up self with steady gait. No signs of acute distress observed at this time. Bed locked in lowest position, upper rails raised, call light within reach, Hourly rounding in place.      1 RN Skin Assessment completed.   Patient's risk of skin breakdown: Low    Devices in place and skin assessed under:   [] Pulse Ox  [] PIV [] Central Line [] SCDs []Purewick  [] Olsen  []Condom Cath   [] BMS        []  Cortrak   []  Oxymask   [] Bipap    [] Nasal Canula   [] N/A   [x] Other(specify):  WG left ankle    Right Ear  [x] WDL                or           [] Skin issue present (please provide assessment/description below)    Left Ear  [x] WDL                or           [] Skin issue present (please provide assessment/description below)    Right Elbow:  [x] WDL               or           [] Skin issue present (please provide assessment/description below)    Left Elbow:   [x] WDL                or           [] Skin issue present (please provide assessment/description below)    Coccyx/Buttocks:  [x] WDL                or           [] Skin issue present (please provide assessment/description below)    Right Heel/Foot/Toes:  [] WDL                or           [x] Skin issue present (please provide assessment/description below)  dry, flaky  scars on knee  Left Heel/Foot/Toes:   [] WDL              or           [x] Skin issue present (please provide assessment/description below)  dry, flaky  Scabs/scars on knee    **Describe any other skin issues in areas not already listed from above list:   [x] N/A    Interventions In Place: Waffle Overlay, Pillows and Pressure Redistribution Mattress       no

## 2023-05-15 NOTE — DISCHARGE PLANNING
Anticipated Discharge Disposition: TBD    Action: Patient has not had any acting out behaviors.  Patient has a very flat affect, shows minimal emotions, typically only orientated to self.    Barriers to Discharge: guardianship, medicaid, cost of care    Plan: continue to monitor for discharge barriers      Guardianship packet sent to Anjelica Sullivan on 7/19/21   none at this time    ASSESSMENT:  Activity/Treatment Tolerance:  [x]  Patient tolerated treatment well  []  Patient limited by fatigue  []  Patient limited by pain   []  Patient limited by medical complications  []  Other: Body Structures/Functions/Activity Limitations: Impaired cognition, Impaired speech, Impaired receptive language, and Impaired expressive language  Prognosis: fair  Prognosis and duration of therapy are dependent on many factors including attendance, motivation, learning capacity, physiological status and home follow-through of therapy assignments. There is no risk associated with this therapy. The benefit of therapy is that it may prevent social, emotional or academic problems from occurring due to the speech/language deficit. PLAN:  Treatment Recommendations: It is recommended that Ga Huber participate in skilled speech and language therapy in order to determine the best fit mode and method of communication in order to begin targeting functional early developing communication skills. [x]  Plan of care initiated. Plan to see patient 1 times per week for 12 weeks to address the treatment planned outlined above. []  Continue with current plan of care  []  Modify plan of care as follows:    []  Hold pending physician visit  []  Discharge    Thank you for choosing [facility]. If we can be of further assistance or you have questions about this evaluation, please call our Outpatient Pediatric Rehabilitation Office: [phone].     Time In 1430   Time Out 1500   Timed Code Minutes: 0  min   Total Treatment Time: 30 min     Electronically Signed by: STACIA Tello
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
